# Patient Record
Sex: FEMALE | Race: WHITE | NOT HISPANIC OR LATINO | Employment: OTHER | URBAN - METROPOLITAN AREA
[De-identification: names, ages, dates, MRNs, and addresses within clinical notes are randomized per-mention and may not be internally consistent; named-entity substitution may affect disease eponyms.]

---

## 2017-01-05 ENCOUNTER — OFFICE VISIT (OUTPATIENT)
Dept: DERMATOLOGY | Facility: CLINIC | Age: 77
End: 2017-01-05

## 2017-01-05 DIAGNOSIS — M06.9 RHEUMATOID ARTHRITIS FLARE (H): Primary | ICD-10-CM

## 2017-01-05 DIAGNOSIS — L88 PYODERMA GANGRENOSUM (H): ICD-10-CM

## 2017-01-05 ASSESSMENT — PAIN SCALES - GENERAL: PAINLEVEL: NO PAIN (0)

## 2017-01-05 NOTE — NURSING NOTE
"Dermatology Rooming Note    Abida Hahn's goals for this visit include:   Chief Complaint   Patient presents with     Derm Problem     Ulcer on ankle. Abida states that this is \"wonderful\" since her last visit.     La Nena Figueroa, CMA    "

## 2017-01-05 NOTE — Clinical Note
"1/5/2017       RE: Abida Hahn  [address]  [facility] [address]     Dear Colleague,    Thank you for referring your patient, Abida Hahn, to the [facility] DERMATOLOGY at [facility]. Please see a copy of my visit note below.    [facility] Dermatology Note      Dermatology Problem List:  1 Skin cancer screening 2011 with Dr Garza: resolving herpes on face, Seborrheic keratoses, acrochordons, cherry angiomas  2. Skin eruption to her left ankle, Seen by Dr. Johnson Meraz at [facility] Dermatology   3. Personal hx of eczema in her youth    CC:   Chief Complaint   Patient presents with     Derm Problem     Ulcer on ankle. Abida states that this is \"wonderful\" since her last visit.         Encounter Date: Jan 5, 2017    History of Present Illness:  Ms. Abida Hahn is a 76 year old female with history pyoderma gangrenosum. She was last seen here 12/01/2016 when 2.0 total cc Kenalog 10 mg/cc were injected into pyoderma grangrenosum lesions and her minocycline was reduced to 100 mg. She has continued to apply mupirocin ointment daily.     Since then, Abida is elated to report significant closure and involution of her lesions. No lesions are becoming worse; all seem to be \"closing up.\" she presents today for further treatment with intralesional Kenalog.     She is having worse joint stiffness and is considering seeing a rheumatologist again. She would like a referral to another provider here. She was hospitalized recently at [facility] for chest pain but no cardiac etiology was found. Other than that , she is feeling well. The patient denies GI upset, headaches, blurred vision. No other skin complaints.      She is here today with her daughter.         Past Medical History:   Patient Active Problem List   Diagnosis     Chronic pain syndrome     Essential hypertension     Rheumatoid arthritis (H)     Encounter for long-term current use of medication     " Seborrheic keratosis     Hemangioma     Acrochordon     Pyoderma gangrenosum     Past Medical History   Diagnosis Date     HTN (hypertension)      Glaucoma      Cataract 8/2011     Right s/p surgery 8/2011; left s/p surgery     Wrist fracture      Right, s/p ORIF     Abscess, toe 2009     Right great toe     Headache(784.0)      Relieved with gabapentin. Chronic pain     RA (rheumatoid arthritis) (H)      Past Surgical History   Procedure Laterality Date     Open reduction internal fixation wrist bilateral       Biopsy artery temporal       Extracapsular cataract extration with intraocular lens implant  8/2011     Left 2 years ago as well       Social History:  The patient is single. The patient denies use of tanning beds. Patiently awaiting great grandchildren from her 24-year-old granddaughter, whom she loves very much.    Family History:  There is no family history of asthma, eczema or allergies.    Medications:  Current Outpatient Prescriptions   Medication Sig Dispense Refill     triamcinolone acetonide (KENALOG) 10 MG/ML injection Inject 1 mL (10 mg) into the skin once for 1 dose 5 mL 0     minocycline (MINOCIN/DYNACIN) 100 MG capsule Take 1 capsule (100 mg) by mouth daily 60 capsule 0     mupirocin (BACTROBAN) 2 % ointment Apply twice daily to areas on the left ankle. 30 g 3     clobetasol (TEMOVATE) 0.05 % ointment Apply topically 2 times daily To affected areas, avoiding face. 60 g 1     hydrocortisone 1 % ointment Apply topically 2 times daily To affected area on right upper cheek 25 g 1     folic acid 800 MCG TABS Take by mouth 2 times daily       losartan (COZAAR) 25 MG tablet Take 25 mg by mouth 2 times daily       Cholecalciferol (VITAMIN D-3) 1000 UNITS CAPS Take by mouth 2 times daily       acetaminophen (TYLENOL) 500 MG tablet Take 500-1,000 mg by mouth every 6 hours as needed       levothyroxine (SYNTHROID, LEVOTHROID) 88 MCG tablet Take 88 mcg by mouth daily       ARTIFICIAL TEAR SOLUTION OP Apply   to eye as needed.       Calcium-Vitamin D (CALCIUM + D PO) Take 1 tablet by mouth 2 times daily       Multiple Vitamin (DAILY MULTIVITAMIN PO) Take 1 tablet by mouth daily And minerals per pt       Allergies   Allergen Reactions     Ibuprofen Sodium      Penicillins      Triple Antibiotic [Neomycin-Polymyxin-Dexameth]          Review of Systems:  -Skin/Heme New Pt: The patient denies frequent sun exposure. The patient denies excessive scarring or problems healing except as per HPI. The patient denies excessive bleeding.  -Constitutional: The patient denies fatigue, fevers, chills, unintended weight loss, and night sweats.  -HEENT: Patient denies nonhealing oral sores.  -Skin: As above in HPI. No additional skin concerns.  -The patient denies GI upset, headaches, blurred vision or dyspigmentation.    Physical exam:  Vitals: There were no vitals taken for this visit.  GEN: This is a well developed, well-nourished female in no acute distress, in a pleasant mood.    SKIN: Total skin excluding the genitalia and breast areas was performed. The exam included the head/face, neck, both arms, chest, back, abdomen, both legs, digits and/or nails. Significant for:   Faint pink plaques with gray rolling borders to her left anterior and lateral lower leg, no erythema. The ulcer on the left lower anterior ankle measures 2 x 1 cm. Non tender.   Edema noted to to the bilateral lower legs.   -No other lesions of concern on areas examined.     Impression/Plan:    Pyoderma gangrenosum- improving with minocycline and kenalog injections. Skin eruption primarily to the left lower extremity. Resolved on right upper cheek.    Continue minocycline 100 mg daily . Minocycline side effects discussed.   Kenalog intralesional injection procedure note (performed by faculty): After verbal consent and discussion of risks including but not limited to atrophy, pain, and bruising, cleansing with isopropyl alcohol, time out was performed, 1.7 total cc  of Kenalog 10 mg/cc was injected into lesions on the left lower leg.  The patient tolerated the procedure well and left the Dermatology clinic in good condition.  Photodocumentation performed.   Continue mupirocin ointment twice daily.   She was referred again to rheumatology for reassessment of her rheumatoid arthritis.       CC Dr. Meraz and PMD on close of this encounter.        Staff Involved:    All risks, benefits and alternatives were discussed with patient.  Patient is in agreement and understands the assessment and plan.  All questions were answered.  Sun Screen Education was given.   Return to Clinic 4 weeks or sooner as needed.   Lillian De Luna PA-C       Pictures were placed in Pt's chart today for future reference.    Again, thank you for allowing me to participate in the care of your patient.      Sincerely,    Lillian De Luna PA-C

## 2017-01-05 NOTE — MR AVS SNAPSHOT
After Visit Summary   1/5/2017    Abida Hahn    MRN: [id number]           Patient Information     Date Of Birth          1940        Visit Information        Provider Department      1/5/2017 12:15 PM Lillian De Luna PA-C [facility] Dermatology        Today's Diagnoses     Rheumatoid arthritis flare (H)    -  1        Follow-ups after your visit        Additional Services     RHEUMATOLOGY REFERRAL       Your provider has referred you to: [facility]: Rheumatology Clinic [facility] [phone]   [email or web address]    Please be aware that coverage of these services is subject to the terms and limitations of your health insurance plan.  Call member services at your health plan with any benefit or coverage questions.      Please bring the following with you to your appointment:    (1) Any X-Rays, CTs or MRIs which have been performed.  Contact the facility where they were done to arrange for  prior to your scheduled appointment.    (2) List of current medications   (3) This referral request   (4) Any documents/labs given to you for this referral                  Your next 10 appointments already scheduled     Feb 02, 2017 12:15 PM   (Arrive by 12:00 PM)   Return Visit with VIKTORIA Rocha [facility] Dermatology ([facility] [place])    [address] [place] [id number] [phone]              Who to contact     Please call your clinic at [phone] to:    Ask questions about your health    Make or cancel appointments    Discuss your medicines    Learn about your test results    Speak to your doctor   If you have compliments or concerns about an experience at your clinic, or if you wish to file a complaint, please contact [facility] Patient Relations at [phone] or email us at [email or web address]         Additional Information About Your Visit         Care EveryWhere ID     This is your Care EveryWhere ID. This could be used by other organizations to access your [place] medical records  [id number]         Blood Pressure from Last 3 Encounters:   07/07/16 151/83   12/23/11 143/77   10/17/11 132/84    Weight from Last 3 Encounters:   12/23/11 92.987 kg (205 lb)   10/17/11 90.447 kg (199 lb 6.4 oz)   08/18/11 89.086 kg (196 lb 6.4 oz)              We Performed the Following     RHEUMATOLOGY REFERRAL        Primary Care Provider Office Phone # Fax #    Zeinab Giang [phone]        [facility] [address]  [facility] [address]        Thank you!     Thank you for choosing [facility] DERMATOLOGY  for your care. Our goal is always to provide you with excellent care. Hearing back from our patients is one way we can continue to improve our services. Please take a few minutes to complete the written survey that you may receive in the mail after your visit with us. Thank you!             Your Updated Medication List - Protect others around you: Learn how to safely use, store and throw away your medicines at www.disposemymeds.org.          This list is accurate as of: 1/5/17 12:56 PM.  Always use your most recent med list.                   Brand Name Dispense Instructions for use    acetaminophen 500 MG tablet    TYLENOL     Take 500-1,000 mg by mouth every 6 hours as needed       ARTIFICIAL TEAR SOLUTION OP      Apply  to eye as needed.       CALCIUM + D PO      Take 1 tablet by mouth 2 times daily       clobetasol 0.05 % ointment    TEMOVATE    60 g    Apply topically 2 times daily To affected areas, avoiding face.       DAILY MULTIVITAMIN PO      Take 1 tablet by mouth daily And minerals per pt       folic acid 800 MCG Tabs      Take by mouth 2 times daily       hydrocortisone 1 % ointment     25 g    Apply topically 2 times daily To affected area on right upper cheek       levothyroxine 88 MCG tablet    SYNTHROID/LEVOTHROID      Take 88 mcg by mouth daily       losartan 25 MG tablet    COZAAR     Take 25 mg by mouth 2 times daily       minocycline 100 MG capsule    MINOCIN/DYNACIN    60 capsule    Take 1 capsule (100 mg) by mouth daily       mupirocin 2 % ointment    BACTROBAN    30 g    Apply twice daily to areas on the left ankle.       Vitamin D-3 1000 UNITS Caps      Take by mouth 2 times daily

## 2017-01-05 NOTE — PROGRESS NOTES
"[facility] Dermatology Note      Dermatology Problem List:  1 Skin cancer screening 2011 with Dr Garza: resolving herpes on face, Seborrheic keratoses, acrochordons, cherry angiomas  2. Skin eruption to her left ankle, Seen by Dr. Johnson Meraz at [facility] Dermatology   3. Personal hx of eczema in her youth    CC:   Chief Complaint   Patient presents with     Derm Problem     Ulcer on ankle. Abida states that this is \"wonderful\" since her last visit.         Encounter Date: Jan 5, 2017    History of Present Illness:  Ms. Abida Hahn is a 76 year old female with history pyoderma gangrenosum. She was last seen here 12/01/2016 when 2.0 total cc Kenalog 10 mg/cc were injected into pyoderma grangrenosum lesions and her minocycline was reduced to 100 mg. She has continued to apply mupirocin ointment daily.     Since then, Abida is elated to report significant closure and involution of her lesions. No lesions are becoming worse; all seem to be \"closing up.\" she presents today for further treatment with intralesional Kenalog.     She is having worse joint stiffness and is considering seeing a rheumatologist again. She would like a referral to another provider here. She was hospitalized recently at [facility] for chest pain but no cardiac etiology was found. Other than that , she is feeling well. The patient denies GI upset, headaches, blurred vision. No other skin complaints.      She is here today with her daughter.         Past Medical History:   Patient Active Problem List   Diagnosis     Chronic pain syndrome     Essential hypertension     Rheumatoid arthritis (H)     Encounter for long-term current use of medication     Seborrheic keratosis     Hemangioma     Acrochordon     Pyoderma gangrenosum     Past Medical History   Diagnosis Date     HTN (hypertension)      Glaucoma      Cataract 8/2011     Right s/p surgery 8/2011; left s/p surgery     Wrist fracture      Right, s/p ORIF     Abscess, toe 2009 "     Right great toe     Headache(784.0)      Relieved with gabapentin. Chronic pain     RA (rheumatoid arthritis) (H)      Past Surgical History   Procedure Laterality Date     Open reduction internal fixation wrist bilateral       Biopsy artery temporal       Extracapsular cataract extration with intraocular lens implant  8/2011     Left 2 years ago as well       Social History:  The patient is single. The patient denies use of tanning beds. Patiently awaiting great grandchildren from her 24-year-old granddaughter, whom she loves very much.    Family History:  There is no family history of asthma, eczema or allergies.    Medications:  Current Outpatient Prescriptions   Medication Sig Dispense Refill     triamcinolone acetonide (KENALOG) 10 MG/ML injection Inject 1 mL (10 mg) into the skin once for 1 dose 5 mL 0     minocycline (MINOCIN/DYNACIN) 100 MG capsule Take 1 capsule (100 mg) by mouth daily 60 capsule 0     mupirocin (BACTROBAN) 2 % ointment Apply twice daily to areas on the left ankle. 30 g 3     clobetasol (TEMOVATE) 0.05 % ointment Apply topically 2 times daily To affected areas, avoiding face. 60 g 1     hydrocortisone 1 % ointment Apply topically 2 times daily To affected area on right upper cheek 25 g 1     folic acid 800 MCG TABS Take by mouth 2 times daily       losartan (COZAAR) 25 MG tablet Take 25 mg by mouth 2 times daily       Cholecalciferol (VITAMIN D-3) 1000 UNITS CAPS Take by mouth 2 times daily       acetaminophen (TYLENOL) 500 MG tablet Take 500-1,000 mg by mouth every 6 hours as needed       levothyroxine (SYNTHROID, LEVOTHROID) 88 MCG tablet Take 88 mcg by mouth daily       ARTIFICIAL TEAR SOLUTION OP Apply  to eye as needed.       Calcium-Vitamin D (CALCIUM + D PO) Take 1 tablet by mouth 2 times daily       Multiple Vitamin (DAILY MULTIVITAMIN PO) Take 1 tablet by mouth daily And minerals per pt       Allergies   Allergen Reactions     Ibuprofen Sodium      Penicillins      Triple  Antibiotic [Neomycin-Polymyxin-Dexameth]          Review of Systems:  -Skin/Heme New Pt: The patient denies frequent sun exposure. The patient denies excessive scarring or problems healing except as per HPI. The patient denies excessive bleeding.  -Constitutional: The patient denies fatigue, fevers, chills, unintended weight loss, and night sweats.  -HEENT: Patient denies nonhealing oral sores.  -Skin: As above in HPI. No additional skin concerns.  -The patient denies GI upset, headaches, blurred vision or dyspigmentation.    Physical exam:  Vitals: There were no vitals taken for this visit.  GEN: This is a well developed, well-nourished female in no acute distress, in a pleasant mood.    SKIN: Total skin excluding the genitalia and breast areas was performed. The exam included the head/face, neck, both arms, chest, back, abdomen, both legs, digits and/or nails. Significant for:   Faint pink plaques with gray rolling borders to her left anterior and lateral lower leg, no erythema. The ulcer on the left lower anterior ankle measures 2 x 1 cm. Non tender.   Edema noted to to the bilateral lower legs.   -No other lesions of concern on areas examined.     Impression/Plan:    Pyoderma gangrenosum- improving with minocycline and kenalog injections. Skin eruption primarily to the left lower extremity. Resolved on right upper cheek.    Continue minocycline 100 mg daily . Minocycline side effects discussed.   Kenalog intralesional injection procedure note (performed by faculty): After verbal consent and discussion of risks including but not limited to atrophy, pain, and bruising, cleansing with isopropyl alcohol, time out was performed, 1.7 total cc of Kenalog 10 mg/cc was injected into lesions on the left lower leg.  The patient tolerated the procedure well and left the Dermatology clinic in good condition.  Photodocumentation performed.   Continue mupirocin ointment twice daily.   She was referred again to rheumatology for  reassessment of her rheumatoid arthritis.       CC Dr. Meraz and PMD on close of this encounter.        Staff Involved:    All risks, benefits and alternatives were discussed with patient.  Patient is in agreement and understands the assessment and plan.  All questions were answered.  Sun Screen Education was given.   Return to Clinic 4 weeks or sooner as needed.   Lillian De Luna PA-C

## 2017-01-13 ENCOUNTER — TELEPHONE (OUTPATIENT)
Dept: RHEUMATOLOGY | Facility: CLINIC | Age: 77
End: 2017-01-13

## 2017-01-13 NOTE — TELEPHONE ENCOUNTER
Left message requesting a return call.    Discussed referral with Dr. Mccurdy, as he was the last to see this pt, and was seeing this in conjunction with Alessio Lofton CNP.  Dr. Mccurdy has agreed to see this pt again, is ok with a return slot, and will see this pt when he next has an opening.    Cheryl Peralta RN  Rheumatology Clinic

## 2017-01-16 NOTE — TELEPHONE ENCOUNTER
Pt returned call today.  Pt had been seeing Dr. Mccurdy/Alessio Lofton when her insurance changed and she had to transfer rheumatologist.  Pt's insurance changed back recently and she would prefer to transfer care back to Dr. Mccurdy.  Was able to schedule an appt for 2/8 with Dr. Mccurdy.  Pt is currently off MTX as she had a bacterial infection and could not be on it.  Pt is having flares and her mobility is decreasing and she would like to be able to move better.  Pt is continuing to walk daily as able at her senior care living.  Pt thinks she should be doing more.  Pt is looking to get treatment going again.  Pt is states she is still able to move but does feel like she needs to be back on medication.      Cheryl Peralta RN  Rheumatology Clinic

## 2017-02-02 ENCOUNTER — OFFICE VISIT (OUTPATIENT)
Dept: INTERNAL MEDICINE | Facility: CLINIC | Age: 77
End: 2017-02-02

## 2017-02-02 ENCOUNTER — OFFICE VISIT (OUTPATIENT)
Dept: DERMATOLOGY | Facility: CLINIC | Age: 77
End: 2017-02-02

## 2017-02-02 VITALS
SYSTOLIC BLOOD PRESSURE: 179 MMHG | DIASTOLIC BLOOD PRESSURE: 109 MMHG | BODY MASS INDEX: 32.77 KG/M2 | HEIGHT: 66 IN | RESPIRATION RATE: 18 BRPM | HEART RATE: 76 BPM | WEIGHT: 203.9 LBS | OXYGEN SATURATION: 97 %

## 2017-02-02 DIAGNOSIS — M06.9 RHEUMATOID ARTHRITIS OF HAND, UNSPECIFIED LATERALITY, UNSPECIFIED RHEUMATOID FACTOR PRESENCE: Primary | ICD-10-CM

## 2017-02-02 DIAGNOSIS — G89.4 CHRONIC PAIN SYNDROME: ICD-10-CM

## 2017-02-02 DIAGNOSIS — R06.02 SOB (SHORTNESS OF BREATH): ICD-10-CM

## 2017-02-02 DIAGNOSIS — Z00.00 HEALTHCARE MAINTENANCE: ICD-10-CM

## 2017-02-02 DIAGNOSIS — R32 URINARY INCONTINENCE, UNSPECIFIED TYPE: ICD-10-CM

## 2017-02-02 DIAGNOSIS — L88 PYODERMA GANGRENOSUM (H): ICD-10-CM

## 2017-02-02 DIAGNOSIS — Z12.31 ENCOUNTER FOR SCREENING MAMMOGRAM FOR BREAST CANCER: ICD-10-CM

## 2017-02-02 DIAGNOSIS — L88 PYODERMA GANGRENOSUM (H): Primary | ICD-10-CM

## 2017-02-02 DIAGNOSIS — I10 ESSENTIAL HYPERTENSION: ICD-10-CM

## 2017-02-02 PROBLEM — E03.9 HYPOTHYROIDISM: Status: ACTIVE | Noted: 2017-02-02

## 2017-02-02 LAB
ALBUMIN SERPL-MCNC: 3.8 G/DL (ref 3.4–5)
ALBUMIN UR-MCNC: NEGATIVE MG/DL
ALP SERPL-CCNC: 334 U/L (ref 40–150)
ALT SERPL W P-5'-P-CCNC: 22 U/L (ref 0–50)
ANION GAP SERPL CALCULATED.3IONS-SCNC: 7 MMOL/L (ref 3–14)
APPEARANCE UR: CLEAR
AST SERPL W P-5'-P-CCNC: 14 U/L (ref 0–45)
BASOPHILS # BLD AUTO: 0.1 10E9/L (ref 0–0.2)
BASOPHILS NFR BLD AUTO: 1.4 %
BILIRUB SERPL-MCNC: 0.5 MG/DL (ref 0.2–1.3)
BILIRUB UR QL STRIP: NEGATIVE
BUN SERPL-MCNC: 18 MG/DL (ref 7–30)
CALCIUM SERPL-MCNC: 10.3 MG/DL (ref 8.5–10.1)
CHLORIDE SERPL-SCNC: 102 MMOL/L (ref 94–109)
CO2 SERPL-SCNC: 31 MMOL/L (ref 20–32)
COLOR UR AUTO: YELLOW
CREAT SERPL-MCNC: 0.81 MG/DL (ref 0.52–1.04)
DIFFERENTIAL METHOD BLD: ABNORMAL
EOSINOPHIL # BLD AUTO: 0.1 10E9/L (ref 0–0.7)
EOSINOPHIL NFR BLD AUTO: 1.4 %
ERYTHROCYTE [DISTWIDTH] IN BLOOD BY AUTOMATED COUNT: 13 % (ref 10–15)
GFR SERPL CREATININE-BSD FRML MDRD: 69 ML/MIN/1.7M2
GLUCOSE SERPL-MCNC: 113 MG/DL (ref 70–99)
GLUCOSE UR STRIP-MCNC: NEGATIVE MG/DL
HCT VFR BLD AUTO: 48.7 % (ref 35–47)
HGB BLD-MCNC: 15.7 G/DL (ref 11.7–15.7)
HGB UR QL STRIP: NEGATIVE
HYALINE CASTS #/AREA URNS LPF: 1 /LPF (ref 0–2)
IMM GRANULOCYTES # BLD: 0 10E9/L (ref 0–0.4)
IMM GRANULOCYTES NFR BLD: 0.4 %
KETONES UR STRIP-MCNC: NEGATIVE MG/DL
LEUKOCYTE ESTERASE UR QL STRIP: NEGATIVE
LYMPHOCYTES # BLD AUTO: 0.9 10E9/L (ref 0.8–5.3)
LYMPHOCYTES NFR BLD AUTO: 15.3 %
MCH RBC QN AUTO: 32.6 PG (ref 26.5–33)
MCHC RBC AUTO-ENTMCNC: 32.2 G/DL (ref 31.5–36.5)
MCV RBC AUTO: 101 FL (ref 78–100)
MONOCYTES # BLD AUTO: 0.3 10E9/L (ref 0–1.3)
MONOCYTES NFR BLD AUTO: 4.4 %
MUCOUS THREADS #/AREA URNS LPF: PRESENT /LPF
NEUTROPHILS # BLD AUTO: 4.4 10E9/L (ref 1.6–8.3)
NEUTROPHILS NFR BLD AUTO: 77.1 %
NITRATE UR QL: NEGATIVE
NRBC # BLD AUTO: 0 10*3/UL
NRBC BLD AUTO-RTO: 0 /100
PH UR STRIP: 6 PH (ref 5–7)
PLATELET # BLD AUTO: 209 10E9/L (ref 150–450)
POTASSIUM SERPL-SCNC: 4.1 MMOL/L (ref 3.4–5.3)
PROT SERPL-MCNC: 7.5 G/DL (ref 6.8–8.8)
RBC # BLD AUTO: 4.82 10E12/L (ref 3.8–5.2)
RBC #/AREA URNS AUTO: 1 /HPF (ref 0–2)
SODIUM SERPL-SCNC: 140 MMOL/L (ref 133–144)
SP GR UR STRIP: 1.01 (ref 1–1.03)
TROPONIN I SERPL-MCNC: NORMAL UG/L (ref 0–0.04)
URN SPEC COLLECT METH UR: ABNORMAL
UROBILINOGEN UR STRIP-MCNC: 0 MG/DL (ref 0–2)
WBC # BLD AUTO: 5.7 10E9/L (ref 4–11)
WBC #/AREA URNS AUTO: <1 /HPF (ref 0–2)

## 2017-02-02 RX ORDER — LISINOPRIL 20 MG/1
20 TABLET ORAL DAILY
Qty: 90 TABLET | Refills: 0 | Status: SHIPPED | OUTPATIENT
Start: 2017-02-02 | End: 2017-05-02

## 2017-02-02 RX ORDER — MINOCYCLINE HYDROCHLORIDE 50 MG/1
50 CAPSULE ORAL DAILY
Qty: 90 CAPSULE | Refills: 0 | Status: SHIPPED | OUTPATIENT
Start: 2017-02-02 | End: 2017-02-02

## 2017-02-02 ASSESSMENT — PAIN SCALES - GENERAL
PAINLEVEL: MODERATE PAIN (4)
PAINLEVEL: NO PAIN (0)

## 2017-02-02 NOTE — PATIENT INSTRUCTIONS
Primary Care Center Phone Number [phone]  Primary Care Center Medication Refill Request Information:  * Please contact your pharmacy regarding ANY request for medication refills.  ** [facility] Prescription Fax = [phone]  * Please allow 3 business days for routine medication refills.  * Please allow 5 business days for controlled substance medication refills.     Primary Care Center Test Result notification information:  *You will be notified with in 7-10 days of your appointment day regarding the results of your test.  If you are on MyChart you will be notified as soon as the provider has reviewed the results and signed off on them.        Please schedule the following appointments:  Mammogram:  Breast Center ([facility]) [phone] ([facility])  Breast Center ([facility]) [phone] ([address])   Dexa Scan: Radiology ([facility]) [phone]

## 2017-02-02 NOTE — LETTER
"2/2/2017       RE: Abida Hahn  [address]  [facility] [address]     Dear Colleague,    Thank you for referring your patient, Abida Hahn, to the [facility] DERMATOLOGY at [facility]. Please see a copy of my visit note below.    [facility] Dermatology Note      Dermatology Problem List:  1 Skin cancer screening 2011 with Dr aGrza: resolving herpes on face, Seborrheic keratoses, acrochordons, cherry angiomas  2. Skin eruption to her left ankle, Seen by Dr. Johnson Meraz at [facility] Dermatology   3. Personal hx of eczema in her youth  4. Pyoderma gangrenosum- minocycyline, clobetasol, kenalog injections.    CC:   Chief Complaint   Patient presents with     Derm Problem     Abida is here for leg wound follow up, states \" It's looking wonderful.\"         Encounter Date: Feb 2, 2017    History of Present Illness:  Ms. Abida Hahn is a 76 year old female with history pyoderma gangrenosum. She was last seen here 1/05/2016 when 1.7 total cc Kenalog 10 mg/cc were injected into pyoderma grangrenosum lesions and her minocycline was continued on 100 mg daily. She has continued to apply mupirocin ointment daily.     Since then, Abida continues to report her lesions seem to be closing and she has not noticed any worsening or new symptoms.     No lesions are becoming worse; all seem to be \"closing up.\" she presents today for further treatment with intralesional Kenalog.     She is having worse joint stiffness and is planning on seeing her previous rheumatologist at the [facility].She is taking tylenol and aspirin with improvement. She is having worsening shortness of breath during excerption and is planning of follow up with primary regarding this. Other than that , she is feeling well. The patient denies GI upset, headaches, blurred vision. No other skin complaints.            Past Medical History:   Patient Active Problem List   Diagnosis     " Chronic pain syndrome     Essential hypertension     Rheumatoid arthritis (H)     Encounter for long-term current use of medication     Seborrheic keratosis     Hemangioma     Acrochordon     Pyoderma gangrenosum     Past Medical History   Diagnosis Date     HTN (hypertension)      Glaucoma      Cataract 8/2011     Right s/p surgery 8/2011; left s/p surgery     Wrist fracture      Right, s/p ORIF     Abscess, toe 2009     Right great toe     Headache(784.0)      Relieved with gabapentin. Chronic pain     RA (rheumatoid arthritis) (H)      Past Surgical History   Procedure Laterality Date     Open reduction internal fixation wrist bilateral       Biopsy artery temporal       Extracapsular cataract extration with intraocular lens implant  8/2011     Left 2 years ago as well       Social History:  The patient is single. The patient denies use of tanning beds. Patiently awaiting great grandchildren from her 24-year-old granddaughter, whom she loves very much.    Family History:  There is no family history of asthma, eczema or allergies.    Medications:  Current Outpatient Prescriptions   Medication Sig Dispense Refill     minocycline (MINOCIN/DYNACIN) 100 MG capsule Take 1 capsule (100 mg) by mouth daily 60 capsule 0     mupirocin (BACTROBAN) 2 % ointment Apply twice daily to areas on the left ankle. 30 g 3     clobetasol (TEMOVATE) 0.05 % ointment Apply topically 2 times daily To affected areas, avoiding face. 60 g 1     hydrocortisone 1 % ointment Apply topically 2 times daily To affected area on right upper cheek 25 g 1     folic acid 800 MCG TABS Take by mouth 2 times daily       losartan (COZAAR) 25 MG tablet Take 25 mg by mouth 2 times daily       Cholecalciferol (VITAMIN D-3) 1000 UNITS CAPS Take by mouth 2 times daily       acetaminophen (TYLENOL) 500 MG tablet Take 500-1,000 mg by mouth every 6 hours as needed       levothyroxine (SYNTHROID, LEVOTHROID) 88 MCG tablet Take 88 mcg by mouth daily       ARTIFICIAL  TEAR SOLUTION OP Apply  to eye as needed.       Calcium-Vitamin D (CALCIUM + D PO) Take 1 tablet by mouth 2 times daily       Multiple Vitamin (DAILY MULTIVITAMIN PO) Take 1 tablet by mouth daily And minerals per pt       Allergies   Allergen Reactions     Ibuprofen Sodium      Penicillins      Triple Antibiotic [Neomycin-Polymyxin-Dexameth]          Review of Systems:  -Skin/Heme New Pt: The patient denies frequent sun exposure. The patient denies excessive scarring or problems healing except as per HPI. The patient denies excessive bleeding.  -Constitutional: The patient denies fatigue, fevers, chills, unintended weight loss, and night sweats.  -HEENT: Patient denies nonhealing oral sores.  -Skin: As above in HPI. No additional skin concerns.  -The patient denies GI upset, headaches, blurred vision or dyspigmentation.    Physical exam:  Vitals: There were no vitals taken for this visit.  GEN: This is a well developed, well-nourished female in no acute distress, in a pleasant mood.    SKIN: Total skin excluding the genitalia and breast areas was performed. The exam included the head/face, neck, both arms, chest, back, abdomen, both legs, digits and/or nails. Significant for:   Faint pink plaques with gray rolling borders to her left anterior and lateral lower leg, no erythema. The ulcer on the left lower anterior ankle measures 2 x 1 cm. Non tender.   Less edema noted to to the bilateral lower legs.   -No other lesions of concern on areas examined.     Impression/Plan:    Pyoderma gangrenosum- improving with minocycline and kenalog injections. Skin eruption primarily to the left lower extremity.     Decrease minocycline to 50 mg daily . Minocycline side effects discussed.   Kenalog intralesional injection procedure note (performed by faculty): After verbal consent and discussion of risks including but not limited to atrophy, pain, and bruising, cleansing with isopropyl alcohol, time out was performed, 2.0 total cc  of Kenalog 10 mg/cc was injected into lesions on the left lower leg.  The patient tolerated the procedure well and left the Dermatology clinic in good condition.  Photodocumentation performed.     Continue mupirocin ointment twice daily.       CC Dr. Meraz and PMD on close of this encounter.        Staff Involved:    All risks, benefits and alternatives were discussed with patient.  Patient is in agreement and understands the assessment and plan.  All questions were answered.  Sun Screen Education was given.   Return to Clinic 4 weeks or sooner as needed.   Lillian De Luna PA-C

## 2017-02-02 NOTE — NURSING NOTE
"Dermatology Rooming Note    Abida Hahn's goals for this visit include:   Chief Complaint   Patient presents with     Derm Problem     Abida is here for leg wound follow up, states \" It's looking wonderful.\"     Lou Brown LPN  "

## 2017-02-02 NOTE — PROGRESS NOTES
"[facility] Dermatology Note      Dermatology Problem List:  1 Skin cancer screening 2011 with Dr Garza: resolving herpes on face, Seborrheic keratoses, acrochordons, cherry angiomas  2. Skin eruption to her left ankle, Seen by Dr. Johnson Meraz at [facility] Dermatology   3. Personal hx of eczema in her youth  4. Pyoderma gangrenosum- minocycyline, clobetasol, kenalog injections.    CC:   Chief Complaint   Patient presents with     Derm Problem     Abida is here for leg wound follow up, states \" It's looking wonderful.\"         Encounter Date: Feb 2, 2017    History of Present Illness:  Ms. Abida Hahn is a 76 year old female with history pyoderma gangrenosum. She was last seen here 1/05/2016 when 1.7 total cc Kenalog 10 mg/cc were injected into pyoderma grangrenosum lesions and her minocycline was continued on 100 mg daily. She has continued to apply mupirocin ointment daily.     Since then, Abida continues to report her lesions seem to be closing and she has not noticed any worsening or new symptoms.     No lesions are becoming worse; all seem to be \"closing up.\" she presents today for further treatment with intralesional Kenalog.     She is having worse joint stiffness and is planning on seeing her previous rheumatologist at the [facility].She is taking tylenol and aspirin with improvement. She is having worsening shortness of breath during excerption and is planning of follow up with primary regarding this. Other than that , she is feeling well. The patient denies GI upset, headaches, blurred vision. No other skin complaints.            Past Medical History:   Patient Active Problem List   Diagnosis     Chronic pain syndrome     Essential hypertension     Rheumatoid arthritis (H)     Encounter for long-term current use of medication     Seborrheic keratosis     Hemangioma     Acrochordon     Pyoderma gangrenosum     Past Medical History   Diagnosis Date     HTN (hypertension)      " Glaucoma      Cataract 8/2011     Right s/p surgery 8/2011; left s/p surgery     Wrist fracture      Right, s/p ORIF     Abscess, toe 2009     Right great toe     Headache(784.0)      Relieved with gabapentin. Chronic pain     RA (rheumatoid arthritis) (H)      Past Surgical History   Procedure Laterality Date     Open reduction internal fixation wrist bilateral       Biopsy artery temporal       Extracapsular cataract extration with intraocular lens implant  8/2011     Left 2 years ago as well       Social History:  The patient is single. The patient denies use of tanning beds. Patiently awaiting great grandchildren from her 24-year-old granddaughter, whom she loves very much.    Family History:  There is no family history of asthma, eczema or allergies.    Medications:  Current Outpatient Prescriptions   Medication Sig Dispense Refill     minocycline (MINOCIN/DYNACIN) 100 MG capsule Take 1 capsule (100 mg) by mouth daily 60 capsule 0     mupirocin (BACTROBAN) 2 % ointment Apply twice daily to areas on the left ankle. 30 g 3     clobetasol (TEMOVATE) 0.05 % ointment Apply topically 2 times daily To affected areas, avoiding face. 60 g 1     hydrocortisone 1 % ointment Apply topically 2 times daily To affected area on right upper cheek 25 g 1     folic acid 800 MCG TABS Take by mouth 2 times daily       losartan (COZAAR) 25 MG tablet Take 25 mg by mouth 2 times daily       Cholecalciferol (VITAMIN D-3) 1000 UNITS CAPS Take by mouth 2 times daily       acetaminophen (TYLENOL) 500 MG tablet Take 500-1,000 mg by mouth every 6 hours as needed       levothyroxine (SYNTHROID, LEVOTHROID) 88 MCG tablet Take 88 mcg by mouth daily       ARTIFICIAL TEAR SOLUTION OP Apply  to eye as needed.       Calcium-Vitamin D (CALCIUM + D PO) Take 1 tablet by mouth 2 times daily       Multiple Vitamin (DAILY MULTIVITAMIN PO) Take 1 tablet by mouth daily And minerals per pt       Allergies   Allergen Reactions     Ibuprofen Sodium       Penicillins      Triple Antibiotic [Neomycin-Polymyxin-Dexameth]          Review of Systems:  -Skin/Heme New Pt: The patient denies frequent sun exposure. The patient denies excessive scarring or problems healing except as per HPI. The patient denies excessive bleeding.  -Constitutional: The patient denies fatigue, fevers, chills, unintended weight loss, and night sweats.  -HEENT: Patient denies nonhealing oral sores.  -Skin: As above in HPI. No additional skin concerns.  -The patient denies GI upset, headaches, blurred vision or dyspigmentation.    Physical exam:  Vitals: There were no vitals taken for this visit.  GEN: This is a well developed, well-nourished female in no acute distress, in a pleasant mood.    SKIN: Total skin excluding the genitalia and breast areas was performed. The exam included the head/face, neck, both arms, chest, back, abdomen, both legs, digits and/or nails. Significant for:   Faint pink plaques with gray rolling borders to her left anterior and lateral lower leg, no erythema. The ulcer on the left lower anterior ankle measures 2 x 1 cm. Non tender.   Less edema noted to to the bilateral lower legs.   -No other lesions of concern on areas examined.     Impression/Plan:    Pyoderma gangrenosum- improving with minocycline and kenalog injections. Skin eruption primarily to the left lower extremity.     Decrease minocycline to 50 mg daily . Minocycline side effects discussed.   Kenalog intralesional injection procedure note (performed by faculty): After verbal consent and discussion of risks including but not limited to atrophy, pain, and bruising, cleansing with isopropyl alcohol, time out was performed, 2.0 total cc of Kenalog 10 mg/cc was injected into lesions on the left lower leg.  The patient tolerated the procedure well and left the Dermatology clinic in good condition.  Photodocumentation performed.     Continue mupirocin ointment twice daily.       CC Dr. Meraz and PMD on close of  this encounter.        Staff Involved:    All risks, benefits and alternatives were discussed with patient.  Patient is in agreement and understands the assessment and plan.  All questions were answered.  Sun Screen Education was given.   Return to Clinic 4 weeks or sooner as needed.   Lillian De Luna PA-C

## 2017-02-02 NOTE — NURSING NOTE
Chief Complaint   Patient presents with     Establish Care     pt is here to establish care with new PCP       Doris Kerns CMA at 1:19 PM on 2/2/2017

## 2017-02-02 NOTE — MR AVS SNAPSHOT
After Visit Summary   2/2/2017    Abida Hahn    MRN: [id number]           Patient Information     Date Of Birth          1940        Visit Information        Provider Department      2/2/2017 12:15 PM Lillian De Luna PA-C [facility] Dermatology        Today's Diagnoses     Pyoderma gangrenosum    -  1        Follow-ups after your visit        Your next 10 appointments already scheduled     Feb 02, 2017  1:35 PM   (Arrive by 1:20 PM)   New Patient Visit with Carmel Freitas MD   [facility] ([facility])    [address] [place] [address]  4th [facility] [address]   [phone]            Feb 08, 2017  8:00 AM   (Arrive by 7:45 AM)   Return Visit with Bebeto Mccurdy MD   [facility] ([facility])    [address] [place] [address]   [phone]            Mar 02, 2017 12:15 PM   (Arrive by 12:00 PM)   Return Visit with VIKTORIA Rocha [facility] Dermatology ([facility])    [address] [place] [address]   [phone]            Mar 16, 2017  8:30 AM   NEW GLAUCOMA with Meagan Mendez MD   [facility] Clinic ([facility])    Jonathan Joyce [address]  [facility] [address]   [phone]            Apr 21, 2017 10:30 AM   (Arrive by 10:15 AM)   Return Visit with Bebeto Mccurdy MD   [facility] ([facility])    [address] [place] [id number] [phone]              Who to contact     Please call your clinic at [phone] to:    Ask questions about your health    Make or cancel appointments    Discuss your medicines    Learn about your test results    Speak to your doctor   If you have compliments or concerns about an experience at your clinic, or if you wish to file a complaint, please contact [facility]  [facility] Patient Relations at [phone] or email us at [email or web address]         Additional Information About Your Visit        [facility] Information     [facility] is an electronic gateway that provides easy, online access to your medical records. With [facility], you can request a clinic appointment, read your test results, renew a prescription or communicate with your care team.     To sign up for [facility] visit the website at [email or web address]   You will be asked to enter the access code listed below, as well as some personal information. Please follow the directions to create your username and password.     Your access code is: [email or web address]  Expires: 5/3/2017 12:46 PM     Your access code will  in 90 days. If you need help or a new code, please contact your [facility] or call [phone] for assistance.        Care EveryWhere ID     This is your Care EveryWhere ID. This could be used by other organizations to access your [facility] medical records  [id number]         Blood Pressure from Last 3 Encounters:   16 151/83   11 143/77   10/17/11 132/84    Weight from Last 3 Encounters:   11 92.987 kg (205 lb)   10/17/11 90.447 kg (199 lb 6.4 oz)   11 89.086 kg (196 lb 6.4 oz)              We Performed the Following     INJECTION INTO SKIN LESIONS <=7          Today's Medication Changes          These changes are accurate as of: 17 12:46 PM.  If you have any questions, ask your nurse or doctor.               Start taking these medicines.        Dose/Directions    triamcinolone acetonide 10 MG/ML injection   Commonly known as:  KENALOG   Used for:  Pyoderma gangrenosum   Started by:  Lillian De Luna PA-C        Dose:  10 mg   Inject 1 mL (10 mg) into the skin once for 1 dose   Quantity:  5 mL   Refills:  0         These medicines have changed or have updated prescriptions.        Dose/Directions    * minocycline  100 MG capsule   Commonly known as:  MINOCIN/DYNACIN   This may have changed:  Another medication with the same name was added. Make sure you understand how and when to take each.   Used for:  Ulceration (H)   Changed by:  Lillian De Luna PA-C        Dose:  100 mg   Take 1 capsule (100 mg) by mouth daily   Quantity:  60 capsule   Refills:  0       * minocycline 50 MG capsule   Commonly known as:  MINOCIN/DYNACIN   This may have changed:  You were already taking a medication with the same name, and this prescription was added. Make sure you understand how and when to take each.   Used for:  Pyoderma gangrenosum   Changed by:  Lillian De Luna PA-C        Dose:  50 mg   Take 1 capsule (50 mg) by mouth daily   Quantity:  90 capsule   Refills:  0       * Notice:  This list has 2 medication(s) that are the same as other medications prescribed for you. Read the directions carefully, and ask your doctor or other care provider to review them with you.         Where to get your medicines      These medications were sent to [facility] [address] [place] [address] [address] AT [facility] [address]  [address] [facility] [address]    Hours:  24-hours Phone:  [phone]    - minocycline 50 MG capsule      Some of these will need a paper prescription and others can be bought over the counter.  Ask your nurse if you have questions.     You don't need a prescription for these medications    - triamcinolone acetonide 10 MG/ML injection             Primary Care Provider Office Phone # Fax #    Carmel Freitas [phone]       [facility] [address] [place] [address]        Thank you!     Thank you for choosing [facility] DERMATOLOGY  for your care. Our goal is always to provide you with excellent care. Hearing back from our patients is one way we can continue to improve our services. Please take a few minutes to complete the written  survey that you may receive in the mail after your visit with us. Thank you!             Your Updated Medication List - Protect others around you: Learn how to safely use, store and throw away your medicines at www.disposemymeds.org.          This list is accurate as of: 2/2/17 12:46 PM.  Always use your most recent med list.                   Brand Name Dispense Instructions for use    acetaminophen 500 MG tablet    TYLENOL     Take 500-1,000 mg by mouth every 6 hours as needed       ARTIFICIAL TEAR SOLUTION OP      Apply  to eye as needed.       CALCIUM + D PO      Take 1 tablet by mouth 2 times daily       clobetasol 0.05 % ointment    TEMOVATE    60 g    Apply topically 2 times daily To affected areas, avoiding face.       DAILY MULTIVITAMIN PO      Take 1 tablet by mouth daily And minerals per pt       folic acid 800 MCG Tabs      Take by mouth 2 times daily       hydrocortisone 1 % ointment     25 g    Apply topically 2 times daily To affected area on right upper cheek       levothyroxine 88 MCG tablet    SYNTHROID/LEVOTHROID     Take 88 mcg by mouth daily       losartan 25 MG tablet    COZAAR     Take 25 mg by mouth 2 times daily       * minocycline 100 MG capsule    MINOCIN/DYNACIN    60 capsule    Take 1 capsule (100 mg) by mouth daily       * minocycline 50 MG capsule    MINOCIN/DYNACIN    90 capsule    Take 1 capsule (50 mg) by mouth daily       mupirocin 2 % ointment    BACTROBAN    30 g    Apply twice daily to areas on the left ankle.       triamcinolone acetonide 10 MG/ML injection    KENALOG    5 mL    Inject 1 mL (10 mg) into the skin once for 1 dose       Vitamin D-3 1000 UNITS Caps      Take by mouth 2 times daily       * Notice:  This list has 2 medication(s) that are the same as other medications prescribed for you. Read the directions carefully, and ask your doctor or other care provider to review them with you.

## 2017-02-02 NOTE — NURSING NOTE
Drug Administration Record    Drug Name: triamcinolone acetonide(kenalog)  Dose: 2mL of triamcinolone 10mg/mL, 20mg dose  Route administered: ID  NDC #: Kenalog-10 ([address]-94)  Amount of waste(mL):3ml  Reason for waste: Single use vial

## 2017-02-02 NOTE — PROGRESS NOTES
SUBJECTIVE:                                                      Abida Hahn is a 76 year old female with a history of hypertension, hypothyroidism, and rheumatoid arthritis who presents to clinic today to establish care.     The patient reports that she has had increasing issues with her rheumatoid arthritis and chronic pain throughout the past several months. She describes a severe flare this past December, during which time she was having significant mobility issues. Though her pain has since improved, the patient continues to have difficulty with ambulation and with pain control on a daily basis. The patient is currently seeing Dermatology for management of a pyoderma gangrenosum infection of the left lower extremity. She discontinued her Methotrexate several months ago in order to help with wound healing, though her pain has become progressively more out of control while off of this medication. Her pain is worst in her lower extremity joints and low back. She currently rates her pain as 4/10 in severity and describes it as dull and aching. She has not ha any pain radiating in to the lower extremities or any numbness/tingling/weakness. She has been using Tylenol and Aspirin at home with only moderate relief of her symptoms. She denies any recent falls or traumatic injuries.     The patient also notes some increased shortness of breath throughout the past several weeks, which is intermittent. This occasionally occurs with exercise but more commonly occurs with rest. She does not experience any associated chest pain, palpitations, diaphoresis or nausea. She also denies recent cough, fevers, or chills. However, she does feel that she fatigues more easily.     In addition, the patient states that she has been experiencing progressively worsening urinary urgency. She states that she occasionally experineces incontinence, as she can not ambulate to the restroom in time. She has not experienced any other urinary  symptoms such as dysuria, hematuria, or frequency. She is managing her symptoms with underwear liners.     The patient would also like to discuss her hypertension today. She is currently taking Losartan 25 mg BID, though her blood pressures have not been at goal. She states that she had previously been on Lisinopril and seemed to be doing better on this medication. She has not been routinely monitoring her blood pressures in the outpatient setting.     Problem list and histories reviewed & adjusted, as indicated.    Patient Active Problem List   Diagnosis     Chronic pain syndrome     Essential hypertension     Rheumatoid arthritis (H)     Encounter for long-term current use of medication     Seborrheic keratosis     Hemangioma     Acrochordon     Pyoderma gangrenosum     Hypothyroidism     Past Surgical History   Procedure Laterality Date     Open reduction internal fixation wrist bilateral       Biopsy artery temporal       Extracapsular cataract extration with intraocular lens implant  8/2011     Left 2 years ago as well       Social History   Substance Use Topics     Smoking status: Former Smoker     Smokeless tobacco: Never Used      Comment: Quit in 20yrs      Alcohol Use: No     Family History   Problem Relation Age of Onset     CANCER Father      colon     Arthritis Mother      RA at 31 y/o, glaucome     Arthritis Brother      RA on Mtx and humira     Melanoma No family hx of      Skin Cancer No family hx of          Current Outpatient Prescriptions   Medication Sig Dispense Refill     GLUCOSAMINE SULFATE PO Take 1,000 mg by mouth daily       lisinopril (PRINIVIL/ZESTRIL) 20 MG tablet Take 1 tablet (20 mg) by mouth daily 90 tablet 0     minocycline (MINOCIN/DYNACIN) 100 MG capsule Take 1 capsule (100 mg) by mouth daily 60 capsule 0     mupirocin (BACTROBAN) 2 % ointment Apply twice daily to areas on the left ankle. 30 g 3     folic acid 800 MCG TABS Take by mouth 2 times daily       Cholecalciferol (VITAMIN  "D-3) 1000 UNITS CAPS Take by mouth 2 times daily       acetaminophen (TYLENOL) 500 MG tablet Take 500-1,000 mg by mouth every 6 hours as needed       levothyroxine (SYNTHROID, LEVOTHROID) 88 MCG tablet Take 88 mcg by mouth daily       ARTIFICIAL TEAR SOLUTION OP Apply  to eye as needed.       Calcium-Vitamin D (CALCIUM + D PO) Take 1 tablet by mouth 2 times daily       Multiple Vitamin (DAILY MULTIVITAMIN PO) Take 1 tablet by mouth daily And minerals per pt       hydrocortisone 1 % ointment Apply topically 2 times daily To affected area on right upper cheek 25 g 1     Allergies   Allergen Reactions     Ibuprofen Sodium      Penicillins      Triple Antibiotic [Neomycin-Polymyxin-Dexameth]      BP Readings from Last 3 Encounters:   02/02/17 179/109   07/07/16 151/83   12/23/11 143/77    Wt Readings from Last 3 Encounters:   02/02/17 92.488 kg (203 lb 14.4 oz)   12/23/11 92.987 kg (205 lb)   10/17/11 90.447 kg (199 lb 6.4 oz)          Labs reviewed in EPIC.     Problem list, Medication list, Allergies, and Medical/Social/Surgical histories reviewed in [facility] and updated as appropriate.    ROS:  10 point ROS neg other than the symptoms noted above in the HPI.     OBJECTIVE:                                                    /109 mmHg  Pulse 76  Resp 18  Ht 1.683 m (5' 6.25\")  Wt 92.488 kg (203 lb 14.4 oz)  BMI 32.65 kg/m2  SpO2 97%  Breastfeeding? No  Body mass index is 32.65 kg/(m^2).    Physical Examination:   General: Well appearing, sitting upright in chair. No acute distress. Pleasant and cooperative with examination, making jokes.   HEENT: PERRL and EOMs intact bilaterally. No scleral icterus or conjunctival injection. Mucous membranes moist without erythema or exudates. Tympanic membranes clear bilaterally.   Neck: No thyromegaly. No lymphadenopathy.   Pulmonary: Poor air movement throughout the bilateral lung fields. CTAB. No wheezes or rales.   Cardiovascular: Normal rate and rhythm. Normal S1 and " S2. No murmurs, gallop, or rub. 2+ radial pulses bilaterally. Trace edema to the bilateral lower extremities.  Abdomen: Non distended. Soft and non-tender to palpation throughout.   Neurological: Cranial nerves 2-12 grossly intact. 5/5 strength throughout the upper and lower extremities bilaterally. 3+ biceps, patellar, and achilles reflexes, symmetric. Sensation intact throughout all dermatomal distributions.    Skin: Several open sores to the LLE covered with Vaseline. Small amount of surrounding erythema. Area is cleaned with appropriate dressings in place.   Psych: Pleasant affect. Normal mentation.      ASSESSMENT/PLAN:                                                      # Shortness of Breath  The patient has had several weeks of intermittent shortness of breath without any associated symptoms. She has no findings on physical examination to suggest infectious causes. The differential diagnosis includes, but is not limited to, viral URI, sequelae of rheumatoid arthritis, COPD, PE, and ACS. Given her lack of associated symptoms, lung disease related to RA seems most likely. We will obtain EKG and CXR today to rule out ACS. The patient has never had PFTs in the past.   - CXR and EKG today   - PFTs in future     # Rheumatoid Arthritis   # Back Pain  The patient has chronic pain at baseline secondary to RA. She is currently not taking any medication apart from Tylenol and Aspirin. She does have an appointment scheduled with Dr. Mccurdy of Rheumatology scheduled for next week, which she has been encouraged to keep.   - Follow up in Rheumatology clinic as scheduled   - Continue Tylenol and Aspirin PRN for pain control     # Hypertension  - Discontinue Losartan  - Start Lisinopril     # Healthcare Maintenance   - Schedule Dexa scan   - Schedule Mammogram   - Lipid panel, CMP, A1c, TSH  - Referral to dental clinic   - Follow up with ophthalmology as scheduled     Follow Up: Return to primary care clinic in 1-2 weeks  for discussion of laboratory and imaging results.     Claudia Story, MS4 2/2/17    Scribe Disclosure:   I, Claudia Story, am serving as a scribe; to document services personally performed by Dr. Freitas based on data collection and the provider's statements to me.     Provider Disclosure:  I agree with above History, Review of Systems, Physical exam and Plan.  I have reviewed the content of the documentation and have edited it as needed. I have personally performed the services documented here and the documentation accurately represents those services and the decisions I have made.      Electronically signed by:      Carmel Freitas MD  [facility] PRIMARY CARE [facility]

## 2017-02-03 LAB — INTERPRETATION ECG - MUSE: NORMAL

## 2017-02-06 ENCOUNTER — TELEPHONE (OUTPATIENT)
Dept: FAMILY MEDICINE | Facility: CLINIC | Age: 77
End: 2017-02-06

## 2017-02-07 NOTE — PROGRESS NOTES
"Rheumatology Clinic Visit 1     Abida Hahn MRN# [id number]   YOB: 1940 Age: 76 year old     Date of Visit: 02/08/2017  Primary care provider: Carmel Freitas          Assessment and Plan:   RA: seropositive, erosive:   Morning stiffness (2 hours) and pain have returned after discontinuing methotrexate in August 2016. The stiffness is worst in her hands, hips, ankles, and feet. Physical exam reveals joint deformity of R and L digits and decreased ROM, but no active synovitis. Tylenol and aspirin have been helpful for stiffness and pain. CRP today is WNL. However, given the extent of her morning stiffness, I believe that rheumatoid arthritis is active at a low level. Strong seropositivity and the presence of established erosions/deformities portends high risk for chronic destructive disease. We had a good discussion about how DMARD Rx can halt and prevent joint-damaging disease, and my recommendation that patient resume chronic Rx.    The plan is as follows:    1) LFT, Renal, and CRP  2) Re-start methotrexate (17.5 mg/week). If this is insufficient for suppressing symptoms/signs within 6-8 weeks, consider adding humira.  3) Continue alternating aspirin (650mg q 6-8 hr) and tylenol (500mg) at least until methotrexate takes effect; pt declines to consider \"bridging\" therapy with prednisone  4) Start omeprazole 20 mg/d due to chronic aspirin use    Follow up 3 months.    I saw this patient with the medical student, who acted as scribe for me. I agree with the findings and recommendations.    Bebeto Mccurdy M.D.  Staff Rheumatologist, [facility]  Pager [phone]          Active Problem List:     Patient Active Problem List    Diagnosis Date Noted     Hypothyroidism 02/02/2017     Priority: Medium     Pyoderma gangrenosum 10/06/2016     Priority: Medium     Seborrheic keratosis 09/25/2011     Hemangioma 09/25/2011     (Problem list name updated by automated process. Provider to review and " confirm.)       Finn 09/25/2011     Encounter for long-term current use of medication 08/18/2011     Problem list name updated by automated process. Provider to review       Chronic pain syndrome 08/01/2011     Essential hypertension 08/01/2011     Problem list name updated by automated process. Provider to review       Rheumatoid arthritis (H) 08/01/2011     Sero-positive, deforming RA with erosions on hand x-ray films 2009  RF 1990 IU; anti-CCP 38 (6/2009). Mantoux neg 2009.   Monotherapy mtx 6/2009 (alalimumab contemplated 2010 but never started)  Prior patient Dr. Shukla.   Problem list name updated by automated process. Provider to review              History of Present Illness:   Abida Hahn follows up for inflammatory arthritis. Last seen in our offices in 2011.    Interval History:    She reports discontinuing methotrexate in July due to concerns of wound healing of a large lesion on the medial surface of her left ankle. She had been taking 7 tabs weekly (under supervision of Dr. DALLAS Haynes) and then slowly tapered her methotrexate before completely stopping in August. She reports feeling fine for about two months after discontinuing her methotrexate, however her arthritis stiffness and pain began to flare in October. She had difficulty getting out of bed and required a lot of stability support. Her morning stiffness lasts for approximately two hours and can continue throughout the day if she is not active. She reports that she only has pain in her hips, but that the stiffness is what is interfering with her quality of life. Her symptoms are worst in her hands, hips, ankles, and feet.She started alternating 625mg aspirin and 500mg tylenol every 6 hours, which greatly improved her ability to be mobile and get out of bed. She relayed concern about falling on ice and falling at home, which limits her ability to get outside and exercise. She has started using a walker, which has greatly improved her  ability to get around. Her primary concern is that she does not want her symptoms to get worse and is hoping that she can restart treatment so that she can regain some independence.    She was diagnosed with pyoderma gangrenosum infection of the lesion on her lower left extremity and has continued to follow with dermatology. She reports that the lesion has been slow to heal and that she continues to take Minocyclin, which she will continue for 3 more months (total of 11 months' treatment).         Review of Systems:   Review Of Systems  Constitutional: No headaches, fevers, chills  Skin: No new rashes, lesions  Eyes: No changes in vision, discharge  Ears/Nose/Throat: No discharge  Respiratory: Some shortness of breath, dyspnea on exertion, cough  Cardiovascular: No palpitations or chest pain  Gastrointestinal: No abdominal pain or changes in bowel movements  Genitourinary: Some urinary incontinence  Musculoskeletal: No cramping or soreness, see HPI  Neurologic: No numbness or tingling  Psychiatric: negative  Hematologic/Lymphatic/Immunologic: negative  Endocrine: negative          Past Medical History:     Past Medical History   Diagnosis Date     HTN (hypertension)      Glaucoma      Cataract 8/2011     Right s/p surgery 8/2011; left s/p surgery     Wrist fracture      Right, s/p ORIF     Abscess, toe 2009     Right great toe     Headache(784.0)      Relieved with gabapentin. Chronic pain     RA (rheumatoid arthritis) (H)      Past Surgical History   Procedure Laterality Date     Open reduction internal fixation wrist bilateral       Biopsy artery temporal       Extracapsular cataract extration with intraocular lens implant  8/2011     Left 2 years ago as well            Social History:     Social History     Occupational History     Not on file.     Social History Main Topics     Smoking status: Former Smoker     Smokeless tobacco: Never Used      Comment: Quit in 20yrs      Alcohol Use: No     Drug Use: No      "Sexual Activity: Not on file            Family History:     Family History   Problem Relation Age of Onset     CANCER Father      colon     Arthritis Mother      RA at 31 y/o, glaucome     Arthritis Brother      RA on Mtx and humira     Melanoma No family hx of      Skin Cancer No family hx of             Allergies:     Allergies   Allergen Reactions     Ibuprofen Sodium      Penicillins      Triple Antibiotic [Neomycin-Polymyxin-Dexameth]             Medications:     Current Outpatient Prescriptions   Medication Sig Dispense Refill     aspirin 325 MG tablet Take 650 mg by mouth every 6 hours as needed for moderate pain       methotrexate 2.5 MG tablet CHEMO Take 7 tablets (17.5 mg) by mouth once a week Take 8 tablets per week 32 tablet 3     omeprazole (PRILOSEC) 20 MG CR capsule Take 1 capsule (20 mg) by mouth daily 30 capsule 3     GLUCOSAMINE SULFATE PO Take 1,000 mg by mouth daily       lisinopril (PRINIVIL/ZESTRIL) 20 MG tablet Take 1 tablet (20 mg) by mouth daily 90 tablet 0     minocycline (MINOCIN/DYNACIN) 100 MG capsule Take 1 capsule (100 mg) by mouth daily 60 capsule 0     mupirocin (BACTROBAN) 2 % ointment Apply twice daily to areas on the left ankle. 30 g 3     folic acid 800 MCG TABS Take by mouth 2 times daily       Cholecalciferol (VITAMIN D-3) 1000 UNITS CAPS Take by mouth 2 times daily       acetaminophen (TYLENOL) 500 MG tablet Take 500-1,000 mg by mouth every 6 hours as needed       levothyroxine (SYNTHROID, LEVOTHROID) 88 MCG tablet Take 88 mcg by mouth daily       ARTIFICIAL TEAR SOLUTION OP Apply  to eye as needed.       Calcium-Vitamin D (CALCIUM + D PO) Take 1 tablet by mouth 2 times daily       Multiple Vitamin (DAILY MULTIVITAMIN PO) Take 1 tablet by mouth daily And minerals per pt       hydrocortisone 1 % ointment Apply topically 2 times daily To affected area on right upper cheek 25 g 1            Physical Exam:   Blood pressure 170/101, pulse 76, height 1.683 m (5' 6.25\"), weight " 91.173 kg (201 lb), SpO2 96 %, not currently breastfeeding.  Wt Readings from Last 4 Encounters:   02/08/17 91.173 kg (201 lb)   02/02/17 92.488 kg (203 lb 14.4 oz)   12/23/11 92.987 kg (205 lb)   10/17/11 90.447 kg (199 lb 6.4 oz)       Constitutional: Appears stated age; cooperative; very talkative  Eyes: nl EOM, PERRLA, vision, conjunctiva, sclera  ENT: nl external ears, nose, hearing, lips, teeth, gums, throat  No mucous membrane lesions, normal saliva pool  Neck: Thyroid enlarged  Resp: lungs clear to auscultation, nl to palpation  CV: RRR, no murmurs, rubs, no edema  GI: no ABD mass or tenderness  : not tested  Lymph: no cervical, supraclavicular, inguinal or epitrochlear nodes  MS: R 4th and 5th PIPs show angulation deformity. L 2nd MCP has angulation deformity. L 4th PIP hyperflexion, DIP hyperextension. L 5th DIP hyperflexion. The TMJ, neck, shoulder, elbow, wrist, spine, hip, knee, and ankle joints were examined and found normal. No active synovitis. Full joint ROM of wrists, elbows, shoulders, knees, and ankle joints. Decreased fist closure bilaterally. Normal  strength.  Skin: 2-3cm nodule L elbow. Dark, well circumscribed, healing lesions L medial leg. Gray coloration likely due to minocycline treatment. No weeping or pus. No nail pitting, rash  Neuro: nl cranial nerves, strength, sensation, DTRs.   Psych: nl judgement, orientation, memory, affect.         Data:     Results for orders placed or performed in visit on 02/02/17   X-ray Chest 2 vws*    Narrative    Exam:  XR CHEST 2 VW, 2/2/2017 3:27 PM    History: Shortness of breath    Comparison:  Chest radiograph from 3/11/2009.    Findings:  PA and lateral views of the chest were obtained.  Cardiomediastinal silhouette is within normal limits. Pulmonary  vasculature is unremarkable. No focal airspace opacity. No pleural  effusion or pneumothorax. Visualized upper abdomen is unremarkable. No  acute osseous abnormality.      Impression     Impression:  No acute cardiopulmonary abnormality.    I have personally reviewed the examination and initial interpretation  and I agree with the findings.    CLAUDIO PRITCHETT MD       Recent Labs   Lab Test  02/02/17   1455  01/26/12   1137  11/01/11   1706  10/17/11   1045  08/01/11   1512  07/15/11   1007   12/22/10   1635  09/24/10   1403   WBC  5.7  5.9   --   5.5   --   5.2   < >  5.5  5.7   RBC  4.82  4.60   --   4.48   --   4.44   < >  4.56  4.62   HGB  15.7  14.9   --   14.7   --   14.7   < >  14.5  14.9   HCT  48.7*  46.2   --   45.8   --   44.7   < >  44.1  45.4   MCV  101*  100   --   102*   --   101*   < >  97  98   RDW  13.0  13.6   --   13.4   --   13.4   < >  13.1  13.1   PLT  209  189   --   210   --   167   < >  203  205   ALBUMIN  3.8   --   3.7  3.6   --   3.8   < >  3.9   --    CRP   --    --    --   8.9*   --   <5.0   --   5.6   --    BUN  18   --    --    --   17   --    --    --   20    < > = values in this interval not displayed.      No results for input(s): TSH, T4 in the last 91072 hours.  CYCLIC CIT PEPT IGG/IGA   Date Value Ref Range Status   01/26/2012 >250  Strongly Positive* <20 UNITS Final     HEMOGLOBIN   Date Value Ref Range Status   02/02/2017 15.7 11.7 - 15.7 g/dL Final   01/26/2012 14.9 11.7 - 15.7 g/dL Final   10/17/2011 14.7 11.7 - 15.7 g/dL Final     UREA NITROGEN   Date Value Ref Range Status   02/02/2017 18 7 - 30 mg/dL Final   08/01/2011 17 7 - 30 mg/dL Final   09/24/2010 20 7 - 30 mg/dL Final     SED RATE   Date Value Ref Range Status   06/13/2008 30 0 - 30 mm/h Final   05/30/2008 21 0 - 30 mm/h Final     CRP INFLAMMATION   Date Value Ref Range Status   10/17/2011 8.9* 0.0 - 8.0 mg/L Final   07/15/2011 <5.0 0.0 - 8.0 mg/L Final   12/22/2010 5.6 0.0 - 8.0 mg/L Final     AST   Date Value Ref Range Status   02/02/2017 14 0 - 45 U/L Final   01/26/2012 26 0 - 45 U/L Final   11/01/2011 37 0 - 45 U/L Final     ALBUMIN   Date Value Ref Range Status   02/02/2017 3.8 3.4 - 5.0 g/dL  Final   11/01/2011 3.7 3.3 - 4.9 g/dL Final   10/17/2011 3.6 3.3 - 4.9 g/dL Final     ALKALINE PHOSPHATASE   Date Value Ref Range Status   02/02/2017 334* 40 - 150 U/L Final   11/01/2011 103 40 - 150 U/L Final   01/07/2009 96 40 - 150 U/L Final     ALT   Date Value Ref Range Status   02/02/2017 22 0 - 50 U/L Final   01/26/2012 19 0 - 50 U/L Final   11/01/2011 24 0 - 50 U/L Final     RHEUMATOID FACTOR   Date Value Ref Range Status   03/11/2009 1990* 0 - 14 IU/mL Final     Recent Labs   Lab Test  02/02/17   1455  01/26/12   1137  11/01/11   1706  10/17/11   1045  08/01/11   1512   09/24/10   1403   01/07/09   1456   WBC  5.7  5.9   --   5.5   --    < >  5.7   < >   --    HGB  15.7  14.9   --   14.7   --    < >  14.9   < >   --    HCT  48.7*  46.2   --   45.8   --    < >  45.4   < >   --    MCV  101*  100   --   102*   --    < >  98   < >   --    PLT  209  189   --   210   --    < >  205   < >   --    BUN  18   --    --    --   17   --   20   --    --    AST  14  26  37  38   --    < >   --    < >  29   ALT  22  19  24  80*   --    < >   --    < >  38   ALKPHOS  334*   --   103   --    --    --    --    --   96    < > = values in this interval not displayed.       Reviewed Rheumatology lab flowsheet

## 2017-02-08 ENCOUNTER — OFFICE VISIT (OUTPATIENT)
Dept: RHEUMATOLOGY | Facility: CLINIC | Age: 77
End: 2017-02-08
Attending: INTERNAL MEDICINE
Payer: MEDICARE

## 2017-02-08 VITALS
HEIGHT: 66 IN | BODY MASS INDEX: 32.3 KG/M2 | HEART RATE: 76 BPM | WEIGHT: 201 LBS | SYSTOLIC BLOOD PRESSURE: 170 MMHG | OXYGEN SATURATION: 96 % | DIASTOLIC BLOOD PRESSURE: 101 MMHG

## 2017-02-08 DIAGNOSIS — R06.02 SOB (SHORTNESS OF BREATH): ICD-10-CM

## 2017-02-08 DIAGNOSIS — M06.9 RHEUMATOID ARTHRITIS FLARE (H): ICD-10-CM

## 2017-02-08 DIAGNOSIS — M06.9 RHEUMATOID ARTHRITIS OF HAND, UNSPECIFIED LATERALITY, UNSPECIFIED RHEUMATOID FACTOR PRESENCE: ICD-10-CM

## 2017-02-08 DIAGNOSIS — M06.9 RHEUMATOID ARTHRITIS FLARE (H): Primary | ICD-10-CM

## 2017-02-08 LAB
6 MIN WALK (FT): 675 FT
6 MIN WALK (M): 206 M
ALT SERPL W P-5'-P-CCNC: 20 U/L (ref 0–50)
AST SERPL W P-5'-P-CCNC: 13 U/L (ref 0–45)
CREAT SERPL-MCNC: 0.78 MG/DL (ref 0.52–1.04)
CRP SERPL-MCNC: <2.9 MG/L (ref 0–8)
ERYTHROCYTE [DISTWIDTH] IN BLOOD BY AUTOMATED COUNT: 13.1 % (ref 10–15)
GFR SERPL CREATININE-BSD FRML MDRD: 71 ML/MIN/1.7M2
HCT VFR BLD AUTO: 46.2 % (ref 35–47)
HGB BLD-MCNC: 15 G/DL (ref 11.7–15.7)
MCH RBC QN AUTO: 32.3 PG (ref 26.5–33)
MCHC RBC AUTO-ENTMCNC: 32.5 G/DL (ref 31.5–36.5)
MCV RBC AUTO: 99 FL (ref 78–100)
PLATELET # BLD AUTO: 204 10E9/L (ref 150–450)
RBC # BLD AUTO: 4.65 10E12/L (ref 3.8–5.2)
WBC # BLD AUTO: 6.8 10E9/L (ref 4–11)

## 2017-02-08 PROCEDURE — 84460 ALANINE AMINO (ALT) (SGPT): CPT | Performed by: INTERNAL MEDICINE

## 2017-02-08 PROCEDURE — 85027 COMPLETE CBC AUTOMATED: CPT | Performed by: INTERNAL MEDICINE

## 2017-02-08 PROCEDURE — 86140 C-REACTIVE PROTEIN: CPT | Performed by: INTERNAL MEDICINE

## 2017-02-08 PROCEDURE — 84450 TRANSFERASE (AST) (SGOT): CPT | Performed by: INTERNAL MEDICINE

## 2017-02-08 PROCEDURE — 99212 OFFICE O/P EST SF 10 MIN: CPT | Mod: ZF

## 2017-02-08 PROCEDURE — 36415 COLL VENOUS BLD VENIPUNCTURE: CPT | Performed by: INTERNAL MEDICINE

## 2017-02-08 PROCEDURE — 82565 ASSAY OF CREATININE: CPT | Performed by: INTERNAL MEDICINE

## 2017-02-08 RX ORDER — ASPIRIN 325 MG
650 TABLET ORAL EVERY 6 HOURS PRN
COMMUNITY
End: 2017-09-08 | Stop reason: ALTCHOICE

## 2017-02-08 ASSESSMENT — PAIN SCALES - GENERAL: PAINLEVEL: NO PAIN (0)

## 2017-02-08 NOTE — NURSING NOTE
"Chief Complaint   Patient presents with     RECHECK     Follow up for RA       Initial /101 mmHg  Pulse 76  Ht 1.683 m (5' 6.25\")  Wt 91.173 kg (201 lb)  BMI 32.19 kg/m2  SpO2 96% Estimated body mass index is 32.19 kg/(m^2) as calculated from the following:    Height as of this encounter: 1.683 m (5' 6.25\").    Weight as of this encounter: 91.173 kg (201 lb).  Medication Reconciliation: complete   Fatimah Smith CMA    "

## 2017-02-08 NOTE — MR AVS SNAPSHOT
After Visit Summary   2/8/2017    Abida Hahn    MRN: [id number]           Patient Information     Date Of Birth          1940        Visit Information        Provider Department      2/8/2017 8:00 AM Bebeto Mccurdy MD [facility] Rheumatology        Today's Diagnoses     Rheumatoid arthritis flare (H)    -  1        Follow-ups after your visit        Follow-up notes from your care team     Return in about 3 months (around 5/8/2017).      Your next 10 appointments already scheduled     Feb 08, 2017 10:00 AM   Lab with  LAB   [facility] Lab ([facility])    [address] [place] [address]  1st [facility] [address]   [phone]            Feb 08, 2017 10:30 AM   DX HIP/PELVIS/SPINE with UCDX1   [facility] ([facility])    [address] [place] [id number] [phone]           Please do not take any of the following 48 hours prior to your exam: vitamins, calcium tablets, antacids.            Feb 16, 2017  3:05 PM   (Arrive by 2:50 PM)   Return Visit with Carmel Freitas MD   [facility] Primary [facility] ([facility])    [address] [place] [address]  4th Floor  [facility] [address]   [phone]            Mar 02, 2017 11:45 AM   (Arrive by 11:30 AM)   MA SCREENING DIGITAL BILATERAL with [email or web address]   [facility] ([facility])    [address] [place] [address]  2nd [facility] [id number] [phone]           Do not use any powder, lotion or deodorant under your arms or on your breast. If you do, we will ask you to remove it before your exam.  Wear comfortable, two-piece clothing.  If you have any allergies, tell your care team.  Bring any previous mammograms from other facilities or have them mailed to the breast center. This mammogram location, [facility], now offers 3D mammography. It doesn't replace a  screening mammogram and can be done with a regular screening mammogram. It is optional and not all insurances will pay for it. 3D mammography is a special kind of mammogram that produces a three-dimensional image of the breast by using low dose-xrays. 3D allows the radiologist to see the breast tissue differently from 2D, which reduces the chance of repeat testing due to overlapping breast tissue. If you are interested in have a 3D mammogram, please check with your insurance before you arrive for your exam. 3D mammography is offered to all patients. On the day of your exam you will be asked to sign a form stating yes, you wish to have 3D imaging or, no, you decline.            Mar 02, 2017 12:15 PM   (Arrive by 12:00 PM)   Return Visit with Lillian De Luna PA-C   [facility] Dermatology ([facility])    [address] [place] [address]   [phone]            Mar 16, 2017  8:30 AM   NEW GLAUCOMA with Meagan Mendez MD   [facility] Clinic ([facility])    [place] [address]  [facility] [address]   [phone]            Apr 21, 2017 10:30 AM   (Arrive by 10:15 AM)   Return Visit with Bebeto Mccurdy MD   [facility] Rheumatology ([facility])    [address] [place] [address]   [phone]            Jun 07, 2017 10:00 AM   (Arrive by 9:45 AM)   Return Visit with Bebeto Mccurdy MD   [facility] Rheumatology ([facility])    [address] [place] [address]   [phone]              Future tests that were ordered for you today     Open Future Orders        Priority Expected Expires Ordered    AST Routine  2/8/2018 2/8/2017    ALT Routine  2/8/2018 2/8/2017    Creatinine Routine  2/8/2018 2/8/2017    CRP inflammation Routine  2/8/2018 2/8/2017    CBC with platelets Routine  2/8/2018 2/8/2017            Who to contact   "   If you have questions or need follow up information about today's clinic visit or your schedule please contact [facility] RHEUMATOLOGY directly at [phone].  Normal or non-critical lab and imaging results will be communicated to you by Megadynehart, letter or phone within 4 business days after the clinic has received the results. If you do not hear from us within 7 days, please contact the clinic through Megadynehart or phone. If you have a critical or abnormal lab result, we will notify you by phone as soon as possible.  Submit refill requests through comScore or call your pharmacy and they will forward the refill request to us. Please allow 3 business days for your refill to be completed.          Additional Information About Your Visit        MegadyneharPancetera Information     comScore lets you send messages to your doctor, view your test results, renew your prescriptions, schedule appointments and more. To sign up, go to [email or web address] . Click on \"Log in\" on the left side of the screen, which will take you to the Welcome page. Then click on \"Sign up Now\" on the right side of the page.     You will be asked to enter the access code listed below, as well as some personal information. Please follow the directions to create your username and password.     Your access code is: [email or web address]  Expires: 5/3/2017 12:46 PM     Your access code will  in 90 days. If you need help or a new code, please call your [place] clinic or [phone].        Care EveryWhere ID     This is your Care EveryWhere ID. This could be used by other organizations to access your [facility] [place] medical records  [id number]        Your Vitals Were     Pulse Height BMI (Body Mass Index) Pulse Oximetry          76 1.683 m (5' 6.25\") 32.19 kg/m2 96%         Blood Pressure from Last 3 Encounters:   17 170/101   17 179/109   16 151/83    Weight from Last 3 Encounters:   17 91.173 kg (201 lb)   17 92.488 kg (203 lb 14.4 oz) "   12/23/11 92.987 kg (205 lb)                 Today's Medication Changes          These changes are accurate as of: 2/8/17  9:04 AM.  If you have any questions, ask your nurse or doctor.               Start taking these medicines.        Dose/Directions    methotrexate 2.5 MG tablet CHEMO   Used for:  Rheumatoid arthritis flare (H)   Started by:  Bebeto Mccurdy MD        Dose:  17.5 mg   Take 7 tablets (17.5 mg) by mouth once a week Take 8 tablets per week   Quantity:  32 tablet   Refills:  3       omeprazole 20 MG CR capsule   Commonly known as:  priLOSEC   Used for:  Rheumatoid arthritis flare (H)   Started by:  Bebeto Mccurdy MD        Dose:  20 mg   Take 1 capsule (20 mg) by mouth daily   Quantity:  30 capsule   Refills:  3            Where to get your medicines      These medications were sent to [facility] [address] [facility] [address] AT [facility] [address]  [address], [facility] [address]    Hours:  24-hours Phone:  [phone]    - methotrexate 2.5 MG tablet CHEMO  - omeprazole 20 MG CR capsule             Primary Care Provider Office Phone # Fax #    Carmel Freitas [phone]       [facility] [address] [place] [address]        Thank you!     Thank you for choosing [facility] RHEUMATOLOGY  for your care. Our goal is always to provide you with excellent care. Hearing back from our patients is one way we can continue to improve our services. Please take a few minutes to complete the written survey that you may receive in the mail after your visit with us. Thank you!             Your Updated Medication List - Protect others around you: Learn how to safely use, store and throw away your medicines at www.disposemymeds.org.          This list is accurate as of: 2/8/17  9:04 AM.  Always use your most recent med list.                   Brand Name Dispense Instructions for use    acetaminophen 500 MG tablet     TYLENOL     Take 500-1,000 mg by mouth every 6 hours as needed       ARTIFICIAL TEAR SOLUTION OP      Apply  to eye as needed.       aspirin 325 MG tablet      Take 650 mg by mouth every 6 hours as needed for moderate pain       CALCIUM + D PO      Take 1 tablet by mouth 2 times daily       DAILY MULTIVITAMIN PO      Take 1 tablet by mouth daily And minerals per pt       folic acid 800 MCG Tabs      Take by mouth 2 times daily       GLUCOSAMINE SULFATE PO      Take 1,000 mg by mouth daily       hydrocortisone 1 % ointment     25 g    Apply topically 2 times daily To affected area on right upper cheek       levothyroxine 88 MCG tablet    SYNTHROID/LEVOTHROID     Take 88 mcg by mouth daily       lisinopril 20 MG tablet    PRINIVIL/ZESTRIL    90 tablet    Take 1 tablet (20 mg) by mouth daily       methotrexate 2.5 MG tablet CHEMO     32 tablet    Take 7 tablets (17.5 mg) by mouth once a week Take 8 tablets per week       minocycline 100 MG capsule    MINOCIN/DYNACIN    60 capsule    Take 1 capsule (100 mg) by mouth daily       mupirocin 2 % ointment    BACTROBAN    30 g    Apply twice daily to areas on the left ankle.       omeprazole 20 MG CR capsule    priLOSEC    30 capsule    Take 1 capsule (20 mg) by mouth daily       Vitamin D-3 1000 UNITS Caps      Take by mouth 2 times daily

## 2017-02-08 NOTE — Clinical Note
"2/8/2017      RE: Abida Hahn  [address]  [facility] [address]       Rheumatology Clinic Visit 1     Abida Hahn MRN# [id number]   YOB: 1940 Age: 76 year old     Date of Visit: 02/08/2017  Primary care provider: Carmel Freitas          Assessment and Plan:   RA: seropositive, erosive:   Morning stiffness (2 hours) and pain have returned after discontinuing methotrexate in August 2016. The stiffness is worst in her hands, hips, ankles, and feet. Physical exam reveals joint deformity of R and L digits and decreased ROM, but no active synovitis. Tylenol and aspirin have been helpful for stiffness and pain. CRP today is WNL. However, given the extent of her morning stiffness, I believe that rheumatoid arthritis is active at a low level. Strong seropositivity and the presence of established erosions/deformities portends high risk for chronic destructive disease. We had a good discussion about how DMARD Rx can halt and prevent joint-damaging disease, and my recommendation that patient resume chronic Rx.    The plan is as follows:    1) LFT, Renal, and CRP  2) Re-start methotrexate (17.5 mg/week). If this is insufficient for suppressing symptoms/signs within 6-8 weeks, consider adding humira.  3) Continue alternating aspirin (650mg q 6-8 hr) and tylenol (500mg) at least until methotrexate takes effect; pt declines to consider \"bridging\" therapy with prednisone  4) Start omeprazole 20 mg/d due to chronic aspirin use    Follow up 3 months.    I saw this patient with the medical student, who acted as scribe for me. I agree with the findings and recommendations.    Bebeto Mccurdy M.D.  Staff Rheumatologist,  Health  Pager [phone]          Active Problem List:     Patient Active Problem List    Diagnosis Date Noted     Hypothyroidism 02/02/2017     Priority: Medium     Pyoderma gangrenosum 10/06/2016     Priority: Medium     Seborrheic keratosis 09/25/2011     Hemangioma 09/25/2011 "     (Problem list name updated by automated process. Provider to review and confirm.)       Acrochordon 09/25/2011     Encounter for long-term current use of medication 08/18/2011     Problem list name updated by automated process. Provider to review       Chronic pain syndrome 08/01/2011     Essential hypertension 08/01/2011     Problem list name updated by automated process. Provider to review       Rheumatoid arthritis (H) 08/01/2011     Sero-positive, deforming RA with erosions on hand x-ray films 2009  RF 1990 IU; anti-CCP 38 (6/2009). Mantoux neg 2009.   Monotherapy mtx 6/2009 (alalimumab contemplated 2010 but never started)  Prior patient Dr. Shukla.   Problem list name updated by automated process. Provider to review              History of Present Illness:   Abida Hahn follows up for inflammatory arthritis. Last seen in our offices in 2011.    Interval History:    She reports discontinuing methotrexate in July due to concerns of wound healing of a large lesion on the medial surface of her left ankle. She had been taking 7 tabs weekly (under supervision of Dr. DALLAS Haynes) and then slowly tapered her methotrexate before completely stopping in August. She reports feeling fine for about two months after discontinuing her methotrexate, however her arthritis stiffness and pain began to flare in October. She had difficulty getting out of bed and required a lot of stability support. Her morning stiffness lasts for approximately two hours and can continue throughout the day if she is not active. She reports that she only has pain in her hips, but that the stiffness is what is interfering with her quality of life. Her symptoms are worst in her hands, hips, ankles, and feet.She started alternating 625mg aspirin and 500mg tylenol every 6 hours, which greatly improved her ability to be mobile and get out of bed. She relayed concern about falling on ice and falling at home, which limits her ability to get outside and  exercise. She has started using a walker, which has greatly improved her ability to get around. Her primary concern is that she does not want her symptoms to get worse and is hoping that she can restart treatment so that she can regain some independence.    She was diagnosed with pyoderma gangrenosum infection of the lesion on her lower left extremity and has continued to follow with dermatology. She reports that the lesion has been slow to heal and that she continues to take Minocyclin, which she will continue for 3 more months (total of 11 months' treatment).         Review of Systems:   Review Of Systems  Constitutional: No headaches, fevers, chills  Skin: No new rashes, lesions  Eyes: No changes in vision, discharge  Ears/Nose/Throat: No discharge  Respiratory: Some shortness of breath, dyspnea on exertion, cough  Cardiovascular: No palpitations or chest pain  Gastrointestinal: No abdominal pain or changes in bowel movements  Genitourinary: Some urinary incontinence  Musculoskeletal: No cramping or soreness, see HPI  Neurologic: No numbness or tingling  Psychiatric: negative  Hematologic/Lymphatic/Immunologic: negative  Endocrine: negative          Past Medical History:     Past Medical History   Diagnosis Date     HTN (hypertension)      Glaucoma      Cataract 8/2011     Right s/p surgery 8/2011; left s/p surgery     Wrist fracture      Right, s/p ORIF     Abscess, toe 2009     Right great toe     Headache(784.0)      Relieved with gabapentin. Chronic pain     RA (rheumatoid arthritis) (H)      Past Surgical History   Procedure Laterality Date     Open reduction internal fixation wrist bilateral       Biopsy artery temporal       Extracapsular cataract extration with intraocular lens implant  8/2011     Left 2 years ago as well            Social History:     Social History     Occupational History     Not on file.     Social History Main Topics     Smoking status: Former Smoker     Smokeless tobacco: Never Used       Comment: Quit in 20yrs      Alcohol Use: No     Drug Use: No     Sexual Activity: Not on file            Family History:     Family History   Problem Relation Age of Onset     CANCER Father      colon     Arthritis Mother      RA at 31 y/o, glaucome     Arthritis Brother      RA on Mtx and humira     Melanoma No family hx of      Skin Cancer No family hx of             Allergies:     Allergies   Allergen Reactions     Ibuprofen Sodium      Penicillins      Triple Antibiotic [Neomycin-Polymyxin-Dexameth]             Medications:     Current Outpatient Prescriptions   Medication Sig Dispense Refill     aspirin 325 MG tablet Take 650 mg by mouth every 6 hours as needed for moderate pain       methotrexate 2.5 MG tablet CHEMO Take 7 tablets (17.5 mg) by mouth once a week Take 8 tablets per week 32 tablet 3     omeprazole (PRILOSEC) 20 MG CR capsule Take 1 capsule (20 mg) by mouth daily 30 capsule 3     GLUCOSAMINE SULFATE PO Take 1,000 mg by mouth daily       lisinopril (PRINIVIL/ZESTRIL) 20 MG tablet Take 1 tablet (20 mg) by mouth daily 90 tablet 0     minocycline (MINOCIN/DYNACIN) 100 MG capsule Take 1 capsule (100 mg) by mouth daily 60 capsule 0     mupirocin (BACTROBAN) 2 % ointment Apply twice daily to areas on the left ankle. 30 g 3     folic acid 800 MCG TABS Take by mouth 2 times daily       Cholecalciferol (VITAMIN D-3) 1000 UNITS CAPS Take by mouth 2 times daily       acetaminophen (TYLENOL) 500 MG tablet Take 500-1,000 mg by mouth every 6 hours as needed       levothyroxine (SYNTHROID, LEVOTHROID) 88 MCG tablet Take 88 mcg by mouth daily       ARTIFICIAL TEAR SOLUTION OP Apply  to eye as needed.       Calcium-Vitamin D (CALCIUM + D PO) Take 1 tablet by mouth 2 times daily       Multiple Vitamin (DAILY MULTIVITAMIN PO) Take 1 tablet by mouth daily And minerals per pt       hydrocortisone 1 % ointment Apply topically 2 times daily To affected area on right upper cheek 25 g 1            Physical Exam:  "  Blood pressure 170/101, pulse 76, height 1.683 m (5' 6.25\"), weight 91.173 kg (201 lb), SpO2 96 %, not currently breastfeeding.  Wt Readings from Last 4 Encounters:   02/08/17 91.173 kg (201 lb)   02/02/17 92.488 kg (203 lb 14.4 oz)   12/23/11 92.987 kg (205 lb)   10/17/11 90.447 kg (199 lb 6.4 oz)       Constitutional: Appears stated age; cooperative; very talkative  Eyes: nl EOM, PERRLA, vision, conjunctiva, sclera  ENT: nl external ears, nose, hearing, lips, teeth, gums, throat  No mucous membrane lesions, normal saliva pool  Neck: Thyroid enlarged  Resp: lungs clear to auscultation, nl to palpation  CV: RRR, no murmurs, rubs, no edema  GI: no ABD mass or tenderness  : not tested  Lymph: no cervical, supraclavicular, inguinal or epitrochlear nodes  MS: R 4th and 5th PIPs show angulation deformity. L 2nd MCP has angulation deformity. L 4th PIP hyperflexion, DIP hyperextension. L 5th DIP hyperflexion. The TMJ, neck, shoulder, elbow, wrist, spine, hip, knee, and ankle joints were examined and found normal. No active synovitis. Full joint ROM of wrists, elbows, shoulders, knees, and ankle joints. Decreased fist closure bilaterally. Normal  strength.  Skin: 2-3cm nodule L elbow. Dark, well circumscribed, healing lesions L medial leg. Gray coloration likely due to minocycline treatment. No weeping or pus. No nail pitting, rash  Neuro: nl cranial nerves, strength, sensation, DTRs.   Psych: nl judgement, orientation, memory, affect.         Data:     Results for orders placed or performed in visit on 02/02/17   X-ray Chest 2 vws*    Narrative    Exam:  XR CHEST 2 VW, 2/2/2017 3:27 PM    History: Shortness of breath    Comparison:  Chest radiograph from 3/11/2009.    Findings:  PA and lateral views of the chest were obtained.  Cardiomediastinal silhouette is within normal limits. Pulmonary  vasculature is unremarkable. No focal airspace opacity. No pleural  effusion or pneumothorax. Visualized upper abdomen is " unremarkable. No  acute osseous abnormality.      Impression    Impression:  No acute cardiopulmonary abnormality.    I have personally reviewed the examination and initial interpretation  and I agree with the findings.    CLAUDIO PRITCHETT MD       Recent Labs   Lab Test  02/02/17   1455  01/26/12   1137  11/01/11   1706  10/17/11   1045  08/01/11   1512  07/15/11   1007   12/22/10   1635  09/24/10   1403   WBC  5.7  5.9   --   5.5   --   5.2   < >  5.5  5.7   RBC  4.82  4.60   --   4.48   --   4.44   < >  4.56  4.62   HGB  15.7  14.9   --   14.7   --   14.7   < >  14.5  14.9   HCT  48.7*  46.2   --   45.8   --   44.7   < >  44.1  45.4   MCV  101*  100   --   102*   --   101*   < >  97  98   RDW  13.0  13.6   --   13.4   --   13.4   < >  13.1  13.1   PLT  209  189   --   210   --   167   < >  203  205   ALBUMIN  3.8   --   3.7  3.6   --   3.8   < >  3.9   --    CRP   --    --    --   8.9*   --   <5.0   --   5.6   --    BUN  18   --    --    --   17   --    --    --   20    < > = values in this interval not displayed.      No results for input(s): TSH, T4 in the last 83512 hours.  CYCLIC CIT PEPT IGG/IGA   Date Value Ref Range Status   01/26/2012 >250  Strongly Positive* <20 UNITS Final     HEMOGLOBIN   Date Value Ref Range Status   02/02/2017 15.7 11.7 - 15.7 g/dL Final   01/26/2012 14.9 11.7 - 15.7 g/dL Final   10/17/2011 14.7 11.7 - 15.7 g/dL Final     UREA NITROGEN   Date Value Ref Range Status   02/02/2017 18 7 - 30 mg/dL Final   08/01/2011 17 7 - 30 mg/dL Final   09/24/2010 20 7 - 30 mg/dL Final     SED RATE   Date Value Ref Range Status   06/13/2008 30 0 - 30 mm/h Final   05/30/2008 21 0 - 30 mm/h Final     CRP INFLAMMATION   Date Value Ref Range Status   10/17/2011 8.9* 0.0 - 8.0 mg/L Final   07/15/2011 <5.0 0.0 - 8.0 mg/L Final   12/22/2010 5.6 0.0 - 8.0 mg/L Final     AST   Date Value Ref Range Status   02/02/2017 14 0 - 45 U/L Final   01/26/2012 26 0 - 45 U/L Final   11/01/2011 37 0 - 45 U/L Final      ALBUMIN   Date Value Ref Range Status   02/02/2017 3.8 3.4 - 5.0 g/dL Final   11/01/2011 3.7 3.3 - 4.9 g/dL Final   10/17/2011 3.6 3.3 - 4.9 g/dL Final     ALKALINE PHOSPHATASE   Date Value Ref Range Status   02/02/2017 334* 40 - 150 U/L Final   11/01/2011 103 40 - 150 U/L Final   01/07/2009 96 40 - 150 U/L Final     ALT   Date Value Ref Range Status   02/02/2017 22 0 - 50 U/L Final   01/26/2012 19 0 - 50 U/L Final   11/01/2011 24 0 - 50 U/L Final     RHEUMATOID FACTOR   Date Value Ref Range Status   03/11/2009 1990* 0 - 14 IU/mL Final     Recent Labs   Lab Test  02/02/17   1455  01/26/12   1137  11/01/11   1706  10/17/11   1045  08/01/11   1512   09/24/10   1403   01/07/09   1456   WBC  5.7  5.9   --   5.5   --    < >  5.7   < >   --    HGB  15.7  14.9   --   14.7   --    < >  14.9   < >   --    HCT  48.7*  46.2   --   45.8   --    < >  45.4   < >   --    MCV  101*  100   --   102*   --    < >  98   < >   --    PLT  209  189   --   210   --    < >  205   < >   --    BUN  18   --    --    --   17   --   20   --    --    AST  14  26  37  38   --    < >   --    < >  29   ALT  22  19  24  80*   --    < >   --    < >  38   ALKPHOS  334*   --   103   --    --    --    --    --   96    < > = values in this interval not displayed.       Reviewed Rheumatology lab flowsheet    Bebeto Mccurdy MD

## 2017-02-09 DIAGNOSIS — J98.4 RESTRICTIVE LUNG DISEASE: Primary | ICD-10-CM

## 2017-02-16 ENCOUNTER — OFFICE VISIT (OUTPATIENT)
Dept: INTERNAL MEDICINE | Facility: CLINIC | Age: 77
End: 2017-02-16

## 2017-02-16 VITALS
HEART RATE: 82 BPM | DIASTOLIC BLOOD PRESSURE: 87 MMHG | WEIGHT: 200.4 LBS | SYSTOLIC BLOOD PRESSURE: 138 MMHG | BODY MASS INDEX: 32.1 KG/M2

## 2017-02-16 DIAGNOSIS — I10 ESSENTIAL HYPERTENSION: ICD-10-CM

## 2017-02-16 DIAGNOSIS — M06.9 RHEUMATOID ARTHRITIS OF HAND, UNSPECIFIED LATERALITY, UNSPECIFIED RHEUMATOID FACTOR PRESENCE: ICD-10-CM

## 2017-02-16 DIAGNOSIS — M81.0 OSTEOPOROSIS: Primary | ICD-10-CM

## 2017-02-16 DIAGNOSIS — E03.9 HYPOTHYROIDISM, UNSPECIFIED TYPE: ICD-10-CM

## 2017-02-16 DIAGNOSIS — R06.02 SOB (SHORTNESS OF BREATH): ICD-10-CM

## 2017-02-16 RX ORDER — ALENDRONATE SODIUM 70 MG/1
70 TABLET ORAL
Qty: 12 TABLET | Refills: 3 | Status: SHIPPED | OUTPATIENT
Start: 2017-02-16 | End: 2017-07-06 | Stop reason: SINTOL

## 2017-02-16 ASSESSMENT — PAIN SCALES - GENERAL: PAINLEVEL: NO PAIN (0)

## 2017-02-16 NOTE — PATIENT INSTRUCTIONS
[facility] Medication Refill Request Information:  * Please contact your pharmacy regarding ANY request for medication refills.  ** [facility] Prescription Fax = [phone]  * Please allow 3 business days for routine medication refills.  * Please allow 5 business days for controlled substance medication refills.     [facility] Test Result notification information:  *You will be notified with in 7-10 days of your appointment day regarding the results of your test.  If you are on MyChart you will be notified as soon as the provider has reviewed the results and signed off on them.    Schedule with the lung clinic to look into the breathing problems.   We'll get thyroid test next time your blood is drawn. Last check in October with [facility] was OK  Blood pressure looks better.  We'll check in 6-8 weeks    Begin taking the alendronate 70mg once weekly  With large glass of water and stay upright for at least 60 minutes afterward.

## 2017-02-16 NOTE — NURSING NOTE
Chief Complaint   Patient presents with     Results     Patient here to discuss CT, Chest X-ray, PFT and labs.       Elio Arellano CMA at 2:41 PM on 2/16/2017.

## 2017-02-16 NOTE — PROGRESS NOTES
"  SUBJECTIVE:                                                    Abida Hahn is a 76 year old female who presents to clinic today for the following health issues:    Here for follow-up of breathing problems. Had PFTs done showing restrictive lung disease pattern.    Back on MTX for Rheumatoid arthritis 2 days ago following appointment with Dr Mccurdy  Mammogram upcoming. Was off of MTX from mid august to mid february    Recent DEXA with osteporosis.  She previously was on alendronate, but stopped 2/2 concern about insert from med showing \"unusual thigh bone fracture\" and she was having some hip pain so she stopped.  She is now interested in resuming the medication.  She walks about 20 minutes a day around her building.    -------------------------------------    Problem list and histories reviewed & adjusted, as indicated.  Additional history: as documented    BP Readings from Last 3 Encounters:   02/16/17 138/87   02/08/17 (!) 170/101   02/02/17 (!) 179/109    Wt Readings from Last 3 Encounters:   02/16/17 90.9 kg (200 lb 6.4 oz)   02/08/17 91.2 kg (201 lb)   02/02/17 92.5 kg (203 lb 14.4 oz)            ROS:  Constitutional, HEENT, cardiovascular, pulmonary, gi and gu systems are negative, except as otherwise noted.    OBJECTIVE:                                                    /87  Pulse 82  Wt 90.9 kg (200 lb 6.4 oz)  Breastfeeding? No  BMI 32.1 kg/m2  Body mass index is 32.1 kg/(m^2).  GENERAL: healthy, alert and no distress  NECK: no adenopathy, no asymmetry, masses, or scars and thyroid normal to palpation  RESP:  Moderate kyphosis, scattered fine crackles present end inspiration  CV: regular rate and rhythm, normal S1 S2, no S3 or S4, no murmur, click or rub, no peripheral edema and peripheral pulses strong  MS: no gross musculoskeletal defects noted, no edema  SKIN: no suspicious lesions or rashes  PSYCH: mentation appears normal, affect normal/bright         ASSESSMENT/PLAN:                       "                                1. Osteoporosis  resume  - alendronate (FOSAMAX) 70 MG tablet; Take 1 tablet (70 mg) by mouth every 7 days  Dispense: 12 tablet; Refill: 3    2. Hypothyroidism, unspecified type  Due for labs  - TSH with free T4 reflex; Future    3. Rheumatoid arthritis of hand, unspecified laterality, unspecified rheumatoid factor presence (H)  Back on MTX, hopeful for improvement, has rheum follow-up in April    4. Essential hypertension  Improved and controlled today    Shortness of breath- PFTs c/w restrictive process, follow-up pulmonary ?rheumatoid assoc lung disease    See Patient Instructions    Cramel Freitas MD  [facility]

## 2017-02-16 NOTE — MR AVS SNAPSHOT
After Visit Summary   2/16/2017    Abida Hahn    MRN: [id number]           Patient Information     Date Of Birth          1940        Visit Information        Provider Department      2/16/2017 3:05 PM Carmel Freitas MD [facility]        Today's Diagnoses     Osteoporosis    -  1    Hypothyroidism, unspecified type        Rheumatoid arthritis of hand, unspecified laterality, unspecified rheumatoid factor presence (H)        Essential hypertension          Care Instructions    Primary Care Center Medication Refill Request Information:  * Please contact your pharmacy regarding ANY request for medication refills.  ** [facility] Prescription Fax = [phone]  * Please allow 3 business days for routine medication refills.  * Please allow 5 business days for controlled substance medication refills.     Primary Care Center Test Result notification information:  *You will be notified with in 7-10 days of your appointment day regarding the results of your test.  If you are on MyChart you will be notified as soon as the provider has reviewed the results and signed off on them.    Schedule with the lung clinic to look into the breathing problems.   We'll get thyroid test next time your blood is drawn. Last check in October with [facility] was OK  Blood pressure looks better.  We'll check in 6-8 weeks    Begin taking the alendronate 70mg once weekly  With large glass of water and stay upright for at least 60 minutes afterward.        Follow-ups after your visit        Follow-up notes from your care team     Return in about 7 weeks (around 4/6/2017).      Your next 10 appointments already scheduled     Mar 02, 2017 11:45 AM CST   (Arrive by 11:30 AM)   MA SCREENING DIGITAL BILATERAL with [id number]   [facility] ([facility])    [address] [address] [phone]           Do not use any powder, lotion  or deodorant under your arms or on your breast. If you do, we will ask you to remove it before your exam.  Wear comfortable, two-piece clothing.  If you have any allergies, tell your care team.  Bring any previous mammograms from other facilities or have them mailed to the breast center. This mammogram location, [facility], now offers 3D mammography. It doesn't replace a screening mammogram and can be done with a regular screening mammogram. It is optional and not all insurances will pay for it. 3D mammography is a special kind of mammogram that produces a three-dimensional image of the breast by using low dose-xrays. 3D allows the radiologist to see the breast tissue differently from 2D, which reduces the chance of repeat testing due to overlapping breast tissue. If you are interested in have a 3D mammogram, please check with your insurance before you arrive for your exam. 3D mammography is offered to all patients. On the day of your exam you will be asked to sign a form stating yes, you wish to have 3D imaging or, no, you decline.            Mar 02, 2017 12:15 PM CST   (Arrive by 12:00 PM)   Return Visit with Lillian De Luna PA-C   [facility] ([facility])    [address]  [address] [facility] [address]   [phone]            Mar 16, 2017  8:30 AM CDT   NEW GLAUCOMA with Meagan Mendez MD   [facility] ([facility])    [address]  [address]  [address] [address]  [facility] [address]   [phone]            Apr 06, 2017  1:05 PM CDT   (Arrive by 12:50 PM)   Return Visit with Carmel Freitas MD   [facility] ([facility])    [address]  [address] [facility] [address]   [phone]            Apr 21, 2017 10:30 AM CDT   (Arrive by 10:15 AM)   Return Visit with Bebeto Mccurdy MD   [facility] ([facility])    [address] [place] [address]   [phone]            May 04, 2017 10:30 AM CDT   (Arrive by 10:15 AM)   New Patient Visit with Mario Marks MD   [facility] ([facility])    [address] [place] [address]   [phone]            2017 10:00 AM CDT   (Arrive by 9:45 AM)   Return Visit with Bebeto Mccurdy MD   [facility] ([facility])    [address] [place] [address]   [phone]              Future tests that were ordered for you today     Open Future Orders        Priority Expected Expires Ordered    TSH with free T4 reflex Routine 2017            Who to contact     Please call your clinic at [phone] to:    Ask questions about your health    Make or cancel appointments    Discuss your medicines    Learn about your test results    Speak to your doctor   If you have compliments or concerns about an experience at your clinic, or if you wish to file a complaint, please contact [facility] Patient Relations at [phone] or email us at [email or web address]         Additional Information About Your Visit        "[employer]"hart Information     Yaphie is an electronic gateway that provides easy, online access to your medical records. With Yaphie, you can request a clinic appointment, read your test results, renew a prescription or communicate with your care team.     To sign up for Yaphie visit the website at [email or web address]   You will be asked to enter the access code listed below, as well as some personal information. Please follow the directions to create your username and password.     Your access code is: [email or web address]  Expires: 5/3/2017 12:46 PM     Your access code will  in 90 days. If you need help or a new code, please contact your  [facility] or call [phone] for assistance.        Care EveryWhere ID     This is your Care EveryWhere ID. This could be used by other organizations to access your [place] medical records  [id number]        Your Vitals Were     Pulse Breastfeeding? BMI (Body Mass Index)             82 No 32.1 kg/m2          Blood Pressure from Last 3 Encounters:   02/16/17 138/87   02/08/17 (!) 170/101   02/02/17 (!) 179/109    Weight from Last 3 Encounters:   02/16/17 90.9 kg (200 lb 6.4 oz)   02/08/17 91.2 kg (201 lb)   02/02/17 92.5 kg (203 lb 14.4 oz)                 Today's Medication Changes          These changes are accurate as of: 2/16/17  3:40 PM.  If you have any questions, ask your nurse or doctor.               Start taking these medicines.        Dose/Directions    alendronate 70 MG tablet   Commonly known as:  FOSAMAX   Used for:  Osteoporosis   Started by:  Carmel Freitas MD        Dose:  70 mg   Take 1 tablet (70 mg) by mouth every 7 days   Quantity:  12 tablet   Refills:  3            Where to get your medicines      These medications were sent to [facility] [address] [place] [address] [address] AT [facility] [address]  [address] [facility] [address]    Hours:  24-hours Phone:  [phone]     alendronate 70 MG tablet                Primary Care Provider Office Phone # Fax #    Carmel Freitas [phone]       PRIMARY CARE CENTER [address] [place] [address]        Thank you!     Thank you for choosing [facility]  for your care. Our goal is always to provide you with excellent care. Hearing back from our patients is one way we can continue to improve our services. Please take a few minutes to complete the written survey that you may receive in the mail after your visit with us. Thank you!             Your Updated Medication List - Protect others around you: Learn how to  safely use, store and throw away your medicines at www.disposemymeds.org.          This list is accurate as of: 2/16/17  3:40 PM.  Always use your most recent med list.                   Brand Name Dispense Instructions for use    acetaminophen 500 MG tablet    TYLENOL     Take 500-1,000 mg by mouth every 6 hours as needed       alendronate 70 MG tablet    FOSAMAX    12 tablet    Take 1 tablet (70 mg) by mouth every 7 days       ARTIFICIAL TEAR SOLUTION OP      Apply  to eye as needed.       aspirin 325 MG tablet      Take 650 mg by mouth every 6 hours as needed for moderate pain       CALCIUM + D PO      Take 1 tablet by mouth 2 times daily       DAILY MULTIVITAMIN PO      Take 1 tablet by mouth daily And minerals per pt       folic acid 800 MCG Tabs      Take by mouth 2 times daily       GLUCOSAMINE SULFATE PO      Take 1,000 mg by mouth daily       hydrocortisone 1 % ointment     25 g    Apply topically 2 times daily To affected area on right upper cheek       levothyroxine 88 MCG tablet    SYNTHROID/LEVOTHROID     Take 88 mcg by mouth daily       lisinopril 20 MG tablet    PRINIVIL/ZESTRIL    90 tablet    Take 1 tablet (20 mg) by mouth daily       methotrexate 2.5 MG tablet CHEMO     32 tablet    Take 7 tablets (17.5 mg) by mouth once a week Take 8 tablets per week       minocycline 100 MG capsule    MINOCIN/DYNACIN    60 capsule    Take 1 capsule (100 mg) by mouth daily       mupirocin 2 % ointment    BACTROBAN    30 g    Apply twice daily to areas on the left ankle.       omeprazole 20 MG CR capsule    priLOSEC    30 capsule    Take 1 capsule (20 mg) by mouth daily       Vitamin D-3 1000 UNITS Caps      Take by mouth 2 times daily

## 2017-03-02 ENCOUNTER — OFFICE VISIT (OUTPATIENT)
Dept: DERMATOLOGY | Facility: CLINIC | Age: 77
End: 2017-03-02

## 2017-03-02 ENCOUNTER — RADIANT APPOINTMENT (OUTPATIENT)
Dept: MAMMOGRAPHY | Facility: CLINIC | Age: 77
End: 2017-03-02

## 2017-03-02 DIAGNOSIS — Z12.31 ENCOUNTER FOR SCREENING MAMMOGRAM FOR BREAST CANCER: ICD-10-CM

## 2017-03-02 DIAGNOSIS — L88: Primary | ICD-10-CM

## 2017-03-02 ASSESSMENT — PAIN SCALES - GENERAL: PAINLEVEL: NO PAIN (0)

## 2017-03-02 NOTE — LETTER
"3/2/2017       RE: Abida Hahn  [address]  [facility] [place] [address]     Dear Colleague,    Thank you for referring your patient, Abida Hahn, to the [facility] DER[facility] at [facility]. Please see a copy of my visit note below.              Pictures were placed in Pt's chart today for future reference.      [facility] Dermatology Note      Dermatology Problem List:  1 Skin cancer screening 2011 with Dr Garza: resolving herpes on face, Seborrheic keratoses, acrochordons, cherry angiomas  2. Skin eruption to her left ankle, Seen by Dr. Johnson Meraz at [facility]   3. Personal hx of eczema in her youth  4. Pyoderma gangrenosum- minocycyline, mupirocin, kenalog injections.    CC:   Chief Complaint   Patient presents with     Derm Problem     Follow up for wound.  Abida states she has \"no concerns\" at this time.             Encounter Date: Mar 2, 2017    History of Present Illness:  Ms. Abida Hahn is a 76 year old female with history pyoderma gangrenosum. She was last seen here 2/2/2017 when 2.0 total cc Kenalog 10 mg/cc were injected into pyoderma grangrenosum lesions and her minocycline was reduced to 50 mg daily. She has continued to apply mupirocin ointment daily.     Since then, Abida continues to report improvement in her skin lesions. They are asymptomatic. For many months she had to wrap her leg but now they are closed and do not weep. No associated pain. She presents today for further treatment with intralesional Kenalog.     Since her last visit she has had pulmonary function testing showing restrictive lung disease pattern and had a DEXA scan showing osteoporosis. She saw her rheumatologist and was restarted on methotrexate. She is feeling well. The patient denies GI upset, headaches, blurred vision. No other skin complaints.            Past Medical History:   Patient Active Problem List   Diagnosis     Chronic pain syndrome     " Essential hypertension     Rheumatoid arthritis (H)     Encounter for long-term current use of medication     Seborrheic keratosis     Hemangioma     Acrochordon     Pyoderma gangrenosum     Hypothyroidism     Osteoporosis     Past Medical History   Diagnosis Date     Abscess, toe 2009     Right great toe     Cataract 8/2011     Right s/p surgery 8/2011; left s/p surgery     Glaucoma      Headache(784.0)      Relieved with gabapentin. Chronic pain     HTN (hypertension)      RA (rheumatoid arthritis) (H)      Wrist fracture      Right, s/p ORIF     Past Surgical History   Procedure Laterality Date     Open reduction internal fixation wrist bilateral       Biopsy artery temporal       Extracapsular cataract extration with intraocular lens implant  8/2011     Left 2 years ago as well       Social History:  The patient is single. The patient denies use of tanning beds. Patiently awaiting great grandchildren from her 24-year-old granddaughter, whom she loves very much.    Family History:  There is no family history of asthma, eczema or allergies.    Medications:  Current Outpatient Prescriptions   Medication Sig Dispense Refill     alendronate (FOSAMAX) 70 MG tablet Take 1 tablet (70 mg) by mouth every 7 days 12 tablet 3     aspirin 325 MG tablet Take 650 mg by mouth every 6 hours as needed for moderate pain       methotrexate 2.5 MG tablet CHEMO Take 7 tablets (17.5 mg) by mouth once a week Take 8 tablets per week 32 tablet 3     omeprazole (PRILOSEC) 20 MG CR capsule Take 1 capsule (20 mg) by mouth daily 30 capsule 3     GLUCOSAMINE SULFATE PO Take 1,000 mg by mouth daily       lisinopril (PRINIVIL/ZESTRIL) 20 MG tablet Take 1 tablet (20 mg) by mouth daily 90 tablet 0     minocycline (MINOCIN/DYNACIN) 100 MG capsule Take 1 capsule (100 mg) by mouth daily 60 capsule 0     mupirocin (BACTROBAN) 2 % ointment Apply twice daily to areas on the left ankle. 30 g 3     hydrocortisone 1 % ointment Apply topically 2 times daily  To affected area on right upper cheek 25 g 1     folic acid 800 MCG TABS Take by mouth 2 times daily       Cholecalciferol (VITAMIN D-3) 1000 UNITS CAPS Take by mouth 2 times daily       acetaminophen (TYLENOL) 500 MG tablet Take 500-1,000 mg by mouth every 6 hours as needed       levothyroxine (SYNTHROID, LEVOTHROID) 88 MCG tablet Take 88 mcg by mouth daily       ARTIFICIAL TEAR SOLUTION OP Apply  to eye as needed.       Calcium-Vitamin D (CALCIUM + D PO) Take 1 tablet by mouth 2 times daily       Multiple Vitamin (DAILY MULTIVITAMIN PO) Take 1 tablet by mouth daily And minerals per pt       Allergies   Allergen Reactions     Ibuprofen Sodium      Penicillins      Triple Antibiotic [Neomycin-Polymyxin-Dexameth]      Aspirin Rash     Codeine Rash         Review of Systems:  -Skin/Heme New Pt: The patient denies frequent sun exposure. The patient denies excessive scarring or problems healing except as per HPI. The patient denies excessive bleeding.  -Constitutional: The patient denies fatigue, fevers, chills, unintended weight loss, and night sweats.  -HEENT: Patient denies nonhealing oral sores.  -Skin: As above in HPI. No additional skin concerns.  -The patient denies GI upset, headaches, blurred vision or dyspigmentation.    Physical exam:  Vitals: There were no vitals taken for this visit.  GEN: This is a well developed, well-nourished female in no acute distress, in a pleasant mood.    SKIN: Localized skin exam includes below the knees bilaterally, face, hands  Significant for:   Skin colored plaques with gray rolling borders to her left anterior and lateral lower leg, no erythema. The ulcer on the left lower anterior ankle measures 2 x 1 cm. Non tender. Granulation tissue noted to all sites, no weeping.   Less edema noted to to the bilateral lower legs.   -No other lesions of concern on areas examined.     Impression/Plan:    Pyoderma gangrenosum- improving with minocycline and kenalog injections. Skin eruption  primarily to the left lower extremity.     Continue minocycline to 50 mg daily . Minocycline side effects discussed.   Kenalog intralesional injection procedure note (performed by faculty): After verbal consent and discussion of risks including but not limited to atrophy, pain, and bruising, cleansing with isopropyl alcohol, time out was performed, 1.5 total cc of Kenalog 10 mg/cc was injected into lesions on the left lower leg.  The patient tolerated the procedure well and left the Dermatology clinic in good condition.  Photodocumentation performed.     Continue mupirocin ointment twice daily.       CC Dr. Meraz and PMD on close of this encounter.      Staff Involved:    All risks, benefits and alternatives were discussed with patient.  Patient is in agreement and understands the assessment and plan.  All questions were answered.  Sun Screen Education was given.   Return to Clinic 4 weeks or sooner as needed.       Lillian De Luna PA-C

## 2017-03-02 NOTE — NURSING NOTE
"Dermatology Rooming Note    Abida Hahn's goals for this visit include:   Chief Complaint   Patient presents with     Derm Problem     Follow up for wound.  Abida states she has \"no concerns\" at this time.           Ashley Montiel LPN    "

## 2017-03-02 NOTE — PROGRESS NOTES
"[facility] Dermatology Note      Dermatology Problem List:  1 Skin cancer screening 2011 with Dr Garza: resolving herpes on face, Seborrheic keratoses, acrochordons, cherry angiomas  2. Skin eruption to her left ankle, Seen by Dr. Johnson Meraz at [facility]   3. Personal hx of eczema in her youth  4. Pyoderma gangrenosum- minocycyline, mupirocin, kenalog injections.    CC:   Chief Complaint   Patient presents with     Derm Problem     Follow up for wound.  Abida states she has \"no concerns\" at this time.             Encounter Date: Mar 2, 2017    History of Present Illness:  Ms. Abida Hahn is a 76 year old female with history pyoderma gangrenosum. She was last seen here 2/2/2017 when 2.0 total cc Kenalog 10 mg/cc were injected into pyoderma grangrenosum lesions and her minocycline was reduced to 50 mg daily. She has continued to apply mupirocin ointment daily.     Since then, Abida continues to report improvement in her skin lesions. They are asymptomatic. For many months she had to wrap her leg but now they are closed and do not weep. No associated pain. She presents today for further treatment with intralesional Kenalog.     Since her last visit she has had pulmonary function testing showing restrictive lung disease pattern and had a DEXA scan showing osteoporosis. She saw her rheumatologist and was restarted on methotrexate. She is feeling well. The patient denies GI upset, headaches, blurred vision. No other skin complaints.            Past Medical History:   Patient Active Problem List   Diagnosis     Chronic pain syndrome     Essential hypertension     Rheumatoid arthritis (H)     Encounter for long-term current use of medication     Seborrheic keratosis     Hemangioma     Acrochordon     Pyoderma gangrenosum     Hypothyroidism     Osteoporosis     Past Medical History   Diagnosis Date     Abscess, toe 2009     Right great toe     Cataract 8/2011     Right s/p surgery 8/2011; left " s/p surgery     Glaucoma      Headache(784.0)      Relieved with gabapentin. Chronic pain     HTN (hypertension)      RA (rheumatoid arthritis) (H)      Wrist fracture      Right, s/p ORIF     Past Surgical History   Procedure Laterality Date     Open reduction internal fixation wrist bilateral       Biopsy artery temporal       Extracapsular cataract extration with intraocular lens implant  8/2011     Left 2 years ago as well       Social History:  The patient is single. The patient denies use of tanning beds. Patiently awaiting great grandchildren from her 24-year-old granddaughter, whom she loves very much.    Family History:  There is no family history of asthma, eczema or allergies.    Medications:  Current Outpatient Prescriptions   Medication Sig Dispense Refill     alendronate (FOSAMAX) 70 MG tablet Take 1 tablet (70 mg) by mouth every 7 days 12 tablet 3     aspirin 325 MG tablet Take 650 mg by mouth every 6 hours as needed for moderate pain       methotrexate 2.5 MG tablet CHEMO Take 7 tablets (17.5 mg) by mouth once a week Take 8 tablets per week 32 tablet 3     omeprazole (PRILOSEC) 20 MG CR capsule Take 1 capsule (20 mg) by mouth daily 30 capsule 3     GLUCOSAMINE SULFATE PO Take 1,000 mg by mouth daily       lisinopril (PRINIVIL/ZESTRIL) 20 MG tablet Take 1 tablet (20 mg) by mouth daily 90 tablet 0     minocycline (MINOCIN/DYNACIN) 100 MG capsule Take 1 capsule (100 mg) by mouth daily 60 capsule 0     mupirocin (BACTROBAN) 2 % ointment Apply twice daily to areas on the left ankle. 30 g 3     hydrocortisone 1 % ointment Apply topically 2 times daily To affected area on right upper cheek 25 g 1     folic acid 800 MCG TABS Take by mouth 2 times daily       Cholecalciferol (VITAMIN D-3) 1000 UNITS CAPS Take by mouth 2 times daily       acetaminophen (TYLENOL) 500 MG tablet Take 500-1,000 mg by mouth every 6 hours as needed       levothyroxine (SYNTHROID, LEVOTHROID) 88 MCG tablet Take 88 mcg by mouth daily        ARTIFICIAL TEAR SOLUTION OP Apply  to eye as needed.       Calcium-Vitamin D (CALCIUM + D PO) Take 1 tablet by mouth 2 times daily       Multiple Vitamin (DAILY MULTIVITAMIN PO) Take 1 tablet by mouth daily And minerals per pt       Allergies   Allergen Reactions     Ibuprofen Sodium      Penicillins      Triple Antibiotic [Neomycin-Polymyxin-Dexameth]      Aspirin Rash     Codeine Rash         Review of Systems:  -Skin/Heme New Pt: The patient denies frequent sun exposure. The patient denies excessive scarring or problems healing except as per HPI. The patient denies excessive bleeding.  -Constitutional: The patient denies fatigue, fevers, chills, unintended weight loss, and night sweats.  -HEENT: Patient denies nonhealing oral sores.  -Skin: As above in HPI. No additional skin concerns.  -The patient denies GI upset, headaches, blurred vision or dyspigmentation.    Physical exam:  Vitals: There were no vitals taken for this visit.  GEN: This is a well developed, well-nourished female in no acute distress, in a pleasant mood.    SKIN: Localized skin exam includes below the knees bilaterally, face, hands  Significant for:   Skin colored plaques with gray rolling borders to her left anterior and lateral lower leg, no erythema. The ulcer on the left lower anterior ankle measures 2 x 1 cm. Non tender. Granulation tissue noted to all sites, no weeping.   Less edema noted to to the bilateral lower legs.   -No other lesions of concern on areas examined.     Impression/Plan:    Pyoderma gangrenosum- improving with minocycline and kenalog injections. Skin eruption primarily to the left lower extremity.     Continue minocycline to 50 mg daily . Minocycline side effects discussed.   Kenalog intralesional injection procedure note (performed by faculty): After verbal consent and discussion of risks including but not limited to atrophy, pain, and bruising, cleansing with isopropyl alcohol, time out was performed, 1.5 total  cc of Kenalog 10 mg/cc was injected into lesions on the left lower leg.  The patient tolerated the procedure well and left the Dermatology clinic in good condition.  Photodocumentation performed.     Continue mupirocin ointment twice daily.       CC Dr. Meraz and PMD on close of this encounter.        Staff Involved:    All risks, benefits and alternatives were discussed with patient.  Patient is in agreement and understands the assessment and plan.  All questions were answered.  Sun Screen Education was given.   Return to Clinic 4 weeks or sooner as needed.   Lillian De Luna PA-C

## 2017-03-02 NOTE — MR AVS SNAPSHOT
After Visit Summary   3/2/2017    Abida Hahn    MRN: [id number]           Patient Information     Date Of Birth          1940        Visit Information        Provider Department      3/2/2017 12:15 PM Lillian De Luna PA-C [facility] Dermatology        Today's Diagnoses     Pyodermic gangrenosum    -  1       Follow-ups after your visit        Your next 10 appointments already scheduled     Mar 23, 2017  1:15 PM CDT   NEW GLAUCOMA with Corazon Addison MD   Eye Clinic ([facility])    [address]  [address] [facility]  [address] [address]  [facility] [address]   [phone]            Apr 06, 2017 12:00 PM CDT   (Arrive by 11:45 AM)   Return Visit with VIKTORIA Rocha [facility] Dermatology ([facility])    [address] [place] [id number] [phone]            Apr 06, 2017  1:05 PM CDT   (Arrive by 12:50 PM)   Return Visit with Carmel Freitas MD   [facility] ([facility])    [address] [place] [address]  4th [facility] [id number] [phone]            Apr 21, 2017 10:30 AM CDT   (Arrive by 10:15 AM)   Return Visit with Bebeto Mcucrdy MD   [facility] Rheumatology ([facility])    [address] [place] [id number] [phone]            May 04, 2017 10:30 AM CDT   (Arrive by 10:15 AM)   New Patient Visit with Mario Marks MD   [facility] ([facility])    [address] [facility]  3rd [facility] [id number] [phone]            Jun 07, 2017 10:00 AM CDT   (Arrive by 9:45 AM)   Return Visit with Bebeto Mccurdy MD   [facility] Rheumatology ([facility])    [address] [place] [id number] [phone]              Who to contact     Please call your clinic at [phone] to:    Ask  questions about your health    Make or cancel appointments    Discuss your medicines    Learn about your test results    Speak to your doctor   If you have compliments or concerns about an experience at your clinic, or if you wish to file a complaint, please contact [facility] Patient Relations at [phone] or email us at [email or web address]         Additional Information About Your Visit        Care EveryWhere ID     This is your Care EveryWhere ID. This could be used by other organizations to access your [place] medical records  [id number]         Blood Pressure from Last 3 Encounters:   02/16/17 138/87   02/08/17 (!) 170/101   02/02/17 (!) 179/109    Weight from Last 3 Encounters:   02/16/17 90.9 kg (200 lb 6.4 oz)   02/08/17 91.2 kg (201 lb)   02/02/17 92.5 kg (203 lb 14.4 oz)              We Performed the Following     INJECTION INTO SKIN LESIONS <=7          Today's Medication Changes          These changes are accurate as of: 3/2/17 12:59 PM.  If you have any questions, ask your nurse or doctor.               Start taking these medicines.        Dose/Directions    triamcinolone acetonide 10 MG/ML injection   Commonly known as:  KENALOG   Used for:  Pyodermic gangrenosum   Started by:  Lillian De Luna PA-C        Dose:  10 mg   Inject 1 mL (10 mg) into the skin once for 1 dose   Quantity:  5 mL   Refills:  0            Where to get your medicines      Some of these will need a paper prescription and others can be bought over the counter.  Ask your nurse if you have questions.     You don't need a prescription for these medications     triamcinolone acetonide 10 MG/ML injection                Primary Care Provider Office Phone # Fax #    Carmel Freitas [phone]       [facility] [address] [place] [address]        Thank you!     Thank you for choosing [facility] DERMATOLOGY  for your care. Our goal is always  to provide you with excellent care. Hearing back from our patients is one way we can continue to improve our services. Please take a few minutes to complete the written survey that you may receive in the mail after your visit with us. Thank you!             Your Updated Medication List - Protect others around you: Learn how to safely use, store and throw away your medicines at www.disposemymeds.org.          This list is accurate as of: 3/2/17 12:59 PM.  Always use your most recent med list.                   Brand Name Dispense Instructions for use    acetaminophen 500 MG tablet    TYLENOL     Take 500-1,000 mg by mouth every 6 hours as needed       alendronate 70 MG tablet    FOSAMAX    12 tablet    Take 1 tablet (70 mg) by mouth every 7 days       ARTIFICIAL TEAR SOLUTION OP      Apply  to eye as needed.       aspirin 325 MG tablet      Take 650 mg by mouth every 6 hours as needed for moderate pain       CALCIUM + D PO      Take 1 tablet by mouth 2 times daily       DAILY MULTIVITAMIN PO      Take 1 tablet by mouth daily And minerals per pt       folic acid 800 MCG Tabs      Take by mouth 2 times daily       GLUCOSAMINE SULFATE PO      Take 1,000 mg by mouth daily       hydrocortisone 1 % ointment     25 g    Apply topically 2 times daily To affected area on right upper cheek       levothyroxine 88 MCG tablet    SYNTHROID/LEVOTHROID     Take 88 mcg by mouth daily       lisinopril 20 MG tablet    PRINIVIL/ZESTRIL    90 tablet    Take 1 tablet (20 mg) by mouth daily       methotrexate 2.5 MG tablet CHEMO     32 tablet    Take 7 tablets (17.5 mg) by mouth once a week Take 8 tablets per week       minocycline 100 MG capsule    MINOCIN/DYNACIN    60 capsule    Take 1 capsule (100 mg) by mouth daily       mupirocin 2 % ointment    BACTROBAN    30 g    Apply twice daily to areas on the left ankle.       omeprazole 20 MG CR capsule    priLOSEC    30 capsule    Take 1 capsule (20 mg) by mouth daily       triamcinolone  acetonide 10 MG/ML injection    KENALOG    5 mL    Inject 1 mL (10 mg) into the skin once for 1 dose       Vitamin D-3 1000 UNITS Caps      Take by mouth 2 times daily

## 2017-03-09 LAB
DLCOCOR-%PRED-PRE: 76 %
DLCOCOR-PRE: 15.79 ML/MIN/MMHG
DLCOUNC-%PRED-PRE: 81 %
DLCOUNC-PRE: 16.8 ML/MIN/MMHG
DLCOUNC-PRED: 20.69 ML/MIN/MMHG
ERV-%PRED-PRE: 7 %
ERV-PRE: 0.03 L
ERV-PRED: 0.42 L
EXPTIME-PRE: 7.6 SEC
FEF2575-%PRED-PRE: 25 %
FEF2575-PRE: 0.45 L/SEC
FEF2575-PRED: 1.73 L/SEC
FEFMAX-%PRED-PRE: 54 %
FEFMAX-PRE: 2.95 L/SEC
FEFMAX-PRED: 5.45 L/SEC
FEV1-%PRED-PRE: 38 %
FEV1-PRE: 0.84 L
FEV1FEV6-PRE: 68 %
FEV1FEV6-PRED: 78 %
FEV1FVC-PRE: 67 %
FEV1FVC-PRED: 77 %
FEV1SVC-PRE: 61 %
FEV1SVC-PRED: 68 %
FIFMAX-PRE: 2.09 L/SEC
FRCPLETH-%PRED-PRE: 98 %
FRCPLETH-PRE: 2.8 L
FRCPLETH-PRED: 2.85 L
FVC-%PRED-PRE: 43 %
FVC-PRE: 1.25 L
FVC-PRED: 2.86 L
IC-%PRED-PRE: 48 %
IC-PRE: 1.35 L
IC-PRED: 2.79 L
RVPLETH-%PRED-PRE: 121 %
RVPLETH-PRE: 2.77 L
RVPLETH-PRED: 2.27 L
TLCPLETH-%PRED-PRE: 77 %
TLCPLETH-PRE: 4.15 L
TLCPLETH-PRED: 5.32 L
VA-%PRED-PRE: 49 %
VA-PRE: 2.68 L
VC-%PRED-PRE: 42 %
VC-PRE: 1.38 L
VC-PRED: 3.21 L

## 2017-03-23 ENCOUNTER — OFFICE VISIT (OUTPATIENT)
Dept: OPHTHALMOLOGY | Facility: CLINIC | Age: 77
End: 2017-03-23
Attending: OPHTHALMOLOGY
Payer: MEDICARE

## 2017-03-23 DIAGNOSIS — H40.1133 PRIMARY OPEN ANGLE GLAUCOMA OF BOTH EYES, SEVERE STAGE: Primary | ICD-10-CM

## 2017-03-23 DIAGNOSIS — Z96.1 PSEUDOPHAKIA OF BOTH EYES: ICD-10-CM

## 2017-03-23 PROCEDURE — 99215 OFFICE O/P EST HI 40 MIN: CPT | Mod: ZF

## 2017-03-23 PROCEDURE — 92250 FUNDUS PHOTOGRAPHY W/I&R: CPT | Mod: ZF | Performed by: OPHTHALMOLOGY

## 2017-03-23 PROCEDURE — 92020 GONIOSCOPY: CPT | Mod: ZF | Performed by: OPHTHALMOLOGY

## 2017-03-23 ASSESSMENT — EXTERNAL EXAM - LEFT EYE: OS_EXAM: NORMAL

## 2017-03-23 ASSESSMENT — GONIOSCOPY
OD_TEMPORAL: 2-3
OS_SUPERIOR: 2-3
OD_INFERIOR: 2-3
OS_NASAL: 2-3
OS_TEMPORAL: 2-3
OS_INFERIOR: 2-3
OD_SUPERIOR: 2-3
OD_NASAL: 2-3

## 2017-03-23 ASSESSMENT — VISUAL ACUITY
OD_CC+: -2
METHOD: SNELLEN - LINEAR
CORRECTION_TYPE: GLASSES
OS_CC+: -2
OS_CC: 20/25
OD_CC: 20/25

## 2017-03-23 ASSESSMENT — ENCOUNTER SYMPTOMS: JOINT SWELLING: 1

## 2017-03-23 ASSESSMENT — CONF VISUAL FIELD
OS_NORMAL: 1
OD_NORMAL: 1

## 2017-03-23 ASSESSMENT — REFRACTION_WEARINGRX
OS_AXIS: 052
OD_SPHERE: -0.25
OD_AXIS: 014
OS_SPHERE: PLANO
OS_ADD: +3.00
OD_ADD: +3.00
OS_CYLINDER: +1.50
OD_CYLINDER: +1.00

## 2017-03-23 ASSESSMENT — TONOMETRY
OD_IOP_MMHG: 18
IOP_METHOD: TONOPEN
OS_IOP_MMHG: 15

## 2017-03-23 ASSESSMENT — SLIT LAMP EXAM - LIDS
COMMENTS: NORMAL
COMMENTS: NORMAL

## 2017-03-23 ASSESSMENT — PACHYMETRY
OS_CT(UM): 540
OD_CT(UM): 571

## 2017-03-23 ASSESSMENT — CUP TO DISC RATIO
OS_RATIO: 0.9
OD_RATIO: 0.9

## 2017-03-23 ASSESSMENT — EXTERNAL EXAM - RIGHT EYE: OD_EXAM: NORMAL

## 2017-03-23 NOTE — MR AVS SNAPSHOT
After Visit Summary   3/23/2017    Abida Hahn    MRN: [id number]           Patient Information     Date Of Birth          1940        Visit Information        Provider Department      3/23/2017 1:15 PM Corazon Addison MD Eye Clinic        Today's Diagnoses     Primary open angle glaucoma of both eyes, severe stage    -  1    Pseudophakia of both eyes          Care Instructions    Patient will return to clinic in 1-4 weeks with refraction for new glasses, visual field test, OCT with RNFL analysis and IOP check.  Pending results further treatment and management decisions will be made.          Follow-ups after your visit        Follow-up notes from your care team     Return 1-4 weeks with refraction for new glasses, visual field test, OCT with RNFL analysis and IOP check.      Your next 10 appointments already scheduled     Mar 30, 2017  9:45 AM CDT   VISUAL FIELD with [facility] EYE VISUAL FIELD   Eye Clinic ([facility])    [address]  [address] [facility]  9[facility] [address] [place] [address]   [phone]            Mar 30, 2017 10:15 AM CDT   RETURN GLAUCOMA with Corazon Addison MD   Eye Clinic ([facility])    [address]  [address] [facility]  [facility] [address] [place] [address]   [phone]            Apr 06, 2017 12:00 PM CDT   (Arrive by 11:45 AM)   Return Visit with Lillian De Luna PA-C   [facility] Dermatology ([facility])    [address] [facility]  3rd [facility] [address]   [phone]            Apr 06, 2017  1:05 PM CDT   (Arrive by 12:50 PM)   Return Visit with Carmel Freitas MD   [facility] ([facility])    [address] [facility]  4th Floor  [facility] [address]   [phone]            Apr 21, 2017 10:30 AM CDT   (Arrive by 10:15 AM)   Return Visit with Bebeto Mccurdy MD   [facility] Rheumatology ([facility])    [address] [address]   [phone]            May 04, 2017 10:30 AM CDT   (Arrive by 10:15 AM)   New Patient Visit with Mario Marks MD   [facility] ([facility])    [address] [address]   [phone]            Jun 07, 2017 10:00 AM CDT   (Arrive by 9:45 AM)   Return Visit with Bebeto Mccurdy MD   [facility] ([facility])    [address] [id number] [phone]              Who to contact     Please call your clinic at [phone] to:    Ask questions about your health    Make or cancel appointments    Discuss your medicines    Learn about your test results    Speak to your doctor   If you have compliments or concerns about an experience at your clinic, or if you wish to file a complaint, please contact [facility] Patient Relations at [phone] or email us at [email or web address]         Additional Information About Your Visit        Care EveryWhere ID     This is your Care EveryWhere ID. This could be used by other organizations to access your [place] medical records  [id number]         Blood Pressure from Last 3 Encounters:   02/16/17 138/87   02/08/17 (!) 170/101   02/02/17 (!) 179/109    Weight from Last 3 Encounters:   02/16/17 90.9 kg (200 lb 6.4 oz)   02/08/17 91.2 kg (201 lb)   02/02/17 92.5 kg (203 lb 14.4 oz)              We Performed the Following     Fundus Photos OU (both eyes)     GONIOSCOPY     Pachymetry OU (both eyes)        Primary Care Provider Office Phone # Fax #    Carmel Freitas [phone]       [facility] [address] [place] [address]        Thank you!     Thank you for choosing EYE CLINIC  for your care. Our goal is always to provide you with excellent care. Hearing back from our patients is  one way we can continue to improve our services. Please take a few minutes to complete the written survey that you may receive in the mail after your visit with us. Thank you!             Your Updated Medication List - Protect others around you: Learn how to safely use, store and throw away your medicines at www.disposemymeds.org.          This list is accurate as of: 3/23/17  2:43 PM.  Always use your most recent med list.                   Brand Name Dispense Instructions for use    acetaminophen 500 MG tablet    TYLENOL     Take 500-1,000 mg by mouth every 6 hours as needed       alendronate 70 MG tablet    FOSAMAX    12 tablet    Take 1 tablet (70 mg) by mouth every 7 days       ARTIFICIAL TEAR SOLUTION OP      Apply  to eye as needed.       aspirin 325 MG tablet      Take 650 mg by mouth every 6 hours as needed for moderate pain       CALCIUM + D PO      Take 1 tablet by mouth 2 times daily       DAILY MULTIVITAMIN PO      Take 1 tablet by mouth daily And minerals per pt       folic acid 800 MCG Tabs      Take by mouth 2 times daily       GLUCOSAMINE SULFATE PO      Take 1,000 mg by mouth daily       hydrocortisone 1 % ointment     25 g    Apply topically 2 times daily To affected area on right upper cheek       levothyroxine 88 MCG tablet    SYNTHROID/LEVOTHROID     Take 88 mcg by mouth daily       lisinopril 20 MG tablet    PRINIVIL/ZESTRIL    90 tablet    Take 1 tablet (20 mg) by mouth daily       methotrexate 2.5 MG tablet CHEMO     32 tablet    Take 7 tablets (17.5 mg) by mouth once a week Take 8 tablets per week       minocycline 100 MG capsule    MINOCIN/DYNACIN    60 capsule    Take 1 capsule (100 mg) by mouth daily       mupirocin 2 % ointment    BACTROBAN    30 g    Apply twice daily to areas on the left ankle.       omeprazole 20 MG CR capsule    priLOSEC    30 capsule    Take 1 capsule (20 mg) by mouth daily       Vitamin D-3 1000 UNITS Caps      Take by mouth 2 times daily

## 2017-03-23 NOTE — PROGRESS NOTES
1)POAG -- Diagnosed with Glc [id number], s/p TRAB OU (OD:96 and OS:95 with revision OS in 1997), H/O Noncompliance with visits (lost to f/u from 5510-4813) -- K pachy: 571/540   Tmax:     HVF:  OD:[place] and OS:[place] in 2009    CDR: 0.9/0.9    HRT/OCT:       FHX of Glc: Mother -- gtts, Sister -- possibly     Gonio:  Open with few PAS     Intolerant to:      Asthma/COPD: No  Steroid Use: No    Kidney Stones:  No   Sulfa Allergy:  No    IOP targets:  2)PCIOL OU  3)H/O HA -- s/p Negative TAB with Dr. Bellamy   4)H/O RA on MTX -- following with Rheum  5)XIN    Patient will return to clinic in 1-4 weeks with refraction for new glasses, visual field test, OCT with RNFL analysis and IOP check.  Pending results further treatment and management decisions will be made.      Attending Physician Attestation:  Complete documentation of historical and exam elements from today's encounter can be found in the full encounter summary report (not reduplicated in this progress note). I personally obtained the chief complaint(s) and history of present illness.  I confirmed and edited as necessary the review of systems, past medical/surgical history, family history, social history, and examination findings as documented by others; and I examined the patient myself. I personally reviewed the relevant tests, images, and reports as documented above. I formulated and edited as necessary the assessment and plan and discussed the findings and management plan with the patient and family.  - Corazon Addison MD 2:06 PM 3/23/2017

## 2017-03-23 NOTE — NURSING NOTE
Chief Complaints and History of Present Illnesses   Patient presents with     Eye Problem     glaucoma suspect     HPI    Symptoms:              Comments:  Abida is a 76 year old female presenting     Self referred.   History of glaucoma and trabeculectomy in [place] 30 years ago. Has not followed up with a physician in a long time.   - Left eye is worse than right per patient.     Cisco LYONS 1:10 PM March 23, 2017

## 2017-03-30 ENCOUNTER — APPOINTMENT (OUTPATIENT)
Dept: OPHTHALMOLOGY | Facility: CLINIC | Age: 77
End: 2017-03-30
Attending: OPHTHALMOLOGY
Payer: MEDICARE

## 2017-03-30 DIAGNOSIS — H40.1130 PRIMARY OPEN ANGLE GLAUCOMA OF BOTH EYES: ICD-10-CM

## 2017-03-30 DIAGNOSIS — Z96.1 PSEUDOPHAKIA OF BOTH EYES: ICD-10-CM

## 2017-03-30 DIAGNOSIS — H40.1133 PRIMARY OPEN ANGLE GLAUCOMA OF BOTH EYES, SEVERE STAGE: Primary | ICD-10-CM

## 2017-03-30 PROCEDURE — 92015 DETERMINE REFRACTIVE STATE: CPT | Mod: ZF

## 2017-03-30 PROCEDURE — 99212 OFFICE O/P EST SF 10 MIN: CPT | Mod: 25,ZF

## 2017-03-30 PROCEDURE — 92133 CPTRZD OPH DX IMG PST SGM ON: CPT | Mod: ZF | Performed by: OPHTHALMOLOGY

## 2017-03-30 PROCEDURE — 92083 EXTENDED VISUAL FIELD XM: CPT | Mod: ZF | Performed by: OPHTHALMOLOGY

## 2017-03-30 ASSESSMENT — REFRACTION_WEARINGRX
OD_ADD: +3.00
OD_AXIS: 170
OS_ADD: +3.00
OD_CYLINDER: +1.00
OD_SPHERE: -1.25
OS_CYLINDER: +1.50
SPECS_TYPE: PAL
OS_AXIS: 082
OS_SPHERE: -1.25

## 2017-03-30 ASSESSMENT — REFRACTION_MANIFEST
OD_SPHERE: -1.75
OD_ADD: +3.00
OS_AXIS: 104
OD_AXIS: 145
OS_CYLINDER: +1.50
OS_ADD: +3.00
OS_SPHERE: -0.75
OD_CYLINDER: +0.75

## 2017-03-30 ASSESSMENT — CONF VISUAL FIELD
OS_NORMAL: 1
OD_NORMAL: 1

## 2017-03-30 ASSESSMENT — TONOMETRY
IOP_METHOD: APPLANATION
OD_IOP_MMHG: 19
OS_IOP_MMHG: 19

## 2017-03-30 ASSESSMENT — VISUAL ACUITY
METHOD: SNELLEN - LINEAR
CORRECTION_TYPE: GLASSES
OS_CC+: -2
OD_CC+: -2
OS_CC: 20/25
OD_CC: 20/30

## 2017-03-30 ASSESSMENT — SLIT LAMP EXAM - LIDS
COMMENTS: NORMAL
COMMENTS: NORMAL

## 2017-03-30 ASSESSMENT — EXTERNAL EXAM - LEFT EYE: OS_EXAM: NORMAL

## 2017-03-30 ASSESSMENT — EXTERNAL EXAM - RIGHT EYE: OD_EXAM: NORMAL

## 2017-03-30 NOTE — PATIENT INSTRUCTIONS
Patient will start Latanoprost which is a teal top drop at bedtime in both eyes.  Patient will return to clinic in 1-2 months with repeat IOP check.      This drop may cause lash growth and some darkening of the skin around the eye.  It is also possible in a patient with a freckled iris or michell eyes, that the iris could darken to brown with time, something that is not common but not reversible.

## 2017-03-30 NOTE — NURSING NOTE
Chief Complaints and History of Present Illnesses   Patient presents with     Follow Up For     POAG      HPI    Affected eye(s):  Both   Symptoms:        Duration:  3 days      Do you have eye pain now?:  No      Comments:  Pt. States no change in VA BE.  No c/o comfort BE.  Freda Olivares COT 10:16 AM March 30, 2017

## 2017-03-30 NOTE — PROGRESS NOTES
1)Endstage POAG -- Diagnosed with Glc [id number], s/p TRAB OU (OD:96 and OS:95 with revision OS in 1997), H/O Noncompliance with visits (lost to f/u from 2658-6615) with marked progression OS -- K pachy: 571/540   Tmax:     HVF:OD:[place] and OS:[place] (marked progresion from 9785-6032 and HVF  OD:[place] and OS:[place] in 2009    CDR: 0.9/0.9    HRT/OCT: OD:Severe RNFl thinning and OS:Mod RNFL thinning      FHX of Glc: Mother -- gtts, Sister -- possibly     Gonio:  Open with few PAS     Intolerant to:      Asthma/COPD: No  Steroid Use: No    Kidney Stones:  No   Sulfa Allergy:  No    IOP targets:L-M teens (?Lteens) -- IOP borderline  2)PCIOL OU  3)H/O HA -- s/p Negative TAB with Dr. Bellamy   4)H/O RA on MTX -- following with Rheum  5)XIN    Patient will start Latanoprost which is a teal top drop at bedtime in both eyes.  Patient will return to clinic in 1-2 months with repeat IOP check.      This drop may cause lash growth and some darkening of the skin around the eye.  It is also possible in a patient with a freckled iris or michell eyes, that the iris could darken to brown with time, something that is not common but not reversible.    Attending Physician Attestation:  Complete documentation of historical and exam elements from today's encounter can be found in the full encounter summary report (not reduplicated in this progress note). I personally obtained the chief complaint(s) and history of present illness.  I confirmed and edited as necessary the review of systems, past medical/surgical history, family history, social history, and examination findings as documented by others; and I examined the patient myself. I personally reviewed the relevant tests, images, and reports as documented above. I formulated and edited as necessary the assessment and plan and discussed the findings and management plan with the patient and family.  - Corazon Addison MD 11:50 AM 3/30/2017

## 2017-03-30 NOTE — MR AVS SNAPSHOT
After Visit Summary   3/30/2017    Abida Hahn    MRN: [id number]           Patient Information     Date Of Birth          1940        Visit Information        Provider Department      3/30/2017 10:15 AM Corazon Addison MD [facility]        Today's Diagnoses     Primary open angle glaucoma of both eyes, severe stage    -  1    Primary open angle glaucoma of both eyes        Pseudophakia of both eyes          Care Instructions    Patient will start Latanoprost which is a teal top drop at bedtime in both eyes.  Patient will return to clinic in 1-2 months with repeat IOP check.      This drop may cause lash growth and some darkening of the skin around the eye.  It is also possible in a patient with a freckled iris or michell eyes, that the iris could darken to brown with time, something that is not common but not reversible.          Follow-ups after your visit        Follow-up notes from your care team     Return in about 2 months (around 5/30/2017).      Your next 10 appointments already scheduled     Apr 06, 2017 12:00 PM CDT   (Arrive by 11:45 AM)   Return Visit with Lillian De Luna PA-C   [facility] ([facility])    [address] [place] [address]  3rd [facility] [address]   [phone]            Apr 06, 2017  1:05 PM CDT   (Arrive by 12:50 PM)   Return Visit with Carmel Freitas MD   [facility] ([facility])    [address] [place] [address]  4th [facility] [address]   [phone]            Apr 21, 2017 10:30 AM CDT   (Arrive by 10:15 AM)   Return Visit with Bebeto Mccurdy MD   [facility] Rheumat[facility] ([facility])    [address] [place] [address]  3rd [facility] [address]   [phone]            May 04, 2017 10:30 AM CDT   (Arrive by 10:15 AM)   New Patient Visit with Mario Marks MD   [facility] ([facility])    [address] [address] [phone]            Jun 07, 2017 10:00 AM CDT   (Arrive by 9:45 AM)   Return Visit with Bebeto Mccurdy MD   [facility] Rheumat[facility] ([facility])    [address] [address] [phone]              Who to contact     Please call your clinic at [phone] to:    Ask questions about your health    Make or cancel appointments    Discuss your medicines    Learn about your test results    Speak to your doctor   If you have compliments or concerns about an experience at your clinic, or if you wish to file a complaint, please contact [facility] Patient Relations at [phone] or email us at [email or web address]         Additional Information About Your Visit        Care EveryWhere ID     This is your Care EveryWhere ID. This could be used by other organizations to access your [place] medical records  [id number]         Blood Pressure from Last 3 Encounters:   02/16/17 138/87   02/08/17 (!) 170/101   02/02/17 (!) 179/109    Weight from Last 3 Encounters:   02/16/17 90.9 kg (200 lb 6.4 oz)   02/08/17 91.2 kg (201 lb)   02/02/17 92.5 kg (203 lb 14.4 oz)              We Performed the Following     OCT Optic Nerve RNFL Spectralis OU (both eyes)     OVF 24-2 Dynamic OU        Primary Care Provider Office Phone # Fax #    Carmel Freitas [phone]       [facility] [address] [place] [id number]        Thank you!     Thank you for choosing E[facility] CLINIC  for your care. Our goal is always to provide you with excellent care. Hearing back from our patients is one way we can continue to improve our services. Please take a few minutes to complete the written survey that you may receive in the mail after your visit with us. Thank you!             Your Updated Medication List - Protect others around you: Learn  how to safely use, store and throw away your medicines at www.disposemymeds.org.          This list is accurate as of: 3/30/17 11:52 AM.  Always use your most recent med list.                   Brand Name Dispense Instructions for use    acetaminophen 500 MG tablet    TYLENOL     Take 500-1,000 mg by mouth every 6 hours as needed       alendronate 70 MG tablet    FOSAMAX    12 tablet    Take 1 tablet (70 mg) by mouth every 7 days       ARTIFICIAL TEAR SOLUTION OP      Apply  to eye as needed.       aspirin 325 MG tablet      Take 650 mg by mouth every 6 hours as needed for moderate pain       CALCIUM + D PO      Take 1 tablet by mouth 2 times daily       DAILY MULTIVITAMIN PO      Take 1 tablet by mouth daily And minerals per pt       folic acid 800 MCG Tabs      Take by mouth 2 times daily       GLUCOSAMINE SULFATE PO      Take 1,000 mg by mouth daily       hydrocortisone 1 % ointment     25 g    Apply topically 2 times daily To affected area on right upper cheek       levothyroxine 88 MCG tablet    SYNTHROID/LEVOTHROID     Take 88 mcg by mouth daily       lisinopril 20 MG tablet    PRINIVIL/ZESTRIL    90 tablet    Take 1 tablet (20 mg) by mouth daily       methotrexate 2.5 MG tablet CHEMO     32 tablet    Take 7 tablets (17.5 mg) by mouth once a week Take 8 tablets per week       minocycline 100 MG capsule    MINOCIN/DYNACIN    60 capsule    Take 1 capsule (100 mg) by mouth daily       mupirocin 2 % ointment    BACTROBAN    30 g    Apply twice daily to areas on the left ankle.       omeprazole 20 MG CR capsule    priLOSEC    30 capsule    Take 1 capsule (20 mg) by mouth daily       Vitamin D-3 1000 UNITS Caps      Take by mouth 2 times daily

## 2017-04-03 DIAGNOSIS — H40.10X3 OPEN-ANGLE GLAUCOMA OF BOTH EYES, SEVERE STAGE, UNSPECIFIED OPEN-ANGLE GLAUCOMA TYPE: Primary | ICD-10-CM

## 2017-04-03 RX ORDER — LATANOPROST 50 UG/ML
1 SOLUTION/ DROPS OPHTHALMIC AT BEDTIME
Qty: 1 BOTTLE | Refills: 11 | Status: SHIPPED | OUTPATIENT
Start: 2017-04-03 | End: 2018-04-25

## 2017-04-06 ENCOUNTER — OFFICE VISIT (OUTPATIENT)
Dept: INTERNAL MEDICINE | Facility: CLINIC | Age: 77
End: 2017-04-06

## 2017-04-06 ENCOUNTER — OFFICE VISIT (OUTPATIENT)
Dept: DERMATOLOGY | Facility: CLINIC | Age: 77
End: 2017-04-06

## 2017-04-06 VITALS — HEART RATE: 69 BPM | SYSTOLIC BLOOD PRESSURE: 148 MMHG | DIASTOLIC BLOOD PRESSURE: 91 MMHG

## 2017-04-06 DIAGNOSIS — E03.9 HYPOTHYROIDISM, UNSPECIFIED TYPE: ICD-10-CM

## 2017-04-06 DIAGNOSIS — L88 PYODERMA GANGRENOSUM (H): Primary | ICD-10-CM

## 2017-04-06 DIAGNOSIS — Z79.899 HIGH RISK MEDICATION USE: ICD-10-CM

## 2017-04-06 DIAGNOSIS — Z79.899 HIGH RISK MEDICATION USE: Primary | ICD-10-CM

## 2017-04-06 LAB
ALBUMIN SERPL-MCNC: 3.9 G/DL (ref 3.4–5)
ALP SERPL-CCNC: 133 U/L (ref 40–150)
ALT SERPL W P-5'-P-CCNC: 25 U/L (ref 0–50)
ANION GAP SERPL CALCULATED.3IONS-SCNC: 5 MMOL/L (ref 3–14)
AST SERPL W P-5'-P-CCNC: 17 U/L (ref 0–45)
BILIRUB SERPL-MCNC: 0.4 MG/DL (ref 0.2–1.3)
BUN SERPL-MCNC: 18 MG/DL (ref 7–30)
CALCIUM SERPL-MCNC: 10 MG/DL (ref 8.5–10.1)
CHLORIDE SERPL-SCNC: 104 MMOL/L (ref 94–109)
CO2 SERPL-SCNC: 30 MMOL/L (ref 20–32)
CREAT SERPL-MCNC: 0.89 MG/DL (ref 0.52–1.04)
GFR SERPL CREATININE-BSD FRML MDRD: 62 ML/MIN/1.7M2
GLUCOSE SERPL-MCNC: 112 MG/DL (ref 70–99)
POTASSIUM SERPL-SCNC: 4.4 MMOL/L (ref 3.4–5.3)
PROT SERPL-MCNC: 7.5 G/DL (ref 6.8–8.8)
SODIUM SERPL-SCNC: 140 MMOL/L (ref 133–144)
TSH SERPL DL<=0.005 MIU/L-ACNC: 1.29 MU/L (ref 0.4–4)
TSH SERPL DL<=0.05 MIU/L-ACNC: 1.29 MU/L (ref 0.4–4)

## 2017-04-06 ASSESSMENT — PAIN SCALES - GENERAL
PAINLEVEL: NO PAIN (1)
PAINLEVEL: NO PAIN (0)

## 2017-04-06 NOTE — LETTER
"4/6/2017       RE: Abida Hahn  [address]  [facility] [address]     Dear Colleague,    Thank you for referring your patient, Abida Hahn, to the [facility] DERMATOLOGY at [facility]. Please see a copy of my visit note below.    [facility] Dermatology Note      Dermatology Problem List:  1 Skin cancer screening 2011 with Dr Garza: resolving herpes on face, Seborrheic keratoses, acrochordons, cherry angiomas  2. Skin eruption to her left ankle, Seen by Dr. Johnson Meraz at [facility] D[facility]   3. Personal hx of eczema in her youth  4. Pyoderma gangrenosum- minocycyline, mupirocin, kenalog injections.    CC:   Chief Complaint   Patient presents with     Derm Problem     Abida is here today for a follow up for wound.  States it \"looks okay.\"            Encounter Date: Apr 6, 2017    History of Present Illness:  Ms. Abida Hahn is a 76 year old female with history pyoderma gangrenosum. She was last seen here 3/2/2017 when 1.5 total cc Kenalog 10 mg/cc were injected into pyoderma grangrenosum lesions and her minocycline was reduced to 50 mg daily. She has continued to apply mupirocin ointment daily.     Since then, Abida continues to report improvement in her skin lesions. They are asymptomatic. For many months she had to wrap her leg but now they are closed and do not weep. No associated pain. She presents today for further treatment with intralesional Kenalog.     She is feeling well but fatigued from the methotrexate that was recently restarted. The patient denies GI upset, headaches, blurred vision. No other skin complaints.            Past Medical History:   Patient Active Problem List   Diagnosis     Chronic pain syndrome     Essential hypertension     Rheumatoid arthritis (H)     Encounter for long-term current use of medication     Seborrheic keratosis     Hemangioma     Acrochordon     Pyoderma gangrenosum     Hypothyroidism     Osteoporosis "     Primary open angle glaucoma of both eyes, severe stage     Pseudophakia of both eyes     Past Medical History:   Diagnosis Date     Abscess, toe 2009    Right great toe     Cataract 8/2011    Right s/p surgery 8/2011; left s/p surgery     Glaucoma      Headache(784.0)     Relieved with gabapentin. Chronic pain     HTN (hypertension)      RA (rheumatoid arthritis) (H)      Wrist fracture     Right, s/p ORIF     Past Surgical History:   Procedure Laterality Date     BIOPSY ARTERY TEMPORAL       EXTRACAPSULAR CATARACT EXTRATION WITH INTRAOCULAR LENS IMPLANT  8/2011    Left 2 years ago as well     OPEN REDUCTION INTERNAL FIXATION WRIST BILATERAL         Social History:  The patient is single. The patient denies use of tanning beds. Patiently awaiting great grandchildren from her 24-year-old granddaughter, whom she loves very much.    Family History:  There is no family history of asthma, eczema or allergies.    Medications:  Current Outpatient Prescriptions   Medication Sig Dispense Refill     latanoprost (XALATAN) 0.005 % ophthalmic solution Place 1 drop into both eyes At Bedtime 1 Bottle 11     alendronate (FOSAMAX) 70 MG tablet Take 1 tablet (70 mg) by mouth every 7 days 12 tablet 3     aspirin 325 MG tablet Take 650 mg by mouth every 6 hours as needed for moderate pain       methotrexate 2.5 MG tablet CHEMO Take 7 tablets (17.5 mg) by mouth once a week Take 8 tablets per week 32 tablet 3     omeprazole (PRILOSEC) 20 MG CR capsule Take 1 capsule (20 mg) by mouth daily 30 capsule 3     GLUCOSAMINE SULFATE PO Take 1,000 mg by mouth daily       lisinopril (PRINIVIL/ZESTRIL) 20 MG tablet Take 1 tablet (20 mg) by mouth daily 90 tablet 0     minocycline (MINOCIN/DYNACIN) 100 MG capsule Take 1 capsule (100 mg) by mouth daily (Patient taking differently: Take 50 mg by mouth daily ) 60 capsule 0     mupirocin (BACTROBAN) 2 % ointment Apply twice daily to areas on the left ankle. 30 g 3     folic acid 800 MCG TABS Take by  mouth 2 times daily       Cholecalciferol (VITAMIN D-3) 1000 UNITS CAPS Take by mouth 2 times daily       acetaminophen (TYLENOL) 500 MG tablet Take 500-1,000 mg by mouth every 6 hours as needed       levothyroxine (SYNTHROID, LEVOTHROID) 88 MCG tablet Take 88 mcg by mouth daily       ARTIFICIAL TEAR SOLUTION OP Apply  to eye as needed.       Calcium-Vitamin D (CALCIUM + D PO) Take 1 tablet by mouth 2 times daily       Multiple Vitamin (DAILY MULTIVITAMIN PO) Take 1 tablet by mouth daily And minerals per pt       hydrocortisone 1 % ointment Apply topically 2 times daily To affected area on right upper cheek (Patient not taking: Reported on 4/6/2017) 25 g 1     Allergies   Allergen Reactions     Ibuprofen Sodium      Penicillins      Triple Antibiotic [Neomycin-Polymyxin-Dexameth]      Aspirin Rash     Codeine Rash         Review of Systems:  -Skin/Heme New Pt: The patient denies frequent sun exposure. The patient denies excessive scarring or problems healing except as per HPI. The patient denies excessive bleeding.  -Constitutional: The patient denies fatigue, fevers, chills, unintended weight loss, and night sweats.  -HEENT: Patient denies nonhealing oral sores.  -Skin: As above in HPI. No additional skin concerns.  -The patient denies GI upset, headaches, blurred vision or dyspigmentation.    Physical exam:  Vitals: There were no vitals taken for this visit.  GEN: This is a well developed, well-nourished female in no acute distress, in a pleasant mood.    SKIN: Localized skin exam includes below the knees bilaterally, face, hands  Significant for:   Skin colored plaques with gray rolling borders to her left anterior and lateral lower leg, no erythema. The ulcer on the left lower anterior ankle measures 2 x 1 cm. Non tender. Granulation tissue noted to all sites, no weeping.   -No other lesions of concern on areas examined.     Impression/Plan:    Pyoderma gangrenosum- improving with minocycline and kenalog  injections. Skin eruption primarily to the left lower extremity.     Continue minocycline to 50 mg daily . Minocycline side effects discussed.   Kenalog intralesional injection procedure note (performed by faculty): After verbal consent and discussion of risks including but not limited to atrophy, pain, and bruising, cleansing with isopropyl alcohol, time out was performed, 1.8 total cc of Kenalog 10 mg/cc was injected into lesions on the left lower leg.  The patient tolerated the procedure well and left the Dermatology clinic in good condition.  Photodocumentation performed.     Continue mupirocin ointment twice daily.             Staff Involved:    All risks, benefits and alternatives were discussed with patient.  Patient is in agreement and understands the assessment and plan.  All questions were answered.  Sun Screen Education was given.   Return to Clinic 4 weeks or sooner as needed.   Lillian De Luna PA-C

## 2017-04-06 NOTE — NURSING NOTE
Chief Complaint   Patient presents with     Hypertension     pt here for high blood pressure     Sara Dillon CMA at 1:01 PM on 4/6/2017.

## 2017-04-06 NOTE — PROGRESS NOTES
CC: Follow up    HPI:   Aibda Hahn is a 77 yo female with a history of RA, hypothyroidism, HTN, osteoporosis, and glaucoma presenting to clinic for follow up. Overall she is been doing well. She reports preferring the lisinopril over losartan as she has noticed she has to get up less frequently at night to urinate which has improved her sleep. She is unsure if this is related to stopping losartan or not, but is pleased with it. She is also very happy to be back on methotrexate, she feels it helps her RA pain significantly and overall improvement in her quality of life.     Regarding her breathing, she feels it has stayed steadyy, not worsened or improved. She has pulmonology follow up in May.     Additionally, she has a question about taking both fosamax and synthroid on empty stomachs, she has been skipping synthroid on the days she takes fosamax.     Lastly, she has a question about whether she can resume driving. She stopped early December as she was feeling very ill, however, she has ok vision per ophtho and no other health concerns that would prevent her from driving.     Patient Active Problem List   Diagnosis     Chronic pain syndrome     Essential hypertension     Rheumatoid arthritis (H)     Encounter for long-term current use of medication     Seborrheic keratosis     Hemangioma     Acrochordon     Pyoderma gangrenosum     Hypothyroidism     Osteoporosis     Primary open angle glaucoma of both eyes, severe stage     Pseudophakia of both eyes       Past Medical History:   Diagnosis Date     Abscess, toe 2009    Right great toe     Cataract 8/2011    Right s/p surgery 8/2011; left s/p surgery     Glaucoma      Headache(784.0)     Relieved with gabapentin. Chronic pain     HTN (hypertension)      RA (rheumatoid arthritis) (H)      Wrist fracture     Right, s/p ORIF     Past Surgical History:   Procedure Laterality Date     BIOPSY ARTERY TEMPORAL       EXTRACAPSULAR CATARACT EXTRATION WITH INTRAOCULAR LENS  IMPLANT  8/2011    Left 2 years ago as well     OPEN REDUCTION INTERNAL FIXATION WRIST BILATERAL       Family History   Problem Relation Age of Onset     CANCER Father      colon     Arthritis Mother      RA at 31 y/o, glaucome     Glaucoma Mother      Arthritis Brother      RA on Mtx and humira     Melanoma No family hx of      Skin Cancer No family hx of      Social History     Social History     Marital status: Single     Spouse name: N/A     Number of children: N/A     Years of education: N/A     Occupational History     Not on file.     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 Enjoys reading, friends, children and shopping.         Allergies   Allergen Reactions     Ibuprofen Sodium      Penicillins      Triple Antibiotic [Neomycin-Polymyxin-Dexameth]      Aspirin Rash     Codeine Rash     Current Outpatient Prescriptions   Medication     latanoprost (XALATAN) 0.005 % ophthalmic solution     alendronate (FOSAMAX) 70 MG tablet     aspirin 325 MG tablet     methotrexate 2.5 MG tablet CHEMO     omeprazole (PRILOSEC) 20 MG CR capsule     GLUCOSAMINE SULFATE PO     lisinopril (PRINIVIL/ZESTRIL) 20 MG tablet     minocycline (MINOCIN/DYNACIN) 100 MG capsule     mupirocin (BACTROBAN) 2 % ointment     hydrocortisone 1 % ointment     folic acid 800 MCG TABS     Cholecalciferol (VITAMIN D-3) 1000 UNITS CAPS     acetaminophen (TYLENOL) 500 MG tablet     levothyroxine (SYNTHROID, LEVOTHROID) 88 MCG tablet     ARTIFICIAL TEAR SOLUTION OP     Calcium-Vitamin D (CALCIUM + D PO)     Multiple Vitamin (DAILY MULTIVITAMIN PO)     No current facility-administered medications for this visit.      ROS:   Review Of Systems  Skin:  positive for negative, rash  Eyes: positive for glaucoma, negative for, visual blurring, double vision  Ears/Nose/Throat: negative for, hearing loss, sinus trouble, persistent sore throat  Respiratory: Shortness of breath- unchanged from prior visit  Cardiovascular: negative for, palpitations, irregular heart beat and chest pain  Gastrointestinal: negative for, nausea, vomiting, abdominal pain, constipation and diarrhea  Genitourinary: negative for, dysuria, frequency and urgency  Musculoskeletal: positive for negative, arthritis and joint pain  Neurologic: negative  Psychiatric: negative  Hematologic/Lymphatic/Immunologic: negative  Endocrine: positive for thyroid disorder and negative for fevers, chills    Physical Exam-   BP (!) 148/91 (BP Location: Right arm, Patient Position: Chair, Cuff Size: Adult Large)  Pulse 69  General- alert, oriented, no acute distress, pleasant mood  HEENT- TMs clear without bulging, erythema, or perforations. Oropharynx clear. Moist mucous membranes  CV- regular rate and rhythm; no murmurs, rubs, or gallops   Lungs- clear to auscultation bilaterally; decreased air movement bilaterally, possibly related to poor effort   Psych: affect pleasant, mood normal.  MSK- ulnar deviation sig at MCPs    Assessment/Plan:   Abida Hahn is a 75 yo female with a history of RA, hypothyroidism, HTN, osteoporosis, and glaucoma presenting to clinic for follow up.     #Hypothyroidism, unspecified type  TSH ordered at last visit, will be done today. Currently taking 88 mcg synthroid.   - TSH     #High risk medication use  On methotrexate for rheumatoid arthritis, will check CMP particularly to monitor for liver function. Patient will be seeing rheumatology June 2017.   - Comprehensive metabolic panel     #Dyspnea  PFTs with obstructive pattern. CT done with multiple thin walled cysts. Shortness of breath continues but stable, hasn't worsened or improved.  Pulmonology follow up May 2017. Today signed DOLLY  [facility] to obtain CD of CTA done in December 2016 for comparison for pulmonology visit.     Also discussed driving with patient. Gave information with [facility] and [facility] about driving and evaluations that she can present to her daughter.     RTC in 3 months.    I, Coltmadeleine Rojascarlos eduardo MS4, acted as scribe for Dr. Freitas.     I have seen and examined the patient, discussed with Ms Esteban and agree with the history, physical and plan as documented above.  Carmel Freitas MD, MPH

## 2017-04-06 NOTE — MR AVS SNAPSHOT
After Visit Summary   4/6/2017    Abida Hahn    MRN: [id number]           Patient Information     Date Of Birth          1940        Visit Information        Provider Department      4/6/2017 12:00 PM Lillian De Luna PA-C [facility]         Follow-ups after your visit        Your next 10 appointments already scheduled     Apr 06, 2017  1:05 PM CDT   (Arrive by 12:50 PM)   Return Visit with Carmel Freitas MD   [facility] ([facility])    [address] [place] [address]   [phone]            Apr 21, 2017 10:30 AM CDT   (Arrive by 10:15 AM)   Return Visit with Bebeto Mccurdy MD   [facility] ([facility])    [address] [place] [address] [phone]            May 04, 2017 10:30 AM CDT   (Arrive by 10:15 AM)   New Patient Visit with Mario Marks MD   [facility] ([facility])    [address] [place] [id number] [phone]            May 11, 2017 12:30 PM CDT   (Arrive by 12:15 PM)   Return Visit with VIKTORIA Rocha [facility] ([facility])    [address] [place] [id number] [phone]            May 23, 2017 12:45 PM CDT   RETURN GLAUCOMA with Corazon Addison MD   Eye Clinic ([facility])    Jonathan Joyce [address]  9[facility] Clin 9a  [facility] [address]   [phone]            Jun 07, 2017 10:00 AM CDT   (Arrive by 9:45 AM)   Return Visit with Bebeto Mccurdy MD   [facility] ([facility])    [address] [place] [id number] [phone]              Who to contact     Please call your clinic at [phone] to:    Ask questions about your health    Make or cancel  appointments    Discuss your medicines    Learn about your test results    Speak to your doctor   If you have compliments or concerns about an experience at your clinic, or if you wish to file a complaint, please contact [facility] Patient Relations at [phone] or email us at [email or web address]         Additional Information About Your Visit        Care EveryWhere ID     This is your Care EveryWhere ID. This could be used by other organizations to access your [facility] medical records  [id number]         Blood Pressure from Last 3 Encounters:   02/16/17 138/87   02/08/17 (!) 170/101   02/02/17 (!) 179/109    Weight from Last 3 Encounters:   02/16/17 90.9 kg (200 lb 6.4 oz)   02/08/17 91.2 kg (201 lb)   02/02/17 92.5 kg (203 lb 14.4 oz)              Today, you had the following     No orders found for display         Today's Medication Changes          These changes are accurate as of: 4/6/17 12:27 PM.  If you have any questions, ask your nurse or doctor.               These medicines have changed or have updated prescriptions.        Dose/Directions    minocycline 100 MG capsule   Commonly known as:  MINOCIN/DYNACIN   This may have changed:  how much to take   Used for:  Ulceration (H)        Dose:  100 mg   Take 1 capsule (100 mg) by mouth daily   Quantity:  60 capsule   Refills:  0                Primary Care Provider Office Phone # Fax #    Carmel Freitas [phone]       [facility] [address] [place] [address]        Thank you!     Thank you for choosing [facility] DERMAT[facility]  for your care. Our goal is always to provide you with excellent care. Hearing back from our patients is one way we can continue to improve our services. Please take a few minutes to complete the written survey that you may receive in the mail after your visit with us. Thank you!             Your Updated Medication List - Protect others around  you: Learn how to safely use, store and throw away your medicines at www.disposemymeds.org.          This list is accurate as of: 4/6/17 12:27 PM.  Always use your most recent med list.                   Brand Name Dispense Instructions for use    acetaminophen 500 MG tablet    TYLENOL     Take 500-1,000 mg by mouth every 6 hours as needed       alendronate 70 MG tablet    FOSAMAX    12 tablet    Take 1 tablet (70 mg) by mouth every 7 days       ARTIFICIAL TEAR SOLUTION OP      Apply  to eye as needed.       aspirin 325 MG tablet      Take 650 mg by mouth every 6 hours as needed for moderate pain       CALCIUM + D PO      Take 1 tablet by mouth 2 times daily       DAILY MULTIVITAMIN PO      Take 1 tablet by mouth daily And minerals per pt       folic acid 800 MCG Tabs      Take by mouth 2 times daily       GLUCOSAMINE SULFATE PO      Take 1,000 mg by mouth daily       hydrocortisone 1 % ointment     25 g    Apply topically 2 times daily To affected area on right upper cheek       latanoprost 0.005 % ophthalmic solution    XALATAN    1 Bottle    Place 1 drop into both eyes At Bedtime       levothyroxine 88 MCG tablet    SYNTHROID/LEVOTHROID     Take 88 mcg by mouth daily       lisinopril 20 MG tablet    PRINIVIL/ZESTRIL    90 tablet    Take 1 tablet (20 mg) by mouth daily       methotrexate 2.5 MG tablet CHEMO     32 tablet    Take 7 tablets (17.5 mg) by mouth once a week Take 8 tablets per week       minocycline 100 MG capsule    MINOCIN/DYNACIN    60 capsule    Take 1 capsule (100 mg) by mouth daily       mupirocin 2 % ointment    BACTROBAN    30 g    Apply twice daily to areas on the left ankle.       omeprazole 20 MG CR capsule    priLOSEC    30 capsule    Take 1 capsule (20 mg) by mouth daily       Vitamin D-3 1000 UNITS Caps      Take by mouth 2 times daily

## 2017-04-06 NOTE — PROGRESS NOTES
"[facility] Dermatology Note      Dermatology Problem List:  1 Skin cancer screening 2011 with Dr Garza: resolving herpes on face, Seborrheic keratoses, acrochordons, cherry angiomas  2. Skin eruption to her left ankle, Seen by Dr. Johnson Meraz at [facility]   3. Personal hx of eczema in her youth  4. Pyoderma gangrenosum- minocycyline, mupirocin, kenalog injections.    CC:   Chief Complaint   Patient presents with     Derm Problem     Abida is here today for a follow up for wound.  States it \"looks okay.\"            Encounter Date: Apr 6, 2017    History of Present Illness:  Ms. Abida Hahn is a 76 year old female with history pyoderma gangrenosum. She was last seen here 3/2/2017 when 1.5 total cc Kenalog 10 mg/cc were injected into pyoderma grangrenosum lesions and her minocycline was reduced to 50 mg daily. She has continued to apply mupirocin ointment daily.     Since then, Abida continues to report improvement in her skin lesions. They are asymptomatic. For many months she had to wrap her leg but now they are closed and do not weep. No associated pain. She presents today for further treatment with intralesional Kenalog.     She is feeling well but fatigued from the methotrexate that was recently restarted. The patient denies GI upset, headaches, blurred vision. No other skin complaints.            Past Medical History:   Patient Active Problem List   Diagnosis     Chronic pain syndrome     Essential hypertension     Rheumatoid arthritis (H)     Encounter for long-term current use of medication     Seborrheic keratosis     Hemangioma     Acrochordon     Pyoderma gangrenosum     Hypothyroidism     Osteoporosis     Primary open angle glaucoma of both eyes, severe stage     Pseudophakia of both eyes     Past Medical History:   Diagnosis Date     Abscess, toe 2009    Right great toe     Cataract 8/2011    Right s/p surgery 8/2011; left s/p surgery     Glaucoma      Headache(784.0)     " Relieved with gabapentin. Chronic pain     HTN (hypertension)      RA (rheumatoid arthritis) (H)      Wrist fracture     Right, s/p ORIF     Past Surgical History:   Procedure Laterality Date     BIOPSY ARTERY TEMPORAL       EXTRACAPSULAR CATARACT EXTRATION WITH INTRAOCULAR LENS IMPLANT  8/2011    Left 2 years ago as well     OPEN REDUCTION INTERNAL FIXATION WRIST BILATERAL         Social History:  The patient is single. The patient denies use of tanning beds. Patiently awaiting great grandchildren from her 24-year-old granddaughter, whom she loves very much.    Family History:  There is no family history of asthma, eczema or allergies.    Medications:  Current Outpatient Prescriptions   Medication Sig Dispense Refill     latanoprost (XALATAN) 0.005 % ophthalmic solution Place 1 drop into both eyes At Bedtime 1 Bottle 11     alendronate (FOSAMAX) 70 MG tablet Take 1 tablet (70 mg) by mouth every 7 days 12 tablet 3     aspirin 325 MG tablet Take 650 mg by mouth every 6 hours as needed for moderate pain       methotrexate 2.5 MG tablet CHEMO Take 7 tablets (17.5 mg) by mouth once a week Take 8 tablets per week 32 tablet 3     omeprazole (PRILOSEC) 20 MG CR capsule Take 1 capsule (20 mg) by mouth daily 30 capsule 3     GLUCOSAMINE SULFATE PO Take 1,000 mg by mouth daily       lisinopril (PRINIVIL/ZESTRIL) 20 MG tablet Take 1 tablet (20 mg) by mouth daily 90 tablet 0     minocycline (MINOCIN/DYNACIN) 100 MG capsule Take 1 capsule (100 mg) by mouth daily (Patient taking differently: Take 50 mg by mouth daily ) 60 capsule 0     mupirocin (BACTROBAN) 2 % ointment Apply twice daily to areas on the left ankle. 30 g 3     folic acid 800 MCG TABS Take by mouth 2 times daily       Cholecalciferol (VITAMIN D-3) 1000 UNITS CAPS Take by mouth 2 times daily       acetaminophen (TYLENOL) 500 MG tablet Take 500-1,000 mg by mouth every 6 hours as needed       levothyroxine (SYNTHROID, LEVOTHROID) 88 MCG tablet Take 88 mcg by mouth  daily       ARTIFICIAL TEAR SOLUTION OP Apply  to eye as needed.       Calcium-Vitamin D (CALCIUM + D PO) Take 1 tablet by mouth 2 times daily       Multiple Vitamin (DAILY MULTIVITAMIN PO) Take 1 tablet by mouth daily And minerals per pt       hydrocortisone 1 % ointment Apply topically 2 times daily To affected area on right upper cheek (Patient not taking: Reported on 4/6/2017) 25 g 1     Allergies   Allergen Reactions     Ibuprofen Sodium      Penicillins      Triple Antibiotic [Neomycin-Polymyxin-Dexameth]      Aspirin Rash     Codeine Rash         Review of Systems:  -Skin/Heme New Pt: The patient denies frequent sun exposure. The patient denies excessive scarring or problems healing except as per HPI. The patient denies excessive bleeding.  -Constitutional: The patient denies fatigue, fevers, chills, unintended weight loss, and night sweats.  -HEENT: Patient denies nonhealing oral sores.  -Skin: As above in HPI. No additional skin concerns.  -The patient denies GI upset, headaches, blurred vision or dyspigmentation.    Physical exam:  Vitals: There were no vitals taken for this visit.  GEN: This is a well developed, well-nourished female in no acute distress, in a pleasant mood.    SKIN: Localized skin exam includes below the knees bilaterally, face, hands  Significant for:   Skin colored plaques with gray rolling borders to her left anterior and lateral lower leg, no erythema. The ulcer on the left lower anterior ankle measures 2 x 1 cm. Non tender. Granulation tissue noted to all sites, no weeping.   -No other lesions of concern on areas examined.     Impression/Plan:    Pyoderma gangrenosum- improving with minocycline and kenalog injections. Skin eruption primarily to the left lower extremity.     Continue minocycline to 50 mg daily . Minocycline side effects discussed.   Kenalog intralesional injection procedure note (performed by faculty): After verbal consent and discussion of risks including but not  limited to atrophy, pain, and bruising, cleansing with isopropyl alcohol, time out was performed, 1.8 total cc of Kenalog 10 mg/cc was injected into lesions on the left lower leg.  The patient tolerated the procedure well and left the Dermatology clinic in good condition.  Photodocumentation performed.     Continue mupirocin ointment twice daily.             Staff Involved:    All risks, benefits and alternatives were discussed with patient.  Patient is in agreement and understands the assessment and plan.  All questions were answered.  Sun Screen Education was given.   Return to Clinic 4 weeks or sooner as needed.   Lillian De Luna PA-C

## 2017-04-06 NOTE — MR AVS SNAPSHOT
After Visit Summary   4/6/2017    Abida Hahn    MRN: [id number]           Patient Information     Date Of Birth          1940        Visit Information        Provider Department      4/6/2017 1:05 PM Carmel Freitas MD [facility]        Today's Diagnoses     High risk medication use    -  1    Hypothyroidism, unspecified type          Care Instructions    Primary Care Center Medication Refill Request Information:  * Please contact your pharmacy regarding ANY request for medication refills.  ** [facility] Prescription Fax = [phone]  * Please allow 3 business days for routine medication refills.  * Please allow 5 business days for controlled substance medication refills.     Primary Care Center Test Result notification information:  *You will be notified with in 7-10 days of your appointment day regarding the results of your test.  If you are on MyChart you will be notified as soon as the provider has reviewed the results and signed off on them.      Primary Care Center   [facility]  [address] [place] [address] (4th Floor )   [place] [address] [phone]  [phone]    Labs today  Pulmonary appointment  Follow-up me in 3 months        Follow-ups after your visit        Your next 10 appointments already scheduled     Apr 06, 2017  2:30 PM CDT   LAB with  LAB   [facility] ([facility])    [address] [place] [address]  [address]st [facility] [address] [phone]           Patient must bring picture ID.  Patient should be prepared to give a urine specimen  Please do not eat 10-12 hours before your appointment if you are coming in fasting for labs on lipids, cholesterol, or glucose (sugar).  Pregnant women should follow their Care Team instructions. Water with medications is okay. Do not drink coffee or other fluids.   If you have concerns about taking  your medications, please ask at office or if scheduling via [employer], send a message by clicking  on Secure Messaging, Message Your Care Team.            Apr 21, 2017 10:30 AM CDT   (Arrive by 10:15 AM)   Return Visit with Bebeto Mccurdy MD   [facility] ([facility])    [address] [place] [address]  [address] [facility] [address]   [phone]            May 04, 2017 10:30 AM CDT   (Arrive by 10:15 AM)   New Patient Visit with Mario Marks MD   [facility] ([facility])    [address] [place] [address]  [address] [facility] [address]   [phone]            May 11, 2017 12:30 PM CDT   (Arrive by 12:15 PM)   Return Visit with Lillian De Luna PA-C   [facility] ([facility])    [address] [place] [address]  [address] [facility] [address]   [phone]            May 23, 2017 12:45 PM CDT   RETURN GLAUCOMA with Corazon Addison MD   Eye Clinic ([facility])    Jonathan Joyce [address] Clin 9a  [facility] [address]   [phone]            Jun 07, 2017 10:00 AM CDT   (Arrive by 9:45 AM)   Return Visit with Bebeto Mccurdy MD   [facility] ([facility])    [address] [place] [address]   [phone]            Jul 06, 2017  1:05 PM CDT   (Arrive by 12:50 PM)   Return Visit with Carmel Freitas MD   [facility] ([facility])    [address] [place] [address] [phone]              Future tests that were ordered for you today     Open Future Orders        Priority Expected Expires Ordered    TSH Routine 4/6/2017 4/6/2018 4/6/2017    Comprehensive metabolic panel Routine 4/6/2017 6/6/2017 4/6/2017            Who to contact     Please call your clinic at [phone] to:    Ask questions about your health    Make or cancel appointments    Discuss your medicines    Learn about your test results    Speak to  your doctor   If you have compliments or concerns about an experience at your clinic, or if you wish to file a complaint, please contact [facility] Patient Relations at [phone] or email us at [email or web address]         Additional Information About Your Visit        Care EveryWhere ID     This is your Care EveryWhere ID. This could be used by other organizations to access your [facility] medical records  [id number]        Your Vitals Were     Pulse                   69            Blood Pressure from Last 3 Encounters:   04/06/17 (!) 148/91   02/16/17 138/87   02/08/17 (!) 170/101    Weight from Last 3 Encounters:   02/16/17 90.9 kg (200 lb 6.4 oz)   02/08/17 91.2 kg (201 lb)   02/02/17 92.5 kg (203 lb 14.4 oz)                 Today's Medication Changes          These changes are accurate as of: 4/6/17  2:14 PM.  If you have any questions, ask your nurse or doctor.               These medicines have changed or have updated prescriptions.        Dose/Directions    minocycline 100 MG capsule   Commonly known as:  MINOCIN/DYNACIN   This may have changed:  how much to take   Used for:  Ulceration (H)        Dose:  100 mg   Take 1 capsule (100 mg) by mouth daily   Quantity:  60 capsule   Refills:  0                Primary Care Provider Office Phone # Fax #    Carmel Freitas [phone]       PRIMARY [facility]ARE CENTER [address] [place] [address]        Thank you!     Thank you for choosing [facility]  for your care. Our goal is always to provide you with excellent care. Hearing back from our patients is one way we can continue to improve our services. Please take a few minutes to complete the written survey that you may receive in the mail after your visit with us. Thank you!             Your Updated Medication List - Protect others around you: Learn how to safely use, store and throw away your medicines at  www.disposemymeds.org.          This list is accurate as of: 4/6/17  2:14 PM.  Always use your most recent med list.                   Brand Name Dispense Instructions for use    acetaminophen 500 MG tablet    TYLENOL     Take 500-1,000 mg by mouth every 6 hours as needed       alendronate 70 MG tablet    FOSAMAX    12 tablet    Take 1 tablet (70 mg) by mouth every 7 days       ARTIFICIAL TEAR SOLUTION OP      Apply  to eye as needed.       aspirin 325 MG tablet      Take 650 mg by mouth every 6 hours as needed for moderate pain       CALCIUM + D PO      Take 1 tablet by mouth 2 times daily       DAILY MULTIVITAMIN PO      Take 1 tablet by mouth daily And minerals per pt       folic acid 800 MCG Tabs      Take by mouth 2 times daily       GLUCOSAMINE SULFATE PO      Take 1,000 mg by mouth daily       hydrocortisone 1 % ointment     25 g    Apply topically 2 times daily To affected area on right upper cheek       latanoprost 0.005 % ophthalmic solution    XALATAN    1 Bottle    Place 1 drop into both eyes At Bedtime       levothyroxine 88 MCG tablet    SYNTHROID/LEVOTHROID     Take 88 mcg by mouth daily       lisinopril 20 MG tablet    PRINIVIL/ZESTRIL    90 tablet    Take 1 tablet (20 mg) by mouth daily       methotrexate 2.5 MG tablet CHEMO     32 tablet    Take 7 tablets (17.5 mg) by mouth once a week Take 8 tablets per week       minocycline 100 MG capsule    MINOCIN/DYNACIN    60 capsule    Take 1 capsule (100 mg) by mouth daily       mupirocin 2 % ointment    BACTROBAN    30 g    Apply twice daily to areas on the left ankle.       omeprazole 20 MG CR capsule    priLOSEC    30 capsule    Take 1 capsule (20 mg) by mouth daily       Vitamin D-3 1000 UNITS Caps      Take by mouth 2 times daily

## 2017-04-06 NOTE — NURSING NOTE
Drug Administration Record    Drug Name: triamcinolone acetonide(kenalog)  Dose: 1.8mL of triamcinolone 10mg/mL, 18mg dose  Route administered: ID  NDC #: Kenalog-10 ([address]-86)  Amount of waste(mL):3.2  Reason for waste: Single use vial

## 2017-04-06 NOTE — NURSING NOTE
"Dermatology Rooming Note    Abida Hahn's goals for this visit include:   Chief Complaint   Patient presents with     Derm Problem     Abida is here today for a follow up for wound.  States it \"looks okay.\"          Ashley Montiel, TRISTIN    "

## 2017-04-06 NOTE — PATIENT INSTRUCTIONS
Primary Care Center Medication Refill Request Information:  * Please contact your pharmacy regarding ANY request for medication refills.  ** [facility] Prescription Fax = [phone]  * Please allow 3 business days for routine medication refills.  * Please allow 5 business days for controlled substance medication refills.     Primary Care Center Test Result notification information:  *You will be notified with in 7-10 days of your appointment day regarding the results of your test.  If you are on MyChart you will be notified as soon as the provider has reviewed the results and signed off on them.      Primary Care Center   [facility]  [address] [place] [address] (4th Floor )   [place] [address] [phone]  [phone]    Labs today  Pulmonary appointment  Follow-up me in 3 months

## 2017-04-28 ENCOUNTER — PRE VISIT (OUTPATIENT)
Dept: PULMONOLOGY | Facility: CLINIC | Age: 77
End: 2017-04-28

## 2017-04-28 NOTE — TELEPHONE ENCOUNTER
1.  Date/reason for appt:5/4/17, SOB  2.  Referring provider: BECCA EVANGELISTA  3.  Call to patient (Yes / No - short description): No, referred   4.  Previous care at / records requested from:   [facility]- office notes and imaging are in epic

## 2017-05-02 DIAGNOSIS — I10 ESSENTIAL HYPERTENSION: ICD-10-CM

## 2017-05-02 RX ORDER — LISINOPRIL 20 MG/1
20 TABLET ORAL DAILY
Qty: 90 TABLET | Refills: 0 | Status: SHIPPED | OUTPATIENT
Start: 2017-05-02 | End: 2017-06-07

## 2017-05-02 NOTE — TELEPHONE ENCOUNTER
lisinopril (PRINIVIL/ZESTRIL) 20 MG tablet  Last Written Prescription Date:  2/2/17  Last Fill Quantity: 90,   # refills: 0  Last Office Visit with G, P or [facility] prescribing provider: 4/6/17  Future Office visit:   7/6/17    Potassium   Date Value Ref Range Status   04/06/2017 4.4 3.4 - 5.3 mmol/L Final   ]  Creatinine   Date Value Ref Range Status   04/06/2017 0.89 0.52 - 1.04 mg/dL Final   ]  BP Readings from Last 3 Encounters:   04/06/17 (!) 148/91   02/16/17 138/87   02/08/17 (!) 170/101     90 tabs to pharmacy    Routing refill request to provider for review/approval because:    lisinopril (PRINIVIL/ZESTRIL) 20 MG tablet  FYI    bp > 140/90 . 90 tabs to pharmacy per SO protocol.

## 2017-05-03 NOTE — TELEPHONE ENCOUNTER
Appointment tomorrow in pulmonary, will see what BP is at that visit. Switch recently from losartan to lisinopril.

## 2017-05-04 ENCOUNTER — OFFICE VISIT (OUTPATIENT)
Dept: PULMONOLOGY | Facility: CLINIC | Age: 77
End: 2017-05-04
Attending: INTERNAL MEDICINE
Payer: MEDICARE

## 2017-05-04 VITALS
DIASTOLIC BLOOD PRESSURE: 83 MMHG | BODY MASS INDEX: 31.93 KG/M2 | OXYGEN SATURATION: 95 % | RESPIRATION RATE: 18 BRPM | WEIGHT: 198.7 LBS | HEIGHT: 66 IN | SYSTOLIC BLOOD PRESSURE: 163 MMHG | TEMPERATURE: 98.1 F | HEART RATE: 68 BPM

## 2017-05-04 DIAGNOSIS — M06.09 RHEUMATOID ARTHRITIS OF MULTIPLE SITES WITHOUT RHEUMATOID FACTOR (H): ICD-10-CM

## 2017-05-04 DIAGNOSIS — J98.4 RESTRICTIVE LUNG DISEASE: ICD-10-CM

## 2017-05-04 DIAGNOSIS — R93.89 ABNORMAL CHEST CT: Primary | ICD-10-CM

## 2017-05-04 PROCEDURE — 99212 OFFICE O/P EST SF 10 MIN: CPT | Mod: ZF

## 2017-05-04 ASSESSMENT — PAIN SCALES - GENERAL: PAINLEVEL: NO PAIN (0)

## 2017-05-04 NOTE — NURSING NOTE
Chief Complaint   Patient presents with     Consult For     New Patient: MARKELL Iraheta CMA at 10:53 AM on 5/4/2017

## 2017-05-04 NOTE — MR AVS SNAPSHOT
After Visit Summary   5/4/2017    Abida Hahn    MRN: [id number]           Patient Information     Date Of Birth          1940        Visit Information        Provider Department      5/4/2017 10:30 AM Mario Marks MD [facility]        Today's Diagnoses     Abnormal chest CT    -  1    Restrictive lung disease        Rheumatoid arthritis of multiple sites without rheumatoid factor (H)           Follow-ups after your visit        Follow-up notes from your care team     Return in about 5 weeks (around 6/8/2017).      Your next 10 appointments already scheduled     May 11, 2017 12:30 PM CDT   (Arrive by 12:15 PM)   Return Visit with Lillian De Luna PA-C   [facility] ([facility])    [address]  [address] [facility] [address] [phone]            May 11, 2017  1:30 PM CDT   PFT VISIT with  [facility] ([facility])    [address] [place] [address]  [address] [facility] [address] [phone]            May 23, 2017 12:45 PM CDT   RETURN GLAUCOMA with Corazon Addison MD   [facility] ([facility])    [address]  [address]  [address] [address]  [facility] [address]   [phone]            Jun 07, 2017 10:00 AM CDT   (Arrive by 9:45 AM)   Return Visit with Bebeto Mccurdy MD   [facility] ([facility] [place])    [address]  [address] [facility] [address]   [phone]            Jul 06, 2017  1:05 PM CDT   (Arrive by 12:50 PM)   Return Visit with Carmel Freitas MD   [facility] ([facility])    [address]  [address] [facility] [address] [phone]              Future tests that were ordered for you today     Open Future Orders        Priority Expected Expires Ordered    General PFT Lab (Please always keep  "checked) Routine  5/4/2018 5/4/2017    General PFT Lab (Please always keep checked) Routine  5/4/2018 5/4/2017    Pulmonary Function Test Routine  5/4/2018 5/4/2017    AFB Culture Non Blood [JDS690] Routine  10/31/2017 5/4/2017    AFB Stain Non Blood [[id number]] Routine  10/31/2017 5/4/2017    AFB Culture Non Blood [[id number]] Routine  10/31/2017 5/4/2017    AFB Stain Non Blood [[id number]] Routine  10/31/2017 5/4/2017    Gram stain (DQV910) Routine  10/31/2017 5/4/2017    AFB Culture Non Blood [[id number]] Routine  10/31/2017 5/4/2017    AFB Stain Non Blood [[id number]] Routine  10/31/2017 5/4/2017            Who to contact     If you have questions or need follow up information about today's clinic visit or your schedule please contact [facility] directly at [phone].  Normal or non-critical lab and imaging results will be communicated to you by MyChart, letter or phone within 4 business days after the clinic has received the results. If you do not hear from us within 7 days, please contact the clinic through [employer] or phone. If you have a critical or abnormal lab result, we will notify you by phone as soon as possible.  Submit refill requests through [facility] or call your pharmacy and they will forward the refill request to us. Please allow 3 business days for your refill to be completed.          Additional Information About Your Visit        Care EveryWhere ID     This is your Care EveryWhere ID. This could be used by other organizations to access your [place] medical records  [id number]        Your Vitals Were     Pulse Temperature Respirations Height Pulse Oximetry BMI (Body Mass Index)    68 98.1  F (36.7  C) (Oral) 18 1.681 m (5' 6.2\") 95% 31.88 kg/m2       Blood Pressure from Last 3 Encounters:   05/04/17 163/83   04/06/17 (!) 148/91   02/16/17 138/87    Weight from Last 3 Encounters:   05/04/17 90.1 kg (198 lb 11.2 oz)   02/16/17 90.9 kg (200 lb 6.4 oz)   02/08/17 91.2 kg (201 lb)    "           We Performed the Following     AFB Culture Non Blood     AFB Stain Non Blood          Today's Medication Changes          These changes are accurate as of: 5/4/17  1:51 PM.  If you have any questions, ask your nurse or doctor.               These medicines have changed or have updated prescriptions.        Dose/Directions    minocycline 100 MG capsule   Commonly known as:  MINOCIN/DYNACIN   This may have changed:  how much to take   Used for:  Ulceration (H)        Dose:  100 mg   Take 1 capsule (100 mg) by mouth daily   Quantity:  60 capsule   Refills:  0                Primary Care Provider Office Phone # Fax #    Carmel Freitas [phone]       [facility] [address] [place] [id number]        Thank you!     Thank you for choosing [facility]  for your care. Our goal is always to provide you with excellent care. Hearing back from our patients is one way we can continue to improve our services. Please take a few minutes to complete the written survey that you may receive in the mail after your visit with us. Thank you!             Your Updated Medication List - Protect others around you: Learn how to safely use, store and throw away your medicines at www.disposemymeds.org.          This list is accurate as of: 5/4/17  1:51 PM.  Always use your most recent med list.                   Brand Name Dispense Instructions for use    acetaminophen 500 MG tablet    TYLENOL     Take 500-1,000 mg by mouth every 6 hours as needed       alendronate 70 MG tablet    FOSAMAX    12 tablet    Take 1 tablet (70 mg) by mouth every 7 days       ARTIFICIAL TEAR SOLUTION OP      Apply  to eye as needed.       aspirin 325 MG tablet      Take 650 mg by mouth every 6 hours as needed for moderate pain       CALCIUM + D PO      Take 1 tablet by mouth 2 times daily       DAILY MULTIVITAMIN PO      Take 1 tablet by mouth daily And minerals per pt        folic acid 800 MCG Tabs      Take by mouth 2 times daily       GLUCOSAMINE SULFATE PO      Take 1,000 mg by mouth daily       hydrocortisone 1 % ointment     25 g    Apply topically 2 times daily To affected area on right upper cheek       latanoprost 0.005 % ophthalmic solution    XALATAN    1 Bottle    Place 1 drop into both eyes At Bedtime       levothyroxine 88 MCG tablet    SYNTHROID/LEVOTHROID     Take 88 mcg by mouth daily       lisinopril 20 MG tablet    PRINIVIL/ZESTRIL    90 tablet    Take 1 tablet (20 mg) by mouth daily       methotrexate 2.5 MG tablet CHEMO     32 tablet    Take 7 tablets (17.5 mg) by mouth once a week Take 8 tablets per week       minocycline 100 MG capsule    MINOCIN/DYNACIN    60 capsule    Take 1 capsule (100 mg) by mouth daily       mupirocin 2 % ointment    BACTROBAN    30 g    Apply twice daily to areas on the left ankle.       omeprazole 20 MG CR capsule    priLOSEC    30 capsule    Take 1 capsule (20 mg) by mouth daily       Vitamin D-3 1000 UNITS Caps      Take by mouth 2 times daily

## 2017-05-04 NOTE — PROGRESS NOTES
"Reason for Visit  Abida Hahn is a 76 year old year old female who is being seen for evaluation of cough and SOB, particularly upon lying down, by Dr. Nataliia Freitas    Pulmonary HPI    The patient was seen and examined by Mario Marks   I had the pleasure of seeing Ms. Abida Hahn for initial visit at the [facility] in referral by Dr. Carmel Freitas as above.  She is a very pleasant 76-year-old woman with a longstanding history of rheumatoid arthritis plus hypothyroidism, hypertension, osteoporosis, and glaucoma, sent to evaluate significant shortness of breath and cough.  The patient has been having difficulty taking a deep breath and getting out of breath when lying down, particularly since approximately Thanksgiving 2016.  She does also experience some degrees of intermittent shortness of breath just while sitting.  She says she is able to walk on flat ground slowly for approximately 15 minutes.  She does not have to deal with steps or flights of stairs very much.  Assessing her activity level is made somewhat difficult by problems with her ankles and lower extremities with rheumatoid arthritis and also a left lower extremity ankle area infection that has been chronic.  She also complains of a cough that is productive of white mucus and sinus trouble, but without definite drainage.  She has been on methotrexate for her rheumatoid arthritis for more than 10 years up until approximately 9 months ago when it was stopped for approximately 7 months.  She has been back on methotrexate again for her rheumatoid arthritis since 02/2017.  She last saw Dr. Freitas approximately 1 month ago on 04/06/2017 which time she told Dr. Freitas her breathing is stable.      In terms of pulmonary history, she was hospitalized for 2-3 weeks with pleurisy and bronchitis is a 6-year-old and she says that she remembers being told they had to \"pull fluid off\" multiple times. "  She was a low-grade smoker, smoking less than a pack per day for 25-30 years from age 20 to approximately age 50.  She has not smoked for 25-30 years.  She has never had pneumonia, wheezing, blood clots or another episode of pleurisy.  She feels her cough has been worse over the last 2-3 weeks.  She did have a significant ankle swelling in 2016 with decreased mobility that led to having a chest PA gram done at [facility] that was negative for pulmonary embolism (by report) and I do not have the formal interpretation from that study, but upon my review it looks like there is some ground-glass changes.      PAST MEDICAL HISTORY:  Notable for shingles, chronic rheumatoid arthritis, glaucoma and eye difficulties, hypothyroidism, hypertension, osteoporosis.      SOCIAL HISTORY:  She is  for more than 30 years and has one daughter and one granddaughter (daughter is local, granddaughter in [place]).      FAMILY HISTORY:  Negative for most lung disease, apart from her father  at age 65.  He was a  at a [employer] production plant and was a tinsmith.  Her mother  at age 55 of myocardial infarction and also had rheumatoid arthritis.  She has 2 sisters and 1 brother, and they have some RA.        PULMONARY REVIEW OF SYSTEMS:  Notable for an aunt who had TB, but she does not believe she was exposed to her.  She denies any trauma to the chest, sinus trouble.  Her weight has increased approximately 10 pounds over the last year.  She does snore somewhat and has mild daytime hypersomnolence, falling asleep watching TV shows but not during meals or conversations.         Current Outpatient Prescriptions   Medication     lisinopril (PRINIVIL/ZESTRIL) 20 MG tablet     latanoprost (XALATAN) 0.005 % ophthalmic solution     alendronate (FOSAMAX) 70 MG tablet     aspirin 325 MG tablet     methotrexate 2.5 MG tablet CHEMO     omeprazole (PRILOSEC) 20 MG CR capsule     GLUCOSAMINE SULFATE PO      minocycline (MINOCIN/DYNACIN) 100 MG capsule     mupirocin (BACTROBAN) 2 % ointment     hydrocortisone 1 % ointment     folic acid 800 MCG TABS     Cholecalciferol (VITAMIN D-3) 1000 UNITS CAPS     acetaminophen (TYLENOL) 500 MG tablet     levothyroxine (SYNTHROID, LEVOTHROID) 88 MCG tablet     ARTIFICIAL TEAR SOLUTION OP     Calcium-Vitamin D (CALCIUM + D PO)     Multiple Vitamin (DAILY MULTIVITAMIN PO)     No current facility-administered medications for this visit.      Allergies   Allergen Reactions     Ibuprofen Sodium      Penicillins      Triple Antibiotic [Neomycin-Polymyxin-Dexameth]      Aspirin Rash     Codeine Rash     Past Medical History:   Diagnosis Date     Abscess, toe 2009    Right great toe     Cataract 8/2011    Right s/p surgery 8/2011; left s/p surgery     Glaucoma      Headache(784.0)     Relieved with gabapentin. Chronic pain     HTN (hypertension)      RA (rheumatoid arthritis) (H)      Wrist fracture     Right, s/p ORIF       Past Surgical History:   Procedure Laterality Date     BIOPSY ARTERY TEMPORAL       EXTRACAPSULAR CATARACT EXTRATION WITH INTRAOCULAR LENS IMPLANT  8/2011    Left 2 years ago as well     OPEN REDUCTION INTERNAL FIXATION WRIST BILATERAL         Social History     Social History     Marital status: Single     Spouse name: N/A     Number of children: N/A     Years of education: N/A     Occupational History     Not on file.     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 Enjoys reading, friends, children and shopping.        ROS Pulmonary  A complete ROS was otherwise negative except as noted in the HPI.  /83  Pulse 68  Temp 98.1  F (36.7  C) (Oral)  Resp 18  Ht  "1.681 m (5' 6.2\")  Wt 90.1 kg (198 lb 11.2 oz)  SpO2 95%  BMI 31.88 kg/m2  Exam:   GENERAL APPEARANCE: Well developed, well nourished, alert, and in no apparent distress. No cough, tachypnea, wheeze  EYES: PERRL, EOMI  HENT: Nasal mucosa with no edema and no hyperemia. No nasal polyps.  EARS: deferred  MOUTH: Oral mucosa is moist, without any lesions, no tonsillar enlargement, no oropharyngeal exudate.  NECK: supple, no masses, no thyromegaly.  LYMPHATICS: No significant axillary, cervical, or supraclavicular nodes.  RESP: normal inspection, palpation, percussion, good air flow throughout.  No crackles. No rhonchi. No wheezes.  CV: RRR Normal S1, S2, regular rhythm, normal rate. No murmur.  No rub. No gallop. No LE edema.   ABDOMEN:  Soft, Bowel sounds normal, soft, nontender, no HSM or masses.   MS: extremities normal. No clubbing. No cyanosis.  SKIN: no rash on limited exam  NEURO: Mentation intact, speech normal, normal strength and tone, normal gait and stance  PSYCH: mentation appears normal. and affect normal/bright  Supine - no paradoxical abdominal motion and O2 Sat 95% on RA  Results:  No results found for this or any previous visit (from the past 168 hour(s)).    Assessment and plan:   Prior testing was reviewed by me personally, including the images.  She had a chest CT scan without contrast on 02/16/2017 that was reviewed by Dr. Jeannie Alexis.  It showed numerous scattered small thin-walled cysts associated with pulmonary vessels, consistent with LIP or emphysema related cyst (less likely); centrilobular nodules and tree-in-bud opacities in the right upper lobe consistent with follicular bronchiolitis or infection.  She did not have inspiratory, expiratory views done but on review, there is a suggestion of possible ground-glass, suggesting some small airways air trapping.  The PFTs done on 02/08/2017 show FEV1/FVC of 0.8/1.25 (38/43% predicted) for a ratio of 67%.  The RV TLC shows 2.8/4.2 (121/77% of " predicted) for a ratio of 67%.  The DLCO corrected for hemoglobin is 15.8 (76% of predicted).  There are no prior PFTs for comparison.  A 6-minute walk test done on the same day show a mild increase in heart rate from the high 60 low 70 range to the mid 80s, but no desaturation with O2 sat on room air maintained 93% or above virtually all the time.  Her [place] dyspnea and fatigue did not change and post-walk (each 3 throughout).        ASSESSMENT/PLAN:     1.  Abnormal chest CT scan and abnormal PFTs with mixed restrictive obstructive defects.  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  I cannot completely exclude any methotrexate side effects on the lung, but these seem unlikely given the long duration of methotrexate exposure and non-specificity to the findings.  Most likely components of her rheumatoid-associated lung disease would be small airway bronchiolitis and/or potential follicular bronchiolitis with LIP (lymphoid interstitial pneumonitis) is also possible.  The other concern is whether she could have chronic subacute atypical mycobacterial infection.  I spent a considerable time period discussing  the possible issues with the patient and her daughter.  We also talked given the worsening with supine positioning about whether she might have respiratory muscle defect.  I did not any abdominal paradoxical motion when lying her flat I observed her breathing.      PLAN:   1.  Obtain maintain MIP and MAP in supine and sitting PFTs to assess why her symptoms seem so much worse when supine.   2.  Consider high resolution CT with inspiratory and expiratory views to confirm the diagnosis of bronchiolitis with air trapping and as a basis for potential therapy for bronchiolitis.   3.  Exclude atypical mycobacterial infection with multiple sputum cultures for AFB.     4.  I will plan to see her back in approximately 4-6 weeks and may choose to obtain a  high risk CT scan with inspiratory and expiratory views as above.  Unless another cause of her symptoms becomes apparent, I will likely start her empirically on chronic azithromycin as an anti-inflammatory agent and his combined beta agonist/steroid inhaler for bronchiolitis.  She currently is on methotrexate and I do not think this is likely the cause.  If there is a major diagnostic concern, we will consider bronchoscopy with lavage.  I spent considerably more than 60 minutes in total face-to-face contact time with the patient and her daughter getting her history and examination and also discussing the potential causes and plan for evaluation and management.        I also reviewed her CT scan and discussed her case with Dr. Nirmala Muñoz, specialist in collagen vascular disease - associated lung problems.

## 2017-05-04 NOTE — LETTER
5/4/2017       RE: Abida Hahn  [address]  [facility] [address]     Dear Colleague,    Thank you for referring your patient, Abida Hahn, to the [facility] at [facility]. Please see a copy of my visit note below.    Reason for Visit  Abida Hahn is a 76 year old year old female who is being seen for evaluation of cough and SOB, particularly upon lying down, by Dr. Nataliia Freitas    Pulmonary HPI    The patient was seen and examined by Mario Marks   I had the pleasure of seeing Ms. Abida Hahn for initial visit at the [facility] in referral by Dr. Carmel Freitas as above.  She is a very pleasant 76-year-old woman with a longstanding history of rheumatoid arthritis plus hypothyroidism, hypertension, osteoporosis, and glaucoma, sent to evaluate significant shortness of breath and cough.  The patient has been having difficulty taking a deep breath and getting out of breath when lying down, particularly since approximately Thanksgiving 2016.  She does also experience some degrees of intermittent shortness of breath just while sitting.  She says she is able to walk on flat ground slowly for approximately 15 minutes.  She does not have to deal with steps or flights of stairs very much.  Assessing her activity level is made somewhat difficult by problems with her ankles and lower extremities with rheumatoid arthritis and also a left lower extremity ankle area infection that has been chronic.  She also complains of a cough that is productive of white mucus and sinus trouble, but without definite drainage.  She has been on methotrexate for her rheumatoid arthritis for more than 10 years up until approximately 9 months ago when it was stopped for approximately 7 months.  She has been back on methotrexate again for her rheumatoid arthritis since 02/2017.  She last saw   "[place] approximately 1 month ago on 2017 which time she told Dr. Freitas her breathing is stable.      In terms of pulmonary history, she was hospitalized for 2-3 weeks with pleurisy and bronchitis is a 6-year-old and she says that she remembers being told they had to \"pull fluid off\" multiple times.  She was a low-grade smoker, smoking less than a pack per day for 25-30 years from age 20 to approximately age 50.  She has not smoked for 25-30 years.  She has never had pneumonia, wheezing, blood clots or another episode of pleurisy.  She feels her cough has been worse over the last 2-3 weeks.  She did have a significant ankle swelling in 2016 with decreased mobility that led to having a chest PA gram done at [facility] that was negative for pulmonary embolism (by report) and I do not have the formal interpretation from that study, but upon my review it looks like there is some ground-glass changes.      PAST MEDICAL HISTORY:  Notable for shingles, chronic rheumatoid arthritis, glaucoma and eye difficulties, hypothyroidism, hypertension, osteoporosis.      SOCIAL HISTORY:  She is  for more than 30 years and has one daughter and one granddaughter (daughter is local, granddaughter in [place]).      FAMILY HISTORY:  Negative for most lung disease, apart from her father  at age 65.  He was a  at a [employer] production plant and was a tinsmith.  Her mother  at age 55 of myocardial infarction and also had rheumatoid arthritis.  She has 2 sisters and 1 brother, and they have some RA.        PULMONARY REVIEW OF SYSTEMS:  Notable for an aunt who had TB, but she does not believe she was exposed to her.  She denies any trauma to the chest, sinus trouble.  Her weight has increased approximately 10 pounds over the last year.  She does snore somewhat and has mild daytime hypersomnolence, falling asleep watching TV shows but not during meals or conversations.         Current Outpatient " Prescriptions   Medication     lisinopril (PRINIVIL/ZESTRIL) 20 MG tablet     latanoprost (XALATAN) 0.005 % ophthalmic solution     alendronate (FOSAMAX) 70 MG tablet     aspirin 325 MG tablet     methotrexate 2.5 MG tablet CHEMO     omeprazole (PRILOSEC) 20 MG CR capsule     GLUCOSAMINE SULFATE PO     minocycline (MINOCIN/DYNACIN) 100 MG capsule     mupirocin (BACTROBAN) 2 % ointment     hydrocortisone 1 % ointment     folic acid 800 MCG TABS     Cholecalciferol (VITAMIN D-3) 1000 UNITS CAPS     acetaminophen (TYLENOL) 500 MG tablet     levothyroxine (SYNTHROID, LEVOTHROID) 88 MCG tablet     ARTIFICIAL TEAR SOLUTION OP     Calcium-Vitamin D (CALCIUM + D PO)     Multiple Vitamin (DAILY MULTIVITAMIN PO)     No current facility-administered medications for this visit.      Allergies   Allergen Reactions     Ibuprofen Sodium      Penicillins      Triple Antibiotic [Neomycin-Polymyxin-Dexameth]      Aspirin Rash     Codeine Rash     Past Medical History:   Diagnosis Date     Abscess, toe 2009    Right great toe     Cataract 8/2011    Right s/p surgery 8/2011; left s/p surgery     Glaucoma      Headache(784.0)     Relieved with gabapentin. Chronic pain     HTN (hypertension)      RA (rheumatoid arthritis) (H)      Wrist fracture     Right, s/p ORIF       Past Surgical History:   Procedure Laterality Date     BIOPSY ARTERY TEMPORAL       EXTRACAPSULAR CATARACT EXTRATION WITH INTRAOCULAR LENS IMPLANT  8/2011    Left 2 years ago as well     OPEN REDUCTION INTERNAL FIXATION WRIST BILATERAL         Social History     Social History     Marital status: Single     Spouse name: N/A     Number of children: N/A     Years of education: N/A     Occupational History     Not on file.     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 Enjoys reading, friends, children and shopping.        ROS Pulmonary  A complete ROS was otherwise negative except as noted in the HPI.  /83  Pulse 68  Temp 98.1  F (36.7  C) (Oral)  Resp 18  Ht 1.681 m (5' 6.2\")  Wt 90.1 kg (198 lb 11.2 oz)  SpO2 95%  BMI 31.88 kg/m2  Exam:   GENERAL APPEARANCE: Well developed, well nourished, alert, and in no apparent distress. No cough, tachypnea, wheeze  EYES: PERRL, EOMI  HENT: Nasal mucosa with no edema and no hyperemia. No nasal polyps.  EARS: deferred  MOUTH: Oral mucosa is moist, without any lesions, no tonsillar enlargement, no oropharyngeal exudate.  NECK: supple, no masses, no thyromegaly.  LYMPHATICS: No significant axillary, cervical, or supraclavicular nodes.  RESP: normal inspection, palpation, percussion, good air flow throughout.  No crackles. No rhonchi. No wheezes.  CV: RRR Normal S1, S2, regular rhythm, normal rate. No murmur.  No rub. No gallop. No LE edema.   ABDOMEN:  Soft, Bowel sounds normal, soft, nontender, no HSM or masses.   MS: extremities normal. No clubbing. No cyanosis.  SKIN: no rash on limited exam  NEURO: Mentation intact, speech normal, normal strength and tone, normal gait and stance  PSYCH: mentation appears normal. and affect normal/bright  Supine - no paradoxical abdominal motion and O2 Sat 95% on RA  Results:  No results found for this or any previous visit (from the past 168 hour(s)).    Assessment and plan:   Prior testing was reviewed by me personally, including the images.  She had a chest CT scan without contrast on 02/16/2017 that was reviewed by Dr. Jeannie Alexis.  It showed numerous scattered small thin-walled cysts associated with pulmonary vessels, consistent with LIP or emphysema related cyst (less likely); centrilobular nodules and tree-in-bud opacities in the right upper lobe consistent with follicular " bronchiolitis or infection.  She did not have inspiratory, expiratory views done but on review, there is a suggestion of possible ground-glass, suggesting some small airways air trapping.  The PFTs done on 02/08/2017 show FEV1/FVC of 0.8/1.25 (38/43% predicted) for a ratio of 67%.  The RV TLC shows 2.8/4.2 (121/77% of predicted) for a ratio of 67%.  The DLCO corrected for hemoglobin is 15.8 (76% of predicted).  There are no prior PFTs for comparison.  A 6-minute walk test done on the same day show a mild increase in heart rate from the high 60 low 70 range to the mid 80s, but no desaturation with O2 sat on room air maintained 93% or above virtually all the time.  Her [place] dyspnea and fatigue did not change and post-walk (each 3 throughout).        ASSESSMENT/PLAN:     1.  Abnormal chest CT scan and abnormal PFTs with mixed restrictive obstructive defects.  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  I cannot completely exclude any methotrexate side effects on the lung, but these seem unlikely given the long duration of methotrexate exposure and non-specificity to the findings.  Most likely components of her rheumatoid-associated lung disease would be small airway bronchiolitis and/or potential follicular bronchiolitis with LIP (lymphoid interstitial pneumonitis) is also possible.  The other concern is whether she could have chronic subacute atypical mycobacterial infection.  I spent a considerable time period discussing  the possible issues with the patient and her daughter.  We also talked given the worsening with supine positioning about whether she might have respiratory muscle defect.  I did not any abdominal paradoxical motion when lying her flat I observed her breathing.      PLAN:   1.  Obtain maintain MIP and MAP in supine and sitting PFTs to assess why her symptoms seem so much worse when supine.   2.  Consider high resolution CT with  inspiratory and expiratory views to confirm the diagnosis of bronchiolitis with air trapping and as a basis for potential therapy for bronchiolitis.   3.  Exclude atypical mycobacterial infection with multiple sputum cultures for AFB.     4.  I will plan to see her back in approximately 4-6 weeks and may choose to obtain a high risk CT scan with inspiratory and expiratory views as above.  Unless another cause of her symptoms becomes apparent, I will likely start her empirically on chronic azithromycin as an anti-inflammatory agent and his combined beta agonist/steroid inhaler for bronchiolitis.  She currently is on methotrexate and I do not think this is likely the cause.  If there is a major diagnostic concern, we will consider bronchoscopy with lavage.  I spent considerably more than 60 minutes in total face-to-face contact time with the patient and her daughter getting her history and examination and also discussing the potential causes and plan for evaluation and management.        I also reviewed her CT scan and discussed her case with Dr. Nirmala Muñoz, specialist in collagen vascular disease - associated lung problems.       Again, thank you for allowing me to participate in the care of your patient.      Sincerely,    Mario Marks MD

## 2017-05-08 LAB
ACID FAST STN SPEC QL: NORMAL
MICRO REPORT STATUS: NORMAL
SPECIMEN SOURCE: NORMAL

## 2017-05-11 ENCOUNTER — TELEPHONE (OUTPATIENT)
Dept: RHEUMATOLOGY | Facility: CLINIC | Age: 77
End: 2017-05-11

## 2017-05-11 ENCOUNTER — OFFICE VISIT (OUTPATIENT)
Dept: DERMATOLOGY | Facility: CLINIC | Age: 77
End: 2017-05-11

## 2017-05-11 DIAGNOSIS — M81.0 OSTEOPOROSIS: ICD-10-CM

## 2017-05-11 DIAGNOSIS — Z79.899 HIGH RISK MEDICATION USE: ICD-10-CM

## 2017-05-11 DIAGNOSIS — M05.79 RHEUMATOID ARTHRITIS INVOLVING MULTIPLE SITES WITH POSITIVE RHEUMATOID FACTOR (H): ICD-10-CM

## 2017-05-11 DIAGNOSIS — L88 PYODERMA GANGRENOSUM (H): Primary | ICD-10-CM

## 2017-05-11 DIAGNOSIS — M05.79 RHEUMATOID ARTHRITIS INVOLVING MULTIPLE SITES WITH POSITIVE RHEUMATOID FACTOR (H): Primary | ICD-10-CM

## 2017-05-11 LAB
ALT SERPL W P-5'-P-CCNC: 21 U/L (ref 0–50)
AST SERPL W P-5'-P-CCNC: 15 U/L (ref 0–45)
CREAT SERPL-MCNC: 0.72 MG/DL (ref 0.52–1.04)
ERYTHROCYTE [DISTWIDTH] IN BLOOD BY AUTOMATED COUNT: 14.9 % (ref 10–15)
GFR SERPL CREATININE-BSD FRML MDRD: 79 ML/MIN/1.7M2
HCT VFR BLD AUTO: 42.4 % (ref 35–47)
HGB BLD-MCNC: 14.1 G/DL (ref 11.7–15.7)
MCH RBC QN AUTO: 34.2 PG (ref 26.5–33)
MCHC RBC AUTO-ENTMCNC: 33.3 G/DL (ref 31.5–36.5)
MCV RBC AUTO: 103 FL (ref 78–100)
PLATELET # BLD AUTO: 236 10E9/L (ref 150–450)
RBC # BLD AUTO: 4.12 10E12/L (ref 3.8–5.2)
WBC # BLD AUTO: 4.5 10E9/L (ref 4–11)

## 2017-05-11 ASSESSMENT — PAIN SCALES - GENERAL
PAINLEVEL: MILD PAIN (2)
PAINLEVEL: NO PAIN (0)

## 2017-05-11 NOTE — TELEPHONE ENCOUNTER
Pt stopped into clinic to see if Dr Mccurdy would like her to have any lab work as she is here for another appointment. Pt is on methotrexate, ordered by Dr Mccurdy, was added on 2/8/17. Last CMP was done 4/6/17. ls needing a CBC/Plts.    Orders for the standing labs completed per refill protocol. Pt will have the CBC done today.    RAQUEL LevineN RN  Rheumatology RN Coordinator  ARNIE Tian

## 2017-05-11 NOTE — NURSING NOTE
"Dermatology Rooming Note    Abida Hahn's goals for this visit include:   Chief Complaint   Patient presents with     Derm Problem     Abida states \" I am here to have my wound checked and to have steriods injected\" \"I feel that my wound is doing fine\"     Annabel Moreno CMA  "

## 2017-05-11 NOTE — NURSING NOTE
Drug Administration Record    Drug Name: triamcinolone acetonide(kenalog)  Dose: 1.5 mL of triamcinolone 10mg/mL, 15mg dose  Route administered: ID  NDC #: Kenalog-10 ([address]-59)  Amount of waste(mL): 3.5  Reason for waste: Single use vial

## 2017-05-11 NOTE — PROGRESS NOTES
"[facility] Dermatology Note      Dermatology Problem List:  1 Skin cancer screening 2011 with Dr Garza: resolving herpes on face, Seborrheic keratoses, acrochordons, cherry angiomas  2. Skin eruption to her left ankle, Seen by Dr. Johnson Meraz at [facility]   3. Personal hx of eczema in her youth  4. Pyoderma gangrenosum- minocycyline, mupirocin, kenalog injections.    CC:   Chief Complaint   Patient presents with     Derm Problem     Abida states \" I am here to have my wound checked and to have steriods injected\" \"I feel that my wound is doing fine\"         Encounter Date: May 11, 2017    History of Present Illness:  Ms. Abida Hahn is a 76 year old female with history pyoderma gangrenosum. She was last seen here 4/6/2017 when she had 1.8 total cc Kenalog 10 mg/cc injected into pyoderma grangrenosum lesions and her minocycline continued on 50 mg daily. She has continued to apply mupirocin ointment daily.     Since then, Abida continues to report improvement in her skin lesions. They are asymptomatic. For many months she had to wrap her leg but now they are closed and do not weep. No associated pain. She presents today for further treatment with intralesional Kenalog.     She is feeling well. The patient denies GI upset, headaches, blurred vision. No other skin complaints.            Past Medical History:   Patient Active Problem List   Diagnosis     Chronic pain syndrome     Essential hypertension     Rheumatoid arthritis (H)     Encounter for long-term current use of medication     Seborrheic keratosis     Hemangioma     Acrochordon     Pyoderma gangrenosum     Hypothyroidism     Osteoporosis     Primary open angle glaucoma of both eyes, severe stage     Pseudophakia of both eyes     Past Medical History:   Diagnosis Date     Abscess, toe 2009    Right great toe     Cataract 8/2011    Right s/p surgery 8/2011; left s/p surgery     Glaucoma      Headache(784.0)     Relieved with " gabapentin. Chronic pain     HTN (hypertension)      RA (rheumatoid arthritis) (H)      Wrist fracture     Right, s/p ORIF     Past Surgical History:   Procedure Laterality Date     BIOPSY ARTERY TEMPORAL       EXTRACAPSULAR CATARACT EXTRATION WITH INTRAOCULAR LENS IMPLANT  8/2011    Left 2 years ago as well     OPEN REDUCTION INTERNAL FIXATION WRIST BILATERAL         Social History:  The patient is single. The patient denies use of tanning beds. Patiently awaiting great grandchildren from her 24-year-old granddaughter, whom she loves very much.    Family History:  There is no family history of asthma, eczema or allergies.    Medications:  Current Outpatient Prescriptions   Medication Sig Dispense Refill     lisinopril (PRINIVIL/ZESTRIL) 20 MG tablet Take 1 tablet (20 mg) by mouth daily 90 tablet 0     latanoprost (XALATAN) 0.005 % ophthalmic solution Place 1 drop into both eyes At Bedtime 1 Bottle 11     alendronate (FOSAMAX) 70 MG tablet Take 1 tablet (70 mg) by mouth every 7 days 12 tablet 3     aspirin 325 MG tablet Take 650 mg by mouth every 6 hours as needed for moderate pain       methotrexate 2.5 MG tablet CHEMO Take 7 tablets (17.5 mg) by mouth once a week Take 8 tablets per week 32 tablet 3     GLUCOSAMINE SULFATE PO Take 1,000 mg by mouth daily       minocycline (MINOCIN/DYNACIN) 100 MG capsule Take 1 capsule (100 mg) by mouth daily (Patient taking differently: Take 50 mg by mouth daily ) 60 capsule 0     mupirocin (BACTROBAN) 2 % ointment Apply twice daily to areas on the left ankle. 30 g 3     folic acid 800 MCG TABS Take by mouth 2 times daily       Cholecalciferol (VITAMIN D-3) 1000 UNITS CAPS Take by mouth 2 times daily       acetaminophen (TYLENOL) 500 MG tablet Take 500-1,000 mg by mouth every 6 hours as needed       levothyroxine (SYNTHROID, LEVOTHROID) 88 MCG tablet Take 88 mcg by mouth daily       ARTIFICIAL TEAR SOLUTION OP Apply  to eye as needed.       Calcium-Vitamin D (CALCIUM + D PO) Take  1 tablet by mouth 2 times daily       Multiple Vitamin (DAILY MULTIVITAMIN PO) Take 1 tablet by mouth daily And minerals per pt       omeprazole (PRILOSEC) 20 MG CR capsule Take 1 capsule (20 mg) by mouth daily (Patient not taking: Reported on 5/11/2017) 30 capsule 3     hydrocortisone 1 % ointment Apply topically 2 times daily To affected area on right upper cheek (Patient not taking: Reported on 5/11/2017) 25 g 1     Allergies   Allergen Reactions     Ibuprofen Sodium      Penicillins      Triple Antibiotic [Neomycin-Polymyxin-Dexameth]      Aspirin Rash     Codeine Rash         Review of Systems:  -Skin/Heme New Pt: The patient denies frequent sun exposure. The patient denies excessive scarring or problems healing except as per HPI. The patient denies excessive bleeding.  -Constitutional: The patient denies fatigue, fevers, chills, unintended weight loss, and night sweats.  -HEENT: Patient denies nonhealing oral sores.  -Skin: As above in HPI. No additional skin concerns.  -The patient denies GI upset, headaches, blurred vision or dyspigmentation.    Physical exam:  Vitals: There were no vitals taken for this visit.  GEN: This is a well developed, well-nourished female in no acute distress, in a pleasant mood.    SKIN: Localized skin exam includes below the knees bilaterally, face, hands  Significant for:   Skin colored plaques with gray rolling borders to her left anterior and lateral lower leg, no erythema. The ulcer on the left lower anterior ankle measures 2 x 1 cm. Non tender. Granulation tissue noted to all sites, no weeping.   -No other lesions of concern on areas examined.     Impression/Plan:    Pyoderma gangrenosum with minocycline hyperpigmentation- improving with minocycline and kenalog injections. All areas are closed. Skin eruption primarily to the left lower extremity.     Finish minocycline to 50 mg daily . Minocycline side effects discussed.   Kenalog intralesional injection procedure note  (performed by faculty): After verbal consent and discussion of risks including but not limited to atrophy, pain, and bruising, cleansing with isopropyl alcohol, time out was performed, 1.5 total cc of Kenalog 10 mg/cc was injected into lesions on the left lower leg (less than 7).  The patient tolerated the procedure well and left the Dermatology clinic in good condition.  Photodocumentation performed.             Staff Involved:    All risks, benefits and alternatives were discussed with patient.  Patient is in agreement and understands the assessment and plan.  All questions were answered.  Sun Screen Education was given.   Return to Clinic 4 weeks or sooner as needed.   Lillian De Luna PA-C

## 2017-05-11 NOTE — MR AVS SNAPSHOT
After Visit Summary   5/11/2017    Abida Hahn    MRN: [id number]           Patient Information     Date Of Birth          1940        Visit Information        Provider Department      5/11/2017 12:30 PM Lillian De Luna PA-C M [facility] Dermatology        Today's Diagnoses     Pyoderma gangrenosum    -  1       Follow-ups after your visit        Your next 10 appointments already scheduled     May 11, 2017  1:30 PM CDT   PFT VISIT with  PFL C   [facility] Pulmonary Function Testing ([facility])    [address] [facility]  3rd [facility] [address]   [phone]            May 23, 2017 12:45 PM CDT   RETURN GLAUCOMA with Corazon Addison MD   Eye Clinic ([facility])    Jonathan Joyce [address]  [facility] [address]   [phone]            Jun 07, 2017 10:00 AM CDT   (Arrive by 9:45 AM)   Return Visit with Bebeto Mccurdy MD   [facility] Rheumatology ([facility])    [address] [place] [address]   [phone]            Jun 29, 2017 12:15 PM CDT   (Arrive by 12:00 PM)   Return Visit with VIKTORIA Rocha [facility] Dermatology ([facility])    [address] [place] [address]   [phone]            Jul 06, 2017 11:25 AM CDT   (Arrive by 11:10 AM)   RE-ESTABLISH NEW with Amarilys Iyer MD   [facility] Primary Care Clinic ([facility])    [address] [place] [address]   [phone]              Future tests that were ordered for you today     Open Standing Orders        Priority Remaining Interval Expires Ordered    ALT Routine 5/6 every 2-3 months 5/11/2018 5/11/2017    AST Routine 5/6 every 2-3 months 5/11/2018 5/11/2017    CBC with platelets Routine 5/6 every 2-3 months 5/11/2018 5/11/2017    Creatinine Routine 5/6 every 2-3 months  5/11/2018 5/11/2017            Who to contact     Please call your clinic at [phone] to:    Ask questions about your health    Make or cancel appointments    Discuss your medicines    Learn about your test results    Speak to your doctor   If you have compliments or concerns about an experience at your clinic, or if you wish to file a complaint, please contact [facility] Patient Relations at [phone] or email us at [email or web address]         Additional Information About Your Visit        Care EveryWhere ID     This is your Care EveryWhere ID. This could be used by other organizations to access your [place] medical records  [id number]         Blood Pressure from Last 3 Encounters:   05/04/17 163/83   04/06/17 (!) 148/91   02/16/17 138/87    Weight from Last 3 Encounters:   05/04/17 90.1 kg (198 lb 11.2 oz)   02/16/17 90.9 kg (200 lb 6.4 oz)   02/08/17 91.2 kg (201 lb)              We Performed the Following     INJECTION INTO SKIN LESIONS <=7          Today's Medication Changes          These changes are accurate as of: 5/11/17  1:28 PM.  If you have any questions, ask your nurse or doctor.               Start taking these medicines.        Dose/Directions    triamcinolone acetonide 10 MG/ML injection   Commonly known as:  KENALOG   Used for:  Pyoderma gangrenosum   Started by:  Lillian De Luna PA-C        Dose:  10 mg   Inject 1 mL (10 mg) into the skin once for 1 dose   Quantity:  5 mL   Refills:  0         These medicines have changed or have updated prescriptions.        Dose/Directions    minocycline 100 MG capsule   Commonly known as:  MINOCIN/DYNACIN   This may have changed:  how much to take   Used for:  Ulceration (H)        Dose:  100 mg   Take 1 capsule (100 mg) by mouth daily   Quantity:  60 capsule   Refills:  0            Where to get your medicines      Some of these will need a paper prescription and others can be bought over the counter.   Ask your nurse if you have questions.     You don't need a prescription for these medications     triamcinolone acetonide 10 MG/ML injection                Primary Care Provider Office Phone # Fax #    Carmel Freitas [phone]       [facility] [address] [place] [address]        Thank you!     Thank you for choosing [facility]  for your care. Our goal is always to provide you with excellent care. Hearing back from our patients is one way we can continue to improve our services. Please take a few minutes to complete the written survey that you may receive in the mail after your visit with us. Thank you!             Your Updated Medication List - Protect others around you: Learn how to safely use, store and throw away your medicines at www.disposemymeds.org.          This list is accurate as of: 5/11/17  1:28 PM.  Always use your most recent med list.                   Brand Name Dispense Instructions for use    acetaminophen 500 MG tablet    TYLENOL     Take 500-1,000 mg by mouth every 6 hours as needed       alendronate 70 MG tablet    FOSAMAX    12 tablet    Take 1 tablet (70 mg) by mouth every 7 days       ARTIFICIAL TEAR SOLUTION OP      Apply  to eye as needed.       aspirin 325 MG tablet      Take 650 mg by mouth every 6 hours as needed for moderate pain       CALCIUM + D PO      Take 1 tablet by mouth 2 times daily       DAILY MULTIVITAMIN PO      Take 1 tablet by mouth daily And minerals per pt       folic acid 800 MCG Tabs      Take by mouth 2 times daily       GLUCOSAMINE SULFATE PO      Take 1,000 mg by mouth daily       hydrocortisone 1 % ointment     25 g    Apply topically 2 times daily To affected area on right upper cheek       latanoprost 0.005 % ophthalmic solution    XALATAN    1 Bottle    Place 1 drop into both eyes At Bedtime       levothyroxine 88 MCG tablet    SYNTHROID/LEVOTHROID     Take 88 mcg by mouth daily       lisinopril 20  MG tablet    PRINIVIL/ZESTRIL    90 tablet    Take 1 tablet (20 mg) by mouth daily       methotrexate 2.5 MG tablet CHEMO     32 tablet    Take 7 tablets (17.5 mg) by mouth once a week Take 8 tablets per week       minocycline 100 MG capsule    MINOCIN/DYNACIN    60 capsule    Take 1 capsule (100 mg) by mouth daily       mupirocin 2 % ointment    BACTROBAN    30 g    Apply twice daily to areas on the left ankle.       omeprazole 20 MG CR capsule    priLOSEC    30 capsule    Take 1 capsule (20 mg) by mouth daily       triamcinolone acetonide 10 MG/ML injection    KENALOG    5 mL    Inject 1 mL (10 mg) into the skin once for 1 dose       Vitamin D-3 1000 UNITS Caps      Take by mouth 2 times daily

## 2017-05-23 ENCOUNTER — OFFICE VISIT (OUTPATIENT)
Dept: OPHTHALMOLOGY | Facility: CLINIC | Age: 77
End: 2017-05-23
Attending: OPHTHALMOLOGY
Payer: MEDICARE

## 2017-05-23 DIAGNOSIS — Z96.1 PSEUDOPHAKIA OF BOTH EYES: ICD-10-CM

## 2017-05-23 DIAGNOSIS — H40.1133 PRIMARY OPEN ANGLE GLAUCOMA OF BOTH EYES, SEVERE STAGE: Primary | ICD-10-CM

## 2017-05-23 PROCEDURE — 99213 OFFICE O/P EST LOW 20 MIN: CPT | Mod: ZF

## 2017-05-23 ASSESSMENT — EXTERNAL EXAM - RIGHT EYE: OD_EXAM: NORMAL

## 2017-05-23 ASSESSMENT — REFRACTION_WEARINGRX
OS_AXIS: 082
SPECS_TYPE: PAL
OD_AXIS: 170
OS_SPHERE: -1.25
OD_ADD: +3.00
OS_ADD: +3.00
OD_SPHERE: -1.25
OS_CYLINDER: +1.50
OD_CYLINDER: +1.00

## 2017-05-23 ASSESSMENT — CONF VISUAL FIELD
OS_INFERIOR_NASAL_RESTRICTION: 3
OS_SUPERIOR_TEMPORAL_RESTRICTION: 3
OD_SUPERIOR_TEMPORAL_RESTRICTION: 3
OD_INFERIOR_TEMPORAL_RESTRICTION: 3
OS_INFERIOR_TEMPORAL_RESTRICTION: 3
OS_SUPERIOR_NASAL_RESTRICTION: 3
OD_SUPERIOR_NASAL_RESTRICTION: 3
OD_INFERIOR_NASAL_RESTRICTION: 3

## 2017-05-23 ASSESSMENT — VISUAL ACUITY
CORRECTION_TYPE: GLASSES
OS_PH_CC: 20/25-2
OD_CC: 20/30-1+2
METHOD: SNELLEN - LINEAR
OS_CC: 20/30-2

## 2017-05-23 ASSESSMENT — SLIT LAMP EXAM - LIDS
COMMENTS: NORMAL
COMMENTS: NORMAL

## 2017-05-23 ASSESSMENT — TONOMETRY
OS_IOP_MMHG: 16
OD_IOP_MMHG: 16
IOP_METHOD: APPLANATION

## 2017-05-23 ASSESSMENT — EXTERNAL EXAM - LEFT EYE: OS_EXAM: NORMAL

## 2017-05-23 NOTE — PATIENT INSTRUCTIONS
Patient will continue Latanoprost which is a teal top drop at bedtime in both eyes.  Patient will return to clinic in 3 months with repeat IOP check and visual field test.  A long discussion of the risks, benefits, and alternatives including potential treatment and management options were had with patient and a decision was made to observe at this time and not add additional eye drops unless evidence of progression or elevated IOPs.         This drop may cause lash growth and some darkening of the skin around the eye.  It is also possible in a patient with a freckled iris or michell eyes, that the iris could darken to brown with time, something that is not common but not reversible.

## 2017-05-23 NOTE — PROGRESS NOTES
1)Endstage POAG -- Diagnosed with Glc [id number], s/p TRAB OU (OD:96 and OS:95 with revision OS in 1997), H/O Noncompliance with visits (lost to f/u from 4483-8547) with marked progression OS -- K pachy: 571/540   Tmax:     HVF:OD:[place] and OS:[place] (marked progresion from 9857-6939 and HVF  OD:[place] and OS:[place]uct in 2009    CDR: 0.9/0.9    HRT/OCT: OD:Severe RNFl thinning and OS:Mod RNFL thinning      FHX of Glc: Mother -- gtts, Sister -- possibly     Gonio:  Open with few PAS     Intolerant to:      Asthma/COPD: No  Steroid Use: No    Kidney Stones:  No   Sulfa Allergy:  No    IOP targets:L-M teens (?Lteens) -- IOP borderline  2)PCIOL OU  3)H/O HA -- s/p Negative TAB with Dr. Bellamy   4)H/O RA on MTX -- following with Rheum  5)XIN    Patient will continue Latanoprost which is a teal top drop at bedtime in both eyes.  Patient will return to clinic in 3 months with repeat IOP check and visual field test.  A long discussion of the risks, benefits, and alternatives including potential treatment and management options were had with patient and a decision was made to observe at this time and not add additional eye drops unless evidence of progression or elevated IOPs.         This drop may cause lash growth and some darkening of the skin around the eye.  It is also possible in a patient with a freckled iris or michell eyes, that the iris could darken to brown with time, something that is not common but not reversible.    Attending Physician Attestation:  Complete documentation of historical and exam elements from today's encounter can be found in the full encounter summary report (not reduplicated in this progress note). I personally obtained the chief complaint(s) and history of present illness.  I confirmed and edited as necessary the review of systems, past medical/surgical history, family history, social history, and examination findings as documented by others; and I examined the patient  myself. I personally reviewed the relevant tests, images, and reports as documented above. I formulated and edited as necessary the assessment and plan and discussed the findings and management plan with the patient and family.  - Corazon Addison MD

## 2017-05-23 NOTE — NURSING NOTE
Chief Complaints and History of Present Illnesses   Patient presents with     Glaucoma Follow Up     IOP check      HPI    Affected eye(s):  Both   Symptoms:     Blurred vision   No decreased vision   Dryness         Do you have eye pain now?:  No      Comments:  Latanoprost qhs BE. No VA changes.   Shelly ROMERO May 23, 2017 12:35 PM

## 2017-05-23 NOTE — MR AVS SNAPSHOT
After Visit Summary   5/23/2017    Abida Hahn    MRN: [id number]           Patient Information     Date Of Birth          1940        Visit Information        Provider Department      5/23/2017 12:45 PM Corazon Addison MD [facility]        Today's Diagnoses     Primary open angle glaucoma of both eyes, severe stage    -  1    Pseudophakia of both eyes          Care Instructions    Patient will continue Latanoprost which is a teal top drop at bedtime in both eyes.  Patient will return to clinic in 3 months with repeat IOP check and visual field test.  A long discussion of the risks, benefits, and alternatives including potential treatment and management options were had with patient and a decision was made to observe at this time and not add additional eye drops unless evidence of progression or elevated IOPs.         This drop may cause lash growth and some darkening of the skin around the eye.  It is also possible in a patient with a freckled iris or michell eyes, that the iris could darken to brown with time, something that is not common but not reversible.        Follow-ups after your visit        Follow-up notes from your care team     Return 3 months with repeat IOP check and visual field test.      Your next 10 appointments already scheduled     Jun 07, 2017 10:00 AM CDT   (Arrive by 9:45 AM)   Return Visit with Bebeto Mccurdy MD   [facility] Rheumatology ([facility])    [address] [place] [address]   [phone]            Jun 29, 2017 12:15 PM CDT   (Arrive by 12:00 PM)   Return Visit with Lillian De Luna PA-C   [facility] Dermatology ([facility])    [address] [place] [address]   [phone]            Jul 06, 2017 11:25 AM CDT   (Arrive by 11:10 AM)   RE-ESTABLISH NEW with Amarilys Iyer MD   [facility] Primary Care Clinic ([facility])     [address] [facility]  4th [facility] [id number] [phone]              Who to contact     Please call your clinic at [phone] to:    Ask questions about your health    Make or cancel appointments    Discuss your medicines    Learn about your test results    Speak to your doctor   If you have compliments or concerns about an experience at your clinic, or if you wish to file a complaint, please contact [facility] Patient Relations at [phone] or email us at [email or web address]         Additional Information About Your Visit        Care EveryWhere ID     This is your Care EveryWhere ID. This could be used by other organizations to access your [place] medical records  [id number]         Blood Pressure from Last 3 Encounters:   05/04/17 163/83   04/06/17 (!) 148/91   02/16/17 138/87    Weight from Last 3 Encounters:   05/04/17 90.1 kg (198 lb 11.2 oz)   02/16/17 90.9 kg (200 lb 6.4 oz)   02/08/17 91.2 kg (201 lb)              Today, you had the following     No orders found for display         Today's Medication Changes          These changes are accurate as of: 5/23/17  1:16 PM.  If you have any questions, ask your nurse or doctor.               These medicines have changed or have updated prescriptions.        Dose/Directions    minocycline 100 MG capsule   Commonly known as:  MINOCIN/DYNACIN   This may have changed:  how much to take   Used for:  Ulceration (H)        Dose:  100 mg   Take 1 capsule (100 mg) by mouth daily   Quantity:  60 capsule   Refills:  0                Primary Care Provider Office Phone # Fax #    Carmel Freitas [phone]       [facility] [address] [place] [id number]        Thank you!     Thank you for choosing EYE CLINIC  for your care. Our goal is always to provide you with excellent care. Hearing back from our patients is one way we can continue to improve our services. Please  take a few minutes to complete the written survey that you may receive in the mail after your visit with us. Thank you!             Your Updated Medication List - Protect others around you: Learn how to safely use, store and throw away your medicines at www.disposemymeds.org.          This list is accurate as of: 5/23/17  1:16 PM.  Always use your most recent med list.                   Brand Name Dispense Instructions for use    acetaminophen 500 MG tablet    TYLENOL     Take 500-1,000 mg by mouth every 6 hours as needed       alendronate 70 MG tablet    FOSAMAX    12 tablet    Take 1 tablet (70 mg) by mouth every 7 days       ARTIFICIAL TEAR SOLUTION OP      Apply  to eye as needed.       aspirin 325 MG tablet      Take 650 mg by mouth every 6 hours as needed for moderate pain       CALCIUM + D PO      Take 1 tablet by mouth 2 times daily       DAILY MULTIVITAMIN PO      Take 1 tablet by mouth daily And minerals per pt       folic acid 800 MCG Tabs      Take by mouth 2 times daily       GLUCOSAMINE SULFATE PO      Take 1,000 mg by mouth daily       hydrocortisone 1 % ointment     25 g    Apply topically 2 times daily To affected area on right upper cheek       latanoprost 0.005 % ophthalmic solution    XALATAN    1 Bottle    Place 1 drop into both eyes At Bedtime       levothyroxine 88 MCG tablet    SYNTHROID/LEVOTHROID     Take 88 mcg by mouth daily       lisinopril 20 MG tablet    PRINIVIL/ZESTRIL    90 tablet    Take 1 tablet (20 mg) by mouth daily       methotrexate 2.5 MG tablet CHEMO     32 tablet    Take 7 tablets (17.5 mg) by mouth once a week Take 8 tablets per week       minocycline 100 MG capsule    MINOCIN/DYNACIN    60 capsule    Take 1 capsule (100 mg) by mouth daily       mupirocin 2 % ointment    BACTROBAN    30 g    Apply twice daily to areas on the left ankle.       omeprazole 20 MG CR capsule    priLOSEC    30 capsule    Take 1 capsule (20 mg) by mouth daily       Vitamin D-3 1000 UNITS Caps       Take by mouth 2 times daily

## 2017-05-25 NOTE — TELEPHONE ENCOUNTER
Sounds like we should INCREASE lisinopril to 40mg daily and repeat labs in 2 weeks. Can you please call her to advise of this OR she can schedule an appointment to follow-up with me.  I have pended prescription if you can sign if she is OK to go ahead and increase.    BP Readings from Last 3 Encounters:   05/04/17 163/83   04/06/17 (!) 148/91   02/16/17 138/87       Thanks  Suzette    Telephone Encounter: Outgoing    Reason for Outgoing Call: See above.     Nursing Action/Patient Instruction: Spoke with patient about BPs and the need to increased lisinopril dose to 40 mg daily. Patient is amenable to the increase. Rx sent for increase dose to pharmacy. She is scheduled for F/U in July.       Patient Questions/Concerns: N/A.     Hollie DURAN

## 2017-05-30 DIAGNOSIS — M06.9 RHEUMATOID ARTHRITIS FLARE (H): ICD-10-CM

## 2017-05-31 RX ORDER — LISINOPRIL 40 MG/1
40 TABLET ORAL DAILY
Qty: 90 TABLET | Refills: 1 | Status: SHIPPED | OUTPATIENT
Start: 2017-05-31 | End: 2017-06-07

## 2017-05-31 NOTE — TELEPHONE ENCOUNTER
methotrexate 2.5 MG      Last Written Prescription Date:  2/8/17  Last Fill Quantity: 32,   # refills: 3  Last Office Visit: 2/8/17  Future Office visit:  6/7/17    CBC RESULTS:   Recent Labs   Lab Test  05/11/17   1226   WBC  4.5   RBC  4.12   HGB  14.1   HCT  42.4   MCV  103*   MCH  34.2*   MCHC  33.3   RDW  14.9   PLT  236       Creatinine   Date Value Ref Range Status   05/11/2017 0.72 0.52 - 1.04 mg/dL Final   ]    Liver Function Studies -   Recent Labs   Lab Test  05/11/17   1226  04/06/17   1442   PROTTOTAL   --   7.5   ALBUMIN   --   3.9   BILITOTAL   --   0.4   ALKPHOS   --   133   AST  15  17   ALT  21  25

## 2017-06-07 ENCOUNTER — OFFICE VISIT (OUTPATIENT)
Dept: RHEUMATOLOGY | Facility: CLINIC | Age: 77
End: 2017-06-07
Attending: INTERNAL MEDICINE
Payer: MEDICARE

## 2017-06-07 VITALS
DIASTOLIC BLOOD PRESSURE: 84 MMHG | BODY MASS INDEX: 31.65 KG/M2 | WEIGHT: 197.3 LBS | SYSTOLIC BLOOD PRESSURE: 149 MMHG | TEMPERATURE: 98 F | HEART RATE: 65 BPM | OXYGEN SATURATION: 95 %

## 2017-06-07 DIAGNOSIS — M06.9 RHEUMATOID ARTHRITIS FLARE (H): ICD-10-CM

## 2017-06-07 DIAGNOSIS — I10 ESSENTIAL HYPERTENSION: ICD-10-CM

## 2017-06-07 PROCEDURE — 99212 OFFICE O/P EST SF 10 MIN: CPT | Mod: ZF

## 2017-06-07 RX ORDER — LISINOPRIL 40 MG/1
40 TABLET ORAL DAILY
Qty: 90 TABLET | Refills: 1 | Status: SHIPPED | OUTPATIENT
Start: 2017-06-07 | End: 2018-07-05

## 2017-06-07 ASSESSMENT — PAIN SCALES - GENERAL: PAINLEVEL: MILD PAIN (2)

## 2017-06-07 NOTE — MR AVS SNAPSHOT
After Visit Summary   6/7/2017    Abida Hahn    MRN: [id number]           Patient Information     Date Of Birth          1940        Visit Information        Provider Department      6/7/2017 10:00 AM Bebeto Mccurdy MD [facility]        Today's Diagnoses     Essential hypertension        Rheumatoid arthritis flare (H)          Care Instructions    For methotrexate associated nausea, try Robitussin-DM 2 tsp every 8 hours as needed.  Continue methotrexate 8 tabs weekly.  Laboratory testing in about 3 months.          Follow-ups after your visit        Follow-up notes from your care team     Return in about 5 months (around 11/7/2017).      Your next 10 appointments already scheduled     Jun 29, 2017 12:15 PM CDT   (Arrive by 12:00 PM)   Return Visit with Lillian De Luna PA-C   [facility] ([facility])    [address] [place] [id number] [phone]            Jul 06, 2017 11:25 AM CDT   (Arrive by 11:10 AM)   RE-ESTABLISH NEW with Amarilys Iyer MD   [facility] ([facility])    [address] [place] [address]  4th [facility] [id number] [phone]            Aug 24, 2017  1:30 PM CDT   VISUAL FIELD with [facility] EYE VISUAL FIELD   Eye Clinic ([facility])    [facility]  [address] [facility] [address] [place] [phone]   [phone]            Aug 24, 2017  2:00 PM CDT   RETURN GLAUCOMA with Corazon Addison MD   Eye Clinic ([facility])    [facility]  [address]   [facility] [address] [place] [id number]   [phone]            Oct 18, 2017  1:00 PM CDT   (Arrive by 12:45 PM)   New Patient Visit with NAKUL Lew [facility] ([facility])    [address] [place] [id number]   [phone]              Who to contact     If you have  "questions or need follow up information about today's clinic visit or your schedule please contact [facility] directly at [phone].  Normal or non-critical lab and imaging results will be communicated to you by ZIIBRAhart, letter or phone within 4 business days after the clinic has received the results. If you do not hear from us within 7 days, please contact the clinic through ZIIBRAhart or phone. If you have a critical or abnormal lab result, we will notify you by phone as soon as possible.  Submit refill requests through [employer] Watch or call your pharmacy and they will forward the refill request to us. Please allow 3 business days for your refill to be completed.          Additional Information About Your Visit        [employer] Information     [employer] Watch lets you send messages to your doctor, view your test results, renew your prescriptions, schedule appointments and more. To sign up, go to [email or web address] . Click on \"Log in\" on the left side of the screen, which will take you to the Welcome page. Then click on \"Sign up Now\" on the right side of the page.     You will be asked to enter the access code listed below, as well as some personal information. Please follow the directions to create your username and password.     Your access code is: [email or web address]  Expires: 2017 11:25 AM     Your access code will  in 90 days. If you need help or a new code, please call your [facility] clinic or [phone].        Care EveryWhere ID     This is your Care EveryWhere ID. This could be used by other organizations to access your [facility] medical records  [id number]        Your Vitals Were     Pulse Temperature Pulse Oximetry BMI (Body Mass Index)          65 98  F (36.7  C) (Oral) 95% 31.65 kg/m2         Blood Pressure from Last 3 Encounters:   17 149/84   17 163/83   17 (!) 148/91    Weight from Last 3 Encounters:   17 89.5 kg (197 lb 4.8 oz)   17 90.1 kg (198 lb 11.2 oz) "   02/16/17 90.9 kg (200 lb 6.4 oz)              Today, you had the following     No orders found for display         Today's Medication Changes          These changes are accurate as of: 6/7/17 11:25 AM.  If you have any questions, ask your nurse or doctor.               These medicines have changed or have updated prescriptions.        Dose/Directions    lisinopril 40 MG tablet   Commonly known as:  PRINIVIL/ZESTRIL   This may have changed:  Another medication with the same name was removed. Continue taking this medication, and follow the directions you see here.   Used for:  Essential hypertension   Changed by:  Bebeto Mccurdy MD        Dose:  40 mg   Take 1 tablet (40 mg) by mouth daily   Quantity:  90 tablet   Refills:  1       methotrexate 2.5 MG tablet CHEMO   This may have changed:  additional instructions   Used for:  Rheumatoid arthritis flare (H)   Changed by:  Bebeto Mccurdy MD        Take 8 tablets (20mg) by mouth once a week Take 8 tablets per week   Quantity:  32 tablet   Refills:  3         Stop taking these medicines if you haven't already. Please contact your care team if you have questions.     minocycline 100 MG capsule   Commonly known as:  MINOCIN/DYNACIN   Stopped by:  Bebeto Mccurdy MD                Where to get your medicines      [facility] [address] [place] [address] [address] AT [facility] [address]  [address] [facility] [address]    Hours:  24-hours Phone:  [phone]     lisinopril 40 MG tablet    methotrexate 2.5 MG tablet CHEMO                Primary Care Provider Office Phone # Fax #    Carmel Freitas [phone]       [facility] [address] [place] [id number]        Thank you!     Thank you for choosing [facility] R[facility]  for your care. Our goal is always to provide you with excellent care. Hearing back from our patients is one way we can continue to  improve our services. Please take a few minutes to complete the written survey that you may receive in the mail after your visit with us. Thank you!             Your Updated Medication List - Protect others around you: Learn how to safely use, store and throw away your medicines at www.disposemymeds.org.          This list is accurate as of: 6/7/17 11:25 AM.  Always use your most recent med list.                   Brand Name Dispense Instructions for use    acetaminophen 500 MG tablet    TYLENOL     Take 500-1,000 mg by mouth every 6 hours as needed       alendronate 70 MG tablet    FOSAMAX    12 tablet    Take 1 tablet (70 mg) by mouth every 7 days       ARTIFICIAL TEAR SOLUTION OP      Apply  to eye as needed.       aspirin 325 MG tablet      Take 650 mg by mouth every 6 hours as needed for moderate pain       CALCIUM + D PO      Take 1 tablet by mouth 2 times daily       DAILY MULTIVITAMIN PO      Take 1 tablet by mouth daily And minerals per pt       folic acid 800 MCG Tabs      Take by mouth 2 times daily       GLUCOSAMINE SULFATE PO      Take 1,000 mg by mouth daily       hydrocortisone 1 % ointment     25 g    Apply topically 2 times daily To affected area on right upper cheek       latanoprost 0.005 % ophthalmic solution    XALATAN    1 Bottle    Place 1 drop into both eyes At Bedtime       levothyroxine 88 MCG tablet    SYNTHROID/LEVOTHROID     Take 88 mcg by mouth daily       lisinopril 40 MG tablet    PRINIVIL/ZESTRIL    90 tablet    Take 1 tablet (40 mg) by mouth daily       methotrexate 2.5 MG tablet CHEMO     32 tablet    Take 8 tablets (20mg) by mouth once a week Take 8 tablets per week       mupirocin 2 % ointment    BACTROBAN    30 g    Apply twice daily to areas on the left ankle.       omeprazole 20 MG CR capsule    priLOSEC    30 capsule    Take 1 capsule (20 mg) by mouth daily       Vitamin D-3 1000 UNITS Caps      Take by mouth 2 times daily

## 2017-06-07 NOTE — PROGRESS NOTES
Rheumatology Clinic Visit     Abida Hahn MRN# [id number]   YOB: 1940 Age: 76 year old     Date of Visit: 06/07/2017  Primary care provider: Carmel Freitas          Assessment and Plan:   RA: seropositive, erosive:   Small joint predominant arthralgia has improved since restarting methotrexate in early 2017. Chronic low back, hip, and ankle discomfort continues, and morning stiffness persists.  Physical exam reveals chronic hand and digit changes, and decreased ROM, but no active synovitis. Liver function tests and CBC in May 2017 were normal. I believe that rheumatoid arthritis activity is low, and improved on methotrexate. Strong seropositivity and the presence of established erosions/deformities portends high risk for chronic destructive disease. We had a good discussion about how DMARD Rx can halt and prevent joint-damaging disease, and my recommendation that patient continue chronic Rx.    The plan is as follows:  1) LFT, CRP, and CRP  2) Continue methotrexate (20 mg/week). If this is insufficient, consider adding humira.  4) Continue omeprazole 20 mg/d due to chronic aspirin use    Post-MTx dosing nausea: Methotrexate is associated with several days of low-grade nausea. I recommend a trial of acetaminophen 1000 mg twice a day, augmented by dextromethorphan (Robitussin DM) 1 to 2 teaspoons every 8 hours after methotrexate dose.  Follow up 4 months with ANDREI Lofton.    Bebeto Mccurdy M.D.  Staff Rheumatologist,  Health  Pager [phone]          Active Problem List:     Patient Active Problem List    Diagnosis Date Noted     Primary open angle glaucoma of both eyes, severe stage 03/23/2017     Priority: Medium     Pseudophakia of both eyes 03/23/2017     Priority: Medium     Osteoporosis 02/16/2017     Priority: Medium     Hypothyroidism 02/02/2017     Priority: Medium     Pyoderma gangrenosum 10/06/2016     Priority: Medium     Encounter for long-term current use of medication  "08/18/2011     Priority: Medium     Problem list name updated by automated process. Provider to review       Chronic pain syndrome 08/01/2011     Priority: Medium     Essential hypertension 08/01/2011     Priority: Medium     Problem list name updated by automated process. Provider to review       Rheumatoid arthritis (H) 08/01/2011     Priority: Medium     Sero-positive, deforming RA with erosions on hand x-ray films 2009  RF 1990 IU; anti-CCP 38 (6/2009). Mantoux neg 2009.   Monotherapy mtx 6/2009 (alalimumab contemplated 2010 but never started)  Prior patient Dr. Shukla.   Problem list name updated by automated process. Provider to review              History of Present Illness:   Abida Hahn follows up for inflammatory arthritis. Last seen in 2-17, when methotrexate was restarted for recurrent arthralgia.    Interval History:    She notes aching pain in both shoulders, ankles, and feet. She reports reduced mobility due to hip and low back discomfort.  There is minimal visible swelling in previously affected finger and hand joints. She has EAS at least 2 hours. She restarted methotrexate in 1-17. She \"doesn't feel right\" for a couple of days after the dose. Not nauseated, just blah.  She is not taking prescribed fosomax due to concerns about GI AE.    She has been working with Derm to treat pyoderma gangrenosum of the L ankle for the last 11 months. She has continued using a walker, which has greatly improved her ability to get around. Her primary concern is that she does not want her symptoms to get worse and is hoping that she can restart treatment so that she can regain some independence.She reports that the lesion has been slow to heal and that she continues to take Minocyclin, which she just discontinued (total of 11 months' treatment).           Review of Systems:   Review Of Systems  Constitutional: No headaches, fevers, chills  Skin: No new rashes, lesions  Eyes: No changes in vision, " discharge  Ears/Nose/Throat: No discharge  Respiratory: No dyspnea on exertion, cough  Cardiovascular: No palpitations or chest pain  Gastrointestinal: No abdominal pain or changes in bowel movements  Genitourinary: Some urinary incontinence  Musculoskeletal: No cramping or soreness, see HPI  Neurologic: No numbness or tingling  Psychiatric: negative  Hematologic/Lymphatic/Immunologic: negative  Endocrine: negative          Past Medical History:     Past Medical History:   Diagnosis Date     Abscess, toe 2009    Right great toe     Cataract 8/2011    Right s/p surgery 8/2011; left s/p surgery     Glaucoma      Headache(784.0)     Relieved with gabapentin. Chronic pain     HTN (hypertension)      RA (rheumatoid arthritis) (H)      Wrist fracture     Right, s/p ORIF     Past Surgical History:   Procedure Laterality Date     BIOPSY ARTERY TEMPORAL       EXTRACAPSULAR CATARACT EXTRATION WITH INTRAOCULAR LENS IMPLANT  8/2011    Left 2 years ago as well     OPEN REDUCTION INTERNAL FIXATION WRIST BILATERAL              Social History:     Social History     Occupational History     Not on file.     Social History Main Topics     Smoking status: Former Smoker     Smokeless tobacco: Never Used      Comment: Quit in 20yrs      Alcohol use No     Drug use: No     Sexual activity: Not on file            Family History:     Family History   Problem Relation Age of Onset     CANCER Father      colon     Arthritis Mother      RA at 33 y/o, glaucome     Glaucoma Mother      Arthritis Brother      RA on Mtx and humira     Melanoma No family hx of      Skin Cancer No family hx of             Allergies:     Allergies   Allergen Reactions     Ibuprofen Sodium      Penicillins      Triple Antibiotic [Neomycin-Polymyxin-Dexameth]      Aspirin Rash     Codeine Rash            Medications:     Current Outpatient Prescriptions   Medication Sig Dispense Refill     lisinopril (PRINIVIL/ZESTRIL) 40 MG tablet Take 1 tablet (40 mg) by mouth  daily 90 tablet 1     methotrexate 2.5 MG tablet CHEMO Take 7 tablets (17.5 mg) by mouth once a week Take 8 tablets per week 32 tablet 2     latanoprost (XALATAN) 0.005 % ophthalmic solution Place 1 drop into both eyes At Bedtime 1 Bottle 11     alendronate (FOSAMAX) 70 MG tablet Take 1 tablet (70 mg) by mouth every 7 days 12 tablet 3     aspirin 325 MG tablet Take 650 mg by mouth every 6 hours as needed for moderate pain       omeprazole (PRILOSEC) 20 MG CR capsule Take 1 capsule (20 mg) by mouth daily 30 capsule 3     GLUCOSAMINE SULFATE PO Take 1,000 mg by mouth daily       mupirocin (BACTROBAN) 2 % ointment Apply twice daily to areas on the left ankle. 30 g 3     hydrocortisone 1 % ointment Apply topically 2 times daily To affected area on right upper cheek 25 g 1     folic acid 800 MCG TABS Take by mouth 2 times daily       Cholecalciferol (VITAMIN D-3) 1000 UNITS CAPS Take by mouth 2 times daily       acetaminophen (TYLENOL) 500 MG tablet Take 500-1,000 mg by mouth every 6 hours as needed       levothyroxine (SYNTHROID, LEVOTHROID) 88 MCG tablet Take 88 mcg by mouth daily       ARTIFICIAL TEAR SOLUTION OP Apply  to eye as needed.       Calcium-Vitamin D (CALCIUM + D PO) Take 1 tablet by mouth 2 times daily       Multiple Vitamin (DAILY MULTIVITAMIN PO) Take 1 tablet by mouth daily And minerals per pt       [DISCONTINUED] lisinopril (PRINIVIL/ZESTRIL) 20 MG tablet Take 1 tablet (20 mg) by mouth daily 90 tablet 0            Physical Exam:   Blood pressure 149/84, pulse 65, temperature 98  F (36.7  C), temperature source Oral, weight 89.5 kg (197 lb 4.8 oz), SpO2 95 %, not currently breastfeeding.  Wt Readings from Last 4 Encounters:   06/07/17 89.5 kg (197 lb 4.8 oz)   05/04/17 90.1 kg (198 lb 11.2 oz)   02/16/17 90.9 kg (200 lb 6.4 oz)   02/08/17 91.2 kg (201 lb)       Constitutional: Appears stated age; cooperative; very talkative  Eyes: nl EOM, PERRLA, vision, conjunctiva, sclera  ENT: nl external ears, nose,  hearing, lips, teeth, gums, throat  No mucous membrane lesions, normal saliva pool  Neck: Thyroid enlarged  Resp: lungs clear to auscultation, nl to palpation  CV: RRR, no murmurs, rubs, no edema  GI: no ABD mass or tenderness  : not tested  Lymph: no cervical, supraclavicular, inguinal or epitrochlear nodes  MS: R 4th and 5th PIPs, L 2nd MCP shows angulation. L 4th PIP hyperflexion, DIP hyperextension. L 5th DIP hyperflexion. The TMJ, neck, shoulder, elbow, wrist, spine, hip, knee, and ankle joints were examined and found normal. No active synovitis. Full joint ROM of wrists, elbows, shoulders, knees, and ankle joints. Decreased fist closure bilaterally. Normal  strength.  Skin: 1 1-cm nodule L elbow. Dark, well circumscribed, healing lesions L medial leg near ankle. Gray coloration likely due to minocycline treatment. No nail pitting, rash  Neuro: nl cranial nerves, strength, sensation, DTRs.   Psych: nl judgement, orientation, memory, affect.         Data:     Results for orders placed or performed in visit on 05/11/17   General PFT Lab (Please always keep checked)   Result Value Ref Range    FVC-Pred 2.85 L    FVC-Pre 1.29 L    FVC-%Pred-Pre 45 %    FEV1-Pre 0.89 L    FEV1-%Pred-Pre 41 %    FEV1FVC-Pred 77 %    FEV1FVC-Pre 70 %    FEFMax-Pred 5.43 L/sec    FEFMax-Pre 3.24 L/sec    FEFMax-%Pred-Pre 59 %    FEF2575-Pred 1.72 L/sec    FEF2575-Pre 0.54 L/sec    RES5882-%Pred-Pre 31 %    FEF2575-Post 0.36 L/sec    IZW3482-%Pred-Post 21 %    ExpTime-Pre 8.13 sec    FIFMax-Pre 1.28 L/sec    MEP-Pre 55 cmH2O    MIP-Pre -30 cmH2O    FEV1FEV6-Pred 78 %    FEV1FEV6-Pre 70 %       Recent Labs   Lab Test  02/02/17   1455  01/26/12   1137  11/01/11   1706  10/17/11   1045  08/01/11   1512  07/15/11   1007   12/22/10   1635  09/24/10   1403   WBC  5.7  5.9   --   5.5   --   5.2   < >  5.5  5.7   RBC  4.82  4.60   --   4.48   --   4.44   < >  4.56  4.62   HGB  15.7  14.9   --   14.7   --   14.7   < >  14.5  14.9   HCT   48.7*  46.2   --   45.8   --   44.7   < >  44.1  45.4   MCV  101*  100   --   102*   --   101*   < >  97  98   RDW  13.0  13.6   --   13.4   --   13.4   < >  13.1  13.1   PLT  209  189   --   210   --   167   < >  203  205   ALBUMIN  3.8   --   3.7  3.6   --   3.8   < >  3.9   --    CRP   --    --    --   8.9*   --   <5.0   --   5.6   --    BUN  18   --    --    --   17   --    --    --   20    < > = values in this interval not displayed.      No results for input(s): TSH, T4 in the last 62041 hours.  Cyclic Cit Pept IgG/IgA   Date Value Ref Range Status   01/26/2012 >250  Strongly Positive (H) <20 UNITS Final     Hemoglobin   Date Value Ref Range Status   05/11/2017 14.1 11.7 - 15.7 g/dL Final   02/08/2017 15.0 11.7 - 15.7 g/dL Final   02/02/2017 15.7 11.7 - 15.7 g/dL Final     Urea Nitrogen   Date Value Ref Range Status   04/06/2017 18 7 - 30 mg/dL Final   02/02/2017 18 7 - 30 mg/dL Final   08/01/2011 17 7 - 30 mg/dL Final     Sed Rate   Date Value Ref Range Status   06/13/2008 30 0 - 30 mm/h Final   05/30/2008 21 0 - 30 mm/h Final     CRP Inflammation   Date Value Ref Range Status   02/08/2017 <2.9 0.0 - 8.0 mg/L Final   10/17/2011 8.9 (H) 0.0 - 8.0 mg/L Final   07/15/2011 <5.0 0.0 - 8.0 mg/L Final     AST   Date Value Ref Range Status   05/11/2017 15 0 - 45 U/L Final   04/06/2017 17 0 - 45 U/L Final   02/08/2017 13 0 - 45 U/L Final     Albumin   Date Value Ref Range Status   04/06/2017 3.9 3.4 - 5.0 g/dL Final   02/02/2017 3.8 3.4 - 5.0 g/dL Final   11/01/2011 3.7 3.3 - 4.9 g/dL Final     Alkaline Phosphatase   Date Value Ref Range Status   04/06/2017 133 40 - 150 U/L Final   02/02/2017 334 (H) 40 - 150 U/L Final   11/01/2011 103 40 - 150 U/L Final     ALT   Date Value Ref Range Status   05/11/2017 21 0 - 50 U/L Final   04/06/2017 25 0 - 50 U/L Final   02/08/2017 20 0 - 50 U/L Final     Rheumatoid Factor   Date Value Ref Range Status   03/11/2009 1990 (H) 0 - 14 IU/mL Final     Recent Labs   Lab Test  05/11/17    1226  04/06/17   1442  02/08/17   1035  02/02/17   1455   11/01/11   1706   08/01/11   1512   WBC  4.5   --   6.8  5.7   < >   --    < >   --    HGB  14.1   --   15.0  15.7   < >   --    < >   --    HCT  42.4   --   46.2  48.7*   < >   --    < >   --    MCV  103*   --   99  101*   < >   --    < >   --    PLT  236   --   204  209   < >   --    < >   --    BUN   --   18   --   18   --    --    --   17   TSH   --   1.29  1.29   --    --    --    --    --    --    AST  15  17  13  14   < >  37   < >   --    ALT  21  25  20  22   < >  24   < >   --    ALKPHOS   --   133   --   334*   --   103   --    --     < > = values in this interval not displayed.       Reviewed Rheumatology lab flowsheet

## 2017-06-07 NOTE — LETTER
6/7/2017      RE: Abida Hahn  [address]  [facility] [address]       Rheumatology Clinic Visit     Abida Hahn MRN# [id number]   YOB: 1940 Age: 76 year old     Date of Visit: 06/07/2017  Primary care provider: Carmel Freitas          Assessment and Plan:   RA: seropositive, erosive:   Small joint predominant arthralgia has improved since restarting methotrexate in early 2017. Chronic low back, hip, and ankle discomfort continues, and morning stiffness persists.  Physical exam reveals chronic hand and digit changes, and decreased ROM, but no active synovitis. Liver function tests and CBC in May 2017 were normal. I believe that rheumatoid arthritis activity is low, and improved on methotrexate. Strong seropositivity and the presence of established erosions/deformities portends high risk for chronic destructive disease. We had a good discussion about how DMARD Rx can halt and prevent joint-damaging disease, and my recommendation that patient continue chronic Rx.    The plan is as follows:  1) LFT, CRP, and CRP  2) Continue methotrexate (20 mg/week). If this is insufficient, consider adding humira.  4) Continue omeprazole 20 mg/d due to chronic aspirin use    Post-MTx dosing nausea: Methotrexate is associated with several days of low-grade nausea. I recommend a trial of acetaminophen 1000 mg twice a day, augmented by dextromethorphan (Robitussin DM) 1 to 2 teaspoons every 8 hours after methotrexate dose.  Follow up 4 months with ANDREI Lofton.    Bebeto Mccurdy M.D.  Staff Rheumatologist,  Health  Pager [phone]          Active Problem List:     Patient Active Problem List    Diagnosis Date Noted     Primary open angle glaucoma of both eyes, severe stage 03/23/2017     Priority: Medium     Pseudophakia of both eyes 03/23/2017     Priority: Medium     Osteoporosis 02/16/2017     Priority: Medium     Hypothyroidism 02/02/2017     Priority: Medium     Pyoderma gangrenosum  "10/06/2016     Priority: Medium     Encounter for long-term current use of medication 08/18/2011     Priority: Medium     Problem list name updated by automated process. Provider to review       Chronic pain syndrome 08/01/2011     Priority: Medium     Essential hypertension 08/01/2011     Priority: Medium     Problem list name updated by automated process. Provider to review       Rheumatoid arthritis (H) 08/01/2011     Priority: Medium     Sero-positive, deforming RA with erosions on hand x-ray films 2009  RF 1990 IU; anti-CCP 38 (6/2009). Mantoux neg 2009.   Monotherapy mtx 6/2009 (alalimumab contemplated 2010 but never started)  Prior patient Dr. Shukla.   Problem list name updated by automated process. Provider to review              History of Present Illness:   Abida Hahn follows up for inflammatory arthritis. Last seen in 2-17, when methotrexate was restarted for recurrent arthralgia.    Interval History:    She notes aching pain in both shoulders, ankles, and feet. She reports reduced mobility due to hip and low back discomfort.  There is minimal visible swelling in previously affected finger and hand joints. She has EAS at least 2 hours. She restarted methotrexate in 1-17. She \"doesn't feel right\" for a couple of days after the dose. Not nauseated, just blah.  She is not taking prescribed fosomax due to concerns about GI AE.    She has been working with Derm to treat pyoderma gangrenosum of the L ankle for the last 11 months. She has continued using a walker, which has greatly improved her ability to get around. Her primary concern is that she does not want her symptoms to get worse and is hoping that she can restart treatment so that she can regain some independence.She reports that the lesion has been slow to heal and that she continues to take Minocyclin, which she just discontinued (total of 11 months' treatment).           Review of Systems:   Review Of Systems  Constitutional: No headaches, " fevers, chills  Skin: No new rashes, lesions  Eyes: No changes in vision, discharge  Ears/Nose/Throat: No discharge  Respiratory: No dyspnea on exertion, cough  Cardiovascular: No palpitations or chest pain  Gastrointestinal: No abdominal pain or changes in bowel movements  Genitourinary: Some urinary incontinence  Musculoskeletal: No cramping or soreness, see HPI  Neurologic: No numbness or tingling  Psychiatric: negative  Hematologic/Lymphatic/Immunologic: negative  Endocrine: negative          Past Medical History:     Past Medical History:   Diagnosis Date     Abscess, toe 2009    Right great toe     Cataract 8/2011    Right s/p surgery 8/2011; left s/p surgery     Glaucoma      Headache(784.0)     Relieved with gabapentin. Chronic pain     HTN (hypertension)      RA (rheumatoid arthritis) (H)      Wrist fracture     Right, s/p ORIF     Past Surgical History:   Procedure Laterality Date     BIOPSY ARTERY TEMPORAL       EXTRACAPSULAR CATARACT EXTRATION WITH INTRAOCULAR LENS IMPLANT  8/2011    Left 2 years ago as well     OPEN REDUCTION INTERNAL FIXATION WRIST BILATERAL              Social History:     Social History     Occupational History     Not on file.     Social History Main Topics     Smoking status: Former Smoker     Smokeless tobacco: Never Used      Comment: Quit in 20yrs      Alcohol use No     Drug use: No     Sexual activity: Not on file            Family History:     Family History   Problem Relation Age of Onset     CANCER Father      colon     Arthritis Mother      RA at 31 y/o, glaucome     Glaucoma Mother      Arthritis Brother      RA on Mtx and humira     Melanoma No family hx of      Skin Cancer No family hx of             Allergies:     Allergies   Allergen Reactions     Ibuprofen Sodium      Penicillins      Triple Antibiotic [Neomycin-Polymyxin-Dexameth]      Aspirin Rash     Codeine Rash            Medications:     Current Outpatient Prescriptions   Medication Sig Dispense Refill      lisinopril (PRINIVIL/ZESTRIL) 40 MG tablet Take 1 tablet (40 mg) by mouth daily 90 tablet 1     methotrexate 2.5 MG tablet CHEMO Take 7 tablets (17.5 mg) by mouth once a week Take 8 tablets per week 32 tablet 2     latanoprost (XALATAN) 0.005 % ophthalmic solution Place 1 drop into both eyes At Bedtime 1 Bottle 11     alendronate (FOSAMAX) 70 MG tablet Take 1 tablet (70 mg) by mouth every 7 days 12 tablet 3     aspirin 325 MG tablet Take 650 mg by mouth every 6 hours as needed for moderate pain       omeprazole (PRILOSEC) 20 MG CR capsule Take 1 capsule (20 mg) by mouth daily 30 capsule 3     GLUCOSAMINE SULFATE PO Take 1,000 mg by mouth daily       mupirocin (BACTROBAN) 2 % ointment Apply twice daily to areas on the left ankle. 30 g 3     hydrocortisone 1 % ointment Apply topically 2 times daily To affected area on right upper cheek 25 g 1     folic acid 800 MCG TABS Take by mouth 2 times daily       Cholecalciferol (VITAMIN D-3) 1000 UNITS CAPS Take by mouth 2 times daily       acetaminophen (TYLENOL) 500 MG tablet Take 500-1,000 mg by mouth every 6 hours as needed       levothyroxine (SYNTHROID, LEVOTHROID) 88 MCG tablet Take 88 mcg by mouth daily       ARTIFICIAL TEAR SOLUTION OP Apply  to eye as needed.       Calcium-Vitamin D (CALCIUM + D PO) Take 1 tablet by mouth 2 times daily       Multiple Vitamin (DAILY MULTIVITAMIN PO) Take 1 tablet by mouth daily And minerals per pt       [DISCONTINUED] lisinopril (PRINIVIL/ZESTRIL) 20 MG tablet Take 1 tablet (20 mg) by mouth daily 90 tablet 0            Physical Exam:   Blood pressure 149/84, pulse 65, temperature 98  F (36.7  C), temperature source Oral, weight 89.5 kg (197 lb 4.8 oz), SpO2 95 %, not currently breastfeeding.  Wt Readings from Last 4 Encounters:   06/07/17 89.5 kg (197 lb 4.8 oz)   05/04/17 90.1 kg (198 lb 11.2 oz)   02/16/17 90.9 kg (200 lb 6.4 oz)   02/08/17 91.2 kg (201 lb)       Constitutional: Appears stated age; cooperative; very talkative  Eyes:  nl EOM, PERRLA, vision, conjunctiva, sclera  ENT: nl external ears, nose, hearing, lips, teeth, gums, throat  No mucous membrane lesions, normal saliva pool  Neck: Thyroid enlarged  Resp: lungs clear to auscultation, nl to palpation  CV: RRR, no murmurs, rubs, no edema  GI: no ABD mass or tenderness  : not tested  Lymph: no cervical, supraclavicular, inguinal or epitrochlear nodes  MS: R 4th and 5th PIPs, L 2nd MCP shows angulation. L 4th PIP hyperflexion, DIP hyperextension. L 5th DIP hyperflexion. The TMJ, neck, shoulder, elbow, wrist, spine, hip, knee, and ankle joints were examined and found normal. No active synovitis. Full joint ROM of wrists, elbows, shoulders, knees, and ankle joints. Decreased fist closure bilaterally. Normal  strength.  Skin: 1 1-cm nodule L elbow. Dark, well circumscribed, healing lesions L medial leg near ankle. Gray coloration likely due to minocycline treatment. No nail pitting, rash  Neuro: nl cranial nerves, strength, sensation, DTRs.   Psych: nl judgement, orientation, memory, affect.         Data:     Results for orders placed or performed in visit on 05/11/17   General PFT Lab (Please always keep checked)   Result Value Ref Range    FVC-Pred 2.85 L    FVC-Pre 1.29 L    FVC-%Pred-Pre 45 %    FEV1-Pre 0.89 L    FEV1-%Pred-Pre 41 %    FEV1FVC-Pred 77 %    FEV1FVC-Pre 70 %    FEFMax-Pred 5.43 L/sec    FEFMax-Pre 3.24 L/sec    FEFMax-%Pred-Pre 59 %    FEF2575-Pred 1.72 L/sec    FEF2575-Pre 0.54 L/sec    HWN0068-%Pred-Pre 31 %    FEF2575-Post 0.36 L/sec    THX9463-%Pred-Post 21 %    ExpTime-Pre 8.13 sec    FIFMax-Pre 1.28 L/sec    MEP-Pre 55 cmH2O    MIP-Pre -30 cmH2O    FEV1FEV6-Pred 78 %    FEV1FEV6-Pre 70 %       Recent Labs   Lab Test  02/02/17   1455  01/26/12   1137  11/01/11   1706  10/17/11   1045  08/01/11   1512  07/15/11   1007   12/22/10   1635  09/24/10   1403   WBC  5.7  5.9   --   5.5   --   5.2   < >  5.5  5.7   RBC  4.82  4.60   --   4.48   --   4.44   < >  4.56   4.62   HGB  15.7  14.9   --   14.7   --   14.7   < >  14.5  14.9   HCT  48.7*  46.2   --   45.8   --   44.7   < >  44.1  45.4   MCV  101*  100   --   102*   --   101*   < >  97  98   RDW  13.0  13.6   --   13.4   --   13.4   < >  13.1  13.1   PLT  209  189   --   210   --   167   < >  203  205   ALBUMIN  3.8   --   3.7  3.6   --   3.8   < >  3.9   --    CRP   --    --    --   8.9*   --   <5.0   --   5.6   --    BUN  18   --    --    --   17   --    --    --   20    < > = values in this interval not displayed.      No results for input(s): TSH, T4 in the last 67237 hours.  Cyclic Cit Pept IgG/IgA   Date Value Ref Range Status   01/26/2012 >250  Strongly Positive (H) <20 UNITS Final     Hemoglobin   Date Value Ref Range Status   05/11/2017 14.1 11.7 - 15.7 g/dL Final   02/08/2017 15.0 11.7 - 15.7 g/dL Final   02/02/2017 15.7 11.7 - 15.7 g/dL Final     Urea Nitrogen   Date Value Ref Range Status   04/06/2017 18 7 - 30 mg/dL Final   02/02/2017 18 7 - 30 mg/dL Final   08/01/2011 17 7 - 30 mg/dL Final     Sed Rate   Date Value Ref Range Status   06/13/2008 30 0 - 30 mm/h Final   05/30/2008 21 0 - 30 mm/h Final     CRP Inflammation   Date Value Ref Range Status   02/08/2017 <2.9 0.0 - 8.0 mg/L Final   10/17/2011 8.9 (H) 0.0 - 8.0 mg/L Final   07/15/2011 <5.0 0.0 - 8.0 mg/L Final     AST   Date Value Ref Range Status   05/11/2017 15 0 - 45 U/L Final   04/06/2017 17 0 - 45 U/L Final   02/08/2017 13 0 - 45 U/L Final     Albumin   Date Value Ref Range Status   04/06/2017 3.9 3.4 - 5.0 g/dL Final   02/02/2017 3.8 3.4 - 5.0 g/dL Final   11/01/2011 3.7 3.3 - 4.9 g/dL Final     Alkaline Phosphatase   Date Value Ref Range Status   04/06/2017 133 40 - 150 U/L Final   02/02/2017 334 (H) 40 - 150 U/L Final   11/01/2011 103 40 - 150 U/L Final     ALT   Date Value Ref Range Status   05/11/2017 21 0 - 50 U/L Final   04/06/2017 25 0 - 50 U/L Final   02/08/2017 20 0 - 50 U/L Final     Rheumatoid Factor   Date Value Ref Range Status    03/11/2009 1990 (H) 0 - 14 IU/mL Final     Recent Labs   Lab Test  05/11/17   1226  04/06/17   1442  02/08/17   1035  02/02/17   1455   11/01/11   1706   08/01/11   1512   WBC  4.5   --   6.8  5.7   < >   --    < >   --    HGB  14.1   --   15.0  15.7   < >   --    < >   --    HCT  42.4   --   46.2  48.7*   < >   --    < >   --    MCV  103*   --   99  101*   < >   --    < >   --    PLT  236   --   204  209   < >   --    < >   --    BUN   --   18   --   18   --    --    --   17   TSH   --   1.29  1.29   --    --    --    --    --    --    AST  15  17  13  14   < >  37   < >   --    ALT  21  25  20  22   < >  24   < >   --    ALKPHOS   --   133   --   334*   --   103   --    --     < > = values in this interval not displayed.       Reviewed Rheumatology lab flowsheet    Bebeto Mccurdy MD

## 2017-06-07 NOTE — PATIENT INSTRUCTIONS
For methotrexate associated nausea, try Robitussin-DM 2 tsp every 8 hours as needed.  Continue methotrexate 8 tabs weekly.  Laboratory testing in about 3 months.

## 2017-06-07 NOTE — NURSING NOTE
"Chief Complaint   Patient presents with     RECHECK     Rheumatiod arthritis follow up       Initial /84 (BP Location: Left arm, Patient Position: Chair, Cuff Size: Adult Regular)  Pulse 65  Temp 98  F (36.7  C) (Oral)  Wt 89.5 kg (197 lb 4.8 oz)  SpO2 95%  BMI 31.65 kg/m2 Estimated body mass index is 31.65 kg/(m^2) as calculated from the following:    Height as of 5/4/17: 1.681 m (5' 6.2\").    Weight as of this encounter: 89.5 kg (197 lb 4.8 oz).  Medication Reconciliation: complete   LATONYA GUTIERREZ CMA      "

## 2017-06-08 LAB
EXPTIME-PRE: 8.13 SEC
FEF2575-%PRED-POST: 21 %
FEF2575-%PRED-PRE: 31 %
FEF2575-POST: 0.36 L/SEC
FEF2575-PRE: 0.54 L/SEC
FEF2575-PRED: 1.72 L/SEC
FEFMAX-%PRED-PRE: 59 %
FEFMAX-PRE: 3.24 L/SEC
FEFMAX-PRED: 5.43 L/SEC
FEV1-%PRED-PRE: 41 %
FEV1-PRE: 0.89 L
FEV1FEV6-PRE: 70 %
FEV1FEV6-PRED: 78 %
FEV1FVC-PRE: 70 %
FEV1FVC-PRED: 77 %
FIFMAX-PRE: 1.28 L/SEC
FVC-%PRED-PRE: 45 %
FVC-PRE: 1.29 L
FVC-PRED: 2.85 L
MEP-PRE: 55 CMH2O
MIP-PRE: -30 CMH2O

## 2017-06-29 ENCOUNTER — OFFICE VISIT (OUTPATIENT)
Dept: DERMATOLOGY | Facility: CLINIC | Age: 77
End: 2017-06-29

## 2017-06-29 DIAGNOSIS — L88 PYODERMA GANGRENOSUM (H): Primary | ICD-10-CM

## 2017-06-29 ASSESSMENT — PAIN SCALES - GENERAL
PAINLEVEL: NO PAIN (1)
PAINLEVEL: NO PAIN (0)

## 2017-06-29 NOTE — NURSING NOTE
"Dermatology Rooming Note    bAida Hahn's goals for this visit include:   Chief Complaint   Patient presents with     Derm Problem     Wound check, Abida states \" It is all healed up.\"     Lou Brown LPN  "

## 2017-06-29 NOTE — NURSING NOTE
Drug Administration Record    Drug Name: triamcinolone acetonide(kenalog)  Dose:0.8 mL  Route administered: ID  NDC #: Kenalog-10 ([address]-08)  Amount of waste(mL):4.2  Reason for waste: Single use vial

## 2017-06-29 NOTE — MR AVS SNAPSHOT
After Visit Summary   6/29/2017    Abida Hahn    MRN: [id number]           Patient Information     Date Of Birth          1940        Visit Information        Provider Department      6/29/2017 12:15 PM Lillian De Luna PA-C [facility] Dermatology        Today's Diagnoses     Pyoderma gangrenosum    -  1       Follow-ups after your visit        Your next 10 appointments already scheduled     Jul 06, 2017 11:25 AM CDT   (Arrive by 11:10 AM)   RE-ESTABLISH NEW with Amarilys Iyer MD   [facility] ([facility])    [address]  [address] [facility] [id number] [phone]            Aug 24, 2017  1:30 PM CDT   VISUAL FIELD with [facility] EYE VISUAL FIELD   Eye Clinic ([facility])    [address]  [address]  [address]  [facility] [id number] [phone]            Aug 24, 2017  2:00 PM CDT   RETURN GLAUCOMA with Corazon Addison MD   Eye Clinic ([facility])    [address]  [address]  [address]  [facility] [id number] [phone]            Aug 24, 2017  2:15 PM CDT   (Arrive by 2:00 PM)   Return Visit with Lillian De Luna PA-C   [facility] Dermatology ([facility])    [address]  [address] [facility] [id number] [phone]            Sep 07, 2017 11:30 AM CDT   (Arrive by 11:15 AM)   Return Visit with Mario Marks MD   [facility] ([facility])    [address]  [address] [facility] [id number] [phone]            Oct 18, 2017  1:00 PM CDT   (Arrive by 12:45 PM)   New Patient Visit with NAKUL [facility] Rheumatology ([facility])    [address] [id number] [phone]              Who to contact     Please call your clinic at [phone] to:    Ask questions  about your health    Make or cancel appointments    Discuss your medicines    Learn about your test results    Speak to your doctor   If you have compliments or concerns about an experience at your clinic, or if you wish to file a complaint, please contact [facility] Patient Relations at [phone] or email us at [email or web address]         Additional Information About Your Visit        CounterTackhart Information     [employer] is an electronic gateway that provides easy, online access to your medical records. With AmberPoint, you can request a clinic appointment, read your test results, renew a prescription or communicate with your care team.     To sign up for Amber[employer] visit the website at [email or web address]   You will be asked to enter the access code listed below, as well as some personal information. Please follow the directions to create your username and password.     Your access code is: [email or web address]  Expires: 2017 11:25 AM     Your access code will  in 90 days. If you need help or a new code, please contact your [facility] or call [phone] for assistance.        Care EveryWhere ID     This is your Care EveryWhere ID. This could be used by other organizations to access your [place] medical records  [id number]         Blood Pressure from Last 3 Encounters:   17 149/84   17 163/83   17 (!) 148/91    Weight from Last 3 Encounters:   17 89.5 kg (197 lb 4.8 oz)   17 90.1 kg (198 lb 11.2 oz)   17 90.9 kg (200 lb 6.4 oz)              We Performed the Following     INJECTION INTO SKIN LESIONS <=7          Today's Medication Changes          These changes are accurate as of: 17 12:52 PM.  If you have any questions, ask your nurse or doctor.               Start taking these medicines.        Dose/Directions    triamcinolone acetonide 10 MG/ML injection   Commonly known as:  KENALOG   Used for:   Pyoderma gangrenosum        Dose:  10 mg   Inject 1 mL (10 mg) into the skin once for 1 dose   Quantity:  1 mL   Refills:  0            Where to get your medicines      Some of these will need a paper prescription and others can be bought over the counter.  Ask your nurse if you have questions.     You don't need a prescription for these medications     triamcinolone acetonide 10 MG/ML injection                Primary Care Provider Office Phone # Fax #    Carmel Becca Freitas [phone]       [facility] [address] [place] [id number]        Equal Access to Services     [facility]: Hadii anu jones hadasho Sochandler, waaxda luqadaha, qaybta kaalmada yo, neo quinn . So [facility] [phone].    ATENCIÓN: Si habla español, tiene a be disposición servicios gratuitos de asistencia lingüística. [facility] [phone].    We comply with applicable federal civil rights laws and [place] laws. We do not discriminate on the basis of race, color, national origin, age, disability sex, sexual orientation or gender identity.            Thank you!     Thank you for choosing [facility] DERMATOLOGY  for your care. Our goal is always to provide you with excellent care. Hearing back from our patients is one way we can continue to improve our services. Please take a few minutes to complete the written survey that you may receive in the mail after your visit with us. Thank you!             Your Updated Medication List - Protect others around you: Learn how to safely use, store and throw away your medicines at www.disposemymeds.org.          This list is accurate as of: 6/29/17 12:52 PM.  Always use your most recent med list.                   Brand Name Dispense Instructions for use Diagnosis    acetaminophen 500 MG tablet    TYLENOL     Take 500-1,000 mg by mouth every 6 hours as needed        alendronate 70 MG tablet    FOSAMAX    12 tablet    Take 1 tablet (70  mg) by mouth every 7 days    Osteoporosis       ARTIFICIAL TEAR SOLUTION OP      Apply  to eye as needed.        aspirin 325 MG tablet      Take 650 mg by mouth every 6 hours as needed for moderate pain        CALCIUM + D PO      Take 1 tablet by mouth 2 times daily        DAILY MULTIVITAMIN PO      Take 1 tablet by mouth daily And minerals per pt        folic acid 800 MCG Tabs      Take by mouth 2 times daily        GLUCOSAMINE SULFATE PO      Take 1,000 mg by mouth daily        hydrocortisone 1 % ointment     25 g    Apply topically 2 times daily To affected area on right upper cheek    Ulceration (H)       latanoprost 0.005 % ophthalmic solution    XALATAN    1 Bottle    Place 1 drop into both eyes At Bedtime    Open-angle glaucoma of both eyes, severe stage, unspecified open-angle glaucoma type       levothyroxine 88 MCG tablet    SYNTHROID/LEVOTHROID     Take 88 mcg by mouth daily        lisinopril 40 MG tablet    PRINIVIL/ZESTRIL    90 tablet    Take 1 tablet (40 mg) by mouth daily    Essential hypertension       methotrexate 2.5 MG tablet CHEMO     32 tablet    Take 8 tablets (20mg) by mouth once a week Take 8 tablets per week    Rheumatoid arthritis flare (H)       mupirocin 2 % ointment    BACTROBAN    30 g    Apply twice daily to areas on the left ankle.    Pyoderma gangrenosum       omeprazole 20 MG CR capsule    priLOSEC    30 capsule    Take 1 capsule (20 mg) by mouth daily    Rheumatoid arthritis flare (H)       triamcinolone acetonide 10 MG/ML injection    KENALOG    1 mL    Inject 1 mL (10 mg) into the skin once for 1 dose    Pyoderma gangrenosum       Vitamin D-3 1000 UNITS Caps      Take by mouth 2 times daily

## 2017-06-29 NOTE — LETTER
"6/29/2017       RE: Abida Hahn  [address]  [facility] [address]     Dear Colleague,    Thank you for referring your patient, Abida Hahn, to the [facility] DERMATOLOGY at [facility]. Please see a copy of my visit note below.    [facility] Dermatology Note      Dermatology Problem List:  1 Skin cancer screening 2011 with Dr Garza: resolving herpes on face, Seborrheic keratoses, acrochordons, cherry angiomas  2. Skin eruption to her left ankle, Seen by Dr. Johnson Meraz at [facility] Dermatology   3. Personal hx of eczema in her youth  4. Pyoderma gangrenosum- minocycyline, mupirocin, kenalog injections.    CC:   Chief Complaint   Patient presents with     Derm Problem     Wound check, Abida states \" It is all healed up.\"         Encounter Date: Jun 29, 2017    History of Present Illness:  Ms. Abida Hahn is a 77 year old female with history pyoderma gangrenosum. She was last seen here 5/11/2017 when she had 1.5 total cc Kenalog 10 mg/cc injected into pyoderma grangrenosum lesions and her minocycline was discontinued. She has continued to apply mupirocin ointment daily.     Since then, Abida continues to report improvement in her skin lesions. They are asymptomatic. For many months she had to wrap her leg but now they are closed and do not weep. No associated pain. She presents today for further evaluation and potential treatment.     She is feeling well. The patient denies GI upset, headaches, blurred vision. No other skin complaints.            Past Medical History:   Patient Active Problem List   Diagnosis     Chronic pain syndrome     Essential hypertension     Rheumatoid arthritis (H)     Encounter for long-term current use of medication     Pyoderma gangrenosum     Hypothyroidism     Osteoporosis     Primary open angle glaucoma of both eyes, severe stage     Pseudophakia of both eyes     Past Medical History:   Diagnosis Date     Abscess, toe " 2009    Right great toe     Cataract 8/2011    Right s/p surgery 8/2011; left s/p surgery     Glaucoma      Headache(784.0)     Relieved with gabapentin. Chronic pain     HTN (hypertension)      RA (rheumatoid arthritis) (H)      Wrist fracture     Right, s/p ORIF     Past Surgical History:   Procedure Laterality Date     BIOPSY ARTERY TEMPORAL       EXTRACAPSULAR CATARACT EXTRATION WITH INTRAOCULAR LENS IMPLANT  8/2011    Left 2 years ago as well     OPEN REDUCTION INTERNAL FIXATION WRIST BILATERAL         Social History:  The patient is single. The patient denies use of tanning beds. Patiently awaiting great grandchildren from her 24-year-old granddaughter, whom she loves very much.    Family History:  There is no family history of asthma, eczema or allergies.    Medications:  Current Outpatient Prescriptions   Medication Sig Dispense Refill     lisinopril (PRINIVIL/ZESTRIL) 40 MG tablet Take 1 tablet (40 mg) by mouth daily 90 tablet 1     methotrexate 2.5 MG tablet CHEMO Take 8 tablets (20mg) by mouth once a week Take 8 tablets per week 32 tablet 3     latanoprost (XALATAN) 0.005 % ophthalmic solution Place 1 drop into both eyes At Bedtime 1 Bottle 11     alendronate (FOSAMAX) 70 MG tablet Take 1 tablet (70 mg) by mouth every 7 days 12 tablet 3     aspirin 325 MG tablet Take 650 mg by mouth every 6 hours as needed for moderate pain       omeprazole (PRILOSEC) 20 MG CR capsule Take 1 capsule (20 mg) by mouth daily 30 capsule 3     GLUCOSAMINE SULFATE PO Take 1,000 mg by mouth daily       mupirocin (BACTROBAN) 2 % ointment Apply twice daily to areas on the left ankle. 30 g 3     hydrocortisone 1 % ointment Apply topically 2 times daily To affected area on right upper cheek 25 g 1     folic acid 800 MCG TABS Take by mouth 2 times daily       Cholecalciferol (VITAMIN D-3) 1000 UNITS CAPS Take by mouth 2 times daily       acetaminophen (TYLENOL) 500 MG tablet Take 500-1,000 mg by mouth every 6 hours as needed        levothyroxine (SYNTHROID, LEVOTHROID) 88 MCG tablet Take 88 mcg by mouth daily       ARTIFICIAL TEAR SOLUTION OP Apply  to eye as needed.       Calcium-Vitamin D (CALCIUM + D PO) Take 1 tablet by mouth 2 times daily       Multiple Vitamin (DAILY MULTIVITAMIN PO) Take 1 tablet by mouth daily And minerals per pt       Allergies   Allergen Reactions     Ibuprofen Sodium      Penicillins      Triple Antibiotic [Neomycin-Polymyxin-Dexameth]      Aspirin Rash     Codeine Rash         Review of Systems:  -Skin/Heme New Pt: The patient denies frequent sun exposure. The patient denies excessive scarring or problems healing except as per HPI. The patient denies excessive bleeding.  -Constitutional: The patient denies fatigue, fevers, chills, unintended weight loss, and night sweats.  -HEENT: Patient denies nonhealing oral sores.  -Skin: As above in HPI. No additional skin concerns.  -The patient denies GI upset, headaches, blurred vision or dyspigmentation.    Physical exam:  Vitals: There were no vitals taken for this visit.  GEN: This is a well developed, well-nourished female in no acute distress, in a pleasant mood.    SKIN: Localized skin exam includes below the knees bilaterally, face, hands  Significant for:   Skin colored plaques with gray rolling borders to her left anterior and lateral lower leg, no erythema. The ulcer on the left lower anterior ankle measures 2 x 1 cm. Non tender. Granulation tissue noted to all sites, no weeping.   -No other lesions of concern on areas examined.     Impression/Plan:    Pyoderma gangrenosum with minocycline hyperpigmentation- improving s/pminocycline and monthly kenalog injections. All areas are closed. Skin eruption primarily to the left lower extremity.     Finish minocycline to 50 mg daily . Minocycline side effects discussed.   Kenalog intralesional injection procedure note (performed by faculty): After verbal consent and discussion of risks including but not limited to  atrophy, pain, and bruising, cleansing with isopropyl alcohol, time out was performed, 0.8 total cc of Kenalog 10 mg/cc was injected into lesions on the left lower leg (less than 7).  The patient tolerated the procedure well and left the Dermatology clinic in good condition.  Photodocumentation performed.   I also curetted some areas to see if the areas can completely close.             Staff Involved:    All risks, benefits and alternatives were discussed with patient.  Patient is in agreement and understands the assessment and plan.  All questions were answered.  Sun Screen Education was given.   Return to Clinic 4 weeks or sooner as needed.   Lillian De Luna PA-C

## 2017-06-29 NOTE — PROGRESS NOTES
"[facility] Dermatology Note      Dermatology Problem List:  1 Skin cancer screening 2011 with Dr Garza: resolving herpes on face, Seborrheic keratoses, acrochordons, cherry angiomas  2. Skin eruption to her left ankle, Seen by Dr. Johnson Meraz at [facility] Dermatology   3. Personal hx of eczema in her youth  4. Pyoderma gangrenosum- minocycyline, mupirocin, kenalog injections.    CC:   Chief Complaint   Patient presents with     Derm Problem     Wound check, Abida states \" It is all healed up.\"         Encounter Date: Jun 29, 2017    History of Present Illness:  Ms. Abida Hahn is a 77 year old female with history pyoderma gangrenosum. She was last seen here 5/11/2017 when she had 1.5 total cc Kenalog 10 mg/cc injected into pyoderma grangrenosum lesions and her minocycline was discontinued. She has continued to apply mupirocin ointment daily.     Since then, Abida continues to report improvement in her skin lesions. They are asymptomatic. For many months she had to wrap her leg but now they are closed and do not weep. No associated pain. She presents today for further evaluation and potential treatment.     She is feeling well. The patient denies GI upset, headaches, blurred vision. No other skin complaints.            Past Medical History:   Patient Active Problem List   Diagnosis     Chronic pain syndrome     Essential hypertension     Rheumatoid arthritis (H)     Encounter for long-term current use of medication     Pyoderma gangrenosum     Hypothyroidism     Osteoporosis     Primary open angle glaucoma of both eyes, severe stage     Pseudophakia of both eyes     Past Medical History:   Diagnosis Date     Abscess, toe 2009    Right great toe     Cataract 8/2011    Right s/p surgery 8/2011; left s/p surgery     Glaucoma      Headache(784.0)     Relieved with gabapentin. Chronic pain     HTN (hypertension)      RA (rheumatoid arthritis) (H)      Wrist fracture     Right, s/p ORIF     Past Surgical " History:   Procedure Laterality Date     BIOPSY ARTERY TEMPORAL       EXTRACAPSULAR CATARACT EXTRATION WITH INTRAOCULAR LENS IMPLANT  8/2011    Left 2 years ago as well     OPEN REDUCTION INTERNAL FIXATION WRIST BILATERAL         Social History:  The patient is single. The patient denies use of tanning beds. Patiently awaiting great grandchildren from her 24-year-old granddaughter, whom she loves very much.    Family History:  There is no family history of asthma, eczema or allergies.    Medications:  Current Outpatient Prescriptions   Medication Sig Dispense Refill     lisinopril (PRINIVIL/ZESTRIL) 40 MG tablet Take 1 tablet (40 mg) by mouth daily 90 tablet 1     methotrexate 2.5 MG tablet CHEMO Take 8 tablets (20mg) by mouth once a week Take 8 tablets per week 32 tablet 3     latanoprost (XALATAN) 0.005 % ophthalmic solution Place 1 drop into both eyes At Bedtime 1 Bottle 11     alendronate (FOSAMAX) 70 MG tablet Take 1 tablet (70 mg) by mouth every 7 days 12 tablet 3     aspirin 325 MG tablet Take 650 mg by mouth every 6 hours as needed for moderate pain       omeprazole (PRILOSEC) 20 MG CR capsule Take 1 capsule (20 mg) by mouth daily 30 capsule 3     GLUCOSAMINE SULFATE PO Take 1,000 mg by mouth daily       mupirocin (BACTROBAN) 2 % ointment Apply twice daily to areas on the left ankle. 30 g 3     hydrocortisone 1 % ointment Apply topically 2 times daily To affected area on right upper cheek 25 g 1     folic acid 800 MCG TABS Take by mouth 2 times daily       Cholecalciferol (VITAMIN D-3) 1000 UNITS CAPS Take by mouth 2 times daily       acetaminophen (TYLENOL) 500 MG tablet Take 500-1,000 mg by mouth every 6 hours as needed       levothyroxine (SYNTHROID, LEVOTHROID) 88 MCG tablet Take 88 mcg by mouth daily       ARTIFICIAL TEAR SOLUTION OP Apply  to eye as needed.       Calcium-Vitamin D (CALCIUM + D PO) Take 1 tablet by mouth 2 times daily       Multiple Vitamin (DAILY MULTIVITAMIN PO) Take 1 tablet by mouth  daily And minerals per pt       Allergies   Allergen Reactions     Ibuprofen Sodium      Penicillins      Triple Antibiotic [Neomycin-Polymyxin-Dexameth]      Aspirin Rash     Codeine Rash         Review of Systems:  -Skin/Heme New Pt: The patient denies frequent sun exposure. The patient denies excessive scarring or problems healing except as per HPI. The patient denies excessive bleeding.  -Constitutional: The patient denies fatigue, fevers, chills, unintended weight loss, and night sweats.  -HEENT: Patient denies nonhealing oral sores.  -Skin: As above in HPI. No additional skin concerns.  -The patient denies GI upset, headaches, blurred vision or dyspigmentation.    Physical exam:  Vitals: There were no vitals taken for this visit.  GEN: This is a well developed, well-nourished female in no acute distress, in a pleasant mood.    SKIN: Localized skin exam includes below the knees bilaterally, face, hands  Significant for:   Skin colored plaques with gray rolling borders to her left anterior and lateral lower leg, no erythema. The ulcer on the left lower anterior ankle measures 2 x 1 cm. Non tender. Granulation tissue noted to all sites, no weeping.   -No other lesions of concern on areas examined.     Impression/Plan:    Pyoderma gangrenosum with minocycline hyperpigmentation- improving s/pminocycline and monthly kenalog injections. All areas are closed. Skin eruption primarily to the left lower extremity.     Finish minocycline to 50 mg daily . Minocycline side effects discussed.   Kenalog intralesional injection procedure note (performed by faculty): After verbal consent and discussion of risks including but not limited to atrophy, pain, and bruising, cleansing with isopropyl alcohol, time out was performed, 0.8 total cc of Kenalog 10 mg/cc was injected into lesions on the left lower leg (less than 7).  The patient tolerated the procedure well and left the Dermatology clinic in good  condition.  Photodocumentation performed.   I also curetted some areas to see if the areas can completely close.             Staff Involved:    All risks, benefits and alternatives were discussed with patient.  Patient is in agreement and understands the assessment and plan.  All questions were answered.  Sun Screen Education was given.   Return to Clinic 4 weeks or sooner as needed.   Lillian De Luna PA-C

## 2017-07-06 ENCOUNTER — OFFICE VISIT (OUTPATIENT)
Dept: INTERNAL MEDICINE | Facility: CLINIC | Age: 77
End: 2017-07-06

## 2017-07-06 VITALS
SYSTOLIC BLOOD PRESSURE: 149 MMHG | BODY MASS INDEX: 31.28 KG/M2 | DIASTOLIC BLOOD PRESSURE: 93 MMHG | HEART RATE: 67 BPM | RESPIRATION RATE: 16 BRPM | WEIGHT: 195 LBS

## 2017-07-06 DIAGNOSIS — M81.0 AGE-RELATED OSTEOPOROSIS WITHOUT CURRENT PATHOLOGICAL FRACTURE: ICD-10-CM

## 2017-07-06 DIAGNOSIS — E78.5 HYPERLIPIDEMIA LDL GOAL <100: Primary | ICD-10-CM

## 2017-07-06 DIAGNOSIS — I10 ESSENTIAL HYPERTENSION: ICD-10-CM

## 2017-07-06 ASSESSMENT — PAIN SCALES - GENERAL: PAINLEVEL: NO PAIN (0)

## 2017-07-06 NOTE — PATIENT INSTRUCTIONS
[facility] Medication Refill Request Information:  * Please contact your pharmacy regarding ANY request for medication refills.  ** [facility] Prescription Fax = [phone]  * Please allow 3 business days for routine medication refills.  * Please allow 5 business days for controlled substance medication refills.     [facility] Test Result notification information:  *You will be notified with in 7-10 days of your appointment day regarding the results of your test.  If you are on MyChart you will be notified as soon as the provider has reviewed the results and signed off on them.    [facility] [phone]     Come back for a fasting cholesterol check with labs anytime.

## 2017-07-06 NOTE — NURSING NOTE
Chief Complaint   Patient presents with     Establish Care     Patient is michael to establish a new PCP     Ro Donald CMA 11:16 AM on 7/6/2017.

## 2017-07-06 NOTE — MR AVS SNAPSHOT
After Visit Summary   7/6/2017    Abida Hahn    MRN: [id number]           Patient Information     Date Of Birth          1940        Visit Information        Provider Department      7/6/2017 11:25 AM Amarilys Iyer MD [facility]        Today's Diagnoses     Hyperlipidemia LDL goal <100    -  1      Care Instructions    [facility] Center Medication Refill Request Information:  * Please contact your pharmacy regarding ANY request for medication refills.  ** [facility] Prescription Fax = [phone]  * Please allow 3 business days for routine medication refills.  * Please allow 5 business days for controlled substance medication refills.     [facility] Test Result notification information:  *You will be notified with in 7-10 days of your appointment day regarding the results of your test.  If you are on MyChart you will be notified as soon as the provider has reviewed the results and signed off on them.    Primary Care Center [phone]     Come back for a fasting cholesterol check with labs anytime.          Follow-ups after your visit        Follow-up notes from your care team     Return in about 4 months (around 11/6/2017) for with future fasting labs.      Your next 10 appointments already scheduled     Aug 24, 2017  1:30 PM CDT   VISUAL FIELD with [facility] EYE VISUAL FIELD   Eye Clinic ([facility])    Jonathan Gaitang  [address] [place] [address]   [phone]            Aug 24, 2017  2:00 PM CDT   RETURN GLAUCOMA with Corazon Addison MD   Eye Clinic ([facility])    [address]  [address] [address]   [phone]            Aug 30, 2017  2:15 PM CDT   (Arrive by 2:00 PM)   Return Visit with Lillian De Luna PA-C   [facility] D[facility]at[facility] ([facility])    [address]  [address] [facility] [id number] [phone]             Sep 07, 2017 11:30 AM CDT   (Arrive by 11:15 AM)   Return Visit with Mario Marks MD   [facility] ([facility])    [address] [place] [address]  3rd [facility] [id number] [phone]            Oct 12, 2017 11:25 AM CDT   (Arrive by 11:10 AM)   Return Visit with Amarilys Iyer MD   [facility] ([facility])    [address] [place] [address]  [address] [facility] [id number] [phone]            Oct 18, 2017  1:00 PM CDT   (Arrive by 12:45 PM)   New Patient Visit with NAKUL Lew CNP   [facility] Rheumatology ([facility])    [address] [place] [id number] [phone]              Future tests that were ordered for you today     Open Future Orders        Priority Expected Expires Ordered    Lipid panel reflex to direct LDL - -(Today) Routine 7/27/2017 7/6/2018 7/6/2017            Who to contact     Please call your clinic at [phone] to:    Ask questions about your health    Make or cancel appointments    Discuss your medicines    Learn about your test results    Speak to your doctor   If you have compliments or concerns about an experience at your clinic, or if you wish to file a complaint, please contact [facility] Patient Relations at [phone] or email us at [email or web address]         Additional Information About Your Visit        [employer] Information     Boedo is an electronic gateway that provides easy, online access to your medical records. With Boedo, you can request a clinic appointment, read your test results, renew a prescription or communicate with your care team.     To sign up for Boedo visit the website at [email or web address]   You will be asked to enter the access code listed below, as well as some personal information. Please follow the directions to create  your username and password.     Your access code is: [email or web address]  Expires: 2017 11:25 AM     Your access code will  in 90 days. If you need help or a new code, please contact your [facility] or call [phone] for assistance.        Care EveryWhere ID     This is your Care EveryWhere ID. This could be used by other organizations to access your [place] medical records  [id number]        Your Vitals Were     Pulse Respirations Breastfeeding? BMI (Body Mass Index)          67 16 No 31.28 kg/m2         Blood Pressure from Last 3 Encounters:   17 (!) 149/93   17 149/84   17 163/83    Weight from Last 3 Encounters:   17 88.5 kg (195 lb)   17 89.5 kg (197 lb 4.8 oz)   17 90.1 kg (198 lb 11.2 oz)                 Today's Medication Changes          These changes are accurate as of: 17 12:16 PM.  If you have any questions, ask your nurse or doctor.               Stop taking these medicines if you haven't already. Please contact your care team if you have questions.     alendronate 70 MG tablet   Commonly known as:  FOSAMAX   Stopped by:  Amarilys Iyer MD                    Primary Care Provider Office Phone # Fax #    Amarilys Iyer [phone]       [address] [address]        Equal Access to Services     ANIBAL ALMARAZ AH: Hadii anu lymano Wayne, waaxda luqadaha, qaybta kaalmaemely wylieay stephy quinn . So [facility] [phone].    ATENCIÓN: Si habla español, tiene a be disposición servicios gratuitos de asistencia lingüística. Llame al [phone].    We comply with applicable federal civil rights laws and [place] laws. We do not discriminate on the basis of race, color, national origin, age, disability sex, sexual orientation or gender identity.            Thank you!     Thank you for choosing [facility]  for your care.  Our goal is always to provide you with excellent care. Hearing back from our patients is one way we can continue to improve our services. Please take a few minutes to complete the written survey that you may receive in the mail after your visit with us. Thank you!             Your Updated Medication List - Protect others around you: Learn how to safely use, store and throw away your medicines at www.disposemymeds.org.          This list is accurate as of: 7/6/17 12:16 PM.  Always use your most recent med list.                   Brand Name Dispense Instructions for use Diagnosis    acetaminophen 500 MG tablet    TYLENOL     Take 500-1,000 mg by mouth every 6 hours as needed        ARTIFICIAL TEAR SOLUTION OP      Apply  to eye as needed.        aspirin 325 MG tablet      Take 650 mg by mouth every 6 hours as needed for moderate pain        CALCIUM + D PO      Take 1 tablet by mouth 2 times daily        DAILY MULTIVITAMIN PO      Take 1 tablet by mouth daily And minerals per pt        folic acid 800 MCG Tabs      Take by mouth 2 times daily        GLUCOSAMINE SULFATE PO      Take 1,000 mg by mouth daily        hydrocortisone 1 % ointment     25 g    Apply topically 2 times daily To affected area on right upper cheek    Ulceration (H)       latanoprost 0.005 % ophthalmic solution    XALATAN    1 Bottle    Place 1 drop into both eyes At Bedtime    Open-angle glaucoma of both eyes, severe stage, unspecified open-angle glaucoma type       levothyroxine 88 MCG tablet    SYNTHROID/LEVOTHROID     Take 88 mcg by mouth daily        lisinopril 40 MG tablet    PRINIVIL/ZESTRIL    90 tablet    Take 1 tablet (40 mg) by mouth daily    Essential hypertension       methotrexate 2.5 MG tablet CHEMO     32 tablet    Take 8 tablets (20mg) by mouth once a week Take 8 tablets per week    Rheumatoid arthritis flare (H)       mupirocin 2 % ointment    BACTROBAN    30 g    Apply twice daily to areas on the left ankle.    Pyoderma gangrenosum        omeprazole 20 MG CR capsule    priLOSEC    30 capsule    Take 1 capsule (20 mg) by mouth daily    Rheumatoid arthritis flare (H)       Vitamin D-3 1000 UNITS Caps      Take by mouth 2 times daily

## 2017-07-06 NOTE — PROGRESS NOTES
Ms. Hahn is a 77 year old female here to establish care.    History of Present Illness:  Abida was previously seeing Dr. Freitas.  She is a pleasant 76 yo F with PMH of RA on MTX, Osteoporosis, Pyoderma gangrenosum, hypothyroidism and possible ILD.  She follows with numerous specialists.  She does take MTX for RA, diagnosed in her 60s, mostly in hands/feets, not much pain but she is stiff.  She has not had surgeries.  She was recently endorsing dyspnea.  She rsaw Dr. Marks for possible restrictive lung disease/ILD given this history of dyspnea when bending, talking.  She has mild cough, nonproductive. She had w/u with PFTs and CT.  They are considering doing sputums for AFB.    She was seeing Derm clinic for TAC injections for PG. She is off of minocycline for 10 months.  Hypothyroidism, last TSH 4/17.  For her OP she was prescribed fosamax but not taking d/t upset stomach, did break wrist previously but no other fractures.  She has Glaucoma and wonders if she can take timolol.    She is retired. Lives in senior housing.  Previously worked in [employer] and [employer] office. Has daughter peg and granddaughter Nilo in [place].        CT Chest 2/17  IMPRESSION:   1. Numerous scattered small thin-walled cysts which are closely  associated with pulmonary vessels are noted, the differential for  which in a patient with rheumatoid arthritis primarily includes  lymphocytic interstitial pneumonia (LIP) and emphysematous related  cysts, the latter which is considered less likely due to the size,  random distribution, and perivascular location of the cysts.  2. Centrilobular nodularity and tree-in-bud opacities in the right  upper lobe. Differential includes follicular bronchiolitis or  Infection.    PFT 5/17:  IMPRESSION:  Possible restriction present, although lung volumes would be required to confirm if clinically indicated.   There is no significant change in spirometry between sitting and supine position.    MIP and MEP are  reduced.    Mammo 3/17 normal  Dexa 2/17 -3.7 radius, -3.1 hips, started fosamax    A full 10-pt Review of Systems was performed, verified and is negative except as documented in the HPI.  All health questionnaires were reviewed, verified and relevant information documented above.    Past Medical History:  Past Medical History:   Diagnosis Date     Abscess, toe 2009    Right great toe     Cataract 8/2011    Right s/p surgery 8/2011; left s/p surgery     Glaucoma      Headache(784.0)     Relieved with gabapentin. Chronic pain     HTN (hypertension)      RA (rheumatoid arthritis) (H)      Wrist fracture     Right, s/p ORIF       Past Surgical History:  Past Surgical History:   Procedure Laterality Date     BIOPSY ARTERY TEMPORAL       EXTRACAPSULAR CATARACT EXTRATION WITH INTRAOCULAR LENS IMPLANT  8/2011    Left 2 years ago as well     OPEN REDUCTION INTERNAL FIXATION WRIST BILATERAL         Active Meds:  Current Outpatient Prescriptions   Medication     lisinopril (PRINIVIL/ZESTRIL) 40 MG tablet     methotrexate 2.5 MG tablet CHEMO     latanoprost (XALATAN) 0.005 % ophthalmic solution     aspirin 325 MG tablet     omeprazole (PRILOSEC) 20 MG CR capsule     GLUCOSAMINE SULFATE PO     mupirocin (BACTROBAN) 2 % ointment     hydrocortisone 1 % ointment     folic acid 800 MCG TABS     Cholecalciferol (VITAMIN D-3) 1000 UNITS CAPS     acetaminophen (TYLENOL) 500 MG tablet     levothyroxine (SYNTHROID, LEVOTHROID) 88 MCG tablet     ARTIFICIAL TEAR SOLUTION OP     Calcium-Vitamin D (CALCIUM + D PO)     Multiple Vitamin (DAILY MULTIVITAMIN PO)     No current facility-administered medications for this visit.         Allergies:  Ibuprofen sodium; Penicillins; Triple antibiotic [neomycin-polymyxin-dexameth]; Aspirin; and Codeine    Family History:  family history includes Arthritis in her brother and mother; CANCER in her father; Glaucoma in her mother. There is no history of Melanoma or Skin Cancer.    Social History:  Social  History   Substance Use Topics     Smoking status: Former Smoker     Packs/day: 0.50     Quit date: 7/6/1992     Smokeless tobacco: Never Used      Comment: Quit in 20yrs      Alcohol use No       Physical Exam:  Vitals: BP (!) 149/93  Pulse 67  Resp 16  Wt 88.5 kg (195 lb)  Breastfeeding? No  BMI 31.28 kg/m2  Constitutional: Alert, oriented, pleasant, no acute distress  Head: Normocephalic, atraumatic  Eyes: Extra-ocular movements intact, pupils equally round and reactive bilaterally, no scleral icterus  ENT: Oropharynx clear, moist mucus membranes, good dentition  Neck: Supple, no lymphadenopathy, no thyromegaly  Cardiovascular: Regular rate and rhythm, no murmurs, rubs or gallops, peripheral pulses full/symmetric  Respiratory: Some decreased air movement bilaterally, lungs clear, no wheezes/rales/rhonchi  Musculoskeletal: No edema, normal muscle tone, ambulates independently but has walker  Neurologic: Alert and oriented, cranial nerves 2-12 intact,   Skin: Healed areas of granulation tissue L medial ankle without warmth/erythema  Psychiatric: normal mentation, affect and mood    Recent Labs:  Lab Results   Component Value Date     04/06/2017      Lab Results   Component Value Date    POTASSIUM 4.4 04/06/2017     Lab Results   Component Value Date    CHLORIDE 104 04/06/2017     Lab Results   Component Value Date    SUNIL 10.0 04/06/2017     Lab Results   Component Value Date    CO2 30 04/06/2017     Lab Results   Component Value Date    BUN 18 04/06/2017     Lab Results   Component Value Date    CR 0.72 05/11/2017     Lab Results   Component Value Date     04/06/2017     Lab Results   Component Value Date    AST 15 05/11/2017     Lab Results   Component Value Date    ALT 21 05/11/2017     Lab Results   Component Value Date    BILICONJ 0.0 01/07/2009      Lab Results   Component Value Date    BILITOTAL 0.4 04/06/2017     Lab Results   Component Value Date    ALBUMIN 3.9 04/06/2017     Lab Results    Component Value Date    PROTTOTAL 7.5 04/06/2017      Lab Results   Component Value Date    ALKPHOS 133 04/06/2017       Lab Results   Component Value Date    WBC 4.5 05/11/2017     Lab Results   Component Value Date    HGB 14.1 05/11/2017     Lab Results   Component Value Date    HCT 42.4 05/11/2017     Lab Results   Component Value Date     05/11/2017     Lab Results   Component Value Date     05/11/2017         Assessment and Plan:    Abida was seen today for establish care.    Diagnoses and all orders for this visit:    Hyperlipidemia LDL goal <100  -     Lipid panel reflex to direct LDL - -(Today); Future    Age-related osteoporosis without current pathological fracture:        - She did not tolerate bisphosphonate.  She is at higher risk d/t history of RA.  She is not eager to see Endocrinology.  We could consider prolia or reclast in the future.  Would need baseline Ca, Mg, Phos, Creatinine.  Will revisit next visit.    Essential hypertension       - Elevated today but improved after lisinopril increased.  I asked her to come back in 3 months to repeat BP check.        #Routine Health Maintenence:  Immunizations (zoster, pneumovax, flu, Tdap, Hep A/B):   Most Recent Immunizations   Administered Date(s) Administered     Influenza (High Dose) 3 valent vaccine 09/27/2015     Influenza (IIV3) 01/01/2016     Pneumococcal (PCV 13) 10/05/2015     Pneumococcal 23 valent 11/21/2006     TD (ADULT, 7+) 06/01/2006     TDAP Vaccine (Boostrix) 11/01/2007     Zoster vaccine, live 10/17/2011     Lipids:   Recent Labs   Lab Test  10/08/10   0947   CHOL  199   HDL  48*   LDL  121   TRIG  155*   CHOLHDLRATIO  4.2     Lung Ca Screening (>30 pk age 55-79 or >20 py age 50-79 + RF): does not qualify, had CT already  Colonoscopy (50-75 yrs): unknown  Dexa (>65W or 70M yrs): 2/17 Conclusions:  The most negative and valid T-score of -3.7  at the level of the wrist, corresponds with osteoporosis   .  Mammogram (40-75  yrs): 3/17  Pap (21-65 yrs):   Lab Results   Component Value Date    PAP NIL 09/24/2010     Depression: screen neg  Advanced Directive: deferred    Return to clinic: 2-3 months to f/u BP    Amarilys Iyer MD  Internal Medicine

## 2017-08-23 DIAGNOSIS — H40.1193 PRIMARY OPEN-ANGLE GLAUCOMA, SEVERE STAGE: Primary | ICD-10-CM

## 2017-08-24 ENCOUNTER — OFFICE VISIT (OUTPATIENT)
Dept: OPHTHALMOLOGY | Facility: CLINIC | Age: 77
End: 2017-08-24
Attending: OPHTHALMOLOGY
Payer: MEDICARE

## 2017-08-24 DIAGNOSIS — H40.1193 PRIMARY OPEN-ANGLE GLAUCOMA, SEVERE STAGE: ICD-10-CM

## 2017-08-24 DIAGNOSIS — Z96.1 PSEUDOPHAKIA OF BOTH EYES: ICD-10-CM

## 2017-08-24 DIAGNOSIS — H40.1133 PRIMARY OPEN ANGLE GLAUCOMA OF BOTH EYES, SEVERE STAGE: Primary | ICD-10-CM

## 2017-08-24 PROCEDURE — 99214 OFFICE O/P EST MOD 30 MIN: CPT | Mod: ZF

## 2017-08-24 PROCEDURE — 92083 EXTENDED VISUAL FIELD XM: CPT | Mod: ZF | Performed by: OPHTHALMOLOGY

## 2017-08-24 ASSESSMENT — VISUAL ACUITY
OS_CC: 20/25
OD_CC+: -2
METHOD: SNELLEN - LINEAR
CORRECTION_TYPE: GLASSES
OS_CC+: -2
OD_CC: 20/30

## 2017-08-24 ASSESSMENT — SLIT LAMP EXAM - LIDS
COMMENTS: NORMAL
COMMENTS: NORMAL

## 2017-08-24 ASSESSMENT — CONF VISUAL FIELD
OD_INFERIOR_NASAL_RESTRICTION: 3
OS_INFERIOR_TEMPORAL_RESTRICTION: 3
OS_SUPERIOR_TEMPORAL_RESTRICTION: 3
OD_SUPERIOR_NASAL_RESTRICTION: 3
OS_SUPERIOR_NASAL_RESTRICTION: 3
OS_INFERIOR_NASAL_RESTRICTION: 3
OD_INFERIOR_TEMPORAL_RESTRICTION: 3
OD_SUPERIOR_TEMPORAL_RESTRICTION: 3

## 2017-08-24 ASSESSMENT — REFRACTION_WEARINGRX
OS_ADD: +3.00
OD_AXIS: 148
OD_ADD: +3.00
OS_AXIS: 109
OS_CYLINDER: +1.50
OD_CYLINDER: +0.50
SPECS_TYPE: PAL
OS_SPHERE: -0.75
OD_SPHERE: -1.50

## 2017-08-24 ASSESSMENT — TONOMETRY
OD_IOP_MMHG: 15
OS_IOP_MMHG: 16
OS_IOP_MMHG: 15
IOP_METHOD: APPLANATION
OD_IOP_MMHG: 15
IOP_METHOD: APPLANATION

## 2017-08-24 ASSESSMENT — EXTERNAL EXAM - LEFT EYE: OS_EXAM: NORMAL

## 2017-08-24 ASSESSMENT — EXTERNAL EXAM - RIGHT EYE: OD_EXAM: NORMAL

## 2017-08-24 NOTE — NURSING NOTE
"Chief Complaints and History of Present Illnesses   Patient presents with     Glaucoma Follow Up     3 month f/u     HPI    Affected eye(s):  Both   Symptoms:     No decreased vision   No floaters   No flashes   Foreign body sensation (Comment: \"like a handful of gravel in the eye\" occasionally noted - related to drops?)      Duration:  3 months      Do you have eye pain now?:  No      Comments:  3 month f/u IOP check and VF  Pt states vision stable since last visit - not better but doesn't feel it's worse    Ocular meds:  Latanoprost at bedtime in both eyes    Peyton Lee COA 1:47 PM August 24, 2017              "

## 2017-08-24 NOTE — MR AVS SNAPSHOT
After Visit Summary   8/24/2017    Abida Hahn    MRN: [id number]           Patient Information     Date Of Birth          1940        Visit Information        Provider Department      8/24/2017 2:00 PM Corazon Addison MD [facility]        Today's Diagnoses     Primary open angle glaucoma of both eyes, severe stage    -  1    Primary open-angle glaucoma, severe stage        Pseudophakia of both eyes          Care Instructions    Patient will continue Latanoprost which is a teal top drop at bedtime in both eyes.  Patient will return to clinic in 3-4 months with repeat IOP check, dilated eye exam and visual field test.  A long discussion of the risks, benefits, and alternatives including potential treatment and management options were had with patient and a decision was made to observe at this time and not add additional eye drops unless evidence of progression or elevated IOPs.         This drop may cause lash growth and some darkening of the skin around the eye.  It is also possible in a patient with a freckled iris or michell eyes, that the iris could darken to brown with time, something that is not common but not reversible.          Follow-ups after your visit        Follow-up notes from your care team     Return 4 months with repeat IOP check, dilated eye exam and visual field test..      Your next 10 appointments already scheduled     Aug 30, 2017  2:15 PM CDT   (Arrive by 2:00 PM)   Return Visit with Lillian De Luna PA-C   [facility] ([facility])    [address] [place] [id number]   [phone]            Sep 07, 2017 11:30 AM CDT   (Arrive by 11:15 AM)   Return Visit with Mario Marks MD   [facility] ([facility])    [address] [place] [id number]   [phone]            Oct 12, 2017 11:25 AM CDT   (Arrive by 11:10 AM)   Return  Visit with Amarilys Iyer MD   [facility] ([facility])    [address]  4th Floor  [facility] [id number] [phone]            Oct 18, 2017  1:00 PM CDT   (Arrive by 12:45 PM)   New Patient Visit with NAKUL Lew CNP   [facility] ([facility])    [address]  3rd Floor  [facility] [id number] [phone]            Dec 19, 2017 12:45 PM CST   VISUAL FIELD with [facility] EYE VISUAL FIELD   [facility]    [address]  [address]  [address]  [facility] [id number] [phone]            Dec 19, 2017  1:15 PM CST   RETURN GLAUCOMA with Corazon Addison MD   [facility]    [address]  [address]  [address]  [facility] [id number] [phone]              Who to contact     Please call your clinic at [phone] to:    Ask questions about your health    Make or cancel appointments    Discuss your medicines    Learn about your test results    Speak to your doctor   If you have compliments or concerns about an experience at your clinic, or if you wish to file a complaint, please contact [facility] Patient Relations at [phone] or email us at [email or web address]         Additional Information About Your Visit        MyChart Information     [employer] is an electronic gateway that provides easy, online access to your medical records. With [employer], you can request a clinic appointment, read your test results, renew a prescription or communicate with your care team.     To sign up for [employer] visit the website at [email or web address]   You will be asked to enter the access code listed below, as well as some personal information. Please follow the directions to create your username and password.     Your access code is: [email or web address]  Expires: 9/5/2017 11:25 AM      Your access code will  in 90 days. If you need help or a new code, please contact your [facility] or call [phone] for assistance.        Care EveryWhere ID     This is your Care EveryWhere ID. This could be used by other organizations to access your [facility] medical records  [id number]         Blood Pressure from Last 3 Encounters:   17 (!) 149/93   17 149/84   17 163/83    Weight from Last 3 Encounters:   17 88.5 kg (195 lb)   17 89.5 kg (197 lb 4.8 oz)   17 90.1 kg (198 lb 11.2 oz)              We Performed the Following     OVF 24-2 Dynamic OU        Primary Care Provider Office Phone # Fax #    Amarilys Iyer [phone]       [address] [place] [address] [address] [place] [id number]        Equal Access to Services     DADA ALMARAZ : Hadii aad ku hadasho Soomaali, waaxda luqadaha, qaybta kaalmada adeegyada, waxay judithin hayisidoro quinn . So [facility] [phone].    ATENCIÓN: Si habla español, tiene a be disposición servicios gratuitos de asistencia lingüística. Llame al [phone].    We comply with applicable federal civil rights laws and [place] laws. We do not discriminate on the basis of race, color, national origin, age, disability sex, sexual orientation or gender identity.            Thank you!     Thank you for choosing EYE CLINIC  for your care. Our goal is always to provide you with excellent care. Hearing back from our patients is one way we can continue to improve our services. Please take a few minutes to complete the written survey that you may receive in the mail after your visit with us. Thank you!             Your Updated Medication List - Protect others around you: Learn how to safely use, store and throw away your medicines at www.disposemymeds.org.          This list is accurate as of: 17  3:11 PM.  Always use your most recent med list.                   Brand Name Dispense  Instructions for use Diagnosis    acetaminophen 500 MG tablet    TYLENOL     Take 500-1,000 mg by mouth every 6 hours as needed        ARTIFICIAL TEAR SOLUTION OP      Apply  to eye as needed.        aspirin 325 MG tablet      Take 650 mg by mouth every 6 hours as needed for moderate pain        CALCIUM + D PO      Take 1 tablet by mouth 2 times daily        DAILY MULTIVITAMIN PO      Take 1 tablet by mouth daily And minerals per pt        folic acid 800 MCG Tabs      Take by mouth 2 times daily        GLUCOSAMINE SULFATE PO      Take 1,000 mg by mouth daily        hydrocortisone 1 % ointment     25 g    Apply topically 2 times daily To affected area on right upper cheek    Ulceration (H)       latanoprost 0.005 % ophthalmic solution    XALATAN    1 Bottle    Place 1 drop into both eyes At Bedtime    Open-angle glaucoma of both eyes, severe stage, unspecified open-angle glaucoma type       levothyroxine 88 MCG tablet    SYNTHROID/LEVOTHROID     Take 88 mcg by mouth daily        lisinopril 40 MG tablet    PRINIVIL/ZESTRIL    90 tablet    Take 1 tablet (40 mg) by mouth daily    Essential hypertension       methotrexate 2.5 MG tablet CHEMO     32 tablet    Take 8 tablets (20mg) by mouth once a week Take 8 tablets per week    Rheumatoid arthritis flare (H)       mupirocin 2 % ointment    BACTROBAN    30 g    Apply twice daily to areas on the left ankle.    Pyoderma gangrenosum       omeprazole 20 MG CR capsule    priLOSEC    30 capsule    Take 1 capsule (20 mg) by mouth daily    Rheumatoid arthritis flare (H)       Vitamin D-3 1000 UNITS Caps      Take by mouth 2 times daily

## 2017-08-24 NOTE — PATIENT INSTRUCTIONS
Patient will continue Latanoprost which is a teal top drop at bedtime in both eyes.  Patient will return to clinic in 3-4 months with repeat IOP check, dilated eye exam and visual field test.  A long discussion of the risks, benefits, and alternatives including potential treatment and management options were had with patient and a decision was made to observe at this time and not add additional eye drops unless evidence of progression or elevated IOPs.         This drop may cause lash growth and some darkening of the skin around the eye.  It is also possible in a patient with a freckled iris or michell eyes, that the iris could darken to brown with time, something that is not common but not reversible.

## 2017-08-24 NOTE — PROGRESS NOTES
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, s/p TRAB OU (OD:96 and OS:95 with revision OS in 1997), H/O Noncompliance with visits (lost to f/u from 7289-5189) with marked progression OS -- K pachy: 571/540   Tmax:     HVF:OD:[place] and OS:[place] (marked progresion from 8220-2951 and HVF  OD:[place] and OS:[place]uct in 2009    CDR: 0.9/0.9    HRT/OCT: OD:Severe RNFl thinning and OS:Mod RNFL thinning      FHX of Glc: Mother -- gtts, Sister -- possibly     Gonio:  Open with few PAS     Intolerant to:      Asthma/COPD: No  Steroid Use: No    Kidney Stones:  No   Sulfa Allergy:  No    IOP targets:L-M teens (?Lteens) -- IOP borderline  2)PCIOL OU  3)H/O HA -- s/p Negative TAB with Dr. Bellamy   4)H/O RA on MTX -- following with Rheum  5)XIN    Patient will continue Latanoprost which is a teal top drop at bedtime in both eyes.  Patient will return to clinic in 3-4 months with repeat IOP check, dilated eye exam and visual field test.  A long discussion of the risks, benefits, and alternatives including potential treatment and management options were had with patient and a decision was made to observe at this time and not add additional eye drops unless evidence of progression or elevated IOPs.         This drop may cause lash growth and some darkening of the skin around the eye.  It is also possible in a patient with a freckled iris or michell eyes, that the iris could darken to brown with time, something that is not common but not reversible.      Resident Note (Please Follow Final Note Above)  S: Vision stable, eyes comfortable, good drop compliance with latanoprost qhs ou. Occ dryness sensation. Not using ATs.     O: IOP 15/16.    A/P:     Endstage POAG   IOP good   VF not uploaded yet   Continue latanoprost    Dry eye   Restart ATs    Attending Physician Attestation:  Complete documentation of historical and exam elements from today's encounter can be found in the full encounter summary report (not  reduplicated in this progress note). I personally obtained the chief complaint(s) and history of present illness.  I confirmed and edited as necessary the review of systems, past medical/surgical history, family history, social history, and examination findings as documented by others; and I examined the patient myself. I personally reviewed the relevant tests, images, and reports as documented above. I formulated and edited as necessary the assessment and plan and discussed the findings and management plan with the patient and family.  - Corazon Addison MD

## 2017-08-30 ENCOUNTER — OFFICE VISIT (OUTPATIENT)
Dept: DERMATOLOGY | Facility: CLINIC | Age: 77
End: 2017-08-30

## 2017-08-30 VITALS — DIASTOLIC BLOOD PRESSURE: 87 MMHG | HEART RATE: 73 BPM | SYSTOLIC BLOOD PRESSURE: 155 MMHG

## 2017-08-30 DIAGNOSIS — M05.79 RHEUMATOID ARTHRITIS INVOLVING MULTIPLE SITES WITH POSITIVE RHEUMATOID FACTOR (H): ICD-10-CM

## 2017-08-30 DIAGNOSIS — Z79.899 HIGH RISK MEDICATION USE: ICD-10-CM

## 2017-08-30 DIAGNOSIS — L91.8 INFLAMED ACROCHORDON: ICD-10-CM

## 2017-08-30 DIAGNOSIS — L88 PYODERMA GANGRENOSUM (H): Primary | ICD-10-CM

## 2017-08-30 LAB
ALT SERPL W P-5'-P-CCNC: 23 U/L (ref 0–50)
AST SERPL W P-5'-P-CCNC: 16 U/L (ref 0–45)
CREAT SERPL-MCNC: 0.94 MG/DL (ref 0.52–1.04)
ERYTHROCYTE [DISTWIDTH] IN BLOOD BY AUTOMATED COUNT: 12.5 % (ref 10–15)
GFR SERPL CREATININE-BSD FRML MDRD: 58 ML/MIN/1.7M2
HCT VFR BLD AUTO: 42.9 % (ref 35–47)
HGB BLD-MCNC: 13.9 G/DL (ref 11.7–15.7)
MCH RBC QN AUTO: 34 PG (ref 26.5–33)
MCHC RBC AUTO-ENTMCNC: 32.4 G/DL (ref 31.5–36.5)
MCV RBC AUTO: 105 FL (ref 78–100)
PLATELET # BLD AUTO: 205 10E9/L (ref 150–450)
RBC # BLD AUTO: 4.09 10E12/L (ref 3.8–5.2)
WBC # BLD AUTO: 5.6 10E9/L (ref 4–11)

## 2017-08-30 ASSESSMENT — PAIN SCALES - GENERAL: PAINLEVEL: NO PAIN (0)

## 2017-08-30 NOTE — PATIENT INSTRUCTIONS
Cryotherapy    What is it?    Use of a very cold liquid, such as liquid nitrogen, to freeze and destroy abnormal skin cells that need to be removed    What should I expect?    Tenderness and redness    A small blister that might grow and fill with dark purple blood. There may be crusting.    More than one treatment may be needed if the lesions do not go away.    How do I care for the treated area?    Gently wash the area with your hands when bathing.    Use a thin layer of Vaseline to help with healing. You may use a Band-Aid.     The area should heal within 7-10 days and may leave behind a pink or lighter color.     Do not use an antibiotic or Neosporin ointment.     You may take acetaminophen (Tylenol) for pain.     Call your Doctor if you have:    Severe pain    Signs of infection (warmth, redness, cloudy yellow drainage, and or a bad smell)    Questions or concerns    Who should I call with questions?       [facility]: [phone]       [facility]: [phone]       For urgent needs outside of business hours call the [facility] at [phone]        and ask for the dermatology resident on call

## 2017-08-30 NOTE — LETTER
8/30/2017       RE: Abida Hahn  [address]  [facility] [address]     Dear Colleague,    Thank you for referring your patient, Abida Hahn, to the [facility] DERMATOLOGY at [facility]. Please see a copy of my visit note below.    [facility] Dermatology Note    Dermatology Problem List:  1.  Skin eruption to her left ankle, Seen by Dr. Johnson Meraz at [facility] Dermatology   2. Hx of eczema in her youth  3. Pyoderma gangrenosum  - s/p minocycyline, mupirocin, kenalog injections.  4. Inflamed acrochordon(s) s/p cryo    CC:   Chief Complaint   Patient presents with     Derm Problem     Valerie is here for a wound follow up.  States the area has gotten better.       Encounter Date: Aug 30, 2017    History of Present Illness:  Ms. Abida Hahn is a 77 year old female for a follow up for pyoderma gangrenosum. Today the patient reports that areas have been a little better. She thinks it looks a lot better. She states that she sees another doctor later this week to help with her shortness of breath. She is doing well. She also reports spots on her neck that are irritating and catch on clothing and necklaces. She would like these areas treated. The patient reports no other lesions of concern at this time.     Otherwise, the patient reports no painful, bleeding, nonhealing, or pruritic lesions, and denies new or changing moles.    Past Medical History:   Patient Active Problem List   Diagnosis     Chronic pain syndrome     Essential hypertension     Rheumatoid arthritis (H)     Encounter for long-term current use of medication     Pyoderma gangrenosum     Hypothyroidism     Osteoporosis     Primary open angle glaucoma of both eyes, severe stage     Pseudophakia of both eyes     Past Medical History:   Diagnosis Date     Abscess, toe 2009    Right great toe     Cataract 8/2011    Right s/p surgery 8/2011; left s/p surgery     Glaucoma      Headache(784.0)      Relieved with gabapentin. Chronic pain     HTN (hypertension)      RA (rheumatoid arthritis) (H)      Wrist fracture     Right, s/p ORIF     Past Surgical History:   Procedure Laterality Date     BIOPSY ARTERY TEMPORAL       EXTRACAPSULAR CATARACT EXTRATION WITH INTRAOCULAR LENS IMPLANT  8/2011    Left 2 years ago as well     OPEN REDUCTION INTERNAL FIXATION WRIST BILATERAL       Social History:  The patient is single. The patient denies use of tanning beds. Patiently awaiting great grandchildren from her 24-year-old granddaughter, whom she loves very much.    Family History:  There is no family history of asthma, eczema or allergies.    Medications:  Current Outpatient Prescriptions   Medication Sig Dispense Refill     lisinopril (PRINIVIL/ZESTRIL) 40 MG tablet Take 1 tablet (40 mg) by mouth daily 90 tablet 1     methotrexate 2.5 MG tablet CHEMO Take 8 tablets (20mg) by mouth once a week Take 8 tablets per week 32 tablet 3     latanoprost (XALATAN) 0.005 % ophthalmic solution Place 1 drop into both eyes At Bedtime 1 Bottle 11     aspirin 325 MG tablet Take 650 mg by mouth every 6 hours as needed for moderate pain       omeprazole (PRILOSEC) 20 MG CR capsule Take 1 capsule (20 mg) by mouth daily 30 capsule 3     GLUCOSAMINE SULFATE PO Take 1,000 mg by mouth daily       mupirocin (BACTROBAN) 2 % ointment Apply twice daily to areas on the left ankle. 30 g 3     hydrocortisone 1 % ointment Apply topically 2 times daily To affected area on right upper cheek 25 g 1     folic acid 800 MCG TABS Take by mouth 2 times daily       Cholecalciferol (VITAMIN D-3) 1000 UNITS CAPS Take by mouth 2 times daily       acetaminophen (TYLENOL) 500 MG tablet Take 500-1,000 mg by mouth every 6 hours as needed       levothyroxine (SYNTHROID, LEVOTHROID) 88 MCG tablet Take 88 mcg by mouth daily       ARTIFICIAL TEAR SOLUTION OP Apply  to eye as needed.       Calcium-Vitamin D (CALCIUM + D PO) Take 1 tablet by mouth 2 times daily        Multiple Vitamin (DAILY MULTIVITAMIN PO) Take 1 tablet by mouth daily And minerals per pt       Allergies   Allergen Reactions     Ibuprofen Sodium      Penicillins      Triple Antibiotic [Neomycin-Polymyxin-Dexameth]      Aspirin Rash     Codeine Rash     Review of Systems:  - Skin: per HPI    Physical exam:  Vitals: /87 (BP Location: Left arm, Patient Position: Chair, Cuff Size: Adult Large)  Pulse 73  GEN: This is a well developed, well-nourished female in no acute distress, in a pleasant mood.      SKIN: Focused examination of the lower left leg and neck was performed.  - Skin colored plaques with gray rolling borders to her left anterior and lateral lower leg, no erythema. The ulcer on the left lower anterior ankle measures 2 x 1 cm. Non tender. Granulation tissue noted to all sites, no weeping.   - There is(are) skin colored pedunculated papules with erythema on the right neck x 4.   - No other lesions of concern on areas examined.     Impression/Plan:  1. Pyoderma gangrenosum with minocycline hyperpigmentation- improving s/pminocycline and monthly kenalog injections. All areas are closed. Skin eruption primarily to the left lower extremity.   - Areas were curetted at today's visit.  - No indication for further kenalog injections or minocycline as there is no longer inflammation.    2. Inflamed acrochordon(s) - right neck x 4  - Cryotherapy procedure note: After verbal consent and discussion of risks and benefits including but no limited to dyspigmentation/scar, blister, and pain, 4 was(were) treated with 1-2mm freeze border for 2 cycles with liquid nitrogen. Post cryotherapy instructions were provided.     Follow up in 3 months, earlier for new or changing lesions.     Staff Involved:  Scribe Disclosure:   Eboni AVILES, am serving as a scribe to document services personally performed by Lillian De Luna PA-C, based on data collection and the provider's statements to me.    Provider Disclosure:    The documentation recorded by the scribe accurately reflects the services I personally performed and the decisions made by me.    All risks, benefits and alternatives were discussed with patient.  Patient is in agreement and understands the assessment and plan.  All questions were answered.  Sun Screen Education was given.   Return to Clinic in 3 months or sooner as needed.   Lillian De Luna PA-C   [facility]

## 2017-08-30 NOTE — PROGRESS NOTES
[facility] Dermatology Note    Dermatology Problem List:  1.  Skin eruption to her left ankle, Seen by Dr. Johnson Meraz at [facility] Dermatology   2. Hx of eczema in her youth  3. Pyoderma gangrenosum  - s/p minocycyline, mupirocin, kenalog injections.  4. Inflamed acrochordon(s) s/p cryo    CC:   Chief Complaint   Patient presents with     Derm Problem     Valerie is here for a wound follow up.  States the area has gotten better.       Encounter Date: Aug 30, 2017    History of Present Illness:  Ms. Abida Hahn is a 77 year old female for a follow up for pyoderma gangrenosum. Today the patient reports that areas have been a little better. She thinks it looks a lot better. She states that she sees another doctor later this week to help with her shortness of breath. She is doing well. She also reports spots on her neck that are irritating and catch on clothing and necklaces. She would like these areas treated. The patient reports no other lesions of concern at this time.     Otherwise, the patient reports no painful, bleeding, nonhealing, or pruritic lesions, and denies new or changing moles.    Past Medical History:   Patient Active Problem List   Diagnosis     Chronic pain syndrome     Essential hypertension     Rheumatoid arthritis (H)     Encounter for long-term current use of medication     Pyoderma gangrenosum     Hypothyroidism     Osteoporosis     Primary open angle glaucoma of both eyes, severe stage     Pseudophakia of both eyes     Past Medical History:   Diagnosis Date     Abscess, toe 2009    Right great toe     Cataract 8/2011    Right s/p surgery 8/2011; left s/p surgery     Glaucoma      Headache(784.0)     Relieved with gabapentin. Chronic pain     HTN (hypertension)      RA (rheumatoid arthritis) (H)      Wrist fracture     Right, s/p ORIF     Past Surgical History:   Procedure Laterality Date     BIOPSY ARTERY TEMPORAL       EXTRACAPSULAR CATARACT EXTRATION WITH INTRAOCULAR LENS  IMPLANT  8/2011    Left 2 years ago as well     OPEN REDUCTION INTERNAL FIXATION WRIST BILATERAL       Social History:  The patient is single. The patient denies use of tanning beds. Patiently awaiting great grandchildren from her 24-year-old granddaughter, whom she loves very much.    Family History:  There is no family history of asthma, eczema or allergies.    Medications:  Current Outpatient Prescriptions   Medication Sig Dispense Refill     lisinopril (PRINIVIL/ZESTRIL) 40 MG tablet Take 1 tablet (40 mg) by mouth daily 90 tablet 1     methotrexate 2.5 MG tablet CHEMO Take 8 tablets (20mg) by mouth once a week Take 8 tablets per week 32 tablet 3     latanoprost (XALATAN) 0.005 % ophthalmic solution Place 1 drop into both eyes At Bedtime 1 Bottle 11     aspirin 325 MG tablet Take 650 mg by mouth every 6 hours as needed for moderate pain       omeprazole (PRILOSEC) 20 MG CR capsule Take 1 capsule (20 mg) by mouth daily 30 capsule 3     GLUCOSAMINE SULFATE PO Take 1,000 mg by mouth daily       mupirocin (BACTROBAN) 2 % ointment Apply twice daily to areas on the left ankle. 30 g 3     hydrocortisone 1 % ointment Apply topically 2 times daily To affected area on right upper cheek 25 g 1     folic acid 800 MCG TABS Take by mouth 2 times daily       Cholecalciferol (VITAMIN D-3) 1000 UNITS CAPS Take by mouth 2 times daily       acetaminophen (TYLENOL) 500 MG tablet Take 500-1,000 mg by mouth every 6 hours as needed       levothyroxine (SYNTHROID, LEVOTHROID) 88 MCG tablet Take 88 mcg by mouth daily       ARTIFICIAL TEAR SOLUTION OP Apply  to eye as needed.       Calcium-Vitamin D (CALCIUM + D PO) Take 1 tablet by mouth 2 times daily       Multiple Vitamin (DAILY MULTIVITAMIN PO) Take 1 tablet by mouth daily And minerals per pt       Allergies   Allergen Reactions     Ibuprofen Sodium      Penicillins      Triple Antibiotic [Neomycin-Polymyxin-Dexameth]      Aspirin Rash     Codeine Rash     Review of Systems:  - Skin:  per HPI    Physical exam:  Vitals: /87 (BP Location: Left arm, Patient Position: Chair, Cuff Size: Adult Large)  Pulse 73  GEN: This is a well developed, well-nourished female in no acute distress, in a pleasant mood.      SKIN: Focused examination of the lower left leg and neck was performed.  - Skin colored plaques with gray rolling borders to her left anterior and lateral lower leg, no erythema. The ulcer on the left lower anterior ankle measures 2 x 1 cm. Non tender. Granulation tissue noted to all sites, no weeping.   - There is(are) skin colored pedunculated papules with erythema on the right neck x 4.   - No other lesions of concern on areas examined.     Impression/Plan:  1. Pyoderma gangrenosum with minocycline hyperpigmentation- improving s/pminocycline and monthly kenalog injections. All areas are closed. Skin eruption primarily to the left lower extremity.   - Areas were curetted at today's visit.  - No indication for further kenalog injections or minocycline as there is no longer inflammation.    2. Inflamed acrochordon(s) - right neck x 4  - Cryotherapy procedure note: After verbal consent and discussion of risks and benefits including but no limited to dyspigmentation/scar, blister, and pain, 4 was(were) treated with 1-2mm freeze border for 2 cycles with liquid nitrogen. Post cryotherapy instructions were provided.     Follow up in 3 months, earlier for new or changing lesions.     Staff Involved:  Scribe Disclosure:   Eboni AVILES, am serving as a scribe to document services personally performed by Lillian De Luna PA-C, based on data collection and the provider's statements to me.    Provider Disclosure:   The documentation recorded by the scribe accurately reflects the services I personally performed and the decisions made by me.    All risks, benefits and alternatives were discussed with patient.  Patient is in agreement and understands the assessment and plan.  All questions were  answered.  Sun Screen Education was given.   Return to Clinic in 3 months or sooner as needed.   Lillian De Luna PA-C   [facility]ermat[facility]

## 2017-08-30 NOTE — NURSING NOTE
Dermatology Rooming Note    Abida Hahn's goals for this visit include:   Chief Complaint   Patient presents with     Derm Problem     Valerie is here for a wound follow up.  States the area has gotten better.         Ashley Montiel LPN

## 2017-08-30 NOTE — MR AVS SNAPSHOT
After Visit Summary   8/30/2017    Abida Hahn    MRN: [id number]           Patient Information     Date Of Birth          1940        Visit Information        Provider Department      8/30/2017 2:15 PM Lillian De Luna PA-C [facility] Dermatology        Care Instructions    Cryotherapy    What is it?    Use of a very cold liquid, such as liquid nitrogen, to freeze and destroy abnormal skin cells that need to be removed    What should I expect?    Tenderness and redness    A small blister that might grow and fill with dark purple blood. There may be crusting.    More than one treatment may be needed if the lesions do not go away.    How do I care for the treated area?    Gently wash the area with your hands when bathing.    Use a thin layer of Vaseline to help with healing. You may use a Band-Aid.     The area should heal within 7-10 days and may leave behind a pink or lighter color.     Do not use an antibiotic or Neosporin ointment.     You may take acetaminophen (Tylenol) for pain.     Call your Doctor if you have:    Severe pain    Signs of infection (warmth, redness, cloudy yellow drainage, and or a bad smell)    Questions or concerns    Who should I call with questions?       [facility]: [phone]       [facility]: [phone]       For urgent needs outside of business hours call the [facility] at [phone]        and ask for the dermatology resident on call          Follow-ups after your visit        Your next 10 appointments already scheduled     Sep 07, 2017 11:30 AM CDT   (Arrive by 11:15 AM)   Return Visit with Mario Marks MD   [facility] ([facility])    [address] [place] [address] [phone]            Oct 12, 2017 11:25 AM CDT   (Arrive by 11:10 AM)   Return Visit with Amarilys Iyer MD   [facility]  Primary Care Clinic ([facility])    [address]  4th [address]  [facility] [address]   [phone]            Oct 18, 2017  1:00 PM CDT   (Arrive by 12:45 PM)   New Patient Visit with NAKUL Lew CNP   [facility] Rheumatology ([facility])    [address]  3rd [address]  [facility] [address]   [phone]            Nov 30, 2017 12:30 PM CST   (Arrive by 12:15 PM)   Return Visit with VIKTORIA Rocha [facility] Dermatology ([facility])    [address]  3[address] Floor  [facility] [id number] [phone]            Dec 19, 2017 12:45 PM CST   VISUAL FIELD with [facility] EYE VISUAL FIELD   Eye Clinic ([facility])    [address]  [address]  [address]  [facility] [address] [phone]            Dec 19, 2017  1:15 PM CST   RETURN GLAUCOMA with Corazon Addison MD   Eye Clinic ([facility])    [address]  [address]  [address]  [facility] [address] [phone]              Who to contact     Please call your clinic at [phone] to:    Ask questions about your health    Make or cancel appointments    Discuss your medicines    Learn about your test results    Speak to your doctor   If you have compliments or concerns about an experience at your clinic, or if you wish to file a complaint, please contact [facility] Patient Relations at [phone] or email us at [email or web address]         Additional Information About Your Visit        Care EveryWhere ID     This is your Care EveryWhere ID. This could be used by other organizations to access your [place] medical records  [id number]        Your Vitals Were     Pulse                   73            Blood Pressure from Last 3 Encounters:   08/30/17 155/87   07/06/17 (!) 149/93   06/07/17 149/84    Weight from Last 3 Encounters:   07/06/17  88.5 kg (195 lb)   06/07/17 89.5 kg (197 lb 4.8 oz)   05/04/17 90.1 kg (198 lb 11.2 oz)              Today, you had the following     No orders found for display       Primary Care Provider Office Phone # Fax #    Amarilys Iyer [phone]       [address] [place] [address] [address]  [facility] [address]        Equal Access to Services     DADA ALMARAZ : Hadii aad ku hadasho Soomaali, waaxda luqadaha, qaybta kaalmada adeegyada, waxay idiin hayaan dakota jacksonsamaralynda lajoseph . So [facility] [phone].    ATENCIÓN: Si gregla vidya, tiene a be disposición servicios gratuitos de asistencia lingüística. Llame al [phone].    We comply with applicable federal civil rights laws and [place] laws. We do not discriminate on the basis of race, color, national origin, age, disability sex, sexual orientation or gender identity.            Thank you!     Thank you for choosing [facility] DERMATOLOGY  for your care. Our goal is always to provide you with excellent care. Hearing back from our patients is one way we can continue to improve our services. Please take a few minutes to complete the written survey that you may receive in the mail after your visit with us. Thank you!             Your Updated Medication List - Protect others around you: Learn how to safely use, store and throw away your medicines at www.disposemymeds.org.          This list is accurate as of: 8/30/17  2:36 PM.  Always use your most recent med list.                   Brand Name Dispense Instructions for use Diagnosis    acetaminophen 500 MG tablet    TYLENOL     Take 500-1,000 mg by mouth every 6 hours as needed        ARTIFICIAL TEAR SOLUTION OP      Apply  to eye as needed.        aspirin 325 MG tablet      Take 650 mg by mouth every 6 hours as needed for moderate pain        CALCIUM + D PO      Take 1 tablet by mouth 2 times daily        DAILY MULTIVITAMIN PO      Take 1 tablet by mouth daily And minerals per pt        folic acid 800 MCG Tabs       Take by mouth 2 times daily        GLUCOSAMINE SULFATE PO      Take 1,000 mg by mouth daily        hydrocortisone 1 % ointment     25 g    Apply topically 2 times daily To affected area on right upper cheek    Ulceration (H)       latanoprost 0.005 % ophthalmic solution    XALATAN    1 Bottle    Place 1 drop into both eyes At Bedtime    Open-angle glaucoma of both eyes, severe stage, unspecified open-angle glaucoma type       levothyroxine 88 MCG tablet    SYNTHROID/LEVOTHROID     Take 88 mcg by mouth daily        lisinopril 40 MG tablet    PRINIVIL/ZESTRIL    90 tablet    Take 1 tablet (40 mg) by mouth daily    Essential hypertension       methotrexate 2.5 MG tablet CHEMO     32 tablet    Take 8 tablets (20mg) by mouth once a week Take 8 tablets per week    Rheumatoid arthritis flare (H)       mupirocin 2 % ointment    BACTROBAN    30 g    Apply twice daily to areas on the left ankle.    Pyoderma gangrenosum       omeprazole 20 MG CR capsule    priLOSEC    30 capsule    Take 1 capsule (20 mg) by mouth daily    Rheumatoid arthritis flare (H)       Vitamin D-3 1000 UNITS Caps      Take by mouth 2 times daily

## 2017-09-01 ENCOUNTER — OFFICE VISIT (OUTPATIENT)
Dept: INTERNAL MEDICINE | Facility: CLINIC | Age: 77
End: 2017-09-01

## 2017-09-01 VITALS
DIASTOLIC BLOOD PRESSURE: 108 MMHG | HEART RATE: 83 BPM | BODY MASS INDEX: 31.44 KG/M2 | RESPIRATION RATE: 16 BRPM | SYSTOLIC BLOOD PRESSURE: 173 MMHG | WEIGHT: 196 LBS

## 2017-09-01 DIAGNOSIS — Z79.899 POLYPHARMACY: ICD-10-CM

## 2017-09-01 DIAGNOSIS — R73.09 ELEVATED GLUCOSE: ICD-10-CM

## 2017-09-01 DIAGNOSIS — D53.9 MACROCYTIC ANEMIA: ICD-10-CM

## 2017-09-01 DIAGNOSIS — E78.5 HYPERLIPIDEMIA LDL GOAL <100: ICD-10-CM

## 2017-09-01 DIAGNOSIS — I10 ESSENTIAL HYPERTENSION: Primary | ICD-10-CM

## 2017-09-01 LAB
ANION GAP SERPL CALCULATED.3IONS-SCNC: 8 MMOL/L (ref 3–14)
BUN SERPL-MCNC: 19 MG/DL (ref 7–30)
CALCIUM SERPL-MCNC: 10.1 MG/DL (ref 8.5–10.1)
CHLORIDE SERPL-SCNC: 105 MMOL/L (ref 94–109)
CHOLEST SERPL-MCNC: 191 MG/DL
CO2 SERPL-SCNC: 28 MMOL/L (ref 20–32)
CREAT SERPL-MCNC: 0.9 MG/DL (ref 0.52–1.04)
CREAT UR-MCNC: 21 MG/DL
FOLATE SERPL-MCNC: 41.2 NG/ML
GFR SERPL CREATININE-BSD FRML MDRD: 61 ML/MIN/1.7M2
GLUCOSE SERPL-MCNC: 95 MG/DL (ref 70–99)
HBA1C MFR BLD: 5.6 % (ref 4.3–6)
HDLC SERPL-MCNC: 49 MG/DL
LDLC SERPL CALC-MCNC: 103 MG/DL
NONHDLC SERPL-MCNC: 142 MG/DL
POTASSIUM SERPL-SCNC: 4 MMOL/L (ref 3.4–5.3)
PROT UR-MCNC: <0.05 G/L
PROT/CREAT 24H UR: NORMAL G/G CR (ref 0–0.2)
SODIUM SERPL-SCNC: 140 MMOL/L (ref 133–144)
TRIGL SERPL-MCNC: 194 MG/DL
TSH SERPL DL<=0.005 MIU/L-ACNC: 0.79 MU/L (ref 0.4–4)
VIT B12 SERPL-MCNC: 613 PG/ML (ref 193–986)

## 2017-09-01 RX ORDER — AMLODIPINE BESYLATE 2.5 MG/1
2.5 TABLET ORAL DAILY
Qty: 30 TABLET | Refills: 1 | Status: SHIPPED | OUTPATIENT
Start: 2017-09-01 | End: 2017-09-08

## 2017-09-01 ASSESSMENT — PAIN SCALES - GENERAL: PAINLEVEL: NO PAIN (0)

## 2017-09-01 NOTE — MR AVS SNAPSHOT
After Visit Summary   9/1/2017    Abida Hahn    MRN: [id number]           Patient Information     Date Of Birth          1940        Visit Information        Provider Department      9/1/2017 9:20 AM Zeinab Giang MD [facility]        Today's Diagnoses     Essential hypertension    -  1    Macrocytic anemia        Elevated glucose        Polypharmacy          Care Instructions    Primary Care Center Medication Refill Request Information:  * Please contact your pharmacy regarding ANY request for medication refills.  ** [facility] Prescription Fax = [phone]  * Please allow 3 business days for routine medication refills.  * Please allow 5 business days for controlled substance medication refills.     Primary Care Center Test Result notification information:  *You will be notified with in 7-10 days of your appointment day regarding the results of your test.  If you are on MyChart you will be notified as soon as the provider has reviewed the results and signed off on them.    Primary Care Center [phone]             Follow-ups after your visit        Additional Services     NUTRITION REFERRAL       Evaluate and treat elevated glucose, chronic medical conditions            PHARMACY ([facility]) REFERRAL       Evaluate and treat polypharmacy, uncontrolled hypertension and elevated glucose                  Follow-up notes from your care team     Return Keep Oct 12 appt with Dr. Iyer.      Your next 10 appointments already scheduled     Sep 01, 2017 10:30 AM CDT   LAB with  LAB   [facility] ([facility])    [address] [place] [address] [phone]           Patient must bring picture ID. Patient should be prepared to give a urine specimen  Please do not eat 10-12 hours before your appointment if you are coming in fasting for labs on lipids, cholesterol, or glucose (sugar). Pregnant women should follow their Care Team  instructions. Water with medications is okay. Do not drink coffee or other fluids. If you have concerns about taking  your medications, please ask at office or if scheduling via [employer], send a message by clicking on Secure Messaging, Message Your Care Team.            Sep 07, 2017 10:30 AM CDT   US RENAL COMPLETE WITH DUPLEX COMPLETE with [id number]   [facility] ([facility])    [address] [place] [address]  [address] [facility] [id number] [phone]           Please bring a list of your medicines (including vitamins, minerals and over-the-counter drugs). Also, tell your doctor about any allergies you may have. Wear comfortable clothes and leave your valuables at home.  Adults: No eating or drinking for 8 hours before the exam. You may take medicine with a small sip of water.  Children: - Children 6+ years: No food or drink for 6 hours before exam. - Children 1-5 years: No food or drink for 4 hours before exam. - Infants, breast-fed: may have breast milk up to 2 hours before exam. - Infants, formula: may have bottle until 4 hours before exam.  Please call the Imaging Department at your exam site with any questions.            Sep 07, 2017 11:30 AM CDT   (Arrive by 11:15 AM)   Return Visit with Mario Marks MD   [facility] ([facility])    [address] [place] [address]  [address] [facility] [address]   [phone]            Sep 08, 2017 12:00 PM CDT   (Arrive by 11:45 AM)   Office Visit with Desiree Chavis RPH   [facility] Therapy Management ([facility])    [address] [place] [address]  [address] [facility] [id number] [phone]           Bring a current list of meds and any records pertaining to this visit. For Physicals, please bring immunization records and any forms needing to be filled out. Please arrive 10 minutes early to complete paperwork.            Oct 12, 2017 11:25  AM CDT   (Arrive by 11:10 AM)   Return Visit with Amarilys Iyer MD   [facility] ([facility])    [address]  4th Floor  [facility] [id number] [phone]            Oct 18, 2017  1:00 PM CDT   (Arrive by 12:45 PM)   New Patient Visit with NAKUL Lew CNP   [facility] Rheumatology ([facility] [place])    [address]  [address]rd [facility] [id number] [phone]            Nov 30, 2017 12:30 PM CST   (Arrive by 12:15 PM)   Return Visit with VIKTORIA Rocha [facility]at[facility] ([facility])    [address]  3rd [facility] [id number] [phone]            Dec 19, 2017 12:45 PM CST   VISUAL FIELD with [facility] EYE VISUAL FIELD   Eye Clinic ([facility])    [address]  [address]   [address] [address]  [facility] [address] [phone]            Dec 19, 2017  1:15 PM CST   RETURN GLAUCOMA with Corazon Addison MD   Eye Clinic ([facility])    [address]  [address]  [address] [address]  [facility] [address] [phone]              Future tests that were ordered for you today     Open Future Orders        Priority Expected Expires Ordered    Protein  random urine with Creat Ratio Routine 9/1/2017 9/1/2018 9/1/2017    Vitamin B12 Routine 9/1/2017 9/1/2018 9/1/2017    Folate Routine 9/1/2017 9/1/2018 9/1/2017    TSH with free T4 reflex Routine 9/1/2017 9/15/2017 9/1/2017    Hemoglobin A1c Routine 9/1/2017 9/15/2017 9/1/2017    US Renal Complete w Doppler Complete Routine  9/1/2018 9/1/2017            Who to contact     Please call your clinic at [phone] to:    Ask questions about your health    Make or cancel appointments    Discuss your medicines    Learn about your test results    Speak to your doctor   If you have compliments or concerns about an experience at your clinic, or if you wish to file  a complaint, please contact [facility] Patient Relations at [phone] or email us at [email or web address]         Additional Information About Your Visit        Care EveryWhere ID     This is your Care EveryWhere ID. This could be used by other organizations to access your [facility] medical records  [id number]        Your Vitals Were     Pulse Respirations Breastfeeding? BMI (Body Mass Index)          83 16 No 31.44 kg/m2         Blood Pressure from Last 3 Encounters:   09/01/17 (!) 173/108   08/30/17 155/87   07/06/17 (!) 149/93    Weight from Last 3 Encounters:   09/01/17 88.9 kg (196 lb)   07/06/17 88.5 kg (195 lb)   06/07/17 89.5 kg (197 lb 4.8 oz)              We Performed the Following     NUTRITION REFERRAL     PHARMACY ([facility]) REFERRAL          Today's Medication Changes          These changes are accurate as of: 9/1/17 10:29 AM.  If you have any questions, ask your nurse or doctor.               Start taking these medicines.        Dose/Directions    amLODIPine 2.5 MG tablet   Commonly known as:  NORVASC   Used for:  Essential hypertension   Started by:  Zeinab Giang MD        Dose:  2.5 mg   Take 1 tablet (2.5 mg) by mouth daily   Quantity:  30 tablet   Refills:  1            Where to get your medicines      These medications were sent to [facility] [address] [place] [address] [address] AT [facility] [address]  [address], [facility] [address]    Hours:  24-hours Phone:  [phone]     amLODIPine 2.5 MG tablet                Primary Care Provider Office Phone # Fax #    Amarilys Iyer [phone]       [address] [place] [address]  [facility] [address]        Equal Access to Services     DADA ALMARAZ AH: Hadii anu lymano Sochandler, waaxda luqadaha, qaybta kaalmada adeegyada, neo otto. So [facility] [phone].    ATENCIÓN: Si habla español, tiene a be disposición servicios  bhaskar de asistencia lingüística. Raheem ham [phone].    We comply with applicable federal civil rights laws and [place] laws. We do not discriminate on the basis of race, color, national origin, age, disability sex, sexual orientation or gender identity.            Thank you!     Thank you for choosing [facility]  for your care. Our goal is always to provide you with excellent care. Hearing back from our patients is one way we can continue to improve our services. Please take a few minutes to complete the written survey that you may receive in the mail after your visit with us. Thank you!             Your Updated Medication List - Protect others around you: Learn how to safely use, store and throw away your medicines at www.disposemymeds.org.          This list is accurate as of: 9/1/17 10:29 AM.  Always use your most recent med list.                   Brand Name Dispense Instructions for use Diagnosis    acetaminophen 500 MG tablet    TYLENOL     Take 500-1,000 mg by mouth every 6 hours as needed        amLODIPine 2.5 MG tablet    NORVASC    30 tablet    Take 1 tablet (2.5 mg) by mouth daily    Essential hypertension       ARTIFICIAL TEAR SOLUTION OP      Apply  to eye as needed.        aspirin 325 MG tablet      Take 650 mg by mouth every 6 hours as needed for moderate pain        CALCIUM + D PO      Take 1 tablet by mouth 2 times daily        DAILY MULTIVITAMIN PO      Take 1 tablet by mouth daily And minerals per pt        folic acid 800 MCG Tabs      Take by mouth 2 times daily        GLUCOSAMINE SULFATE PO      Take 1,000 mg by mouth daily        hydrocortisone 1 % ointment     25 g    Apply topically 2 times daily To affected area on right upper cheek    Ulceration (H)       latanoprost 0.005 % ophthalmic solution    XALATAN    1 Bottle    Place 1 drop into both eyes At Bedtime    Open-angle glaucoma of both eyes, severe stage, unspecified open-angle glaucoma type        levothyroxine 88 MCG tablet    SYNTHROID/LEVOTHROID     Take 88 mcg by mouth daily        lisinopril 40 MG tablet    PRINIVIL/ZESTRIL    90 tablet    Take 1 tablet (40 mg) by mouth daily    Essential hypertension       methotrexate 2.5 MG tablet CHEMO     32 tablet    Take 8 tablets (20mg) by mouth once a week Take 8 tablets per week    Rheumatoid arthritis flare (H)       mupirocin 2 % ointment    BACTROBAN    30 g    Apply twice daily to areas on the left ankle.    Pyoderma gangrenosum       omeprazole 20 MG CR capsule    priLOSEC    30 capsule    Take 1 capsule (20 mg) by mouth daily    Rheumatoid arthritis flare (H)       Vitamin D-3 1000 UNITS Caps      Take by mouth 2 times daily

## 2017-09-01 NOTE — LETTER
Patient:  Abida Hahn  :   1940  MRN:     [id number]        Ms. Abida Hahn  [address] [place] [address]        2017    Dear Ms. Hahn,    Thank you for choosing the [facility] for your healthcare needs.  We appreciate the opportunity to serve you.    The following are your recent test results.     Your urine protein test result is normal.     Results for orders placed or performed in visit on 17   Protein  random urine with Creat Ratio   Result Value Ref Range    Protein Random Urine <0.05 g/L    Protein Total Urine g/gr Creatinine Unable to calculate due to low value 0 - 0.2 g/g Cr   Creatinine urine calculation only   Result Value Ref Range    Creatinine Urine 21 mg/dL     Please contact your provider if you have any questions or concerns.  We look forward to serving your needs in the future.      Sincerely,     Dr. Giang

## 2017-09-01 NOTE — PROGRESS NOTES
CC:  Hypertension    S:  BP's elevated despite increase in lisinopril earlier this year.  Asymptomatic.  She does check home BP's which have consistently been running in the 153 range  Is compliant with medications, no other changes that would affect BP, no OTC meds nor supplements  Not exercising much, pain has been managed well, keeps dietary sodium as low as possible, has been gaining weight, notes difficulty following ideal diet provided by her [facility].  I reviewed her chart including labs and any pertinent imaging.  We discussed the results.  We talked about the potential etiologies for why her blood pressure has not been controlled including age related arterial changes, kidney function, etc.  She liked the idea of meeting with our pharmacist and a nutritionist to review her meds and get advice about managing her diet given her chronic health conditions and food options at her [facility] facility.    Patient Active Problem List   Diagnosis     Chronic pain syndrome     Essential hypertension     Rheumatoid arthritis (H)     Encounter for long-term current use of medication     Pyoderma gangrenosum     Hypothyroidism     Osteoporosis     Primary open angle glaucoma of both eyes, severe stage     Pseudophakia of both eyes     Current Outpatient Prescriptions   Medication Sig Dispense Refill     lisinopril (PRINIVIL/ZESTRIL) 40 MG tablet Take 1 tablet (40 mg) by mouth daily 90 tablet 1     methotrexate 2.5 MG tablet CHEMO Take 8 tablets (20mg) by mouth once a week Take 8 tablets per week 32 tablet 3     latanoprost (XALATAN) 0.005 % ophthalmic solution Place 1 drop into both eyes At Bedtime 1 Bottle 11     aspirin 325 MG tablet Take 650 mg by mouth every 6 hours as needed for moderate pain       omeprazole (PRILOSEC) 20 MG CR capsule Take 1 capsule (20 mg) by mouth daily 30 capsule 3     GLUCOSAMINE SULFATE PO Take 1,000 mg by mouth daily       mupirocin (BACTROBAN) 2 % ointment Apply twice daily to  areas on the left ankle. 30 g 3     hydrocortisone 1 % ointment Apply topically 2 times daily To affected area on right upper cheek 25 g 1     folic acid 800 MCG TABS Take by mouth 2 times daily       Cholecalciferol (VITAMIN D-3) 1000 UNITS CAPS Take by mouth 2 times daily       acetaminophen (TYLENOL) 500 MG tablet Take 500-1,000 mg by mouth every 6 hours as needed       levothyroxine (SYNTHROID, LEVOTHROID) 88 MCG tablet Take 88 mcg by mouth daily       ARTIFICIAL TEAR SOLUTION OP Apply  to eye as needed.       Calcium-Vitamin D (CALCIUM + D PO) Take 1 tablet by mouth 2 times daily       Multiple Vitamin (DAILY MULTIVITAMIN PO) Take 1 tablet by mouth daily And minerals per pt       Allergies   Allergen Reactions     Ibuprofen Sodium      Penicillins      Triple Antibiotic [Neomycin-Polymyxin-Dexameth]      Aspirin Rash     Codeine Rash     BP (!) 173/108 (BP Location: Left arm, Patient Position: Sitting, Cuff Size: Adult Regular)  Pulse 83  Resp 16  Wt 88.9 kg (196 lb)  Breastfeeding? No  BMI 31.44 kg/m2  BP Readings from Last 6 Encounters:   09/01/17 (!) 173/108   08/30/17 155/87   07/06/17 (!) 149/93   06/07/17 149/84   05/04/17 163/83   04/06/17 (!) 148/91     Physical Examination:  General:  Pleasant, alert, no acute distress  Eyes:  Pupils 2-3 mm, sclera white, EOM's full.    Lungs:  Clear to auscultation throughout, no wheezes, rhonchi or rales.  C/V:  Regular rate and rhythm, no murmurs, rubs or gallops.  No JVD, no carotid bruits.  No abdominal bruits.  No peripheral edema. Does have some non pitting but it is around the ankle joints, right more than left.  Abdomen:  Not distended.  Bowel sounds active.  No tenderness, no hepatosplenomegaly or masses.  No CVA tenderness or masses.  Neuro:  Alert and oriented, face symmetric. No tremor.  Required my assistance to get on and off table.  Somewhat unsteady.    M/S:   Hand joint deformities c/w RA  Skin:   No rashes or jaundice.  Normal moisture, good  skin turgor.  Psych:  Narrow range, flat affect.    Component      Latest Ref Rng & Units 2/2/2017 2/8/2017 4/6/2017 5/11/2017   Color Urine       Yellow      Appearance Urine       Clear      Glucose Urine      NEG mg/dL Negative      Bilirubin Urine      NEG Negative      Ketones Urine      NEG mg/dL Negative      Specific Gravity Urine      1.003 - 1.035 1.010      Blood Urine      NEG Negative      pH Urine      5.0 - 7.0 pH 6.0      Protein Albumin Urine      NEG mg/dL Negative      Urobilinogen mg/dL      0.0 - 2.0 mg/dL 0.0      Nitrite Urine      NEG Negative      Leukocyte Esterase Urine      NEG Negative      Source       Midstream Urine      WBC Urine      0 - 2 /HPF <1      RBC Urine      0 - 2 /HPF 1      Mucous Urine      NEG /LPF Present (A)      Hyaline Casts      0 - 2 /LPF 1 (A)      Sodium      133 - 144 mmol/L 140  140    Potassium      3.4 - 5.3 mmol/L 4.1  4.4    Chloride      94 - 109 mmol/L 102  104    Carbon Dioxide      20 - 32 mmol/L 31  30    Anion Gap      3 - 14 mmol/L 7  5    Glucose      70 - 99 mg/dL 113 (H)  112 (H)    Urea Nitrogen      7 - 30 mg/dL 18  18    Creatinine      0.52 - 1.04 mg/dL 0.81  0.89    GFR Estimate      >60 mL/min/1.7m2 69  62    GFR Estimate If Black      >60 mL/min/1.7m2 83  75    Calcium      8.5 - 10.1 mg/dL 10.3 (H)  10.0    Bilirubin Total      0.2 - 1.3 mg/dL 0.5  0.4    Albumin      3.4 - 5.0 g/dL 3.8  3.9    Protein Total      6.8 - 8.8 g/dL 7.5  7.5    Alkaline Phosphatase      40 - 150 U/L 334 (H)  133    ALT      0 - 50 U/L 22  25    AST      0 - 45 U/L 14  17    WBC      4.0 - 11.0 10e9/L  6.8  4.5   RBC Count      3.8 - 5.2 10e12/L  4.65  4.12   Hemoglobin      11.7 - 15.7 g/dL  15.0  14.1   Hematocrit      35.0 - 47.0 %  46.2  42.4   MCV      78 - 100 fl  99  103 (H)   MCH      26.5 - 33.0 pg  32.3  34.2 (H)   MCHC      31.5 - 36.5 g/dL  32.5  33.3   RDW      10.0 - 15.0 %  13.1  14.9   Platelet Count      150 - 450 10e9/L  204  236      Component      Latest Ref Rng & Units 8/30/2017   Color Urine          Appearance Urine          Glucose Urine      NEG mg/dL    Bilirubin Urine      NEG    Ketones Urine      NEG mg/dL    Specific Gravity Urine      1.003 - 1.035    Blood Urine      NEG    pH Urine      5.0 - 7.0 pH    Protein Albumin Urine      NEG mg/dL    Urobilinogen mg/dL      0.0 - 2.0 mg/dL    Nitrite Urine      NEG    Leukocyte Esterase Urine      NEG    Source          WBC Urine      0 - 2 /HPF    RBC Urine      0 - 2 /HPF    Mucous Urine      NEG /LPF    Hyaline Casts      0 - 2 /LPF    Sodium      133 - 144 mmol/L    Potassium      3.4 - 5.3 mmol/L    Chloride      94 - 109 mmol/L    Carbon Dioxide      20 - 32 mmol/L    Anion Gap      3 - 14 mmol/L    Glucose      70 - 99 mg/dL    Urea Nitrogen      7 - 30 mg/dL    Creatinine      0.52 - 1.04 mg/dL    GFR Estimate      >60 mL/min/1.7m2    GFR Estimate If Black      >60 mL/min/1.7m2    Calcium      8.5 - 10.1 mg/dL    Bilirubin Total      0.2 - 1.3 mg/dL    Albumin      3.4 - 5.0 g/dL    Protein Total      6.8 - 8.8 g/dL    Alkaline Phosphatase      40 - 150 U/L    ALT      0 - 50 U/L    AST      0 - 45 U/L    WBC      4.0 - 11.0 10e9/L 5.6   RBC Count      3.8 - 5.2 10e12/L 4.09   Hemoglobin      11.7 - 15.7 g/dL 13.9   Hematocrit      35.0 - 47.0 % 42.9   MCV      78 - 100 fl 105 (H)   MCH      26.5 - 33.0 pg 34.0 (H)   MCHC      31.5 - 36.5 g/dL 32.4   RDW      10.0 - 15.0 % 12.5   Platelet Count      150 - 450 10e9/L 205     Abida was seen today for hypertension.    Diagnoses and all orders for this visit:    Essential hypertension  -     Protein  random urine with Creat Ratio  -     PHARMACY ([facility]) REFERRAL  -     US Renal Complete w Doppler Complete; Future  -     Add amLODIPine (NORVASC) 2.5 MG tablet; Take 1 tablet (2.5 mg) by mouth daily, continue current dose of losartan  -     BMP    Macrocytic anemia  -     Vitamin B12; Future  -     Folate; Future  -     TSH with free T4  reflex; Future    Elevated glucose  -     NUTRITION REFERRAL  -     PHARMACY (MTM) REFERRAL  -     Hemoglobin A1c; Future    Polypharmacy  -     PHARMACY (MTM) REFERRAL    Total time spent 40 minutes.  More than 50% of the time spent with Ms. Hahn on counseling / coordinating her care    Keep upcoming appointment with PCP.     Zeinab Giang M.D.  Internal Medicine  Primary Care Center   pager [phone]

## 2017-09-01 NOTE — NURSING NOTE
Chief Complaint   Patient presents with     Hypertension     Patient is concerns with high BP     Ro Donald CMA 9:31 AM on 9/1/2017.  \

## 2017-09-01 NOTE — PATIENT INSTRUCTIONS
[facility] Medication Refill Request Information:  * Please contact your pharmacy regarding ANY request for medication refills.  ** [facility] Prescription Fax = [phone]  * Please allow 3 business days for routine medication refills.  * Please allow 5 business days for controlled substance medication refills.     [facility] Test Result notification information:  *You will be notified with in 7-10 days of your appointment day regarding the results of your test.  If you are on MyChart you will be notified as soon as the provider has reviewed the results and signed off on them.    [facility] [phone]

## 2017-09-07 ENCOUNTER — OFFICE VISIT (OUTPATIENT)
Dept: PULMONOLOGY | Facility: CLINIC | Age: 77
End: 2017-09-07
Attending: INTERNAL MEDICINE
Payer: MEDICARE

## 2017-09-07 VITALS
HEIGHT: 67 IN | WEIGHT: 195 LBS | DIASTOLIC BLOOD PRESSURE: 86 MMHG | HEART RATE: 72 BPM | BODY MASS INDEX: 30.61 KG/M2 | SYSTOLIC BLOOD PRESSURE: 152 MMHG | OXYGEN SATURATION: 91 %

## 2017-09-07 DIAGNOSIS — R06.09 DYSPNEA ON EXERTION: ICD-10-CM

## 2017-09-07 DIAGNOSIS — J84.9 ILD (INTERSTITIAL LUNG DISEASE) (H): Primary | ICD-10-CM

## 2017-09-07 ASSESSMENT — PAIN SCALES - GENERAL: PAINLEVEL: NO PAIN (0)

## 2017-09-07 NOTE — LETTER
9/7/2017       RE: Abida Hahn  [address]  [facility] [address]     Dear Colleague,    Thank you for referring your patient, Abida Hahn, to the [facility] at [facility]. Please see a copy of my visit note below.    Reason for Visit  Abida Hahn is a 76 year old year old female who is being seen for evaluation of cough and SOB, particularly upon lying down, by Dr. Nataliia Freitas    Pulmonary HPI    The patient was seen and examined by Mario Marks   I had the pleasure of seeing Ms. Abida Hahn again for follow-up at the [facility] in referral by Dr. Carmel Freitas as above.  She is a very pleasant 76-year-old woman with a longstanding history of rheumatoid arthritis plus hypothyroidism, hypertension, osteoporosis, and glaucoma, sent to evaluate significant shortness of breath and cough.  The patient has been having difficulty taking a deep breath and getting out of breath when lying down, particularly since approximately 11/2016, as well as slight intermittent shortness of breath just while sitting.  She has been on methotrexate for her rheumatoid arthritis for more than 10 years up until approximately 9 months ago when it was stopped for approximately 7 months to assist healing of a chronic L ankle wound.  She has been back on methotrexate again for her rheumatoid arthritis since 02/2017.  She last saw Internal Medicine (Dr Giang) on 09/01/2017.    Today she reports symptoms that are slightly worse than at her initial visit but qualitatively unchanged. She now reports being able to walk for 5-10 min on flat ground, as opposed to 15 min reported at her initial visit. She cannot say whether this is due to increased pulmonary symptoms or deconditioning due to her ankle wound and temporary discontinuation of her methotrexate. A 6min walk test in 2/2017 shows a  "total distance of 206m with little change in her HR or Ugarte score (3 pre/3 post). She denies fever, myalgia, weight loss, or any other new symptoms.     In terms of pulmonary history, she was hospitalized for 2-3 weeks with pleurisy and bronchitis is a 6-year-old and she says that she remembers being told they had to \"pull fluid off\" multiple times.  She was a low-grade smoker, smoking less than a pack per day for 25-30 years from age 20 to approximately age 50.  She has not smoked for 25-30 years.  She has never had pneumonia, wheezing, blood clots or another episode of pleurisy.  She feels her cough has been worse over the last 2-3 weeks.  She did have a significant ankle swelling in 2016 with decreased mobility that led to having a chest PA gram done at [facility] that was negative for pulmonary embolism (by report) and I do not have the formal interpretation from that study, but upon my review it looks like there is some ground-glass changes.      PAST MEDICAL HISTORY:  Notable for shingles, chronic rheumatoid arthritis, glaucoma and eye difficulties, hypothyroidism, hypertension, osteoporosis.      SOCIAL HISTORY:  She is  for more than 30 years and has one daughter and one granddaughter (daughter is local, granddaughter in [place]).      FAMILY HISTORY:  Negative for most lung disease, apart from her father  at age 65.  He was a  at a [employer] production plant and was a tinsmith.  Her mother  at age 55 of myocardial infarction and also had rheumatoid arthritis.  She has 2 sisters and 1 brother, and they have some RA.       PULMONARY REVIEW OF SYSTEMS:  Notable for an aunt who had TB, but she does not believe she was exposed to her.  She denies any trauma to the chest, sinus trouble.  Her weight has increased approximately 10 pounds over the last year.  She does snore somewhat and has mild daytime hypersomnolence, falling asleep watching TV shows but not during meals or " conversations.         Current Outpatient Prescriptions   Medication     amLODIPine (NORVASC) 2.5 MG tablet     lisinopril (PRINIVIL/ZESTRIL) 40 MG tablet     methotrexate 2.5 MG tablet CHEMO     latanoprost (XALATAN) 0.005 % ophthalmic solution     aspirin 325 MG tablet     omeprazole (PRILOSEC) 20 MG CR capsule     GLUCOSAMINE SULFATE PO     mupirocin (BACTROBAN) 2 % ointment     hydrocortisone 1 % ointment     folic acid 800 MCG TABS     Cholecalciferol (VITAMIN D-3) 1000 UNITS CAPS     acetaminophen (TYLENOL) 500 MG tablet     levothyroxine (SYNTHROID, LEVOTHROID) 88 MCG tablet     ARTIFICIAL TEAR SOLUTION OP     Calcium-Vitamin D (CALCIUM + D PO)     Multiple Vitamin (DAILY MULTIVITAMIN PO)     No current facility-administered medications for this visit.      Allergies   Allergen Reactions     Ibuprofen Sodium      Penicillins      Triple Antibiotic [Neomycin-Polymyxin-Dexameth]      Aspirin Rash     Codeine Rash     Past Medical History:   Diagnosis Date     Abscess, toe 2009    Right great toe     Cataract 8/2011    Right s/p surgery 8/2011; left s/p surgery     Glaucoma      Headache(784.0)     Relieved with gabapentin. Chronic pain     HTN (hypertension)      RA (rheumatoid arthritis) (H)      Wrist fracture     Right, s/p ORIF       Past Surgical History:   Procedure Laterality Date     BIOPSY ARTERY TEMPORAL       EXTRACAPSULAR CATARACT EXTRATION WITH INTRAOCULAR LENS IMPLANT  8/2011    Left 2 years ago as well     OPEN REDUCTION INTERNAL FIXATION WRIST BILATERAL         Social History     Social History     Marital status: Single     Spouse name: N/A     Number of children: N/A     Years of education: N/A     Occupational History     Not on file.     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 Enjoys reading, friends, children and shopping.        ROS Pulmonary  A complete ROS was otherwise negative except as noted in the HPI.  /86  Pulse 72  Ht 1.702 m (5' 7\")  Wt 88.5 kg (195 lb)  SpO2 91%  BMI 30.54 kg/m2  Exam:   GENERAL APPEARANCE: Well developed, well nourished, alert, and in no apparent distress. No cough, tachypnea, wheeze  EYES: PERRL, EOMI  HENT: Nasal mucosa with no edema and no hyperemia. No nasal polyps.  EARS: deferred  MOUTH: Oral mucosa is moist, without any lesions, no tonsillar enlargement, no oropharyngeal exudate.  NECK: supple, no masses, no thyromegaly.  LYMPHATICS: No significant axillary, cervical, or supraclavicular nodes.  RESP: normal inspection, palpation, percussion, good air flow throughout.  No crackles. No rhonchi. No wheezes. Subjective inability to take a \"full breath\"  CV: RRR Normal S1, S2, regular rhythm, normal rate. No murmur.  No rub. No gallop. No LE edema.   ABDOMEN:  Soft, Bowel sounds normal, soft, nontender, no HSM or masses.   MS: extremities normal. No clubbing. No cyanosis.  SKIN: no rash on limited exam  NEURO: Mentation intact, speech normal, normal strength and tone, normal gait and stance  PSYCH: mentation appears normal. and affect normal/bright  Supine - no paradoxical abdominal motion and O2 Sat 95% on RA  Results:  Recent Results (from the past 168 hour(s))   Protein  random urine with Creat Ratio    Collection Time: 09/01/17 10:10 AM   Result Value Ref Range    Protein Random Urine <0.05 g/L    Protein Total Urine g/gr Creatinine Unable to calculate due to low value 0 - 0.2 g/g Cr   Creatinine urine calculation only    Collection Time: 09/01/17 10:10 AM   Result Value Ref Range    Creatinine Urine 21 mg/dL   Vitamin B12    Collection Time: 09/01/17 11:03 AM   Result Value Ref Range    Vitamin B12 613 193 " - 986 pg/mL   TSH with free T4 reflex    Collection Time: 09/01/17 11:03 AM   Result Value Ref Range    TSH 0.79 0.40 - 4.00 mU/L   Hemoglobin A1c    Collection Time: 09/01/17 11:03 AM   Result Value Ref Range    Hemoglobin A1C 5.6 4.3 - 6.0 %   Lipid panel reflex to direct LDL - -(Today)    Collection Time: 09/01/17 11:03 AM   Result Value Ref Range    Cholesterol 191 <200 mg/dL    Triglycerides 194 (H) <150 mg/dL    HDL Cholesterol 49 (L) >49 mg/dL    LDL Cholesterol Calculated 103 (H) <100 mg/dL    Non HDL Cholesterol 142 (H) <130 mg/dL   Basic metabolic panel    Collection Time: 09/01/17 11:03 AM   Result Value Ref Range    Sodium 140 133 - 144 mmol/L    Potassium 4.0 3.4 - 5.3 mmol/L    Chloride 105 94 - 109 mmol/L    Carbon Dioxide 28 20 - 32 mmol/L    Anion Gap 8 3 - 14 mmol/L    Glucose 95 70 - 99 mg/dL    Urea Nitrogen 19 7 - 30 mg/dL    Creatinine 0.90 0.52 - 1.04 mg/dL    GFR Estimate 61 >60 mL/min/1.7m2    GFR Estimate If Black 74 >60 mL/min/1.7m2    Calcium 10.1 8.5 - 10.1 mg/dL   Folate    Collection Time: 09/01/17 11:04 AM   Result Value Ref Range    Folate 41.2 >5.4 ng/mL       ASSESSMENT/PLAN:     Ms Freeman had a chest CT scan on 02/16/2017 that was reviewed by Dr. Jeannie Alexis.  It showed numerous scattered small thin-walled cysts associated with pulmonary vessels, consistent with LIP or emphysema related cyst (less likely); centrilobular nodules and tree-in-bud opacities in the right upper lobe consistent with follicular bronchiolitis or infection.  She did not have inspiratory, expiratory views done but on review, there is a suggestion of possible ground-glass, suggesting some small airways air trapping.  The PFTs done on 02/08/2017 show FEV1/FVC of 0.8/1.25 (38/43% predicted) for a ratio of 67%.  The RV TLC shows 2.8/4.2 (121/77% of predicted) for a ratio of 67%.  The DLCO corrected for hemoglobin is 15.8 (76% of predicted).  There are no prior PFTs for comparison.  A 6-minute walk test done  on the same day show a mild increase in heart rate from the high 60 low 70 range to the mid 80s, but no desaturation with O2 sat on room air maintained 93% or above virtually all the time.  Her [place] dyspnea and fatigue did not change and post-walk (each 3 throughout).        1.  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  I have little suspicion for methotrexate side effects on the lung; these seem unlikely given the long duration of methotrexate exposure and non-specificity to the findings.  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 Other differentials considered include a chronic subacute atypical mycobacterial infection or small airway bronchiolitis.     PLAN:   1.  Obtain inspiratory and expiratory CT imaging of the lungs to better characterize interstitial lung changes.  2. Considering the duration since her last 6-minute walk test and the few changes since then, we will repeat a 6-minute walk test before her next appointment.  3.  I have spoken to our interstitial lung specialists and will refer her to their clinic for ongoing management. She may be scheduled following the abovementioned chest CT and 6-minute walk test.      Andre Montoya-Barthelemy, MD  9/7/2017   Resident Physician, Occupational Medicine    Attending Attestation  I saw and examined the patient with Dr. Colby confirming pat aspects of the history and exam.  I personally reviewed the recent Xrays and other labs.  The fellow s note reflects our detailed discussion of the findings, assessment and plan.  I reviewed her CT scan and discussed her case with Dr. Nirmala Muñoz, specialist  in collagen vascular disease - associated lung problems and she will see Dr Muñoz and ILD group in followup.  The patient was given contact information for them.     If possible obtain sputum for mycobacterial studies.  Bronch not essential at present.  Consider with possible TBBx in future.    I personally spent 30 minutes of face to face time with the patient.    Mario Marks MD

## 2017-09-07 NOTE — MR AVS SNAPSHOT
After Visit Summary   9/7/2017    Abida Hahn    MRN: [id number]           Patient Information     Date Of Birth          1940        Visit Information        Provider Department      9/7/2017 11:30 AM Mario Marks MD [facility]        Today's Diagnoses     ILD (interstitial lung disease) (H)    -  1    Dyspnea on exertion           Follow-ups after your visit        Follow-up notes from your care team     Return in about 3 weeks (around 9/28/2017).      Your next 10 appointments already scheduled     Sep 07, 2017  8:00 PM CDT   (Arrive by 7:45 PM)   CT CHEST W/O CONTRAST with UCCT2   [facility] [place] [facility] ([facility])    [address]  [address] [facility] [address] [phone]           Please bring any scans or X-rays taken at other hospitals, if similar tests were done. Also bring a list of your medicines, including vitamins, minerals and over-the-counter drugs. It is safest to leave personal items at home.  Be sure to tell your doctor:   If you have any allergies.   If there s any chance you are pregnant.   If you are breastfeeding.   If you have any special needs.  You do not need to do anything special to prepare.  Please wear loose clothing, such as a sweat suit or jogging clothes. Avoid snaps, zippers and other metal. We may ask you to undress and put on a hospital gown.            Sep 08, 2017 11:30 AM CDT   Six Minute Walk with UC PFL 6 MINUTE WALK 1   [facility] Pulmonary Function Testing ([facility])    [address]  [address] [facility] [id number] [phone]            Sep 08, 2017 12:00 PM CDT   (Arrive by 11:45 AM)   Office Visit with Desiree Chavis RPH   [facility] Medication Therapy Management ([facility])    [address]  [address] [facility] [address] [phone]           Bring a current list of meds and any  records pertaining to this visit. For Physicals, please bring immunization records and any forms needing to be filled out. Please arrive 10 minutes early to complete paperwork.            Oct 12, 2017 11:25 AM CDT   (Arrive by 11:10 AM)   Return Visit with Amarilys Iyer MD   [facility] ([facility] [place])    [address]  4th Floor  [facility] [address]   [phone]            Oct 18, 2017  1:00 PM CDT   (Arrive by 12:45 PM)   New Patient Visit with NAKUL Lew CNP   [facility] Rheumatology ([facility] [place])    [address]  3rd Floor  [facility] [address]   [phone]            Nov 30, 2017 12:30 PM CST   (Arrive by 12:15 PM)   Return Visit with Lillian De Luna PA-C   [facility] Dermatology ([facility] [place])    [address]  [address] [facility] [address]   [phone]            Dec 19, 2017 12:45 PM CST   VISUAL FIELD with [facility] EYE VISUAL FIELD   Eye Clinic ([facility])    [address]  [address]  [address]  [facility] [address]   [phone]            Dec 19, 2017  1:15 PM CST   RETURN GLAUCOMA with Corazon Addiosn MD   Eye Clinic ([facility])    [address]  [address]  [address]  [facility] [address]   [phone]              Future tests that were ordered for you today     Open Future Orders        Priority Expected Expires Ordered    6 minute walk test Routine  9/7/2018 9/7/2017            Who to contact     If you have questions or need follow up information about today's clinic visit or your schedule please contact [facility] directly at [phone].  Normal or non-critical lab and imaging results will be communicated to you by MyChart, letter or phone within 4 business days after the clinic has received the results. If you do not hear from us within 7  "days, please contact the clinic through Access Mobilet or phone. If you have a critical or abnormal lab result, we will notify you by phone as soon as possible.  Submit refill requests through [employer] or call your pharmacy and they will forward the refill request to us. Please allow 3 business days for your refill to be completed.          Additional Information About Your Visit        Care EveryWhere ID     This is your Care EveryWhere ID. This could be used by other organizations to access your [facility] medical records  [id number]        Your Vitals Were     Pulse Height Pulse Oximetry BMI (Body Mass Index)          72 1.702 m (5' 7\") 91% 30.54 kg/m2         Blood Pressure from Last 3 Encounters:   09/07/17 152/86   09/01/17 (!) 173/108   08/30/17 155/87    Weight from Last 3 Encounters:   09/07/17 88.5 kg (195 lb)   09/01/17 88.9 kg (196 lb)   07/06/17 88.5 kg (195 lb)               Primary Care Provider Office Phone # Fax #    Amarilys Iyer [phone]       [address] [place] [address]  [facility] [address]        Equal Access to Services     DADA ALMARAZ : Hadii anu jones hadasho Somanpreetali, waaxda luqadaha, qaybta kaalmada adebernieyada, neo otto. So [facility] [phone].    ATENCIÓN: Si habla español, tiene a be disposición servicios gratuitos de asistencia lingüística. [facility] [phone].    We comply with applicable federal civil rights laws and [place] laws. We do not discriminate on the basis of race, color, national origin, age, disability sex, sexual orientation or gender identity.            Thank you!     Thank you for choosing [facility]  for your care. Our goal is always to provide you with excellent care. Hearing back from our patients is one way we can continue to improve our services. Please take a few minutes to complete the written survey that you may receive in the mail after your visit with us. Thank you!           "   Your Updated Medication List - Protect others around you: Learn how to safely use, store and throw away your medicines at www.disposemymeds.org.          This list is accurate as of: 9/7/17 12:17 PM.  Always use your most recent med list.                   Brand Name Dispense Instructions for use Diagnosis    acetaminophen 500 MG tablet    TYLENOL     Take 500-1,000 mg by mouth every 6 hours as needed        amLODIPine 2.5 MG tablet    NORVASC    30 tablet    Take 1 tablet (2.5 mg) by mouth daily    Essential hypertension       ARTIFICIAL TEAR SOLUTION OP      Apply  to eye as needed.        aspirin 325 MG tablet      Take 650 mg by mouth every 6 hours as needed for moderate pain        CALCIUM + D PO      Take 1 tablet by mouth 2 times daily        DAILY MULTIVITAMIN PO      Take 1 tablet by mouth daily And minerals per pt        folic acid 800 MCG Tabs      Take by mouth 2 times daily        GLUCOSAMINE SULFATE PO      Take 1,000 mg by mouth daily        hydrocortisone 1 % ointment     25 g    Apply topically 2 times daily To affected area on right upper cheek    Ulceration (H)       latanoprost 0.005 % ophthalmic solution    XALATAN    1 Bottle    Place 1 drop into both eyes At Bedtime    Open-angle glaucoma of both eyes, severe stage, unspecified open-angle glaucoma type       levothyroxine 88 MCG tablet    SYNTHROID/LEVOTHROID     Take 88 mcg by mouth daily        lisinopril 40 MG tablet    PRINIVIL/ZESTRIL    90 tablet    Take 1 tablet (40 mg) by mouth daily    Essential hypertension       methotrexate 2.5 MG tablet CHEMO     32 tablet    Take 8 tablets (20mg) by mouth once a week Take 8 tablets per week    Rheumatoid arthritis flare (H)       mupirocin 2 % ointment    BACTROBAN    30 g    Apply twice daily to areas on the left ankle.    Pyoderma gangrenosum       omeprazole 20 MG CR capsule    priLOSEC    30 capsule    Take 1 capsule (20 mg) by mouth daily    Rheumatoid arthritis flare (H)       Vitamin D-3  1000 UNITS Caps      Take by mouth 2 times daily

## 2017-09-07 NOTE — PROGRESS NOTES
Reason for Visit  Abida Hahn is a 76 year old year old female who is being seen for evaluation of cough and SOB, particularly upon lying down, by Dr. Nataliia Freitas    Pulmonary HPI    The patient was seen and examined by Mario Marks   I had the pleasure of seeing Ms. Abida Hahn again for follow-up at the [facility] in referral by Dr. Carmel Freitas as above.  She is a very pleasant 76-year-old woman with a longstanding history of rheumatoid arthritis plus hypothyroidism, hypertension, osteoporosis, and glaucoma, sent to evaluate significant shortness of breath and cough.  The patient has been having difficulty taking a deep breath and getting out of breath when lying down, particularly since approximately 11/2016, as well as slight intermittent shortness of breath just while sitting.  She has been on methotrexate for her rheumatoid arthritis for more than 10 years up until approximately 9 months ago when it was stopped for approximately 7 months to assist healing of a chronic L ankle wound.  She has been back on methotrexate again for her rheumatoid arthritis since 02/2017.  She last saw Internal Medicine (Dr Giang) on 09/01/2017.    Today she reports symptoms that are slightly worse than at her initial visit but qualitatively unchanged. She now reports being able to walk for 5-10 min on flat ground, as opposed to 15 min reported at her initial visit. She cannot say whether this is due to increased pulmonary symptoms or deconditioning due to her ankle wound and temporary discontinuation of her methotrexate. A 6min walk test in 2/2017 shows a total distance of 206m with little change in her HR or Ugarte score (3 pre/3 post). She denies fever, myalgia, weight loss, or any other new symptoms.     In terms of pulmonary history, she was hospitalized for 2-3 weeks with pleurisy and bronchitis is a 6-year-old and she says that she remembers being told  "they had to \"pull fluid off\" multiple times.  She was a low-grade smoker, smoking less than a pack per day for 25-30 years from age 20 to approximately age 50.  She has not smoked for 25-30 years.  She has never had pneumonia, wheezing, blood clots or another episode of pleurisy.  She feels her cough has been worse over the last 2-3 weeks.  She did have a significant ankle swelling in 2016 with decreased mobility that led to having a chest PA gram done at [facility] that was negative for pulmonary embolism (by report) and I do not have the formal interpretation from that study, but upon my review it looks like there is some ground-glass changes.      PAST MEDICAL HISTORY:  Notable for shingles, chronic rheumatoid arthritis, glaucoma and eye difficulties, hypothyroidism, hypertension, osteoporosis.      SOCIAL HISTORY:  She is  for more than 30 years and has one daughter and one granddaughter (daughter is local, granddaughter in [place]).      FAMILY HISTORY:  Negative for most lung disease, apart from her father  at age 65.  He was a  at a [employer] plant and was a tinsmith.  Her mother  at age 55 of myocardial infarction and also had rheumatoid arthritis.  She has 2 sisters and 1 brother, and they have some RA.       PULMONARY REVIEW OF SYSTEMS:  Notable for an aunt who had TB, but she does not believe she was exposed to her.  She denies any trauma to the chest, sinus trouble.  Her weight has increased approximately 10 pounds over the last year.  She does snore somewhat and has mild daytime hypersomnolence, falling asleep watching TV shows but not during meals or conversations.         Current Outpatient Prescriptions   Medication     amLODIPine (NORVASC) 2.5 MG tablet     lisinopril (PRINIVIL/ZESTRIL) 40 MG tablet     methotrexate 2.5 MG tablet CHEMO     latanoprost (XALATAN) 0.005 % ophthalmic solution     aspirin 325 MG tablet     omeprazole (PRILOSEC) 20 MG CR " capsule     GLUCOSAMINE SULFATE PO     mupirocin (BACTROBAN) 2 % ointment     hydrocortisone 1 % ointment     folic acid 800 MCG TABS     Cholecalciferol (VITAMIN D-3) 1000 UNITS CAPS     acetaminophen (TYLENOL) 500 MG tablet     levothyroxine (SYNTHROID, LEVOTHROID) 88 MCG tablet     ARTIFICIAL TEAR SOLUTION OP     Calcium-Vitamin D (CALCIUM + D PO)     Multiple Vitamin (DAILY MULTIVITAMIN PO)     No current facility-administered medications for this visit.      Allergies   Allergen Reactions     Ibuprofen Sodium      Penicillins      Triple Antibiotic [Neomycin-Polymyxin-Dexameth]      Aspirin Rash     Codeine Rash     Past Medical History:   Diagnosis Date     Abscess, toe 2009    Right great toe     Cataract 8/2011    Right s/p surgery 8/2011; left s/p surgery     Glaucoma      Headache(784.0)     Relieved with gabapentin. Chronic pain     HTN (hypertension)      RA (rheumatoid arthritis) (H)      Wrist fracture     Right, s/p ORIF       Past Surgical History:   Procedure Laterality Date     BIOPSY ARTERY TEMPORAL       EXTRACAPSULAR CATARACT EXTRATION WITH INTRAOCULAR LENS IMPLANT  8/2011    Left 2 years ago as well     OPEN REDUCTION INTERNAL FIXATION WRIST BILATERAL         Social History     Social History     Marital status: Single     Spouse name: N/A     Number of children: N/A     Years of education: N/A     Occupational History     Not on file.     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 Enjoys reading, friends, children and shopping.        ROS Pulmonary  A complete ROS was otherwise negative except as noted in the HPI.  /86  Pulse 72  Ht 1.702 m (5'  "7\")  Wt 88.5 kg (195 lb)  SpO2 91%  BMI 30.54 kg/m2  Exam:   GENERAL APPEARANCE: Well developed, well nourished, alert, and in no apparent distress. No cough, tachypnea, wheeze  EYES: PERRL, EOMI  HENT: Nasal mucosa with no edema and no hyperemia. No nasal polyps.  EARS: deferred  MOUTH: Oral mucosa is moist, without any lesions, no tonsillar enlargement, no oropharyngeal exudate.  NECK: supple, no masses, no thyromegaly.  LYMPHATICS: No significant axillary, cervical, or supraclavicular nodes.  RESP: normal inspection, palpation, percussion, good air flow throughout.  No crackles. No rhonchi. No wheezes. Subjective inability to take a \"full breath\"  CV: RRR Normal S1, S2, regular rhythm, normal rate. No murmur.  No rub. No gallop. No LE edema.   ABDOMEN:  Soft, Bowel sounds normal, soft, nontender, no HSM or masses.   MS: extremities normal. No clubbing. No cyanosis.  SKIN: no rash on limited exam  NEURO: Mentation intact, speech normal, normal strength and tone, normal gait and stance  PSYCH: mentation appears normal. and affect normal/bright  Supine - no paradoxical abdominal motion and O2 Sat 95% on RA  Results:  Recent Results (from the past 168 hour(s))   Protein  random urine with Creat Ratio    Collection Time: 09/01/17 10:10 AM   Result Value Ref Range    Protein Random Urine <0.05 g/L    Protein Total Urine g/gr Creatinine Unable to calculate due to low value 0 - 0.2 g/g Cr   Creatinine urine calculation only    Collection Time: 09/01/17 10:10 AM   Result Value Ref Range    Creatinine Urine 21 mg/dL   Vitamin B12    Collection Time: 09/01/17 11:03 AM   Result Value Ref Range    Vitamin B12 613 193 - 986 pg/mL   TSH with free T4 reflex    Collection Time: 09/01/17 11:03 AM   Result Value Ref Range    TSH 0.79 0.40 - 4.00 mU/L   Hemoglobin A1c    Collection Time: 09/01/17 11:03 AM   Result Value Ref Range    Hemoglobin A1C 5.6 4.3 - 6.0 %   Lipid panel reflex to direct LDL - -(Today)    Collection Time: " 09/01/17 11:03 AM   Result Value Ref Range    Cholesterol 191 <200 mg/dL    Triglycerides 194 (H) <150 mg/dL    HDL Cholesterol 49 (L) >49 mg/dL    LDL Cholesterol Calculated 103 (H) <100 mg/dL    Non HDL Cholesterol 142 (H) <130 mg/dL   Basic metabolic panel    Collection Time: 09/01/17 11:03 AM   Result Value Ref Range    Sodium 140 133 - 144 mmol/L    Potassium 4.0 3.4 - 5.3 mmol/L    Chloride 105 94 - 109 mmol/L    Carbon Dioxide 28 20 - 32 mmol/L    Anion Gap 8 3 - 14 mmol/L    Glucose 95 70 - 99 mg/dL    Urea Nitrogen 19 7 - 30 mg/dL    Creatinine 0.90 0.52 - 1.04 mg/dL    GFR Estimate 61 >60 mL/min/1.7m2    GFR Estimate If Black 74 >60 mL/min/1.7m2    Calcium 10.1 8.5 - 10.1 mg/dL   Folate    Collection Time: 09/01/17 11:04 AM   Result Value Ref Range    Folate 41.2 >5.4 ng/mL       ASSESSMENT/PLAN:     Ms Freeman had a chest CT scan on 02/16/2017 that was reviewed by Dr. Jeannie Alexis.  It showed numerous scattered small thin-walled cysts associated with pulmonary vessels, consistent with LIP or emphysema related cyst (less likely); centrilobular nodules and tree-in-bud opacities in the right upper lobe consistent with follicular bronchiolitis or infection.  She did not have inspiratory, expiratory views done but on review, there is a suggestion of possible ground-glass, suggesting some small airways air trapping.  The PFTs done on 02/08/2017 show FEV1/FVC of 0.8/1.25 (38/43% predicted) for a ratio of 67%.  The RV TLC shows 2.8/4.2 (121/77% of predicted) for a ratio of 67%.  The DLCO corrected for hemoglobin is 15.8 (76% of predicted).  There are no prior PFTs for comparison.  A 6-minute walk test done on the same day show a mild increase in heart rate from the high 60 low 70 range to the mid 80s, but no desaturation with O2 sat on room air maintained 93% or above virtually all the time.  Her [place] dyspnea and fatigue did not change and post-walk (each 3 throughout).        1.  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  I have little suspicion for methotrexate side effects on the lung; these seem unlikely given the long duration of methotrexate exposure and non-specificity to the findings.  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 Other differentials considered include a chronic subacute atypical mycobacterial infection or small airway bronchiolitis.     PLAN:   1.  Obtain inspiratory and expiratory CT imaging of the lungs to better characterize interstitial lung changes.  2. Considering the duration since her last 6-minute walk test and the few changes since then, we will repeat a 6-minute walk test before her next appointment.  3.  I have spoken to our interstitial lung specialists and will refer her to their clinic for ongoing management. She may be scheduled following the abovementioned chest CT and 6-minute walk test.      Andre Montoya-Barthelemy, MD  9/7/2017   Resident Physician, Occupational Medicine    Attending Attestation  I saw and examined the patient with Dr. Colby confirming pat aspects of the history and exam.  I personally reviewed the recent Xrays and other labs.  The fellow s note reflects our detailed discussion of the findings, assessment and plan.  I reviewed her CT scan and discussed her case with Dr. Nirmala Muñoz, specialist in collagen vascular disease - associated lung problems and she will see Dr Muñoz and ILD group in followup.  The patient was given contact information for them.     If possible obtain sputum for mycobacterial studies.  Bronch not essential at present.  Consider with possible TBBx in future.    I  personally spent 30 minutes of face to face time with the patient.    Mario Marks MD

## 2017-09-08 ENCOUNTER — OFFICE VISIT (OUTPATIENT)
Dept: PHARMACY | Facility: CLINIC | Age: 77
End: 2017-09-08
Payer: COMMERCIAL

## 2017-09-08 VITALS — DIASTOLIC BLOOD PRESSURE: 84 MMHG | HEART RATE: 64 BPM | SYSTOLIC BLOOD PRESSURE: 144 MMHG

## 2017-09-08 DIAGNOSIS — M06.9 RHEUMATOID ARTHRITIS, INVOLVING UNSPECIFIED SITE, UNSPECIFIED RHEUMATOID FACTOR PRESENCE: Primary | ICD-10-CM

## 2017-09-08 DIAGNOSIS — E56.9 VITAMIN DEFICIENCY: ICD-10-CM

## 2017-09-08 DIAGNOSIS — H40.50X0 GLAUCOMA ASSOCIATED WITH ANOMALY OF IRIS: ICD-10-CM

## 2017-09-08 DIAGNOSIS — K21.9 GASTROESOPHAGEAL REFLUX DISEASE WITHOUT ESOPHAGITIS: ICD-10-CM

## 2017-09-08 DIAGNOSIS — M81.0 AGE-RELATED OSTEOPOROSIS WITHOUT CURRENT PATHOLOGICAL FRACTURE: ICD-10-CM

## 2017-09-08 DIAGNOSIS — I10 BENIGN ESSENTIAL HYPERTENSION: Primary | ICD-10-CM

## 2017-09-08 DIAGNOSIS — Q13.2 GLAUCOMA ASSOCIATED WITH ANOMALY OF IRIS: ICD-10-CM

## 2017-09-08 DIAGNOSIS — J84.9 ILD (INTERSTITIAL LUNG DISEASE) (H): Primary | ICD-10-CM

## 2017-09-08 DIAGNOSIS — E03.9 HYPOTHYROIDISM, UNSPECIFIED TYPE: ICD-10-CM

## 2017-09-08 DIAGNOSIS — I10 ESSENTIAL HYPERTENSION: ICD-10-CM

## 2017-09-08 DIAGNOSIS — K21.9 ESOPHAGEAL REFLUX: ICD-10-CM

## 2017-09-08 LAB
6 MIN WALK (FT): 970 FT
6 MIN WALK (M): 296 M

## 2017-09-08 PROCEDURE — 99605 MTMS BY PHARM NP 15 MIN: CPT | Performed by: PHARMACIST

## 2017-09-08 PROCEDURE — 99607 MTMS BY PHARM ADDL 15 MIN: CPT | Performed by: PHARMACIST

## 2017-09-08 RX ORDER — AMLODIPINE BESYLATE 5 MG/1
5 TABLET ORAL DAILY
Qty: 90 TABLET | Refills: 0 | Status: SHIPPED | OUTPATIENT
Start: 2017-09-08 | End: 2017-12-05

## 2017-09-08 NOTE — MR AVS SNAPSHOT
After Visit Summary   9/8/2017    Abida Hahn    MRN: [id number]           Patient Information     Date Of Birth          1940        Visit Information        Provider Department      9/8/2017 12:00 PM [facility]        Today's Diagnoses     Essential hypertension          Care Instructions    Recommendations from today's MTM visit:                                                    MTM (medication therapy management) is a service provided by a clinical pharmacist designed to help you get the most of out of your medicines.   Today we reviewed what your medicines are for, how to know if they are working, that your medicines are safe and how to make your medicine regimen as easy as possible.     1. I have no concerns about using over the counter folic acid/folate, other than the suggestion you take at least 1000mcg (1mg) per day. I would suggest you get a USP certified product to make sure it is potent.     2. Speak with primary care about whether you would like to do a Reclast infusion for your bones.    3. Your calcium goal is 1200mg daily from both diet and supplements.    4. I would support not taking omeprazole if    5. Stop aspirin for 2 weeks, then restart at 81mg daily.    6. Increase your amlodipine to 5mg daily. I sent an order to [facility].    Next MTM visit: October 18th at 12:30pm     To schedule another MTM appointment, please call the clinic directly or you may call the MTM scheduling line at [phone] or toll-free at [phone].     My Clinical Pharmacist's contact information:                                                      It was a pleasure seeing you today!  Please feel free to contact me with any questions or concerns you have.      Desiree Chavis, Raghav  Medication Therapy Management Provider  Phone:[phone]  Pager: [phone]    You may receive a survey about the MTM services you received.  I would  appreciate your feedback to help me serve you better in the future. Please fill it out and return it when you can. Your comments will be anonymous.                  Follow-ups after your visit        Your next 10 appointments already scheduled     Oct 12, 2017 11:25 AM CDT   (Arrive by 11:10 AM)   Return Visit with Amarilys Iyer MD   [facility] ([facility])    [address]  [address] [facility] [address]   [phone]            Oct 18, 2017 12:30 PM CDT   (Arrive by 12:15 PM)   SHORT with Desiree Chavis [facility] Medication Therapy Management ([facility])    [address]  [address] [facility] [address]   [phone]            Oct 18, 2017  1:00 PM CDT   (Arrive by 12:45 PM)   New Patient Visit with NAKUL Lew [facility] ([facility])    [address]  [address] [facility] [address]   [phone]            Nov 30, 2017 12:30 PM CST   (Arrive by 12:15 PM)   Return Visit with Lillian De Luna PA-C   [facility] ([facility])    [address]  [address] [facility] [address]   [phone]            Dec 19, 2017 12:45 PM CST   VISUAL FIELD with [facility] EYE VISUAL FIELD   Eye Clinic ([facility])    [address]  [address]  [address] Clin [address] [place] [address]   [phone]            Dec 19, 2017  1:15 PM CST   RETURN GLAUCOMA with Corazon Addison MD   Eye Clinic ([facility])    [address]  [address]  [address] Clin [address] [place] [address]   [phone]              Future tests that were ordered for you today     Open Future Orders        Priority Expected Expires Ordered    Echocardiogram Routine  9/8/2018 9/8/2017    6 minute walk test Routine  9/7/2018 9/7/2017            Who to contact     If you have questions or need  follow up information about today's clinic visit or your schedule please contact [facility] MEDICATION THERAPY MANAGEMENT directly at [phone].  Normal or non-critical lab and imaging results will be communicated to you by MyChart, letter or phone within 4 business days after the clinic has received the results. If you do not hear from us within 7 days, please contact the clinic through MyChart or phone. If you have a critical or abnormal lab result, we will notify you by phone as soon as possible.  Submit refill requests through [employer] or call your pharmacy and they will forward the refill request to us. Please allow 3 business days for your refill to be completed.          Additional Information About Your Visit        Care EveryWhere ID     This is your Care EveryWhere ID. This could be used by other organizations to access your [place] medical records  [id number]        Your Vitals Were     Pulse                   64            Blood Pressure from Last 3 Encounters:   09/08/17 144/84   09/07/17 152/86   09/01/17 (!) 173/108    Weight from Last 3 Encounters:   09/07/17 195 lb (88.5 kg)   09/01/17 196 lb (88.9 kg)   07/06/17 195 lb (88.5 kg)              Today, you had the following     No orders found for display         Today's Medication Changes          These changes are accurate as of: 9/8/17  1:15 PM.  If you have any questions, ask your nurse or doctor.               These medicines have changed or have updated prescriptions.        Dose/Directions    amLODIPine 5 MG tablet   Commonly known as:  NORVASC   This may have changed:    - medication strength  - how much to take   Used for:  Essential hypertension   Changed by:  Desiree Chavis RP        Dose:  5 mg   Take 1 tablet (5 mg) by mouth daily   Quantity:  90 tablet   Refills:  0       aspirin 81 MG tablet   This may have changed:  Another medication with the same name was removed. Continue taking this medication, and follow the directions  you see here.   Changed by:  Desiree Chavis [facility]        Dose:  81 mg   Take 81 mg by mouth daily   Refills:  0            Where to get your medicines      These medications were sent to [id number] [place] [id number]  [address] [address]    Hours:  24-hours Phone:  [phone]     amLODIPine 5 MG tablet                Primary Care Provider Office Phone # Fax #    Amarilys Iyer [phone]       [address] [place] [address]  [facility] [address]        Equal Access to Services     ANIBAL ALMARAZ : Hadii anu jones hadasho Sochandler, waaxda luqadaha, qaybta kaalmada adeegyada, neo quinn . So [facility] [phone].    ATENCIÓN: Si habla español, tiene a be disposición servicios gratuitos de asistencia lingüística. Llame al [phone].    We comply with applicable federal civil rights laws and [place] laws. We do not discriminate on the basis of race, color, national origin, age, disability sex, sexual orientation or gender identity.            Thank you!     Thank you for choosing [facility] MEDICATION THERAPY MANAGEMENT  for your care. Our goal is always to provide you with excellent care. Hearing back from our patients is one way we can continue to improve our services. Please take a few minutes to complete the written survey that you may receive in the mail after your visit with us. Thank you!             Your Updated Medication List - Protect others around you: Learn how to safely use, store and throw away your medicines at www.disposemymeds.org.          This list is accurate as of: 9/8/17  1:15 PM.  Always use your most recent med list.                   Brand Name Dispense Instructions for use Diagnosis    acetaminophen 500 MG tablet    TYLENOL     Take 500-1,000 mg by mouth every 6 hours as needed        amLODIPine 5 MG tablet    NORVASC    90 tablet    Take 1 tablet (5  mg) by mouth daily    Essential hypertension       ARTIFICIAL TEAR SOLUTION OP      Apply  to eye as needed.        aspirin 81 MG tablet      Take 81 mg by mouth daily        CALCIUM + D PO      Take 1 tablet by mouth 2 times daily        DAILY MULTIVITAMIN PO      Take 1 tablet by mouth daily And minerals per pt        folic acid 800 MCG Tabs      Take by mouth 2 times daily        GLUCOSAMINE SULFATE PO      Take 1,000 mg by mouth daily        latanoprost 0.005 % ophthalmic solution    XALATAN    1 Bottle    Place 1 drop into both eyes At Bedtime    Open-angle glaucoma of both eyes, severe stage, unspecified open-angle glaucoma type       levothyroxine 88 MCG tablet    SYNTHROID/LEVOTHROID     Take 88 mcg by mouth daily        lisinopril 40 MG tablet    PRINIVIL/ZESTRIL    90 tablet    Take 1 tablet (40 mg) by mouth daily    Essential hypertension       methotrexate 2.5 MG tablet CHEMO     32 tablet    Take 8 tablets (20mg) by mouth once a week Take 8 tablets per week    Rheumatoid arthritis flare (H)       Vitamin D-3 1000 UNITS Caps      Take by mouth 2 times daily

## 2017-09-08 NOTE — LETTER
"     [facility] MEDICATION THERAPY MANAGEMENT     Date: 2017    Abida Hahn  [address]  [facility] [address]    Dear Ms. Hahn,    Thank you for talking with me on 17 about your health and medications. Medicare s MTM (Medication Therapy Management) program helps you understand your medications and use them safely.      This letter includes an action plan (Medication Action Plan) and medication list (Personal Medication List). The action plan has steps you should take to help you get the best results from your medications. The medication list will help you keep track of your medications and how to use them the right way.       Have your action plan and medication list with you when you talk with your doctors, pharmacists, and other healthcare providers in your care team.     Ask your doctors, pharmacists, and other healthcare providers to update the action plan and medication list at every visit.     Take your medication list with you if you go to the hospital or emergency room.     Give a copy of the action plan and medication list to your family or caregivers.     If you want to talk about this letter or any of the papers with it, please call   [phone].   We look forward to working with you, your doctors, and other healthcare providers to help you stay healthy.     Sincerely,    Desiree Chavis      Enclosed: Medication Action Plan and Personal Medication List    MEDICATION ACTION PLAN FOR Abida Hahn,  1940     This action plan will help you get the best results from your medications if you:   1. Read \"What we talked about.\"   2. Take the steps listed in the \"What I need to do\" boxes.   3. Fill in \"What I did and when I did it.\"   4. Fill in \"My follow-up plan\" and \"Questions I want to ask.\"     Have this action plan with you when you talk with your doctors, pharmacists, and other healthcare providers in your care team. Share this with your family or caregivers " too.  DATE PREPARED: 2017  What we talked about: Your calcium goal is 1200mg daily from diet and supplements.                                                What I need to do: Check your multivitamin and calcium bottle at home and make sure you are not getting too much or too little supplemental calcium.     What I did and when I did it:                                              What we talked about: Your blood pressure was high on two out of three checks today.                                             What I need to do: Increase amlodipine to 5mg daily.       What I did and when I did it:                                              What we talked about: Your aspirin is likely one of the primary causes of your stomach issues.                                                  What I need to do: Stop taking aspirin for two weeks, then restart at 81mg daily. Do not take extra for pain.       What I did and when I did it:                                                My follow-up plan:                 Questions I want to ask:              If you have any questions about your action plan, call Desiree Chavis, Phone: [phone] , Monday-Friday 8-4:30pm.           MEDICATION LIST FOR Abida HahnGREG 1940     This medication list was made for you after we talked. We also used information from your doctor's chart.      Use blank rows to add new medications. Then fill in the dates you started using them.    Cross out medications when you no longer use them. Then write the date and why you stopped using them.    Ask your doctors, pharmacists, and other healthcare providers to update this list at every visit. Keep this list up-to-date with:       Prescription medications    Over the counter drugs     Herbals    Vitamins    Minerals      If you go to the hospital or emergency room, take this list with you. Share this with your family or caregivers too.     DATE PREPARED: 2017  Allergies or side  effects: Ibuprofen sodium; Penicillins; Triple antibiotic [neomycin-polymyxin-dexameth]; Aspirin; and Codeine     Medication:  ACETAMINOPHEN 500 MG PO TABS      How I use it:  Take 500-1,000 mg by mouth every 6 hours as needed      Why I use it:  Pain    Prescriber:  Patient Reported      Date I started using it:       Date I stopped using it:         Why I stopped using it:            Medication:  AMLODIPINE BESYLATE 5 MG PO TABS      How I use it:  Take 1 tablet (5 mg) by mouth daily      Why I use it: Essential hypertension    Prescriber:  Amarilys Iyer MD      Date I started using it:       Date I stopped using it:         Why I stopped using it:            Medication:  ARTIFICIAL TEAR SOLUTION OP      How I use it:  Apply  to eye as needed.      Why I use it:  Dry eye    Prescriber:  Patient Reported      Date I started using it:       Date I stopped using it:         Why I stopped using it:            Medication:  ASPIRIN 81 MG PO TABS      How I use it:  Take 81 mg by mouth daily      Why I use it:  Heart Health    Prescriber:  Patient Reported      Date I started using it:       Date I stopped using it:         Why I stopped using it:            Medication:  CALCIUM + D PO      How I use it:  Take 1 tablet by mouth 2 times daily      Why I use it:  Bone Health    Prescriber:  Patient Reported      Date I started using it:       Date I stopped using it:         Why I stopped using it:            Medication:  DAILY MULTIVITAMIN PO      How I use it:  Take 1 tablet by mouth daily And minerals per pt      Why I use it: General Health    Prescriber:  Patient Reported      Date I started using it:       Date I stopped using it:         Why I stopped using it:            Medication:  FOLIC ACID 800 MCG PO TABS      How I use it:  Take by mouth 2 times daily      Why I use it:  Prevent Methotrexate Toxicity    Prescriber:  Patient Reported      Date I started using it:       Date I stopped using it:          Why I stopped using it:            Medication:  GLUCOSAMINE SULFATE PO      How I use it:  Take 1,000 mg by mouth daily      Why I use it:  Joint Pain    Prescriber:  Patient Reported      Date I started using it:       Date I stopped using it:         Why I stopped using it:            Medication:  LATANOPROST 0.005 % OP SOLN      How I use it:  Place 1 drop into both eyes At Bedtime      Why I use it: Open-angle glaucoma of both eyes, severe stage, unspecified open-angle glaucoma type    Prescriber:  Corazon Addison MD      Date I started using it:       Date I stopped using it:         Why I stopped using it:            Medication:  LEVOTHYROXINE SODIUM 88 MCG PO TABS      How I use it:  Take 88 mcg by mouth daily      Why I use it:  Thyroid    Prescriber:  Patient Reported      Date I started using it:       Date I stopped using it:         Why I stopped using it:            Medication:  LISINOPRIL 40 MG PO TABS      How I use it:  Take 1 tablet (40 mg) by mouth daily      Why I use it: Essential hypertension    Prescriber:  Bebeto Mccurdy MD      Date I started using it:       Date I stopped using it:         Why I stopped using it:            Medication:  METHOTREXATE 2.5 MG PO TABS      How I use it:  Take 8 tablets (20mg) by mouth once a week Take 8 tablets per week      Why I use it: Rheumatoid arthritis flare (H)    Prescriber:  Bebeto Mccurdy MD      Date I started using it:       Date I stopped using it:         Why I stopped using it:            Medication:  VITAMIN D-3 1000 UNITS PO CAPS      How I use it:  Take by mouth 2 times daily      Why I use it:  Bone Health    Prescriber:  Patient Reported      Date I started using it:       Date I stopped using it:         Why I stopped using it:            Medication:         How I use it:         Why I use it:      Prescriber:         Date I started using it:       Date I stopped using it:         Why I stopped using it:            Medication:          How I use it:         Why I use it:      Prescriber:         Date I started using it:       Date I stopped using it:         Why I stopped using it:            Medication:         How I use it:         Why I use it:      Prescriber:         Date I started using it:       Date I stopped using it:         Why I stopped using it:              Other Information:     If you have any questions about your action plan, call [phone].    According to the Paperwork Reduction Act of 1995, no persons are required to respond to a collection of information unless it displays a valid OMB control number. The valid OMB number for this information collection is [id number]. The time required to complete this information collection is estimated to average 40 minutes per response, including the time to review instructions, searching existing data resources, gather the data needed, and complete and review the information collection. If you have any comments concerning the accuracy of the time estimate(s) or suggestions for improving this form, please write to: CMS, Attn: MELY Reports Clearance Officer, [address] [place] [id number].

## 2017-09-08 NOTE — PROGRESS NOTES
"SUBJECTIVE/OBJECTIVE:                           Abida Hahn is a 77 year old female coming in for an initial visit for Medication Therapy Management.  She was referred to me from Dr. Giang.       Chief Complaint: Upset stomach.  Personal Healthcare Goals: Getting BP under control     Allergies/ADRs: Reviewed in Epic  Tobacco: History of tobacco dependence - quit 7/6/1992    Alcohol: none  Caffeine: 1 cups/day of coffee  Activity: Tries to walk outside her senior living complex    PMH: Reviewed in Epic    Medication Adherence: no issues reported    Hypertension: Current medications include lisinopril 40mg daily, amlodipine 2.5mg daily started last week by Dr. Giang.  Patient does not self-monitor BP.  Patient reports no current medication side effects.    RA and Related Pain: Pt is taking MTX 20mg weekly (barely tolerable so she splits up the tablets over 24h), folate 800mcg daily (reduced dose recently because she heard it could upset her stomach). Pt has been getting folate OTC because it is cheaper and is wondering if this is okay.   Pt is taking glucosamine 1000mg daily and feels it is beneficial (notices a difference without it).  Pt used to alternate APAP and aspirin for pain while she was off of MTX but now needs it less and still takes aspirin 325mg daily \"because I like aspirin\". Admits she would be willing to stop it if needed.    GERD: Current medications include: Potentially Robitussin DM (she is not sure, says a cough syrup recommended by a physician). Patient feels that current regimen is effective. Pt was prescribed omeprazole but is not taking it.    Glaucoma: Pt is currently using Xalatan as directed which causes \"gravelly\" sensation in her eyes occasionally, for which she uses artificial tears.    Osteoporosis: Current therapy includes: Calcium/Vitamin D and Vitamin D supplements: 2000 IU. Pt is not experiencing side effects. Fosamax was D/C'd 7/2017 because pt was complaining of irritation " to the stomach and was wondering if she should restart it.  Pt is getting the following sources of dietary calcium: Pt is lactose intolerant, does not tolerate cruciferous vegetables  Last vitamin D level: 3/11/2009 53 ug/dL  DEXA History: 2/8/2017 - lowest T score -3.7  Risk factors: post-menopausal     Hypothyroidism: Pt takes levothyroxine 88mcg daily without complaint of issues, aside from some annoyance with morning dosing (still willing to take it at that time).     Other Supplements: Multivitamin daily without complaint of issues.    Current labs include:  BP Readings from Last 3 Encounters:   09/08/17 144/84   09/07/17 152/86   09/01/17 (!) 173/108     Today's Vitals: /84  Pulse 64 first reading was 157/113  Lab Results   Component Value Date    A1C 5.6 09/01/2017   .  Lab Results   Component Value Date    CHOL 191 09/01/2017     Lab Results   Component Value Date    TRIG 194 09/01/2017     Lab Results   Component Value Date    HDL 49 09/01/2017     Lab Results   Component Value Date     09/01/2017       Liver Function Studies -   Recent Labs   Lab Test  08/30/17   1401   04/06/17   1442   PROTTOTAL   --    --   7.5   ALBUMIN   --    --   3.9   BILITOTAL   --    --   0.4   ALKPHOS   --    --   133   AST  16   < >  17   ALT  23   < >  25    < > = values in this interval not displayed.       Lab Results   Component Value Date    UCRR 21 09/01/2017       Last Basic Metabolic Panel:  Lab Results   Component Value Date     09/01/2017      Lab Results   Component Value Date    POTASSIUM 4.0 09/01/2017     Lab Results   Component Value Date    CHLORIDE 105 09/01/2017     Lab Results   Component Value Date    BUN 19 09/01/2017     Lab Results   Component Value Date    CR 0.90 09/01/2017     GFR Estimate   Date Value Ref Range Status   09/01/2017 61 >60 mL/min/1.7m2 Final     Comment:     Non  GFR Calc   08/30/2017 58 (L) >60 mL/min/1.7m2 Final     Comment:     Non   GFR Calc   05/11/2017 79 >60 mL/min/1.7m2 Final     Comment:     Non  GFR Calc     GFR Estimate If Black   Date Value Ref Range Status   09/01/2017 74 >60 mL/min/1.7m2 Final     Comment:      GFR Calc   08/30/2017 70 >60 mL/min/1.7m2 Final     Comment:      GFR Calc   05/11/2017 >90   GFR Calc   >60 mL/min/1.7m2 Final     TSH   Date Value Ref Range Status   09/01/2017 0.79 0.40 - 4.00 mU/L Final   ]    Most Recent Immunizations   Administered Date(s) Administered     Influenza (High Dose) 3 valent vaccine 09/27/2015     Influenza (IIV3) 01/01/2016     Pneumococcal (PCV 13) 10/05/2015     Pneumococcal 23 valent 11/21/2006     TD (ADULT, 7+) 06/01/2006     TDAP Vaccine (Boostrix) 11/01/2007     Zoster vaccine, live 10/17/2011       ASSESSMENT:                             Current medications were reviewed today. Medicare Part D topics discussed:OTC products, Medication safety and Medication changes    Medication Adherence: no issues identified    Hypertension: Need improvement - Pt's BP was at goal today, but it was on the high end of goal and first reading was extremely high. Pt would benefit from an increase in the amlodipine dose. Pt agrees with this plan.     RA and Related Pain: Need improvement - Pt would benefit from not using Aspirin 325mg for pain as there's a high risk of a GI bleed. Discussed with pt that ASA at such a dose is most likely the cause of her stomach upset. However, ASA 81mg will be beneficial for heart health. Discussed stopping ASA 325mg and then restarting 81mg once stomach lining has time to heal.   Pt would also benefit from either obtaining an Rx for folic acid or buying OTC folic acid that's USP certified to ensure potency. Clarified with pt that folic acid is unlikely to cause stomach upset.     GERD: Unable to assess current therapy with limited information. Will address at next visit once pt has been off high-dose ASA.      Glaucoma: Stable - artificial tears is adequately helping in alleviating the gravelly feeling from latanoprost usage.      Osteoporosis: Need improvement - Pt would benefit from obtaining 1200mg calcium per day from both diet and supplements. She would also benefit from either Prolia subQ or IV Reclast therapy as oral therapy is intolerable (Reclast first line, Prolia second line).    Hypothyroidism: Stable - TSH levels WNL. Pt tolerating levothyroxine well.     Other Supplements: Stable      PLAN:                            1.  Pt to take at least 1000mcg (1mg) of folic acid per day and to get a USP certified product if possible to make sure it is potent (likely taking between supplement and MVI).     2. Pt to speak with primary care about possible Reclast or Prolia start.    3. Pt to try to take in 1200mg daily of calcium from both diet and supplements.    4. Pt to stop aspirin for 2 weeks, then restart at 81mg daily.    5. Pt to increase amlodipine to 5mg daily. I sent an order to [facility].    I spent 70 minutes with this patient today (an extra 15 minutes was spent creating the Medication Action Plan). All changes were made via collaborative practice agreement with Amarilys Iyer. A copy of the visit note was provided to the patient's primary care provider.    Will follow up on October 18th at 12:30pm in clinic.    The patient was given a summary of these recommendations as an after visit summary.     Tammy Moran, 4th Year Pharmacy Student     Desiree Chavis, PharmD  Medication Therapy Management Provider  Phone:[phone]  Pager: [phone]

## 2017-09-08 NOTE — PATIENT INSTRUCTIONS
Recommendations from today's MTM visit:                                                    MTM (medication therapy management) is a service provided by a clinical pharmacist designed to help you get the most of out of your medicines.   Today we reviewed what your medicines are for, how to know if they are working, that your medicines are safe and how to make your medicine regimen as easy as possible.     1. I have no concerns about using over the counter folic acid/folate, other than the suggestion you take at least 1000mcg (1mg) per day. I would suggest you get a USP certified product to make sure it is potent.     2. Speak with primary care about whether you would like to do a Reclast infusion for your bones.    3. Your calcium goal is 1200mg daily from both diet and supplements.    4. I would support not taking omeprazole.    5. Stop aspirin for 2 weeks, then restart at 81mg daily.    6. Increase your amlodipine to 5mg daily. I sent an order to [facility].    Next MTM visit: October 18th at 12:30pm     To schedule another MTM appointment, please call the clinic directly or you may call the MTM scheduling line at [phone] or toll-free at [phone].     My Clinical Pharmacist's contact information:                                                      It was a pleasure seeing you today!  Please feel free to contact me with any questions or concerns you have.      Desiree Chavis, Raghav  Medication Therapy Management Provider  Phone:[phone]  Pager: [phone]    You may receive a survey about the MTM services you received.  I would appreciate your feedback to help me serve you better in the future. Please fill it out and return it when you can. Your comments will be anonymous.

## 2017-09-12 ENCOUNTER — TELEPHONE (OUTPATIENT)
Dept: INTERNAL MEDICINE | Facility: CLINIC | Age: 77
End: 2017-09-12

## 2017-09-12 NOTE — TELEPHONE ENCOUNTER
Notes Recorded by Zeinab Giang MD on 9/8/2017 at 1:02 PM  Your kidney ultrasound did not show any problems with narrowing of the arteries.  Your kidneys themselves look fine.  The radiologist did see something that might suggest a possible heart related problem with circulation to the kidneys.  I would like you to get an echocardiogram.  We will contact you to arrange for an appointment.  Dr. Padmaja Teixeira, please help coordinate an appointment for an echo.  Thank you.  SB        Spoke to pt and transferred pt to make the ECHO cardiogram. All questions answered at this time.  Lluvia García RN 11:12 AM on 9/12/2017.

## 2017-09-13 LAB — FIO2-PRE: 21 %

## 2017-09-18 ENCOUNTER — RADIANT APPOINTMENT (OUTPATIENT)
Dept: CARDIOLOGY | Facility: CLINIC | Age: 77
End: 2017-09-18
Attending: INTERNAL MEDICINE

## 2017-09-18 DIAGNOSIS — I10 BENIGN ESSENTIAL HYPERTENSION: ICD-10-CM

## 2017-09-18 DIAGNOSIS — J84.9 ILD (INTERSTITIAL LUNG DISEASE) (H): Primary | ICD-10-CM

## 2017-09-18 RX ADMIN — Medication 3 ML: at 15:00

## 2017-10-12 ENCOUNTER — OFFICE VISIT (OUTPATIENT)
Dept: INTERNAL MEDICINE | Facility: CLINIC | Age: 77
End: 2017-10-12

## 2017-10-12 VITALS
SYSTOLIC BLOOD PRESSURE: 129 MMHG | OXYGEN SATURATION: 91 % | HEART RATE: 75 BPM | RESPIRATION RATE: 20 BRPM | DIASTOLIC BLOOD PRESSURE: 78 MMHG

## 2017-10-12 DIAGNOSIS — Z23 NEED FOR PROPHYLACTIC VACCINATION WITH TETANUS-DIPHTHERIA (TD): ICD-10-CM

## 2017-10-12 DIAGNOSIS — R60.9 EDEMA, UNSPECIFIED TYPE: ICD-10-CM

## 2017-10-12 DIAGNOSIS — Z23 NEED FOR PROPHYLACTIC VACCINATION AND INOCULATION AGAINST INFLUENZA: ICD-10-CM

## 2017-10-12 DIAGNOSIS — K29.70 GASTRITIS WITHOUT BLEEDING, UNSPECIFIED CHRONICITY, UNSPECIFIED GASTRITIS TYPE: Primary | ICD-10-CM

## 2017-10-12 RX ORDER — OMEPRAZOLE 40 MG/1
40 CAPSULE, DELAYED RELEASE ORAL DAILY
Qty: 90 CAPSULE | Refills: 0 | Status: SHIPPED | OUTPATIENT
Start: 2017-10-12 | End: 2018-01-08

## 2017-10-12 ASSESSMENT — PAIN SCALES - GENERAL: PAINLEVEL: MILD PAIN (3)

## 2017-10-12 NOTE — PATIENT INSTRUCTIONS
[facility] Phone Number [phone]  [facility] Medication Refill Request Information:  * Please contact your pharmacy regarding ANY request for medication refills.  ** [facility] Prescription Fax = [phone]  * Please allow 3 business days for routine medication refills.  * Please allow 5 business days for controlled substance medication refills.     [facility] Test Result notification information:  *You will be notified with in 7-10 days of your appointment day regarding the results of your test.  If you are on MyChart you will be notified as soon as the provider has reviewed the results and signed off on them.

## 2017-10-12 NOTE — PROGRESS NOTES
Subjective:     Abida Hahn is a 77 year old female with history of hypothyroidism, hypertension, rheumatoid arthritis who is being seen today in clinic for chief complaint of abdominal pain and follow up for pyoderma gangrenosum and recent spirometry results. In brief, patient has long standing rheumatoid arthritis diagnosed 15 years ago which she takes methotrexate for. She took herself off of it for 7 mo last year following issues regarding pyoderma gangrenosum which she sees dermatology for and is healing well with curettage/kenolog injections. She reports that abdominal pain developed in the past year and has not gotten worse. Reports colicky pain that is worse immediately after eating and better in the morning. States vegetables make it worse, though patient does not eat a lot of vegetables normally. Of note patient has unintentionally lost about 10 lbs over this year. Denies any history of anemia, fatigue, malaise, fevers, chills, N/V/D, stool changes (consistency, size, bloody, tarry). Last colonoscopy revealed benign polyps and diverticulosis. No history of cholestasis, gallstones, pancreatitis, and does not smoke or drink. Does not endorse reflux symptoms or bitter/acidic taste in back of mouth. Until last month she had taken full dose aspirin and stopped per pharmacy consult.  In regards to her lung history, patient was seen by Dr. Marks 9/7 for SOB and is being worked up for possible ILD/lymphocytic interstitial pneumonitis. She had an abnormal chest CT as well as PFTs consistent with a restrictive lung disease pattern. She will see Dr. Muñoz in pulmonology in 2 weeks for further evaluation. Today she reports similar symptoms of SOB and exercise intolerance though she denies any chest pain or syncope.   Hypertension is well controlled on current regimen of lisinopril and amlodipine which was increased to 5 mg in September, patient reports pressures have been in the mid to low 130's at home. Of  note, renal U/S 9/7 did not show evidence for ORIANA.   ROS: 4 point ROS neg other than the symptoms noted above in the HPI.       Past Medical History:     Past Medical History:   Diagnosis Date     Abscess, toe 2009    Right great toe     Cataract 8/2011    Right s/p surgery 8/2011; left s/p surgery     Glaucoma      Headache(784.0)     Relieved with gabapentin. Chronic pain     HTN (hypertension)      RA (rheumatoid arthritis) (H)      Wrist fracture     Right, s/p ORIF            Past Surgical History:     Past Surgical History:   Procedure Laterality Date     BIOPSY ARTERY TEMPORAL       EXTRACAPSULAR CATARACT EXTRATION WITH INTRAOCULAR LENS IMPLANT  8/2011    Left 2 years ago as well     OPEN REDUCTION INTERNAL FIXATION WRIST BILATERAL              Family History:     Family History   Problem Relation Age of Onset     CANCER Father      colon     Arthritis Mother      RA at 31 y/o, glaucome     Glaucoma Mother      Arthritis Brother      RA on Mtx and humira     Melanoma No family hx of      Skin Cancer No family hx of             Social History:     Social History   Substance Use Topics     Smoking status: Former Smoker     Packs/day: 0.50     Quit date: 7/6/1992     Smokeless tobacco: Never Used      Comment: Quit in 20yrs      Alcohol use No     History   Sexual Activity     Sexual activity: Not on file            Current Medications:     Current Outpatient Prescriptions on File Prior to Visit:  aspirin 81 MG tablet Take 81 mg by mouth daily   amLODIPine (NORVASC) 5 MG tablet Take 1 tablet (5 mg) by mouth daily   lisinopril (PRINIVIL/ZESTRIL) 40 MG tablet Take 1 tablet (40 mg) by mouth daily   methotrexate 2.5 MG tablet CHEMO Take 8 tablets (20mg) by mouth once a week Take 8 tablets per week   latanoprost (XALATAN) 0.005 % ophthalmic solution Place 1 drop into both eyes At Bedtime   GLUCOSAMINE SULFATE PO Take 1,000 mg by mouth daily   folic acid 800 MCG TABS Take by mouth 2 times daily   Cholecalciferol  (VITAMIN D-3) 1000 UNITS CAPS Take by mouth 2 times daily   acetaminophen (TYLENOL) 500 MG tablet Take 500-1,000 mg by mouth every 6 hours as needed   levothyroxine (SYNTHROID, LEVOTHROID) 88 MCG tablet Take 88 mcg by mouth daily   ARTIFICIAL TEAR SOLUTION OP Apply  to eye as needed.   Calcium-Vitamin D (CALCIUM + D PO) Take 1 tablet by mouth 2 times daily   Multiple Vitamin (DAILY MULTIVITAMIN PO) Take 1 tablet by mouth daily And minerals per pt     No current facility-administered medications on file prior to visit.          Allergies:     Allergies   Allergen Reactions     Ibuprofen Sodium      Penicillins      Triple Antibiotic [Neomycin-Polymyxin-Dexameth]      Aspirin Rash     Codeine Rash            Physical Exam:   Vitals were reviewed  Patient Vitals for the past 24 hrs:   BP Pulse Resp SpO2   10/12/17 1133 129/78 75 20 91 %     Constitutional: no distress, comfortable, pleasant   Eyes: anicteric, normal extra-ocular movements   Ears, Nose and Throat: throat clear, neck supple with full range of motion, no thyromegaly.   Cardiovascular: regular rate and rhythm, normal S1 and S2, no murmurs, rubs or gallops, peripheral pulses full and symmetric   Respiratory: Dry rales auscultated in lung lobes bilaterally, no rhonchi  Gastrointestinal: Diffuse tenderness noted, with some worsening in the LLQ, RUQ, and epigastric region, no guarding or rebound tenderness, positive bowel sounds, no hepatosplenomegaly, no masses   Musculoskeletal: full range of motion, 2+ edema in legs, L worse vs R but not red, swollen, or tender with palpation of calf, ulnar deviation of bilat hands with boutonierre and swan neck deformities  Skin: Left ankle with several deep dermal wounds that are not red, erythematous, swollen, tender, or oozing--well epithelialized. Not painful, no jaundice   Neurological: normal strength and sensation, normal gait, no tremor   Psychological: appropriate mood   Lymphatic: no cervical, axillary or  inguinal lymphadenopathy        Assessment/Plan   Abida Hahn is a 77 year old female with history of hypothyroidism, hypertension, rheumatoid arthritis who is being seen today in clinic for chief complaint of abdominal pain and follow up for pyoderma gangrenosum and recent spirometry results.  1. Gastritis: Likely 2/2 to aspirin use and methotrexate, now resolving. Consistent given immediate onset after eating and improvement in morning. Pt has lost some weight since beginning of year but no other alarm symtpoms. Other less likely etiologies include malignancy, duodenal ulcer, diverticulitis, cholecystitis, pancreatitis given physical exam findings, and clinical presentation such as stool changes and systemic symptoms. Reflux symptoms not present. Vitamin B12 and folate levels also normal.  - Will start trial of omeprazole with follow up in 3-4 weeks if symptoms do not improve    2. Asymmetric leg swelling: Patient does not have history of DVT or PE and no clinical symptoms of chest pain.   - U/S of left leg to check for DVT    3. Hypertension: Well controlled with current regimen of lisinopril and amlodipine with systolic pressures in the 130s and 129 today. No plans to change today.    4. Pyoderma gangrenosum: Well appearing on today's visit without evidence for active infection or pain. Well managed with kenalog injections and curettage by dermatology with patient follow up as necessary.    5. 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 Etiologies include methotrexate though patient has been on it for a while and PFTs consistent with a restrictive lung disease pattern  -Follow up with pulmonology in 2 weeks with scheduled repeat PFT prior to visit with Dr. Muñoz.    6. Health maintenance  -Flu shot     BRAYDON Goldman acting as scribe     I spent a total of 25 minutes face-to-face with the patient during today's  office visit.  Over 50% of this time was spent counseling the patient and/or coordinating care regarding gastritis, medications, weight management, spirometry, hypertension, and rheumatoid arthritis. See note for details.      Attending Addendum:  The medical student acted as scribe.  The patient was seen and examined with medical student.  The history and physical were independently verified by myself.  The above documentation represents our joint assessment and plan.  Amarilys Iyer MD  Internal Medicine    40 min spent face to face, of which >50% time spent on counseling/coordinating care exclusive of any procedure time

## 2017-10-12 NOTE — MR AVS SNAPSHOT
After Visit Summary   10/12/2017    Abida Hahn    MRN: [id number]           Patient Information     Date Of Birth          1940        Visit Information        Provider Department      10/12/2017 11:25 AM Amarilys Iyer MD [facility]        Today's Diagnoses     Gastritis without bleeding, unspecified chronicity, unspecified gastritis type    -  1    Need for prophylactic vaccination and inoculation against influenza        Need for prophylactic vaccination with tetanus-diphtheria (TD)        Edema, unspecified type          Care Instructions    Primary Care Center Phone Number [phone]  Primary Care Center Medication Refill Request Information:  * Please contact your pharmacy regarding ANY request for medication refills.  ** [facility] Prescription Fax = [phone]  * Please allow 3 business days for routine medication refills.  * Please allow 5 business days for controlled substance medication refills.     Primary Care Center Test Result notification information:  *You will be notified with in 7-10 days of your appointment day regarding the results of your test.  If you are on MyChart you will be notified as soon as the provider has reviewed the results and signed off on them.                  Follow-ups after your visit        Follow-up notes from your care team     Return in about 6 weeks (around 11/23/2017) for Routine Visit.      Your next 10 appointments already scheduled     Oct 18, 2017 11:30 AM CDT   US VENOUS with [facility] [address] [facility]  ([facility])    [address] [place] [address] [phone]           Please bring a list of your medicines (including vitamins, minerals and over-the-counter drugs). Also, tell your doctor about any allergies you may have. Wear comfortable clothes and leave your valuables at home.  You do not need to do anything special to prepare for your exam.  Please call  the Imaging Department at your exam site with any questions.            Oct 18, 2017 12:30 PM CDT   (Arrive by 12:15 PM)   SHORT with Desiree Chavis RPH   [facility] Medication Therapy Management ([facility])    [address] [place] [address]   [phone]            Oct 18, 2017  1:00 PM CDT   (Arrive by 12:45 PM)   New Patient Visit with NAKUL Lew CNP   [facility] Rheumatology ([facility])    [address] [place] [address]   [phone]            Oct 27, 2017 12:00 PM CDT   FULL PULMONARY FUNCTION with  PF CRYS   [facility] Pulmonary Function Testing ([facility])    [address] [place] [address]   [phone]            Oct 27, 2017  1:00 PM CDT   (Arrive by 12:45 PM)   New Interstitial Lung with Nirmala Muñoz MD   [facility] ([facility])    [address] [place] [address]   [phone]            Nov 30, 2017 10:55 AM CST   (Arrive by 10:40 AM)   Return Visit with Amarilys Iyer MD   [facility] ([facility])    [address] [place] [address]   [phone]            Nov 30, 2017 12:30 PM CST   (Arrive by 12:15 PM)   Return Visit with Lillian De Luna PA-C   [facility] Dermatology ([facility])    [address] [place] [address]   [phone]            Dec 19, 2017 12:45 PM CST   [facility]   Eye Clinic ([facility])    [address]  [address]  [address]  [facility] [address]   [phone]            Dec 19, 2017  1:15 PM CST   RETURN GLAUCOMA with Corazon Addison MD   Eye Clinic ([facility])    [address]  [address]  [address]  [address]  [facility] [address]   [phone]              Future tests that were ordered for you today     Open Future Orders        Priority Expected Expires Ordered    US Upper Extremity Venous Duplex Left Routine  10/12/2018 10/12/2017            Who to contact     Please call your clinic at [phone] to:    Ask questions about your health    Make or cancel appointments    Discuss your medicines    Learn about your test results    Speak to your doctor   If you have compliments or concerns about an experience at your clinic, or if you wish to file a complaint, please contact [facility] Patient Relations at [phone] or email us at [email or web address]         Additional Information About Your Visit        Care EveryWhere ID     This is your Care EveryWhere ID. This could be used by other organizations to access your [place] medical records  [id number]        Your Vitals Were     Pulse Respirations Pulse Oximetry Breastfeeding?          75 20 91% No         Blood Pressure from Last 3 Encounters:   10/12/17 129/78   09/08/17 144/84   09/07/17 152/86    Weight from Last 3 Encounters:   09/07/17 88.5 kg (195 lb)   09/01/17 88.9 kg (196 lb)   07/06/17 88.5 kg (195 lb)              We Performed the Following     FLU VACCINE, INCREASED ANTIGEN, PRESV FREE, AGE 65+ [[address]35]          Today's Medication Changes          These changes are accurate as of: 10/12/17  1:01 PM.  If you have any questions, ask your nurse or doctor.               Start taking these medicines.        Dose/Directions    omeprazole 40 MG capsule   Commonly known as:  priLOSEC   Used for:  Gastritis without bleeding, unspecified chronicity, unspecified gastritis type   Started by:  Amarilys Iyer MD        Dose:  40 mg   Take 1 capsule (40 mg) by mouth daily IN AM before breakfast   Quantity:  90 capsule   Refills:  0            Where to get your medicines      These medications [facility]  [facility] [place], [place] - [address] AT [facility] [address]  [address], [facility] [address]    Hours:  24-hours Phone:  [phone]     omeprazole 40 MG capsule                Primary Care Provider Office Phone # Fax #    [place] Iyer [phone]       [address] [place] [address]  [facility] [address]        Equal Access to Services     DADA ALMARAZ : Hadii aad ku hadasho Soomaali, waaxda luqadaha, qaybta kaalmada adeegyada, waxay idiin hayaan adeeg kharash la'isidoro . So [facility] [phone].    ATENCIÓN: Si habla español, tiene a be disposición servicios gratuitos de asistencia lingüística. [facility] [phone].    We comply with applicable federal civil rights laws and [place] laws. We do not discriminate on the basis of race, color, national origin, age, disability, sex, sexual orientation, or gender identity.            Thank you!     Thank you for choosing [facility]  for your care. Our goal is always to provide you with excellent care. Hearing back from our patients is one way we can continue to improve our services. Please take a few minutes to complete the written survey that you may receive in the mail after your visit with us. Thank you!             Your Updated Medication List - Protect others around you: Learn how to safely use, store and throw away your medicines at www.disposemymeds.org.          This list is accurate as of: 10/12/17  1:01 PM.  Always use your most recent med list.                   Brand Name Dispense Instructions for use Diagnosis    acetaminophen 500 MG tablet    TYLENOL     Take 500-1,000 mg by mouth every 6 hours as needed        amLODIPine 5 MG tablet    NORVASC    90 tablet    Take 1 tablet (5 mg) by mouth daily    Essential hypertension       ARTIFICIAL TEAR SOLUTION OP      Apply  to eye as needed.        aspirin 81 MG tablet      Take 81 mg by mouth daily        CALCIUM + D PO      Take  1 tablet by mouth 2 times daily        DAILY MULTIVITAMIN PO      Take 1 tablet by mouth daily And minerals per pt        folic acid 800 MCG Tabs      Take by mouth 2 times daily        GLUCOSAMINE SULFATE PO      Take 1,000 mg by mouth daily        latanoprost 0.005 % ophthalmic solution    XALATAN    1 Bottle    Place 1 drop into both eyes At Bedtime    Open-angle glaucoma of both eyes, severe stage, unspecified open-angle glaucoma type       levothyroxine 88 MCG tablet    SYNTHROID/LEVOTHROID     Take 88 mcg by mouth daily        lisinopril 40 MG tablet    PRINIVIL/ZESTRIL    90 tablet    Take 1 tablet (40 mg) by mouth daily    Essential hypertension       methotrexate 2.5 MG tablet CHEMO     32 tablet    Take 8 tablets (20mg) by mouth once a week Take 8 tablets per week    Rheumatoid arthritis flare (H)       omeprazole 40 MG capsule    priLOSEC    90 capsule    Take 1 capsule (40 mg) by mouth daily IN AM before breakfast    Gastritis without bleeding, unspecified chronicity, unspecified gastritis type       Vitamin D-3 1000 UNITS Caps      Take by mouth 2 times daily

## 2017-10-12 NOTE — NURSING NOTE
Chief Complaint   Patient presents with     Recheck Medication     pt is here for a medication follow up        Doris Kerns CMA at 11:33 AM on 10/12/2017

## 2017-10-12 NOTE — NURSING NOTE
"Injectable Influenza Immunization Documentation    1.  Has the patient received the information for the injectable influenza vaccine? YES     2. Is the patient 6 months of age or older? YES     3. Does the patient have any of the following contraindications?         Severe allergy to eggs? No     Severe allergic reaction to previous influenza vaccines? No   Severe allergy to latex? No       History of Guillain-[place] syndrome? No     Currently have a temperature greater than 100.4F? No        4.  Severely egg allergic patients should have flu vaccine eligibility assessed by an MD, RN, or pharmacist, and those who received flu vaccine should be observed for 15 min by an MD, RN, Pharmacist, Medical Technician, or member of clinic staff.\": YES    5. Latex-allergic patients should be given latex-free influenza vaccine No. Please reference the Vaccine latex table to determine if your clinic s product is latex-containing.       Vaccination given by MARY AN at 12:58 PM on 10/12/2017.          "

## 2017-10-18 ENCOUNTER — OFFICE VISIT (OUTPATIENT)
Dept: RHEUMATOLOGY | Facility: CLINIC | Age: 77
End: 2017-10-18
Attending: NURSE PRACTITIONER
Payer: MEDICARE

## 2017-10-18 VITALS
HEIGHT: 67 IN | DIASTOLIC BLOOD PRESSURE: 70 MMHG | SYSTOLIC BLOOD PRESSURE: 117 MMHG | BODY MASS INDEX: 29.68 KG/M2 | WEIGHT: 189.1 LBS | OXYGEN SATURATION: 90 % | HEART RATE: 82 BPM

## 2017-10-18 DIAGNOSIS — R76.8 POSITIVE ANTI-CCP TEST: ICD-10-CM

## 2017-10-18 DIAGNOSIS — M05.79 RHEUMATOID ARTHRITIS INVOLVING MULTIPLE SITES WITH POSITIVE RHEUMATOID FACTOR (H): Primary | ICD-10-CM

## 2017-10-18 PROCEDURE — 99212 OFFICE O/P EST SF 10 MIN: CPT | Mod: ZF

## 2017-10-18 ASSESSMENT — PAIN SCALES - GENERAL: PAINLEVEL: NO PAIN (0)

## 2017-10-18 NOTE — NURSING NOTE
"Chief Complaint   Patient presents with     RECHECK     Follow up for RA       Initial /70  Pulse 82  Ht 1.695 m (5' 6.75\")  Wt 85.8 kg (189 lb 1.6 oz)  SpO2 90%  BMI 29.84 kg/m2 Estimated body mass index is 29.84 kg/(m^2) as calculated from the following:    Height as of this encounter: 1.695 m (5' 6.75\").    Weight as of this encounter: 85.8 kg (189 lb 1.6 oz).  Medication Reconciliation: complete   Fatimah Smith CMA    "

## 2017-10-18 NOTE — PROGRESS NOTES
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 +RF 1990 +CC >250. +YEYO >13 (8-2016) -CAROL ANN panel -vasculitis -MTB QG 8-2016. Monotherapy MTX since 6/2009, never started humira. Osteoporosis --seeing endocrine. X-rays hands 3-2009 advanced erosive changes. Pyoderma gangrenosum infection LLE-seen derm. Labs 8-2017 macrocyotis NL HGB and other labs are NL.       Copy forward 6-2017 with Dr. Mccurdy:   She notes aching pain in both shoulders, ankles, and feet. She reports reduced mobility due to hip and low back discomfort.  There is minimal visible swelling in previously affected finger and hand joints. She has EAS at least 2 hours. She restarted methotrexate in 1-17. She \"doesn't feel right\" for a couple of days after the dose. Not nauseated, just blah.  She is not taking prescribed fosomax due to concerns about GI AE.    She has been working with Derm to treat pyoderma gangrenosum of the L ankle for the last 11 months. She has continued using a walker, which has greatly improved her ability to get around. Her primary concern is that she does not want her symptoms to get worse and is hoping that she can restart treatment so that she can regain some independence.She reports that the lesion has been slow to heal and that she continues to take Minocyclin, which she just discontinued (total of 11 months' treatment).    October 18, 2017  Taking methotrexate 20 mg once Q Sat (takes over 12 hours), folic acid 1600 mcg once a day. Tolerating --no diarrhea, upset stomach, hairloss and oral sores. Recently started omeprazole, now no longer having upset stomach only taken recently \"this is the only 1st good day\" . Tylenol taking once a day 2 tablet 500 mg then may 500 mg later in the day    Ankles, hips, shoulders less in her hands \"knuckles\". No RA flaring nor any " "swelling or redness. EAS for 2 hours. No pain in wrists. No hand pain in mornings. Neck mild pain only with turn, no pain, no arm or leg neurological changes. No changes in BB. No headaches or vision issues. General swelling of her ankles.     In Feb 2017, \"couldnt catch my breath\" and getting worse this past several month. Uses a walker. If any movements, will get have difficulty catching her breath. No cough, fever, chills, CP or chest heaviness. Seen pulmonary 9-2017. low-grade smoker, smoking less than a pack per day for 25-30 years from age 20 to approximately age 50.  She has not smoked for 25-30 years. She has never had pneumonia, wheezing, blood clots or another episode of pleurisy. CT scan showing some ground glass changes. Plan was to see Dr. Muñoz and If possible obtain sputum for mycobacterial studies.  Bronch not essential at present. Consider with possible TBBx in future.--not done recently. desat 89% with walking     No recurrent of the PG. Seeing derm hx Pyoderma gangrenosum. s/p minocycyline, mupirocin, kenalog injection. Off minocyline at this time,     PMH:  Medical-DEXA 2-2017 T-3.7, shingles  Past Medical History:   Diagnosis Date     Abscess, toe 2009    Right great toe     Cataract 8/2011    Right s/p surgery 8/2011; left s/p surgery     Glaucoma      Headache(784.0)     Relieved with gabapentin. Chronic pain     HTN (hypertension)      RA (rheumatoid arthritis) (H)      Wrist fracture     Right, s/p ORIF     Patient Active Problem List    Diagnosis Date Noted     Primary open angle glaucoma of both eyes, severe stage 03/23/2017     Priority: Medium     Pseudophakia of both eyes 03/23/2017     Priority: Medium     Osteoporosis 02/16/2017     Priority: Medium     Hypothyroidism 02/02/2017     Priority: Medium     Pyoderma gangrenosum 10/06/2016     Priority: Medium     Encounter for long-term current use of medication 08/18/2011     Priority: Medium     Problem list name updated by automated " process. Provider to review       Chronic pain syndrome 08/01/2011     Priority: Medium     Essential hypertension 08/01/2011     Priority: Medium     Problem list name updated by automated process. Provider to review       Rheumatoid arthritis (H) 08/01/2011     Priority: Medium     Sero-positive, deforming RA with erosions on hand x-ray films 2009  RF 1990 IU; anti-CCP 38 (6/2009). Mantoux neg 2009.   Monotherapy mtx 6/2009 (alalimumab contemplated 2010 but never started)  Prior patient Dr. Shukla.   Problem list name updated by automated process. Provider to review         Surgical-  Past Surgical History:   Procedure Laterality Date     BIOPSY ARTERY TEMPORAL       EXTRACAPSULAR CATARACT EXTRATION WITH INTRAOCULAR LENS IMPLANT  8/2011    Left 2 years ago as well     OPEN REDUCTION INTERNAL FIXATION WRIST BILATERAL         No blood transfusions      FH:  No autoimmune disorders, psoriasis, UC, crohn's, SLE, RA, PsA, gout.  No MS or cancer in family  Mother-RA, MI  Father-colon CA   Siblings-2 sisters and brother (RA- humira and MTX) unsure of medical hx   Children-  Aunt-TB    SH:  No Alcohol. Quit Smoking. No IVDU. 1 Children-healthy. Right or left handed.      [facility] and updated [facility].    ROS:  Negative raynaud s phenomena, hairloss, sun sensitivities, keratoconjunctivitis sicca, pleurisy, significant rashes like malar, oral/nasal or ulcerations, inflammatory eye disease, inflammatory bowel disease, dactylitis, tenosynovitis, or enthespathy. Negative for miscarriages over 2 months into pregnancy, blood clots, gout, psoriasis, UC, crohn's. No temporal headache, no jaw claudication, no scalp tenderness, vision changes, carotidynia, cough  CONSTITUTIONAL: No fevers, night sweats or unintentional weight change. No acute distress, swollen glands  EYES: No vision change, diplopia, pain in eyes or red eyes   EARS, NOSE, MOUTH, THROAT: No tinnitus or hearing change, no epistaxis or nasal  "discharge, no oral lesions, throat clear. Normal saliva pool.  No drymouth. No thyroid  enlargement  CARDIOVASCULAR: No chest pain, palpitations, or pain with walking, no orthopnea or PND   RESPIRATORY: See above   GI: No nausea, vomiting, diarrhea or constipation, no abdominal pain, or blood in stools.   : No change in urine, no dysuria or hematuria   MUSCKL: see above    INTEGUMENTARY: No concerning lesions or moles   NEURO: No loss of strength or sensation, no numbness or tingling, no tremor, no dizziness, no headache. No falls   ENDO: No polyuria or polydipsia, no temperature intolerance   HEME/LYMPH:No concerning bumps, bleeding problems, or swollen lymph nodes. No recent infections, hospitalizations or new illnesses.   ALLERGY: No environmental allergies   PSYCH:No depression or anxiety, no sleep problems.  Otherwise 14 point ROS obtained, reviewed and found negative.     Physical exam:  Vitals: Blood pressure 117/70, pulse 82, height 1.695 m (5' 6.75\"), weight 85.8 kg (189 lb 1.6 oz), SpO2 90 %, not currently breastfeeding.  Wt Readings from Last 4 Encounters:   10/18/17 85.8 kg (189 lb 1.6 oz)   09/07/17 88.5 kg (195 lb)   09/01/17 88.9 kg (196 lb)   07/06/17 88.5 kg (195 lb)     Constitutional: WD-WN-WG cooperative  Eyes: nl EOM, PERRLA, vision, conjunctiva, sclera  ENT: nl external ears, nose, hearing, lips, teeth, gums, throat  Neck: no mass or thyroid enlargement  Resp: lungs clear to auscultation, nl to palpation, nl effort  CV: RRR, no murmurs, rubs or gallops, no edema  GI: no ABD mass or tenderness, no HSM  : not tested  Lymph: no cervical or epitrochlear nodes  MS:  R 4th and 5th PIPs, L 2nd MCP shows angulation. L 4th PIP hyperflexion, DIP hyperextension. L 5th DIP hyperflexion.  Decreased fist closure bilaterally. Normal  strength. All TMJ, neck, shoulder, elbow, wrist, hand, spine, hip, knee, ankle, and foot joints were examined and otherwise found normal. Normal  strength. No active " synovitis or deformity. Full ROM. Normal prayer sign. Negative Negative Lhermitte's sign. No periuncle erythema  Skin: no nail pitting, alopecia, rash. Dark, well circumscribed, healing lesions L medial leg near ankle. Gray coloration likely due to minocycline treatment. .   Neuro: nl cranial nerves, strength, sensation, DTRs.   Psych: nl judgement, orientation, memory, affect.      Labs/Imaging:  Results for orders placed or performed in visit on 17   ECHO COMPLETE WITH OPTISON    Narrative    [id number]  [id number]  [id number]  [id number]^BERNICE^GAVINO^DUNG           [facility]  Diagnostic and Treamtent-3rd Floor  [address] [place] [address]     Name: TYSHAWN LUKE  MRN: [id number]  : 1940  Study Date: 2017 02:10 PM  Age: 77 yrs  Gender: Female  [place] [facility] [place]  Reason For Study: , Essential (primary) hypertension  Ordering Physician: GAVINO QUEEN  Referring Physician: GAVINO QUEEN  Performed By: Rick Fischer RDCS     BSA: 2.0 m2  Height: 67 in  Weight: 195 lb  _____________________________________________________________________________  __        Procedure  Echocardiogram with two-dimensional, color and spectral Doppler performed.  Contrast Optison. Optison (NDC #5949-1806-37) given intravenously. Patient was  given 3 ml mixture of 3 ml Optison and 6 ml saline. 6 ml wasted.  _____________________________________________________________________________  __        Interpretation Summary  Global and regional left ventricular function is normal with an EF of 60-65%.  Right ventricular function, chamber size, wall motion, and thickness are  normal.  Pulmonary artery systolic pressure cannot be assessed.  Ascending aorta 4 cm.  The inferior vena cava is normal.  _____________________________________________________________________________  __        Left Ventricle  Global and regional left ventricular function is normal with an EF  of 60-65%.  Left ventricular wall thickness is normal. Left ventricular size is normal.  Normal left ventricular filling for age. No regional wall motion abnormalities  are seen.     Right Ventricle  Right ventricular function, chamber size, wall motion, and thickness are  normal.     Atria  Both atria appear normal. The atrial septum is intact as assessed by color  Doppler .        Mitral Valve  The mitral valve is normal. Trace to mild mitral insufficiency is present.     Aortic Valve  The aortic valve is tricuspid. Trace aortic insufficiency is present.     Tricuspid Valve  The tricuspid valve is normal. Trace to mild tricuspid insufficiency is  present. Pulmonary artery systolic pressure cannot be assessed.     Pulmonic Valve  The pulmonic valve is normal. Trace pulmonic insufficiency is present.     Vessels  The pulmonary artery is normal. The inferior vena cava is normal. Ascending  aorta 4 cm. Sinuses of Valsalva 3.7 cm.     Pericardium  No pericardial effusion is present.     _____________________________________________________________________________  __  MMode/2D Measurements & Calculations  Ao root diam: 3.4 cm  asc Aorta Diam: 4.0 cm     LVOT diam: 2.0 cm  LVOT area: 3.3 cm2  LA Volume (BP): 52.5 ml  LA Volume Index (BP): 26.3 ml/m2        Doppler Measurements & Calculations  MV E max elizabeth: 48.0 cm/sec  MV A max elizabeth: 67.9 cm/sec  MV E/A: 0.71  MV dec time: 0.35 sec  AI P1/2t: 396.2 msec  Lateral E/e': 8.8  Medial E/e': 13.4        _____________________________________________________________________________  __           Report approved by: Too Collins 09/18/2017 03:40 PM          Impression/Plan:  RA: seropositive, erosive:   Overall improved small joint predominant arthralgia since restarting methotrexate in early 2017. Exam no active synovitis, some deformities loss ROM of the hands.  Labs Aug/Sept NL CBC, liver and kidney tests (mild reduced GFR 58) and high MCV, no anemia). No active peripheral  arthritis, but she is now undergoing pulmonary work-up for possible ILD or other lung disease. Strong seropositivity and the presence of established erosions/deformities portends high risk for chronic destructive disease and extra-articular manifestations. I discussed with her this and risks of extra-articular manisfestations and lung disease, further how DMARD Rx can halt and prevent joint-damaging disease. No further PG, or other infections. I recommend she continue this and then will wait until input from Dr. Muñoz.     The plan is as follows:  1) LFT, CRP, and CRP  2) Continue methotrexate (20 mg/week).   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 Will be seeing Dr. Nirmala Muñoz in ILD clinic 10/27. If ILD from Rheumatoid Arthritis, then we will ask Dr. Muñoz input on her RA medications and also if safe to continue methotrexate.     High YEYO. No s/sx SLE and negative CAROL ANN panel    Post-MTx dosing nausea: Controlled. Prn try acetaminophen 1000 mg twice a day prn, augmented by dextromethorphan (Robitussin DM) 1 to 2 teaspoons every 8 hours after methotrexate dose.     The patient understood the rational for the diagnosis and treatment plan. Patient shared in the decision making. All questions were answered to best of my ability and the patient's satisfaction  Alessio MOTA, KRISTINE, MSN  [facility]  Department of Rheumatology & Autoimmune Disorders

## 2017-10-18 NOTE — LETTER
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 +RF 1990 +CC >250. +YEYO >13 (8-2016) -CAROL ANN panel -vasculitis -MTB QG 8-2016. Monotherapy MTX since 6/2009, never started humira. Osteoporosis --seeing endocrine. X-rays hands 3-2009 advanced erosive changes. Pyoderma gangrenosum infection LLE-seen derm. Labs 8-2017 macrocyotis NL HGB and other labs are NL.       Copy forward 6-2017 with Dr. Mccurdy:   She notes aching pain in both shoulders, ankles, and feet. She reports reduced mobility due to hip and low back discomfort.  There is minimal visible swelling in previously affected finger and hand joints. She has EAS at least 2 hours. She restarted methotrexate in 1-17. She \"doesn't feel right\" for a couple of days after the dose. Not nauseated, just blah.  She is not taking prescribed fosomax due to concerns about GI AE.    She has been working with Derm to treat pyoderma gangrenosum of the L ankle for the last 11 months. She has continued using a walker, which has greatly improved her ability to get around. Her primary concern is that she does not want her symptoms to get worse and is hoping that she can restart treatment so that she can regain some independence.She reports that the lesion has been slow to heal and that she continues to take Minocyclin, which she just discontinued (total of 11 months' treatment).    October 18, 2017  Taking methotrexate 20 mg once Q Sat (takes over 12 hours), folic acid 1600 mcg once a day. Tolerating --no diarrhea, upset stomach, hairloss and oral sores. Recently started omeprazole, now no longer having upset stomach only taken recently \"this is the only 1st good day\" . Tylenol taking once a day 2 tablet 500 mg then may 500 mg later in the " "day    Ankles, hips, shoulders less in her hands \"knuckles\". No RA flaring nor any swelling or redness. EAS for 2 hours. No pain in wrists. No hand pain in mornings. Neck mild pain only with turn, no pain, no arm or leg neurological changes. No changes in BB. No headaches or vision issues. General swelling of her ankles.     In Feb 2017, \"couldnt catch my breath\" and getting worse this past several month. Uses a walker. If any movements, will get have difficulty catching her breath. No cough, fever, chills, CP or chest heaviness. Seen pulmonary 9-2017. low-grade smoker, smoking less than a pack per day for 25-30 years from age 20 to approximately age 50.  She has not smoked for 25-30 years. She has never had pneumonia, wheezing, blood clots or another episode of pleurisy. CT scan showing some ground glass changes. Plan was to see Dr. Muñoz and If possible obtain sputum for mycobacterial studies.  Bronch not essential at present. Consider with possible TBBx in future.--not done recently. desat 89% with walking     No recurrent of the PG. Seeing derm hx Pyoderma gangrenosum. s/p minocycyline, mupirocin, kenalog injection. Off minocyline at this time,     PMH:  Medical-DEXA 2-2017 T-3.7, shingles  Past Medical History:   Diagnosis Date     Abscess, toe 2009    Right great toe     Cataract 8/2011    Right s/p surgery 8/2011; left s/p surgery     Glaucoma      Headache(784.0)     Relieved with gabapentin. Chronic pain     HTN (hypertension)      RA (rheumatoid arthritis) (H)      Wrist fracture     Right, s/p ORIF     Patient Active Problem List    Diagnosis Date Noted     Primary open angle glaucoma of both eyes, severe stage 03/23/2017     Priority: Medium     Pseudophakia of both eyes 03/23/2017     Priority: Medium     Osteoporosis 02/16/2017     Priority: Medium     Hypothyroidism 02/02/2017     Priority: Medium     Pyoderma gangrenosum 10/06/2016     Priority: Medium     Encounter for long-term current use of " medication 08/18/2011     Priority: Medium     Problem list name updated by automated process. Provider to review       Chronic pain syndrome 08/01/2011     Priority: Medium     Essential hypertension 08/01/2011     Priority: Medium     Problem list name updated by automated process. Provider to review       Rheumatoid arthritis (H) 08/01/2011     Priority: Medium     Sero-positive, deforming RA with erosions on hand x-ray films 2009  RF 1990 IU; anti-CCP 38 (6/2009). Mantoux neg 2009.   Monotherapy mtx 6/2009 (alalimumab contemplated 2010 but never started)  Prior patient Dr. Shukla.   Problem list name updated by automated process. Provider to review         Surgical-  Past Surgical History:   Procedure Laterality Date     BIOPSY ARTERY TEMPORAL       EXTRACAPSULAR CATARACT EXTRATION WITH INTRAOCULAR LENS IMPLANT  8/2011    Left 2 years ago as well     OPEN REDUCTION INTERNAL FIXATION WRIST BILATERAL         No blood transfusions      FH:  No autoimmune disorders, psoriasis, UC, crohn's, SLE, RA, PsA, gout.  No MS or cancer in family  Mother-RA, MI  Father-colon CA   Siblings-2 sisters and brother (RA- humira and MTX) unsure of medical hx   Children-  Aunt-TB    SH:  No Alcohol. Quit Smoking. No IVDU. 1 Children-healthy. Right or left handed.      PMSH personally reviewed and updated by me.    ROS:  Negative raynaud s phenomena, hairloss, sun sensitivities, keratoconjunctivitis sicca, pleurisy, significant rashes like malar, oral/nasal or ulcerations, inflammatory eye disease, inflammatory bowel disease, dactylitis, tenosynovitis, or enthespathy. Negative for miscarriages over 2 months into pregnancy, blood clots, gout, psoriasis, UC, crohn's. No temporal headache, no jaw claudication, no scalp tenderness, vision changes, carotidynia, cough  CONSTITUTIONAL: No fevers, night sweats or unintentional weight change. No acute distress, swollen glands  EYES: No vision change, diplopia, pain in eyes or red eyes  "  EARS, NOSE, MOUTH, THROAT: No tinnitus or hearing change, no epistaxis or nasal discharge, no oral lesions, throat clear. Normal saliva pool.  No drymouth. No thyroid  enlargement  CARDIOVASCULAR: No chest pain, palpitations, or pain with walking, no orthopnea or PND   RESPIRATORY: See above   GI: No nausea, vomiting, diarrhea or constipation, no abdominal pain, or blood in stools.   : No change in urine, no dysuria or hematuria   MUSCKL: see above    INTEGUMENTARY: No concerning lesions or moles   NEURO: No loss of strength or sensation, no numbness or tingling, no tremor, no dizziness, no headache. No falls   ENDO: No polyuria or polydipsia, no temperature intolerance   HEME/LYMPH:No concerning bumps, bleeding problems, or swollen lymph nodes. No recent infections, hospitalizations or new illnesses.   ALLERGY: No environmental allergies   PSYCH:No depression or anxiety, no sleep problems.  Otherwise 14 point ROS obtained, reviewed and found negative.     Physical exam:  Vitals: Blood pressure 117/70, pulse 82, height 1.695 m (5' 6.75\"), weight 85.8 kg (189 lb 1.6 oz), SpO2 90 %, not currently breastfeeding.  Wt Readings from Last 4 Encounters:   10/18/17 85.8 kg (189 lb 1.6 oz)   09/07/17 88.5 kg (195 lb)   09/01/17 88.9 kg (196 lb)   07/06/17 88.5 kg (195 lb)     Constitutional: WD-WN-WG cooperative  Eyes: nl EOM, PERRLA, vision, conjunctiva, sclera  ENT: nl external ears, nose, hearing, lips, teeth, gums, throat  Neck: no mass or thyroid enlargement  Resp: lungs clear to auscultation, nl to palpation, nl effort  CV: RRR, no murmurs, rubs or gallops, no edema  GI: no ABD mass or tenderness, no HSM  : not tested  Lymph: no cervical or epitrochlear nodes  MS:  R 4th and 5th PIPs, L 2nd MCP shows angulation. L 4th PIP hyperflexion, DIP hyperextension. L 5th DIP hyperflexion.  Decreased fist closure bilaterally. Normal  strength. All TMJ, neck, shoulder, elbow, wrist, hand, spine, hip, knee, ankle, and foot " joints were examined and otherwise found normal. Normal  strength. No active synovitis or deformity. Full ROM. Normal prayer sign. Negative Negative Lhermitte's sign. No periuncle erythema  Skin: no nail pitting, alopecia, rash. Dark, well circumscribed, healing lesions L medial leg near ankle. Gray coloration likely due to minocycline treatment. .   Neuro: nl cranial nerves, strength, sensation, DTRs.   Psych: nl judgement, orientation, memory, affect.      Labs/Imaging:  Results for orders placed or performed in visit on 17   ECHO COMPLETE WITH OPTISON    Narrative    [id number]  [id number]  [id number]  [id number]^BERNICE^GAVINO^DUNG           [facility]  Diagnostic and Treamtent-3rd Floor  [address] [place] [address]     Name: TYSHAWN LUKE  MRN: [id number]  : 1940  Study Date: 2017 02:10 PM  Age: 77 yrs  Gender: Female  Patient Location: [facility] [place]  Reason For Study: , Essential (primary) hypertension  Ordering Physician: GAVINO QUEEN  Referring Physician: GAVINO QUEEN  Performed By: Rick Fischer RDCS     BSA: 2.0 m2  Height: 67 in  Weight: 195 lb  _____________________________________________________________________________  __        Procedure  Echocardiogram with two-dimensional, color and spectral Doppler performed.  Contrast Optison. Optison (NDC #1223-3404-87) given intravenously. Patient was  given 3 ml mixture of 3 ml Optison and 6 ml saline. 6 ml wasted.  _____________________________________________________________________________  __        Interpretation Summary  Global and regional left ventricular function is normal with an EF of 60-65%.  Right ventricular function, chamber size, wall motion, and thickness are  normal.  Pulmonary artery systolic pressure cannot be assessed.  Ascending aorta 4 cm.  The inferior vena cava is normal.  _____________________________________________________________________________  __         Left Ventricle  Global and regional left ventricular function is normal with an EF of 60-65%.  Left ventricular wall thickness is normal. Left ventricular size is normal.  Normal left ventricular filling for age. No regional wall motion abnormalities  are seen.     Right Ventricle  Right ventricular function, chamber size, wall motion, and thickness are  normal.     Atria  Both atria appear normal. The atrial septum is intact as assessed by color  Doppler .        Mitral Valve  The mitral valve is normal. Trace to mild mitral insufficiency is present.     Aortic Valve  The aortic valve is tricuspid. Trace aortic insufficiency is present.     Tricuspid Valve  The tricuspid valve is normal. Trace to mild tricuspid insufficiency is  present. Pulmonary artery systolic pressure cannot be assessed.     Pulmonic Valve  The pulmonic valve is normal. Trace pulmonic insufficiency is present.     Vessels  The pulmonary artery is normal. The inferior vena cava is normal. Ascending  aorta 4 cm. Sinuses of Valsalva 3.7 cm.     Pericardium  No pericardial effusion is present.     _____________________________________________________________________________  __  MMode/2D Measurements & Calculations  Ao root diam: 3.4 cm  asc Aorta Diam: 4.0 cm     LVOT diam: 2.0 cm  LVOT area: 3.3 cm2  LA Volume (BP): 52.5 ml  LA Volume Index (BP): 26.3 ml/m2        Doppler Measurements & Calculations  MV E max elizabeth: 48.0 cm/sec  MV A max elizabeth: 67.9 cm/sec  MV E/A: 0.71  MV dec time: 0.35 sec  AI P1/2t: 396.2 msec  Lateral E/e': 8.8  Medial E/e': 13.4        _____________________________________________________________________________  __           Report approved by: Too Collins 09/18/2017 03:40 PM          Impression/Plan:  RA: seropositive, erosive:   Overall improved small joint predominant arthralgia since restarting methotrexate in early 2017. Exam no active synovitis, some deformities loss ROM of the hands.  Labs Aug/Sept NL CBC, liver  and kidney tests (mild reduced GFR 58) and high MCV, no anemia). No active peripheral arthritis, but she is now undergoing pulmonary work-up for possible ILD or other lung disease. Strong seropositivity and the presence of established erosions/deformities portends high risk for chronic destructive disease and extra-articular manifestations. I discussed with her this and risks of extra-articular manisfestations and lung disease, further how DMARD Rx can halt and prevent joint-damaging disease. No further PG, or other infections. I recommend she continue this and then will wait until input from Dr. Muñoz.     The plan is as follows:  1) LFT, CRP, and CRP  2) Continue methotrexate (20 mg/week).   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 Will be seeing Dr. Nirmala Muñoz in ILD clinic 10/27. If ILD from Rheumatoid Arthritis, then we will ask Dr. Muñoz input on her RA medications and also if safe to continue methotrexate.     High YEYO. No s/sx SLE and negative CAROL ANN panel    Post-MTx dosing nausea: Controlled. Prn try acetaminophen 1000 mg twice a day prn, augmented by dextromethorphan (Robitussin DM) 1 to 2 teaspoons every 8 hours after methotrexate dose.     The patient understood the rational for the diagnosis and treatment plan. Patient shared in the decision making. All questions were answered to best of my ability and the patient's satisfaction  Alessio MOTA, CNP, MSN  [facility]  Department of Rheumatology & Autoimmune Disorders

## 2017-10-18 NOTE — MR AVS SNAPSHOT
After Visit Summary   10/18/2017    Abida Hahn    MRN: [id number]           Patient Information     Date Of Birth          1940        Visit Information        Provider Department      10/18/2017 1:00 PM Alessio Lofton APRN CNP [facility] Rheumatology        Today's Diagnoses     Rheumatoid arthritis involving multiple sites with positive rheumatoid factor (H)    -  1    Positive anti-CCP test           Follow-ups after your visit        Follow-up notes from your care team     Return in about 3 months (around 1/18/2018) for Labs every 8-12 weeks.      Your next 10 appointments already scheduled     Oct 27, 2017 12:00 PM CDT   FULL PULMONARY FUNCTION with  PFL D   [facility] Pulmonary Function Testing ([facility])    [address]  [address] [facility] [address]   [phone]            Oct 27, 2017  1:00 PM CDT   (Arrive by 12:45 PM)   New Interstitial Lung with Nirmala Muñoz MD   [facility] ([facility])    [address]  [address] [facility] [address]   [phone]            Nov 30, 2017 10:55 AM CST   (Arrive by 10:40 AM)   Return Visit with Amarilys Iyer MD   [facility] ([facility])    [address]  [address] [facility] [address]   [phone]            Nov 30, 2017 12:30 PM CST   (Arrive by 12:15 PM)   Return Visit with Lillian De Luna PA-C   [facility] Dermatology ([facility])    [address]  [address] [facility] [address]   [phone]            Dec 19, 2017 12:45 PM CST   VISUAL FIELD with [facility] EYE VISUAL FIELD   [facility] Clinic ([facility])    [address]  [address]  [address]  [facility] [address]   [phone]            Dec 19, 2017  1:15 PM CST   RETURN GLAUCOMA with Corazon Addison MD   [facility] Clinic ([facility])     "[address]  [address]  [address]  [facility] [address]   [phone]            Jan 17, 2018  2:00 PM CST   (Arrive by 1:45 PM)   Return Visit with NAKUL Lew CNP   [facility] ([facility])    [address]  3rd Floor  [facility] [address] [phone]              Who to contact     If you have questions or need follow up information about today's clinic visit or your schedule please contact [facility] RHEUMATOLOGY directly at [phone].  Normal or non-critical lab and imaging results will be communicated to you by MyChart, letter or phone within 4 business days after the clinic has received the results. If you do not hear from us within 7 days, please contact the clinic through MyChart or phone. If you have a critical or abnormal lab result, we will notify you by phone as soon as possible.  Submit refill requests through [employer] or call your pharmacy and they will forward the refill request to us. Please allow 3 business days for your refill to be completed.          Additional Information About Your Visit        Care EveryWhere ID     This is your Care EveryWhere ID. This could be used by other organizations to access your [facility] medical records  [id number]        Your Vitals Were     Pulse Height Pulse Oximetry BMI (Body Mass Index)          82 1.695 m (5' 6.75\") 90% 29.84 kg/m2         Blood Pressure from Last 3 Encounters:   10/18/17 117/70   10/12/17 129/78   09/08/17 144/84    Weight from Last 3 Encounters:   10/18/17 85.8 kg (189 lb 1.6 oz)   09/07/17 88.5 kg (195 lb)   09/01/17 88.9 kg (196 lb)              Today, you had the following     No orders found for display       Primary Care Provider Office Phone # Fax #    Amarilys Iyer [phone]       [address]  [facility] [address]        Equal Access to Services     DADA ALMARAZ AH: jorge Harley " solomon citlalyalfredelizabeth luis eneo jones. So [facility] [phone].    ATENCIÓN: Si ruben dasilva, tiene a be disposición servicios gratuitos de asistencia lingüística. Raheem al [phone].    We comply with applicable federal civil rights laws and [place] laws. We do not discriminate on the basis of race, color, national origin, age, disability, sex, sexual orientation, or gender identity.            Thank you!     Thank you for choosing [facility] RHEUMATOLOGY  for your care. Our goal is always to provide you with excellent care. Hearing back from our patients is one way we can continue to improve our services. Please take a few minutes to complete the written survey that you may receive in the mail after your visit with us. Thank you!             Your Updated Medication List - Protect others around you: Learn how to safely use, store and throw away your medicines at www.disposemymeds.org.          This list is accurate as of: 10/18/17  2:15 PM.  Always use your most recent med list.                   Brand Name Dispense Instructions for use Diagnosis    acetaminophen 500 MG tablet    TYLENOL     Take 500-1,000 mg by mouth every 6 hours as needed        amLODIPine 5 MG tablet    NORVASC    90 tablet    Take 1 tablet (5 mg) by mouth daily    Essential hypertension       ARTIFICIAL TEAR SOLUTION OP      Apply  to eye as needed.        aspirin 81 MG tablet      Take 81 mg by mouth daily        CALCIUM + D PO      Take 1 tablet by mouth 2 times daily        DAILY MULTIVITAMIN PO      Take 1 tablet by mouth daily And minerals per pt        folic acid 800 MCG Tabs      Take by mouth 2 times daily        GLUCOSAMINE SULFATE PO      Take 1,000 mg by mouth daily        latanoprost 0.005 % ophthalmic solution    XALATAN    1 Bottle    Place 1 drop into both eyes At Bedtime    Open-angle glaucoma of both eyes, severe stage, unspecified open-angle glaucoma type       levothyroxine 88 MCG  tablet    SYNTHROID/LEVOTHROID     Take 88 mcg by mouth daily        lisinopril 40 MG tablet    PRINIVIL/ZESTRIL    90 tablet    Take 1 tablet (40 mg) by mouth daily    Essential hypertension       methotrexate 2.5 MG tablet CHEMO     32 tablet    Take 8 tablets (20mg) by mouth once a week Take 8 tablets per week    Rheumatoid arthritis flare (H)       omeprazole 40 MG capsule    priLOSEC    90 capsule    Take 1 capsule (40 mg) by mouth daily IN AM before breakfast    Gastritis without bleeding, unspecified chronicity, unspecified gastritis type       Vitamin D-3 1000 UNITS Caps      Take by mouth 2 times daily

## 2017-10-27 ENCOUNTER — OFFICE VISIT (OUTPATIENT)
Dept: PULMONOLOGY | Facility: CLINIC | Age: 77
End: 2017-10-27
Attending: INTERNAL MEDICINE
Payer: MEDICARE

## 2017-10-27 VITALS
WEIGHT: 187.7 LBS | SYSTOLIC BLOOD PRESSURE: 115 MMHG | BODY MASS INDEX: 30.17 KG/M2 | RESPIRATION RATE: 16 BRPM | DIASTOLIC BLOOD PRESSURE: 79 MMHG | HEART RATE: 74 BPM | OXYGEN SATURATION: 94 % | HEIGHT: 66 IN

## 2017-10-27 DIAGNOSIS — J84.9 ILD (INTERSTITIAL LUNG DISEASE) (H): Primary | ICD-10-CM

## 2017-10-27 DIAGNOSIS — M05.79 RHEUMATOID ARTHRITIS INVOLVING MULTIPLE SITES WITH POSITIVE RHEUMATOID FACTOR (H): Primary | ICD-10-CM

## 2017-10-27 PROCEDURE — 99212 OFFICE O/P EST SF 10 MIN: CPT | Mod: ZF

## 2017-10-27 RX ORDER — ALBUTEROL SULFATE 90 UG/1
2 AEROSOL, METERED RESPIRATORY (INHALATION) EVERY 6 HOURS PRN
Qty: 1 INHALER | Refills: 1 | Status: SHIPPED | OUTPATIENT
Start: 2017-10-27

## 2017-10-27 ASSESSMENT — PAIN SCALES - GENERAL: PAINLEVEL: NO PAIN (0)

## 2017-10-27 NOTE — NURSING NOTE
Chief Complaint   Patient presents with     Consult     New consult on Abida and her ILD     Abhay Iraheta CMA at 12:18 PM on 10/27/2017

## 2017-10-27 NOTE — MR AVS SNAPSHOT
After Visit Summary   10/27/2017    Abida Hahn    MRN: [id number]           Patient Information     Date Of Birth          1940        Visit Information        Provider Department      10/27/2017 1:00 PM Nirmala Muñoz MD [facility]        Today's Diagnoses     ILD (interstitial lung disease) (H)    -  1       Follow-ups after your visit        Follow-up notes from your care team     Return in about 3 months (around 1/27/2018).      Your next 10 appointments already scheduled     Nov 30, 2017 10:55 AM CST   (Arrive by 10:40 AM)   Return Visit with Amarilys Iyer MD   [facility] Primary [facility] ([facility])    [address]  [address] [facility] [id number] [phone]            Nov 30, 2017 12:30 PM CST   (Arrive by 12:15 PM)   Return Visit with Lillian De Luna PA-C   [facility] Dermatology ([facility])    [address]  [address]rd [facility] [address]   [phone]            Dec 19, 2017 12:45 PM CST   VISUAL FIELD with [facility] EYE VISUAL FIELD   Eye Clinic ([facility])    [address]  [address]  [address] Clin [address]  [facility] [address]   [phone]            Dec 19, 2017  1:15 PM CST   RETURN GLAUCOMA with Corazon Addison MD   Eye Clinic ([facility])    [address]  [address]  [address] Clin [address]a  [facility] [address]   [phone]            Jan 17, 2018  2:00 PM CST   (Arrive by 1:45 PM)   Return Visit with NAKUL Lew CNP   [facility] Rheumatology ([facility])    [address]  [address] [facility] [address]   [phone]            Mar 02, 2018 10:30 AM CST   FULL PULMONARY FUNCTION with  PFL [facility] Pulmonary Function Testing ([facility])    [address]  [address] [facility] [id number] [phone]            Mar  "02, 2018 11:30 AM CST   (Arrive by 11:15 AM)   Return Interstitial Lung with Nirmala Muñoz MD   [facility] ([facility])    [address] [address] [phone]              Future tests that were ordered for you today     Open Future Orders        Priority Expected Expires Ordered    General PFT Lab (Please always keep checked) Routine 1/27/2018 10/27/2018 10/27/2017    Pulmonary Function Test Routine 1/27/2018 10/27/2018 10/27/2017            Who to contact     If you have questions or need follow up information about today's clinic visit or your schedule please contact [facility] directly at [phone].  Normal or non-critical lab and imaging results will be communicated to you by MyChart, letter or phone within 4 business days after the clinic has received the results. If you do not hear from us within 7 days, please contact the clinic through MyChart or phone. If you have a critical or abnormal lab result, we will notify you by phone as soon as possible.  Submit refill requests through [employer] or call your pharmacy and they will forward the refill request to us. Please allow 3 business days for your refill to be completed.          Additional Information About Your Visit        Care EveryWhere ID     This is your Care EveryWhere ID. This could be used by other organizations to access your [facility] medical records  [id number]        Your Vitals Were     Pulse Respirations Height Pulse Oximetry BMI (Body Mass Index)       74 16 1.683 m (5' 6.25\") 94% 30.07 kg/m2        Blood Pressure from Last 3 Encounters:   10/27/17 115/79   10/18/17 117/70   10/12/17 129/78    Weight from Last 3 Encounters:   10/27/17 85.1 kg (187 lb 11.2 oz)   10/18/17 85.8 kg (189 lb 1.6 oz)   09/07/17 88.5 kg (195 lb)                 Today's Medication Changes          These changes are accurate as of: 10/27/17  " 1:24 PM.  If you have any questions, ask your nurse or doctor.               Start taking these medicines.        Dose/Directions    albuterol 108 (90 BASE) MCG/ACT Inhaler   Commonly known as:  PROAIR HFA/PROVENTIL HFA/VENTOLIN HFA   Used for:  ILD (interstitial lung disease) (H)   Started by:  Nirmala Muñoz MD        Dose:  2 puff   Inhale 2 puffs into the lungs every 6 hours as needed for shortness of breath / dyspnea or wheezing   Quantity:  1 Inhaler   Refills:  1       mometasone-formoterol 100-5 MCG/ACT oral inhaler   Commonly known as:  DULERA   Used for:  ILD (interstitial lung disease) (H)   Started by:  Nirmala Muñoz MD        Dose:  2 puff   Inhale 2 puffs into the lungs 2 times daily   Quantity:  13 g   Refills:  1            Where to get your medicines      These medications were sent to [email or web address] [address] [place] [address] [address] AT [facility] [address]  [address] [facility] [address]    Hours:  24-hours Phone:  [phone]     albuterol 108 (90 BASE) MCG/ACT Inhaler    mometasone-formoterol 100-5 MCG/ACT oral inhaler                Primary Care Provider Office Phone # Fax #    Amarilys Iyer [phone]       [address] [place] [address]  [facility] [address]        Equal Access to Services     [facility] AH: Hadii anu lymano Sochandler, waaxda luqadaha, qaybta kaalmada adebernieyada, neo quinn . So [facility] [phone].    ATENCIÓN: Si habla español, tiene a be disposición servicios gratuitos de asistencia lingüística. Llame al [phone].    We comply with applicable federal civil rights laws and [place] laws. We do not discriminate on the basis of race, color, national origin, age, disability, sex, sexual orientation, or gender identity.            Thank you!     Thank you for choosing [facility]  for your care. Our goal is always to provide you with excellent care.  Hearing back from our patients is one way we can continue to improve our services. Please take a few minutes to complete the written survey that you may receive in the mail after your visit with us. Thank you!             Your Updated Medication List - Protect others around you: Learn how to safely use, store and throw away your medicines at www.disposemymeds.org.          This list is accurate as of: 10/27/17  1:24 PM.  Always use your most recent med list.                   Brand Name Dispense Instructions for use Diagnosis    acetaminophen 500 MG tablet    TYLENOL     Take 500-1,000 mg by mouth every 6 hours as needed        albuterol 108 (90 BASE) MCG/ACT Inhaler    PROAIR HFA/PROVENTIL HFA/VENTOLIN HFA    1 Inhaler    Inhale 2 puffs into the lungs every 6 hours as needed for shortness of breath / dyspnea or wheezing    ILD (interstitial lung disease) (H)       amLODIPine 5 MG tablet    NORVASC    90 tablet    Take 1 tablet (5 mg) by mouth daily    Essential hypertension       ARTIFICIAL TEAR SOLUTION OP      Apply  to eye as needed.        aspirin 81 MG tablet      Take 81 mg by mouth daily        CALCIUM + D PO      Take 1 tablet by mouth 2 times daily        DAILY MULTIVITAMIN PO      Take 1 tablet by mouth daily And minerals per pt        folic acid 800 MCG Tabs      Take by mouth 2 times daily        GLUCOSAMINE SULFATE PO      Take 1,000 mg by mouth daily        latanoprost 0.005 % ophthalmic solution    XALATAN    1 Bottle    Place 1 drop into both eyes At Bedtime    Open-angle glaucoma of both eyes, severe stage, unspecified open-angle glaucoma type       levothyroxine 88 MCG tablet    SYNTHROID/LEVOTHROID     Take 88 mcg by mouth daily        lisinopril 40 MG tablet    PRINIVIL/ZESTRIL    90 tablet    Take 1 tablet (40 mg) by mouth daily    Essential hypertension       methotrexate 2.5 MG tablet CHEMO     32 tablet    Take 8 tablets (20mg) by mouth once a week Take 8 tablets per week    Rheumatoid  arthritis flare (H)       mometasone-formoterol 100-5 MCG/ACT oral inhaler    DULERA    13 g    Inhale 2 puffs into the lungs 2 times daily    ILD (interstitial lung disease) (H)       omeprazole 40 MG capsule    priLOSEC    90 capsule    Take 1 capsule (40 mg) by mouth daily IN AM before breakfast    Gastritis without bleeding, unspecified chronicity, unspecified gastritis type       Vitamin D-3 1000 UNITS Caps      Take by mouth 2 times daily

## 2017-10-27 NOTE — LETTER
10/27/2017     RE: Abida Hahn  [address]  [facility] [address]     Dear Colleague,    Thank you for referring your patient, Abida Hahn, to the [facility] at [facility]. Please see a copy of my visit note below.    [facility] Interstitial Lung Disease Clinic    Reason for Visit  Abida Hahn is a 77 year old year old female who is being seen for Consult (New consult on Abida and her ILD)    HPI    Mrs. Hahn is a 78 yo who is a new patient referred by Dr. Marks for evaluation and management of ILD.  She has had RA for ~ 20 years and has taken methotrexate for ~10 years.  It was stopped for 9 mo to speed healing of left ankle pyoderma gangrenosum and restarted 2/2017.  Mrs. Hahn reports dyspnea since last year, gradually worsening.  For example, she feels short of breath walking uphill or sometimes with activity such as getting up on the exam table in clinic. She denies cough, wheezing, paroxysmal nocturnal dyspnea, chest pain, fevers, new skin rashes or syncope.  Echo 9/2017 showed left and right ventricular function.  She was evaluated by Dr. Marks in Pulmonary Clinic in May and Sept.  He obtained a HRCT and referred her to ILD Clinic.    Mrs. Hahn reports that she received the flu vaccine.  She has not yet been treated for her dyspnea.        Current Outpatient Prescriptions   Medication     mometasone-formoterol (DULERA) 100-5 MCG/ACT oral inhaler     albuterol (PROAIR HFA/PROVENTIL HFA/VENTOLIN HFA) 108 (90 BASE) MCG/ACT Inhaler     omeprazole (PRILOSEC) 40 MG capsule     aspirin 81 MG tablet     amLODIPine (NORVASC) 5 MG tablet     lisinopril (PRINIVIL/ZESTRIL) 40 MG tablet     methotrexate 2.5 MG tablet CHEMO     latanoprost (XALATAN) 0.005 % ophthalmic solution     GLUCOSAMINE SULFATE PO     folic acid 800 MCG TABS     Cholecalciferol (VITAMIN D-3) 1000 UNITS CAPS     acetaminophen (TYLENOL) 500 MG  tablet     levothyroxine (SYNTHROID, LEVOTHROID) 88 MCG tablet     ARTIFICIAL TEAR SOLUTION OP     Calcium-Vitamin D (CALCIUM + D PO)     Multiple Vitamin (DAILY MULTIVITAMIN PO)     No current facility-administered medications for this visit.      Allergies   Allergen Reactions     Ibuprofen Sodium      Penicillins      Triple Antibiotic [Neomycin-Polymyxin-Dexameth]      Aspirin Rash     Codeine Rash     Past Medical History:   Diagnosis Date     Abscess, toe 2009    Right great toe     Cataract 8/2011    Right s/p surgery 8/2011; left s/p surgery     Glaucoma      Headache(784.0)     Relieved with gabapentin. 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 +YEYO      Wrist fracture     Right, s/p ORIF       Past Surgical History:   Procedure Laterality Date     BIOPSY ARTERY TEMPORAL       EXTRACAPSULAR CATARACT EXTRATION WITH INTRAOCULAR LENS IMPLANT  8/2011    Left 2 years ago as well     OPEN REDUCTION INTERNAL FIXATION WRIST BILATERAL         Social History     Social History     Marital status: Single     Spouse name: N/A     Number of children: N/A     Years of education: N/A     Occupational History     Not on file.     Social History Main Topics     Smoking status: Former Smoker     Packs/day: 0.50     Quit date: 7/6/1992     Smokeless tobacco: Never Used      Comment: Quit in 20yrs      Alcohol use No     Drug use: No     Sexual activity: Not on file     Other Topics Concern     Not on file     Social History Narrative    Lives in senior building.  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 Enjoys reading, friends, children and shopping.  Denies exposure to asbestos, silica, pet birds, hot tubs, feather pillows, mold.       Family History   Problem Relation Age of Onset     CANCER  "Father      colon     Emphysema Father      Arthritis Mother      RA at 31 y/o, glaucome     Glaucoma Mother      Arthritis Brother      RA on Mtx and humira     Melanoma No family hx of      Skin Cancer No family hx of              ROS Pulmonary  A complete ROS was otherwise negative except as noted in the HPI.    Vitals: /79  Pulse 74  Resp 16  Ht 1.683 m (5' 6.25\")  Wt 85.1 kg (187 lb 11.2 oz)  SpO2 94%  BMI 30.07 kg/m2    Exam:   GENERAL APPEARANCE: Well developed, well nourished, alert, and in no apparent distress.  RESP: good air flow throughout.  No crackles. No rhonchi. No wheezes.  CV: Normal S1, S2, regular rhythm, normal rate. No murmur.  No LE edema.   MS: extremities normal. No clubbing. No cyanosis.  SKIN: no rash on limited exam.  Old healed lesions on left ankle.  NEURO: Mentation intact, speech normal, normal gait and stance.  PSYCH: mentation appears normal. and affect normal/bright.    Results:  Recent Results (from the past 168 hour(s))   General PFT Lab (Please always keep checked)    Collection Time: 10/27/17 11:32 AM   Result Value Ref Range    FVC-Pred 2.83 L    FVC-Pre 1.38 L    FVC-%Pred-Pre 48 %    FEV1-Pre 1.00 L    FEV1-%Pred-Pre 46 %    FEV1FVC-Pred 77 %    FEV1FVC-Pre 72 %    FEFMax-Pred 5.39 L/sec    FEFMax-Pre 4.13 L/sec    FEFMax-%Pred-Pre 76 %    FEF2575-Pred 1.71 L/sec    FEF2575-Pre 0.64 L/sec    OQS7839-%Pred-Pre 37 %    ExpTime-Pre 5.64 sec    FIFMax-Pre 2.12 L/sec    VC-Pred 3.19 L    VC-Pre 1.53 L    VC-%Pred-Pre 47 %    IC-Pred 2.71 L    IC-Pre 1.43 L    IC-%Pred-Pre 52 %    ERV-Pred 0.48 L    ERV-Pre 0.10 L    ERV-%Pred-Pre 21 %    FEV1FEV6-Pred 78 %    FEV1FEV6-Pre 72 %    FRCPleth-Pred 2.85 L    FRCPleth-Pre 3.05 L    FRCPleth-%Pred-Pre 106 %    RVPleth-Pred 2.28 L    RVPleth-Pre 2.94 L    RVPleth-%Pred-Pre 129 %    TLCPleth-Pred 5.32 L    TLCPleth-Pre 4.47 L    TLCPleth-%Pred-Pre 84 %    DLCOunc-Pred 20.61 ml/min/mmHg    DLCOunc-Pre 13.05 ml/min/mmHg    " DLCOunc-%Pred-Pre 63 %    VA-Pre 2.43 L    VA-%Pred-Pre 44 %    FEV1SVC-Pred 67 %    FEV1SVC-Pre 65 %      I reviewed HRCT from Sept: Mosaic attenuation with air trapping, bilateral thin-walled round cysts, some centrilobular small nodules (less than 2/2017).  My impression is LIP and follicular bronchiolitis.  I also reviewed PFT from today: nonspecific spirometry pattern suggestive of airflow obstruction with normal TLC, mild diffusion defect.    I reviewed results with the patient.      Assessment and plan:   Mrs. Hahn is a 78 yo new patient referred for evaluation and management of ILD.  1.  ILD.  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  PFT is stable.  I will treat the small airways disease with Dulera 100 mcg.  I will have the nurse assistant teach her how to use the inhaler, and we will give her a spacer to use.  I recommended that she gargle with water after using Dulera.  I also will start albuterol prn as a rescue inhaler.  RTC in 3 mo with PFT.  If her symptoms do not improve, then can try daily azithromycin as an anti-inflammatory.  No need for systemic immunosuppression at this time.  I spent 45 min with the patient, more than half spent in counseling and discussing diagnosis and treatment.      Addendum:  I reviewed chest CT in ILD conference.  Cysts, micronodules, GG opacities, mosaic and air trapping: pattern is consistent with follicular bronchioliits and/or LIP.    Again, thank you for allowing me to participate in the care of your patient.      Sincerely,    Nirmala Muñoz MD

## 2017-10-27 NOTE — PROGRESS NOTES
[facility] Interstitial Lung Disease Clinic    Reason for Visit  Abida Hahn is a 77 year old year old female who is being seen for Consult (New consult on Abida and her ILD)    HPI    Mrs. Hahn is a 78 yo who is a new patient referred by Dr. Marks for evaluation and management of ILD.  She has had RA for ~ 20 years and has taken methotrexate for ~10 years.  It was stopped for 9 mo to speed healing of left ankle pyoderma gangrenosum and restarted 2/2017.  Mrs. Hahn reports dyspnea since last year, gradually worsening.  For example, she feels short of breath walking uphill or sometimes with activity such as getting up on the exam table in clinic. She denies cough, wheezing, paroxysmal nocturnal dyspnea, chest pain, fevers, new skin rashes or syncope.  Echo 9/2017 showed left and right ventricular function.  She was evaluated by Dr. Marks in Pulmonary Clinic in May and Sept.  He obtained a HRCT and referred her to ILD Clinic.    Mrs. Hahn reports that she received the flu vaccine.  She has not yet been treated for her dyspnea.        Current Outpatient Prescriptions   Medication     mometasone-formoterol (DULERA) 100-5 MCG/ACT oral inhaler     albuterol (PROAIR HFA/PROVENTIL HFA/VENTOLIN HFA) 108 (90 BASE) MCG/ACT Inhaler     omeprazole (PRILOSEC) 40 MG capsule     aspirin 81 MG tablet     amLODIPine (NORVASC) 5 MG tablet     lisinopril (PRINIVIL/ZESTRIL) 40 MG tablet     methotrexate 2.5 MG tablet CHEMO     latanoprost (XALATAN) 0.005 % ophthalmic solution     GLUCOSAMINE SULFATE PO     folic acid 800 MCG TABS     Cholecalciferol (VITAMIN D-3) 1000 UNITS CAPS     acetaminophen (TYLENOL) 500 MG tablet     levothyroxine (SYNTHROID, LEVOTHROID) 88 MCG tablet     ARTIFICIAL TEAR SOLUTION OP     Calcium-Vitamin D (CALCIUM + D PO)     Multiple Vitamin (DAILY MULTIVITAMIN PO)     No current facility-administered medications for this visit.      Allergies   Allergen Reactions     Ibuprofen Sodium       Penicillins      Triple Antibiotic [Neomycin-Polymyxin-Dexameth]      Aspirin Rash     Codeine Rash     Past Medical History:   Diagnosis Date     Abscess, toe 2009    Right great toe     Cataract 8/2011    Right s/p surgery 8/2011; left s/p surgery     Glaucoma      Headache(784.0)     Relieved with gabapentin. 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 +YEYO      Wrist fracture     Right, s/p ORIF       Past Surgical History:   Procedure Laterality Date     BIOPSY ARTERY TEMPORAL       EXTRACAPSULAR CATARACT EXTRATION WITH INTRAOCULAR LENS IMPLANT  8/2011    Left 2 years ago as well     OPEN REDUCTION INTERNAL FIXATION WRIST BILATERAL         Social History     Social History     Marital status: Single     Spouse name: N/A     Number of children: N/A     Years of education: N/A     Occupational History     Not on file.     Social History Main Topics     Smoking status: Former Smoker     Packs/day: 0.50     Quit date: 7/6/1992     Smokeless tobacco: Never Used      Comment: Quit in 20yrs      Alcohol use No     Drug use: No     Sexual activity: Not on file     Other Topics Concern     Not on file     Social History Narrative    Lives in senior building.  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 Enjoys reading, friends, children and shopping.  Denies exposure to asbestos, silica, pet birds, hot tubs, feather pillows, mold.       Family History   Problem Relation Age of Onset     CANCER Father      colon     Emphysema Father      Arthritis Mother      RA at 31 y/o, glaucome     Glaucoma Mother      Arthritis Brother      RA on Mtx and humira     Melanoma No family hx of      Skin Cancer No family hx of              ROS Pulmonary  A complete ROS was otherwise negative except as noted in  "the HPI.    Vitals: /79  Pulse 74  Resp 16  Ht 1.683 m (5' 6.25\")  Wt 85.1 kg (187 lb 11.2 oz)  SpO2 94%  BMI 30.07 kg/m2    Exam:   GENERAL APPEARANCE: Well developed, well nourished, alert, and in no apparent distress.  RESP: good air flow throughout.  No crackles. No rhonchi. No wheezes.  CV: Normal S1, S2, regular rhythm, normal rate. No murmur.  No LE edema.   MS: extremities normal. No clubbing. No cyanosis.  SKIN: no rash on limited exam.  Old healed lesions on left ankle.  NEURO: Mentation intact, speech normal, normal gait and stance.  PSYCH: mentation appears normal. and affect normal/bright.    Results:  Recent Results (from the past 168 hour(s))   General PFT Lab (Please always keep checked)    Collection Time: 10/27/17 11:32 AM   Result Value Ref Range    FVC-Pred 2.83 L    FVC-Pre 1.38 L    FVC-%Pred-Pre 48 %    FEV1-Pre 1.00 L    FEV1-%Pred-Pre 46 %    FEV1FVC-Pred 77 %    FEV1FVC-Pre 72 %    FEFMax-Pred 5.39 L/sec    FEFMax-Pre 4.13 L/sec    FEFMax-%Pred-Pre 76 %    FEF2575-Pred 1.71 L/sec    FEF2575-Pre 0.64 L/sec    SFS1394-%Pred-Pre 37 %    ExpTime-Pre 5.64 sec    FIFMax-Pre 2.12 L/sec    VC-Pred 3.19 L    VC-Pre 1.53 L    VC-%Pred-Pre 47 %    IC-Pred 2.71 L    IC-Pre 1.43 L    IC-%Pred-Pre 52 %    ERV-Pred 0.48 L    ERV-Pre 0.10 L    ERV-%Pred-Pre 21 %    FEV1FEV6-Pred 78 %    FEV1FEV6-Pre 72 %    FRCPleth-Pred 2.85 L    FRCPleth-Pre 3.05 L    FRCPleth-%Pred-Pre 106 %    RVPleth-Pred 2.28 L    RVPleth-Pre 2.94 L    RVPleth-%Pred-Pre 129 %    TLCPleth-Pred 5.32 L    TLCPleth-Pre 4.47 L    TLCPleth-%Pred-Pre 84 %    DLCOunc-Pred 20.61 ml/min/mmHg    DLCOunc-Pre 13.05 ml/min/mmHg    DLCOunc-%Pred-Pre 63 %    VA-Pre 2.43 L    VA-%Pred-Pre 44 %    FEV1SVC-Pred 67 %    FEV1SVC-Pre 65 %      I reviewed HRCT from Sept: Mosaic attenuation with air trapping, bilateral thin-walled round cysts, some centrilobular small nodules (less than 2/2017).  My impression is LIP and follicular " bronchiolitis.  I also reviewed PFT from today: nonspecific spirometry pattern suggestive of airflow obstruction with normal TLC, mild diffusion defect.    I reviewed results with the patient.      Assessment and plan:   Mrs. Hahn is a 78 yo new patient referred for evaluation and management of ILD.  1.  ILD.  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  PFT is stable.  I will treat the small airways disease with Dulera 100 mcg.  I will have the nurse assistant teach her how to use the inhaler, and we will give her a spacer to use.  I recommended that she gargle with water after using Dulera.  I also will start albuterol prn as a rescue inhaler.  RTC in 3 mo with PFT.  If her symptoms do not improve, then can try daily azithromycin as an anti-inflammatory.  No need for systemic immunosuppression at this time.  I spent 45 min with the patient, more than half spent in counseling and discussing diagnosis and treatment.      Addendum:  I reviewed chest CT in ILD conference.  Cysts, micronodules, GG opacities, mosaic and air trapping: pattern is consistent with follicular bronchioliits and/or LIP.

## 2017-10-31 DIAGNOSIS — J84.9 ILD (INTERSTITIAL LUNG DISEASE) (H): Primary | ICD-10-CM

## 2017-11-01 ENCOUNTER — CARE COORDINATION (OUTPATIENT)
Dept: PULMONOLOGY | Facility: CLINIC | Age: 77
End: 2017-11-01

## 2017-11-01 NOTE — PROGRESS NOTES
Telephone Encounter: Incoming    Reason for Call: Breo inhaler that was approved by insurance is $340 a month. Does not think it is worth it and wondering if it will even help.     Nursing Action/Patient Instruction: Discuss that the pattern of her disease show that she has airway centered pattern and that it looks like an inhaler would be beneficial. Instructed to start using Albuterol inhaler 2-4 times daily for a while and see if she has benefit. If she finds it beneficial it show that the ICS-LAMA would be helpful. Then we can try to see which one we can get and if we can get it for a reasonable cost.     Patient Response/Questions/Concerns: Agreed with plan.

## 2017-11-08 LAB
DLCOUNC-%PRED-PRE: 63 %
DLCOUNC-PRE: 13.05 ML/MIN/MMHG
DLCOUNC-PRED: 20.61 ML/MIN/MMHG
ERV-%PRED-PRE: 21 %
ERV-PRE: 0.1 L
ERV-PRED: 0.48 L
EXPTIME-PRE: 5.64 SEC
FEF2575-%PRED-PRE: 37 %
FEF2575-PRE: 0.64 L/SEC
FEF2575-PRED: 1.71 L/SEC
FEFMAX-%PRED-PRE: 76 %
FEFMAX-PRE: 4.13 L/SEC
FEFMAX-PRED: 5.39 L/SEC
FEV1-%PRED-PRE: 46 %
FEV1-PRE: 1 L
FEV1FEV6-PRE: 72 %
FEV1FEV6-PRED: 78 %
FEV1FVC-PRE: 72 %
FEV1FVC-PRED: 77 %
FEV1SVC-PRE: 65 %
FEV1SVC-PRED: 67 %
FIFMAX-PRE: 2.12 L/SEC
FRCPLETH-%PRED-PRE: 106 %
FRCPLETH-PRE: 3.05 L
FRCPLETH-PRED: 2.85 L
FVC-%PRED-PRE: 48 %
FVC-PRE: 1.38 L
FVC-PRED: 2.83 L
IC-%PRED-PRE: 52 %
IC-PRE: 1.43 L
IC-PRED: 2.71 L
RVPLETH-%PRED-PRE: 129 %
RVPLETH-PRE: 2.94 L
RVPLETH-PRED: 2.28 L
TLCPLETH-%PRED-PRE: 84 %
TLCPLETH-PRE: 4.47 L
TLCPLETH-PRED: 5.32 L
VA-%PRED-PRE: 44 %
VA-PRE: 2.43 L
VC-%PRED-PRE: 47 %
VC-PRE: 1.53 L
VC-PRED: 3.19 L

## 2017-11-30 ENCOUNTER — OFFICE VISIT (OUTPATIENT)
Dept: DERMATOLOGY | Facility: CLINIC | Age: 77
End: 2017-11-30

## 2017-11-30 ENCOUNTER — OFFICE VISIT (OUTPATIENT)
Dept: INTERNAL MEDICINE | Facility: CLINIC | Age: 77
End: 2017-11-30

## 2017-11-30 VITALS
SYSTOLIC BLOOD PRESSURE: 123 MMHG | BODY MASS INDEX: 30.39 KG/M2 | WEIGHT: 189.7 LBS | RESPIRATION RATE: 20 BRPM | HEART RATE: 70 BPM | DIASTOLIC BLOOD PRESSURE: 78 MMHG | OXYGEN SATURATION: 94 %

## 2017-11-30 DIAGNOSIS — M81.0 AGE-RELATED OSTEOPOROSIS WITHOUT CURRENT PATHOLOGICAL FRACTURE: ICD-10-CM

## 2017-11-30 DIAGNOSIS — R10.13 EPIGASTRIC PAIN: Primary | ICD-10-CM

## 2017-11-30 DIAGNOSIS — M05.79 RHEUMATOID ARTHRITIS INVOLVING MULTIPLE SITES WITH POSITIVE RHEUMATOID FACTOR (H): Primary | ICD-10-CM

## 2017-11-30 DIAGNOSIS — Z87.2 HISTORY OF PYODERMA GANGRENOSUM: Primary | ICD-10-CM

## 2017-11-30 DIAGNOSIS — M05.79 RHEUMATOID ARTHRITIS INVOLVING MULTIPLE SITES WITH POSITIVE RHEUMATOID FACTOR (H): ICD-10-CM

## 2017-11-30 DIAGNOSIS — J84.9 ILD (INTERSTITIAL LUNG DISEASE) (H): ICD-10-CM

## 2017-11-30 DIAGNOSIS — I10 ESSENTIAL HYPERTENSION: ICD-10-CM

## 2017-11-30 ASSESSMENT — PAIN SCALES - GENERAL
PAINLEVEL: MILD PAIN (3)
PAINLEVEL: NO PAIN (0)

## 2017-11-30 NOTE — NURSING NOTE
Dermatology Rooming Note    Abida Hahn's goals for this visit include:   Chief Complaint   Patient presents with     Derm Problem     Abida is here for a wound follow up, no concerns noted at this time.     Lou Brown LPN

## 2017-11-30 NOTE — PROGRESS NOTES
"[facility] Dermatology Note    Dermatology Problem List:  1. Skin eruption to her left ankle, Seen by Dr. Johnson Meraz at [facility]   2. Hx of eczema in her youth  3. Pyoderma gangrenosum  - s/p minocycyline, mupirocin, kenalog injections.  4. Inflamed acrochordon(s) s/p cryo    CC:   Chief Complaint   Patient presents with     Derm Problem     Abida is here for a wound follow up, no concerns noted at this time.     Encounter Date: Nov 30, 2017    History of Present Illness:  Ms. Abida Hahn is a 77 year old female for a follow up for pyoderma gangrenosum. The patient was last seen on 8/30/17 when areas were curetted on the left lower extremity and 4 inflamed acrochordons were treated with cryotherapy. Today the patient reports that the wound on the top of the ankle healed well, but she notes that there is other spots on the right side of the left ankle that could be curetted at today's visit. She states that her stomach has had some issues where it is painful and she \"burps\" often. This is being followed by another provider. She notes her joints are okay. The patient reports no other lesions of concern at this time.     Otherwise, the patient reports no painful, bleeding, nonhealing, or pruritic lesions, and denies new or changing moles.    Past Medical History:   Patient Active Problem List   Diagnosis     Chronic pain syndrome     Essential hypertension     Rheumatoid arthritis (H)     Encounter for long-term current use of medication     Pyoderma gangrenosum     Hypothyroidism     Osteoporosis     Primary open angle glaucoma of both eyes, severe stage     Pseudophakia of both eyes     ILD (interstitial lung disease) (H)     Past Medical History:   Diagnosis Date     Abscess, toe 2009    Right great toe     Cataract 8/2011    Right s/p surgery 8/2011; left s/p surgery     Glaucoma      Headache(784.0)     Relieved with gabapentin. 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 +YEYO      Wrist fracture     Right, s/p ORIF     Past Surgical History:   Procedure Laterality Date     BIOPSY ARTERY TEMPORAL       EXTRACAPSULAR CATARACT EXTRATION WITH INTRAOCULAR LENS IMPLANT  8/2011    Left 2 years ago as well     OPEN REDUCTION INTERNAL FIXATION WRIST BILATERAL       Social History:  The patient is single. The patient denies use of tanning beds. Patiently awaiting great grandchildren from her 24-year-old granddaughter, whom she loves very much.    Family History:  There is no family history of asthma, eczema or allergies.    Medications:  Current Outpatient Prescriptions   Medication Sig Dispense Refill     LOSARTAN POTASSIUM PO Take 1 tablet by mouth daily       fluticasone-vilanterol (BREO ELLIPTA) 100-25 MCG/INH oral inhaler Inhale 1 puff into the lungs daily 1 Inhaler 0     mometasone-formoterol (DULERA) 100-5 MCG/ACT oral inhaler Inhale 2 puffs into the lungs 2 times daily 13 g 1     albuterol (PROAIR HFA/PROVENTIL HFA/VENTOLIN HFA) 108 (90 BASE) MCG/ACT Inhaler Inhale 2 puffs into the lungs every 6 hours as needed for shortness of breath / dyspnea or wheezing 1 Inhaler 1     omeprazole (PRILOSEC) 40 MG capsule Take 1 capsule (40 mg) by mouth daily IN AM before breakfast 90 capsule 0     aspirin 81 MG tablet Take 81 mg by mouth daily       amLODIPine (NORVASC) 5 MG tablet Take 1 tablet (5 mg) by mouth daily 90 tablet 0     lisinopril (PRINIVIL/ZESTRIL) 40 MG tablet Take 1 tablet (40 mg) by mouth daily 90 tablet 1     methotrexate 2.5 MG tablet CHEMO Take 8 tablets (20mg) by mouth once a week Take 8 tablets per week 32 tablet 3     latanoprost (XALATAN) 0.005 % ophthalmic solution Place 1 drop into both eyes At Bedtime 1 Bottle 11     GLUCOSAMINE SULFATE PO Take  1,000 mg by mouth daily       folic acid 800 MCG TABS Take by mouth 2 times daily       Cholecalciferol (VITAMIN D-3) 1000 UNITS CAPS Take by mouth 2 times daily       acetaminophen (TYLENOL) 500 MG tablet Take 500-1,000 mg by mouth every 6 hours as needed       levothyroxine (SYNTHROID, LEVOTHROID) 88 MCG tablet Take 88 mcg by mouth daily       ARTIFICIAL TEAR SOLUTION OP Apply  to eye as needed.       Calcium-Vitamin D (CALCIUM + D PO) Take 1 tablet by mouth 2 times daily       Multiple Vitamin (DAILY MULTIVITAMIN PO) Take 1 tablet by mouth daily And minerals per pt       Allergies   Allergen Reactions     Ibuprofen Sodium      Penicillins      Triple Antibiotic [Neomycin-Polymyxin-Dexameth]      Aspirin Rash     Codeine Rash     Review of Systems:  - Skin: per HPI    Physical exam:  Vitals: There were no vitals taken for this visit.  GEN: This is a well developed, well-nourished female in no acute distress, in a pleasant mood.      SKIN: Focused examination of the lower left leg was performed.  - Thin, non tender, pink plaques on the left lower anterior leg and medial leg  - Some retained epidermis in the plaques  - No other lesions of concern on areas examined.     Impression/Plan:  1. Hx of Pyoderma gangrenosum with minocycline hyperpigmentation- improving s/p minocycline and monthly kenalog injections. All areas are closed.   - Significant improvement with kenalog injections and minocycline in the past.  - Areas were curetted at today's visit to removed the retained epidermis.   - No indication for further kenalog injections or minocycline as there is no longer inflammation.  - Photos obtained at today's visit.  - Recommend light scrubbing with wash cloth in the shower.   -Return as needed, as the areas will continue to improve on their own.   Follow up PRN.    Staff Involved:  Scribe Disclosure:   Eboni AVILES, am serving as a scribe to document services personally performed by Lillian De Luna PA-C,  based on data collection and the provider's statements to me.    Provider Disclosure:   The documentation recorded by the scribe accurately reflects the services I personally performed and the decisions made by me.    All risks, benefits and alternatives were discussed with patient.  Patient is in agreement and understands the assessment and plan.  All questions were answered.  Sun Screen Education was given.   Return to Clinic as needed.   Lillian De Luna PA-C   [facility]

## 2017-11-30 NOTE — MR AVS SNAPSHOT
After Visit Summary   11/30/2017    Abida Hahn    MRN: [id number]           Patient Information     Date Of Birth          1940        Visit Information        Provider Department      11/30/2017 12:30 PM Lillian De Luna PA-C [facility]        Today's Diagnoses     History of pyoderma gangrenosum    -  1       Follow-ups after your visit        Follow-up notes from your care team     Return if symptoms worsen or fail to improve.      Your next 10 appointments already scheduled     Dec 19, 2017 12:45 PM CST   VISUAL FIELD with [facility] EYE VISUAL FIELD   Eye Clinic ([facility])    [address]  [address]   [address] [address]  [facility] [address]   [phone]            Dec 19, 2017  1:15 PM CST   RETURN GLAUCOMA with Corazon Addison MD   Eye Clinic ([facility])    [address]  [address]  [address] [address]  [facility] [address]   [phone]            Jan 11, 2018 11:25 AM CST   (Arrive by 11:10 AM)   Return Visit with Amarilys Iyer MD   [facility] ([facility])    [address] [place] [address]  [address]th [facility] [id number] [phone]            Jan 17, 2018  2:00 PM CST   (Arrive by 1:45 PM)   [facility] ([facility])    [address]  3rd [facility] [id number] [phone]            Mar 02, 2018 10:30 AM CST   FULL PULMONARY FUNCTION with  [facility] Pulmonary Function Testing ([facility])    [address]  [address]rd [facility] [address]   [phone]            Mar 02, 2018 11:30 AM CST   (Arrive by 11:15 AM)   Return Interstitial Lung with Nirmala Muñoz MD   [facility] ([facility])    [address] [place] [address]  3rd [facility] [id number] [phone]               Who to contact     Please call your clinic at [phone] to:    Ask questions about your health    Make or cancel appointments    Discuss your medicines    Learn about your test results    Speak to your doctor   If you have compliments or concerns about an experience at your clinic, or if you wish to file a complaint, please contact [facility] Patient Relations at [phone] or email us at [email or web address]         Additional Information About Your Visit        [employer] Information     Seriosity is an electronic gateway that provides easy, online access to your medical records. With Seriosity, you can request a clinic appointment, read your test results, renew a prescription or communicate with your care team.     To sign up for Seriosity visit the website at [email or web address]   You will be asked to enter the access code listed below, as well as some personal information. Please follow the directions to create your username and password.     Your access code is: [email or web address]  Expires: 2018 11:42 AM     Your access code will  in 90 days. If you need help or a new code, please contact your [facility] Clinic or call [phone] for assistance.        Care EveryWhere ID     This is your Care EveryWhere ID. This could be used by other organizations to access your [place] medical records  [id number]         Blood Pressure from Last 3 Encounters:   17 123/78   10/27/17 115/79   10/18/17 117/70    Weight from Last 3 Encounters:   17 86 kg (189 lb 11.2 oz)   10/27/17 85.1 kg (187 lb 11.2 oz)   10/18/17 85.8 kg (189 lb 1.6 oz)              Today, you had the following     No orders found for display       Primary Care Provider Office Phone # Fax #    Amarilys Iyer [phone]       [address] [place] [address]  [facility] [address]        Equal Access to Services     DADA ALMARAZ AH: Karoline grant  [place]ne, maryda luqadaha, qaybta kataj ram, neo jacquesnathaniel clarita. So [facility] [phone].    ATENCIÓN: Si ruben dasilva, tiene a be disposición servicios gratuitos de asistencia lingüística. Raheem al [phone].    We comply with applicable federal civil rights laws and [place] laws. We do not discriminate on the basis of race, color, national origin, age, disability, sex, sexual orientation, or gender identity.            Thank you!     Thank you for choosing [facility] DERMAT[facility]  for your care. Our goal is always to provide you with excellent care. Hearing back from our patients is one way we can continue to improve our services. Please take a few minutes to complete the written survey that you may receive in the mail after your visit with us. Thank you!             Your Updated Medication List - Protect others around you: Learn how to safely use, store and throw away your medicines at www.disposemymeds.org.          This list is accurate as of: 11/30/17  3:20 PM.  Always use your most recent med list.                   Brand Name Dispense Instructions for use Diagnosis    acetaminophen 500 MG tablet    TYLENOL     Take 500-1,000 mg by mouth every 6 hours as needed        albuterol 108 (90 BASE) MCG/ACT Inhaler    PROAIR HFA/PROVENTIL HFA/VENTOLIN HFA    1 Inhaler    Inhale 2 puffs into the lungs every 6 hours as needed for shortness of breath / dyspnea or wheezing    ILD (interstitial lung disease) (H)       amLODIPine 5 MG tablet    NORVASC    90 tablet    Take 1 tablet (5 mg) by mouth daily    Essential hypertension       ARTIFICIAL TEAR SOLUTION OP      Apply  to eye as needed.        aspirin 81 MG tablet      Take 81 mg by mouth daily        CALCIUM + D PO      Take 1 tablet by mouth 2 times daily        DAILY MULTIVITAMIN PO      Take 1 tablet by mouth daily And minerals per pt        fluticasone-vilanterol 100-25 MCG/INH oral inhaler    BREO ELLIPTA    1 Inhaler    Inhale 1  puff into the lungs daily    ILD (interstitial lung disease) (H)       folic acid 800 MCG Tabs      Take by mouth 2 times daily        GLUCOSAMINE SULFATE PO      Take 1,000 mg by mouth daily        latanoprost 0.005 % ophthalmic solution    XALATAN    1 Bottle    Place 1 drop into both eyes At Bedtime    Open-angle glaucoma of both eyes, severe stage, unspecified open-angle glaucoma type       levothyroxine 88 MCG tablet    SYNTHROID/LEVOTHROID     Take 88 mcg by mouth daily        lisinopril 40 MG tablet    PRINIVIL/ZESTRIL    90 tablet    Take 1 tablet (40 mg) by mouth daily    Essential hypertension       LOSARTAN POTASSIUM PO      Take 1 tablet by mouth daily        methotrexate 2.5 MG tablet CHEMO     32 tablet    Take 8 tablets (20mg) by mouth once a week Take 8 tablets per week    Rheumatoid arthritis flare (H)       mometasone-formoterol 100-5 MCG/ACT oral inhaler    DULERA    13 g    Inhale 2 puffs into the lungs 2 times daily    ILD (interstitial lung disease) (H)       omeprazole 40 MG capsule    priLOSEC    90 capsule    Take 1 capsule (40 mg) by mouth daily IN AM before breakfast    Gastritis without bleeding, unspecified chronicity, unspecified gastritis type       Vitamin D-3 1000 UNITS Caps      Take by mouth 2 times daily

## 2017-11-30 NOTE — PATIENT INSTRUCTIONS
[facility]: [phone]     [facility] Medication Refill Request Information:  * Please contact your pharmacy regarding ANY request for medication refills.  ** [facility] Prescription Fax = [phone]  * Please allow 3 business days for routine medication refills.  * Please allow 5 business days for controlled substance medication refills.     [facility] Test Result notification information:  *You will be notified with in 7-10 days of your appointment day regarding the results of your test.  If you are on MyChart you will be notified as soon as the provider has reviewed the results and signed off on them.

## 2017-11-30 NOTE — NURSING NOTE
"Chief Complaint   Patient presents with     Hypertension     follow up hypertension     Gastrointestinal Problem     follow up GI \" problems, and I am still having problems.\"   Emmanuelle Rice LPN 11:10 AM on 11/30/2017  Rooming Note  Patient is up to date with all health maintenance items  Patient believes has already had a tetanus shot within last 10 years.Emmanuelle Rice LPN 11:13 AM on 11/30/2017  "

## 2017-11-30 NOTE — PROGRESS NOTES
Ms. Hahn is a 77 year old female here to establish care.    History of Present Illness:  Abida was previously seeing Dr. Freitas.  She is a pleasant 76 yo F with PMH of RA on MTX, Osteoporosis, Pyoderma gangrenosum, hypothyroidism and possible ILD.  She follows with numerous specialists.  She does take MTX for RA, diagnosed in her 60s, mostly in hands/feets, not much pain but she is stiff.  She follows with Dr. Muñoz of newly diagnosed ILD/LIP with restrictive physiology.      Abida has been having GI issues for a while.  Started around the time she stopped minocycline in June. She has pain with eating.  Even bread triggers it.  No trouble swallowing, no diarrhea or constipation.  Feels full and bloated.  Tried omaprazole and not efficacious.  No nausea or vomiting.  Restarted MTX in Feb. No blood in stools.  Thinks it was better when off MTX.    A full 10-pt Review of Systems was performed, verified and is negative except as documented in the HPI.  All health questionnaires were reviewed, verified and relevant information documented above.    Past Medical History:  Past Medical History:   Diagnosis Date     Abscess, toe 2009    Right great toe     Cataract 8/2011    Right s/p surgery 8/2011; left s/p surgery     Glaucoma      Headache(784.0)     Relieved with gabapentin. 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 +YEYO      Wrist fracture     Right, s/p ORIF       Past Surgical History:  Past Surgical History:   Procedure Laterality Date     BIOPSY ARTERY TEMPORAL       EXTRACAPSULAR CATARACT EXTRATION WITH INTRAOCULAR LENS IMPLANT  8/2011    Left 2 years ago as well     OPEN REDUCTION INTERNAL FIXATION WRIST BILATERAL         Active Meds:  Current Outpatient Prescriptions   Medication      LOSARTAN POTASSIUM PO     fluticasone-vilanterol (BREO ELLIPTA) 100-25 MCG/INH oral inhaler     mometasone-formoterol (DULERA) 100-5 MCG/ACT oral inhaler     albuterol (PROAIR HFA/PROVENTIL HFA/VENTOLIN HFA) 108 (90 BASE) MCG/ACT Inhaler     omeprazole (PRILOSEC) 40 MG capsule     aspirin 81 MG tablet     amLODIPine (NORVASC) 5 MG tablet     lisinopril (PRINIVIL/ZESTRIL) 40 MG tablet     methotrexate 2.5 MG tablet CHEMO     latanoprost (XALATAN) 0.005 % ophthalmic solution     GLUCOSAMINE SULFATE PO     folic acid 800 MCG TABS     Cholecalciferol (VITAMIN D-3) 1000 UNITS CAPS     acetaminophen (TYLENOL) 500 MG tablet     levothyroxine (SYNTHROID, LEVOTHROID) 88 MCG tablet     ARTIFICIAL TEAR SOLUTION OP     Calcium-Vitamin D (CALCIUM + D PO)     Multiple Vitamin (DAILY MULTIVITAMIN PO)     No current facility-administered medications for this visit.         Allergies:  Ibuprofen sodium; Penicillins; Triple antibiotic [neomycin-polymyxin-dexameth]; Aspirin; and Codeine    Family History:  family history includes Arthritis in her brother and mother; CANCER in her father; Emphysema in her father; Glaucoma in her mother. There is no history of Melanoma or Skin Cancer.    Social History:  Social History   Substance Use Topics     Smoking status: Former Smoker     Packs/day: 0.50     Quit date: 7/6/1992     Smokeless tobacco: Never Used      Comment: Quit in 20yrs      Alcohol use No       Physical Exam:  Vitals: /78 (BP Location: Right arm, Patient Position: Sitting, Cuff Size: Adult Regular)  Pulse 70  Resp 20  Wt 86 kg (189 lb 11.2 oz)  SpO2 94%  BMI 30.39 kg/m2  Constitutional: Alert, oriented, pleasant, no acute distress  Head: Normocephalic, atraumatic  Eyes: Extra-ocular movements intact, pupils equally round and reactive bilaterally, no scleral icterus  ENT: Oropharynx clear, moist mucus membranes, good dentition  Neck: Supple, no lymphadenopathy, no thyromegaly  Cardiovascular: Regular rate and  rhythm, no murmurs, rubs or gallops, peripheral pulses full/symmetric  Respiratory: Some decreased air movement bilaterally, lungs clear, no wheezes/rales/rhonchi  GI: Abdomen distended, soft, non masss, tender in epigastrum, no rebound/guarding  Musculoskeletal: No edema, normal muscle tone, ambulates independently but has walker  Neurologic: Alert and oriented, cranial nerves 2-12 intact,   Psychiatric: normal mentation, affect and mood        Assessment and Plan:  Abida was seen today for hypertension and gastrointestinal problem.    Diagnoses and all orders for this visit:    Epigastric pain: Unclear etiology, does not seem c/w biliary/pancreatic etiology or colonic.  May be gastritis, functional, ischemic, motility.  Also wondering if MTX could be contributing given it was previously better off the med.  Discussed whether a drug holiday might help; will d/w Rheum.  Otherwise, informed her that w/u with CT, EGD, GES may be indicated.    Rheumatoid arthritis involving multiple sites with positive rheumatoid factor (H): Stable    Essential hypertension: Improved today    ILD (interstitial lung disease) (H): Following with Pulm    Age-related osteoporosis without current pathological fracture:        - She did not tolerate bisphosphonate.  She is at higher risk d/t history of RA.  She is not eager to see Endocrinology.  We could consider prolia or reclast in the future.  Would need baseline Ca, Mg, Phos, Creatinine.  Will revisit next visit.        #Routine Health Maintenence:  Immunizations (zoster, pneumovax, flu, Tdap, Hep A/B):   Most Recent Immunizations   Administered Date(s) Administered     Influenza (High Dose) 3 valent vaccine 10/12/2017     Influenza (IIV3) PF 01/01/2016     Pneumococcal (PCV 13) 10/05/2015     Pneumococcal 23 valent 11/21/2006     TD (ADULT, 7+) 06/01/2006     TDAP Vaccine (Boostrix) 11/01/2007     Zoster vaccine, live 10/17/2011     Lipids:   Recent Labs   Lab Test  10/08/10   0994    CHOL  199   HDL  48*   LDL  121   TRIG  155*   CHOLHDLRATIO  4.2     Lung Ca Screening (>30 pk age 55-79 or >20 py age 50-79 + RF): does not qualify, had CT already  Colonoscopy (50-75 yrs): unknown  Dexa (>65W or 70M yrs): 2/17 Conclusions:  The most negative and valid T-score of -3.7  at the level of the wrist, corresponds with osteoporosis   .  Mammogram (40-75 yrs): 3/17  Pap (21-65 yrs):   Lab Results   Component Value Date    PAP NIL 09/24/2010     Depression: screen neg  Advanced Directive: deferred    Return to clinic: 6 weeks    Amarilys Iyer MD  Internal Medicine      25 min spent face to face, of which >50% time spent on counseling/coordinating care exclusive of any procedure time

## 2017-11-30 NOTE — MR AVS SNAPSHOT
After Visit Summary   11/30/2017    Abida Hahn    MRN: [id number]           Patient Information     Date Of Birth          1940        Visit Information        Provider Department      11/30/2017 10:55 AM Amarilys Iyer MD [facility]        Care Instructions    Primary Care Center: [phone]     [facility] Medication Refill Request Information:  * Please contact your pharmacy regarding ANY request for medication refills.  ** P[facility] Prescription Fax = [phone]  * Please allow 3 business days for routine medication refills.  * Please allow 5 business days for controlled substance medication refills.     Primary Care Center Test Result notification information:  *You will be notified with in 7-10 days of your appointment day regarding the results of your test.  If you are on MyChart you will be notified as soon as the provider has reviewed the results and signed off on them.          Follow-ups after your visit        Follow-up notes from your care team     Return in about 6 weeks (around 1/11/2018).      Your next 10 appointments already scheduled     Nov 30, 2017 12:30 PM CST   (Arrive by 12:15 PM)   Return Visit with Lillian De Luna PA-C   [facility] ([facility])    [address] [facility]  3rd [facility] [address]   [phone]            Dec 19, 2017 12:45 PM CST   VISUAL FIELD with [facility] EYE VISUAL FIELD   Eye Clinic ([facility])    [address]  [address] [place] [address]   [phone]            Dec 19, 2017  1:15 PM CST   RETURN GLAUCOMA with Corazon Addison MD   Eye Clinic ([facility])    [address]  [address] [place] [address]   [phone]            Jan 11, 2018 11:25 AM CST   (Arrive by 11:10 AM)   Return Visit with Amarilys Iyer MD   [facility] ([facility])    [address] [facility]  4th [facility] [address]   [phone]            Jan 17, 2018  2:00 PM CST   (Arrive by 1:45 PM)   Return Visit with NAKUL Lew CNP   [facility] ([facility])    [address] [place] [address]   [phone]            Mar 02, 2018 10:30 AM CST   FULL PULMONARY FUNCTION with  PFL C   [facility] Pulmonary Function Testing ([facility])    [address] [place] [address] [phone]            Mar 02, 2018 11:30 AM CST   (Arrive by 11:15 AM)   Return Interstitial Lung with Nirmala Muñoz MD   [facility] ([facility])    [address] [place] [id number] [phone]              Who to contact     Please call your clinic at [phone] to:    Ask questions about your health    Make or cancel appointments    Discuss your medicines    Learn about your test results    Speak to your doctor   If you have compliments or concerns about an experience at your clinic, or if you wish to file a complaint, please contact [facility] Patient Relations at [phone] or email us at [email or web address]         Additional Information About Your Visit        Piccsyhart Information     Hythiamt is an electronic gateway that provides easy, online access to your medical records. With Relavance Software, you can request a clinic appointment, read your test results, renew a prescription or communicate with your care team.     To sign up for Hythiamt visit the website at [email or web address]   You will be asked to enter the access code listed below, as well as some personal information. Please follow the directions to create your username and password.     Your access code is: [email or web address]  Expires: 2/28/2018 11:42 AM     Your access code will   in 90 days. If you need help or a new code, please contact your [facility] or call [phone] for assistance.        Care EveryWhere ID     This is your Care EveryWhere ID. This could be used by other organizations to access your [place] medical records  [id number]        Your Vitals Were     Pulse Respirations Pulse Oximetry BMI (Body Mass Index)          70 20 94% 30.39 kg/m2         Blood Pressure from Last 3 Encounters:   17 123/78   10/27/17 115/79   10/18/17 117/70    Weight from Last 3 Encounters:   17 86 kg (189 lb 11.2 oz)   10/27/17 85.1 kg (187 lb 11.2 oz)   10/18/17 85.8 kg (189 lb 1.6 oz)              Today, you had the following     No orders found for display       Primary Care Provider Office Phone # Fax #    Amarilys Iyer [phone]       [address] [place] [address] [address] [place] [address]        Equal Access to Services     DADA ALMARAZ : Hadii anu ku hadasho Soomaali, waaxda luqadaha, qaybta kaalmada adeegyada, neo quinn . So [facility] [place] [phone].    ATENCIÓN: Si habla español, tiene a be disposición servicios gratuitos de asistencia lingüística. Llame al [phone].    We comply with applicable federal civil rights laws and [place] laws. We do not discriminate on the basis of race, color, national origin, age, disability, sex, sexual orientation, or gender identity.            Thank you!     Thank you for choosing [facility]  for your care. Our goal is always to provide you with excellent care. Hearing back from our patients is one way we can continue to improve our services. Please take a few minutes to complete the written survey that you may receive in the mail after your visit with us. Thank you!             Your Updated Medication List - Protect others around you: Learn how to safely use, store and throw away your medicines at www.disposemymeds.org.          This list  is accurate as of: 11/30/17 11:42 AM.  Always use your most recent med list.                   Brand Name Dispense Instructions for use Diagnosis    acetaminophen 500 MG tablet    TYLENOL     Take 500-1,000 mg by mouth every 6 hours as needed        albuterol 108 (90 BASE) MCG/ACT Inhaler    PROAIR HFA/PROVENTIL HFA/VENTOLIN HFA    1 Inhaler    Inhale 2 puffs into the lungs every 6 hours as needed for shortness of breath / dyspnea or wheezing    ILD (interstitial lung disease) (H)       amLODIPine 5 MG tablet    NORVASC    90 tablet    Take 1 tablet (5 mg) by mouth daily    Essential hypertension       ARTIFICIAL TEAR SOLUTION OP      Apply  to eye as needed.        aspirin 81 MG tablet      Take 81 mg by mouth daily        CALCIUM + D PO      Take 1 tablet by mouth 2 times daily        DAILY MULTIVITAMIN PO      Take 1 tablet by mouth daily And minerals per pt        fluticasone-vilanterol 100-25 MCG/INH oral inhaler    BREO ELLIPTA    1 Inhaler    Inhale 1 puff into the lungs daily    ILD (interstitial lung disease) (H)       folic acid 800 MCG Tabs      Take by mouth 2 times daily        GLUCOSAMINE SULFATE PO      Take 1,000 mg by mouth daily        latanoprost 0.005 % ophthalmic solution    XALATAN    1 Bottle    Place 1 drop into both eyes At Bedtime    Open-angle glaucoma of both eyes, severe stage, unspecified open-angle glaucoma type       levothyroxine 88 MCG tablet    SYNTHROID/LEVOTHROID     Take 88 mcg by mouth daily        lisinopril 40 MG tablet    PRINIVIL/ZESTRIL    90 tablet    Take 1 tablet (40 mg) by mouth daily    Essential hypertension       LOSARTAN POTASSIUM PO      Take 1 tablet by mouth daily        methotrexate 2.5 MG tablet CHEMO     32 tablet    Take 8 tablets (20mg) by mouth once a week Take 8 tablets per week    Rheumatoid arthritis flare (H)       mometasone-formoterol 100-5 MCG/ACT oral inhaler    DULERA    13 g    Inhale 2 puffs into the lungs 2 times daily    ILD (interstitial lung  disease) (H)       omeprazole 40 MG capsule    priLOSEC    90 capsule    Take 1 capsule (40 mg) by mouth daily IN AM before breakfast    Gastritis without bleeding, unspecified chronicity, unspecified gastritis type       Vitamin D-3 1000 UNITS Caps      Take by mouth 2 times daily

## 2017-11-30 NOTE — TELEPHONE ENCOUNTER
Spoke with Dr. Iyer today about pt.  Pt having epigastric discomfort chronically; not responsive to PPI. Query contribution from methotrexate.  Plan:  Patient should switch to liquid methotrexate 0.8 mL to be mixed with juice and consumed po weekly for 4 weeks. Disp 4 mL.  Patient should report on abd pain status after a trial of 2 or 3 methotrexate doses.  Please contact patient, and help provide pharmacy and diagnosis for Rx (order started).

## 2017-12-01 RX ORDER — METHOTREXATE 25 MG/ML
INJECTION, SOLUTION INTRA-ARTERIAL; INTRAMUSCULAR; INTRAVENOUS
Qty: 4 ML | Refills: 0 | Status: SHIPPED | OUTPATIENT
Start: 2017-12-01 | End: 2018-01-08

## 2017-12-01 NOTE — TELEPHONE ENCOUNTER
Called pt with Dr Mccurdy's recommendations. Pt is agreeable to try the liquid methotrexate placed into juice. She will call after 2 or 3 doses to report on the status of her abd pain. Pt would like this sent to her listed [facility]'s pharmacy. Pt had no questions at this time, but contact information was given to the RN CC.    Dr Mccurdy asked that the methotrexate order that he had placed be signed and sent to the pharmacy. This was done.    RAQUEL LevineN RN  Rheumatology RN Coordinator   [place]

## 2017-12-04 ENCOUNTER — TELEPHONE (OUTPATIENT)
Dept: RHEUMATOLOGY | Facility: CLINIC | Age: 77
End: 2017-12-04

## 2017-12-04 NOTE — TELEPHONE ENCOUNTER
Returned call to pt. Verified that the script for the liquid methotrexate is for oral use. Recommended that she ask the pharmacist for something to measure the dose of methotrexate she is to use in her juice. Pt is agreeable to call with any additional questions or concerns.    JOSE Levine RN  Rheumatology RN Coordinator   [place]

## 2017-12-04 NOTE — TELEPHONE ENCOUNTER
"Pt requesting callback from RENEE Bellamy regarding methotrexate.    Pt states \"rx from pharmacy was injectible methotrexate & she thought it was to be an oral form of methotrexate.\"    Please callback pt at [phone].   "

## 2018-01-08 DIAGNOSIS — M05.79 RHEUMATOID ARTHRITIS INVOLVING MULTIPLE SITES WITH POSITIVE RHEUMATOID FACTOR (H): ICD-10-CM

## 2018-01-08 DIAGNOSIS — K29.70 GASTRITIS WITHOUT BLEEDING, UNSPECIFIED CHRONICITY, UNSPECIFIED GASTRITIS TYPE: ICD-10-CM

## 2018-01-09 RX ORDER — METHOTREXATE 25 MG/ML
INJECTION, SOLUTION INTRA-ARTERIAL; INTRAMUSCULAR; INTRAVENOUS
Qty: 4 ML | Refills: 0 | Status: SHIPPED | OUTPATIENT
Start: 2018-01-09 | End: 2018-01-22

## 2018-01-09 NOTE — TELEPHONE ENCOUNTER
methotrexate 50 MG/2ML  INJ  Last Written Prescription Date:  12/1/17  Last Fill Quantity: 4ML,   # refills: 0  Last Office Visit: 10/18/17  Future Office visit:  1/17/18    CBC RESULTS:   Recent Labs   Lab Test  08/30/17   1401   WBC  5.6   RBC  4.09   HGB  13.9   HCT  42.9   MCV  105*   MCH  34.0*   MCHC  32.4   RDW  12.5   PLT  205       Creatinine   Date Value Ref Range Status   09/01/2017 0.90 0.52 - 1.04 mg/dL Final   ]    Liver Function Studies -   Recent Labs   Lab Test  08/30/17   1401   04/06/17   1442   PROTTOTAL   --    --   7.5   ALBUMIN   --    --   3.9   BILITOTAL   --    --   0.4   ALKPHOS   --    --   133   AST  16   < >  17   ALT  23   < >  25    < > = values in this interval not displayed.       Routing refill request to provider for review/approval because:  LABS  8/30/17

## 2018-01-09 NOTE — TELEPHONE ENCOUNTER
prilosec    Last Written Prescription Date:  10/12/17  Last Fill Quantity: 90,   # refills: 0  Last Office Visit : 11/30/17  Future Office visit:  2/9/18    Routing refill request to clinic nurse for review/approval because:  Failed protocol due to hx of osteoporosis

## 2018-01-11 ENCOUNTER — OFFICE VISIT (OUTPATIENT)
Dept: OPHTHALMOLOGY | Facility: CLINIC | Age: 78
End: 2018-01-11
Attending: OPHTHALMOLOGY
Payer: MEDICARE

## 2018-01-11 DIAGNOSIS — Z96.1 PSEUDOPHAKIA OF BOTH EYES: ICD-10-CM

## 2018-01-11 DIAGNOSIS — H40.1133 PRIMARY OPEN ANGLE GLAUCOMA OF BOTH EYES, SEVERE STAGE: Primary | ICD-10-CM

## 2018-01-11 PROCEDURE — 92083 EXTENDED VISUAL FIELD XM: CPT | Mod: ZF | Performed by: OPHTHALMOLOGY

## 2018-01-11 PROCEDURE — G0463 HOSPITAL OUTPT CLINIC VISIT: HCPCS | Mod: ZF

## 2018-01-11 RX ORDER — LATANOPROST 50 UG/ML
1 SOLUTION/ DROPS OPHTHALMIC AT BEDTIME
Qty: 1 BOTTLE | Refills: 11 | Status: SHIPPED | OUTPATIENT
Start: 2018-01-11 | End: 2018-11-16

## 2018-01-11 RX ORDER — OMEPRAZOLE 40 MG/1
CAPSULE, DELAYED RELEASE ORAL
Qty: 90 CAPSULE | Refills: 3 | Status: SHIPPED | OUTPATIENT
Start: 2018-01-11 | End: 2018-01-22

## 2018-01-11 ASSESSMENT — REFRACTION_WEARINGRX
OD_AXIS: 148
OD_CYLINDER: +0.50
OS_SPHERE: -0.75
SPECS_TYPE: PAL
OD_SPHERE: -1.50
OD_ADD: +3.00
OS_AXIS: 109
OS_CYLINDER: +1.50
OS_ADD: +3.00

## 2018-01-11 ASSESSMENT — VISUAL ACUITY
OD_CC+: -1
OS_CC+: -1
OS_CC: 20/25
OD_PH_CC: 20/25
METHOD: SNELLEN - LINEAR
OD_CC: 20/30

## 2018-01-11 ASSESSMENT — CONF VISUAL FIELD
OD_SUPERIOR_TEMPORAL_RESTRICTION: 3
OS_SUPERIOR_TEMPORAL_RESTRICTION: 3
OD_INFERIOR_TEMPORAL_RESTRICTION: 3
OD_SUPERIOR_NASAL_RESTRICTION: 3
OD_INFERIOR_NASAL_RESTRICTION: 3
OS_SUPERIOR_NASAL_RESTRICTION: 3

## 2018-01-11 ASSESSMENT — SLIT LAMP EXAM - LIDS
COMMENTS: NORMAL
COMMENTS: NORMAL

## 2018-01-11 ASSESSMENT — EXTERNAL EXAM - LEFT EYE: OS_EXAM: NORMAL

## 2018-01-11 ASSESSMENT — CUP TO DISC RATIO
OS_RATIO: 0.9
OD_RATIO: 0.9

## 2018-01-11 ASSESSMENT — TONOMETRY
IOP_METHOD: APPLANATION
OS_IOP_MMHG: 11
OD_IOP_MMHG: 13

## 2018-01-11 ASSESSMENT — EXTERNAL EXAM - RIGHT EYE: OD_EXAM: NORMAL

## 2018-01-11 NOTE — NURSING NOTE
Chief Complaints and History of Present Illnesses   Patient presents with     Follow Up For     Primary open angle glaucoma of both eyes, severe stage     HPI    Last Eye Exam:  8/24/17   Affected eye(s):  Both   Symptoms:        Unknown duration    Frequency:  Constant       Do you have eye pain now?:  No      Comments:  Abida is here today for a follow up of Primary open angle glaucoma of both eyes, severe stage  She says her vision is about the same as last visit.  She says she sometimes sees a single floater LE  She has been using latanoprost every evening as directed OU.     Cornelius Sanz COT 2:51 PM January 11, 2018

## 2018-01-11 NOTE — MR AVS SNAPSHOT
After Visit Summary   1/11/2018    Abida Hahn    MRN: [id number]           Patient Information     Date Of Birth          1940        Visit Information        Provider Department      1/11/2018 2:30 PM Corazon Addison MD Eye Clinic        Today's Diagnoses     Primary open angle glaucoma of both eyes, severe stage    -  1    Pseudophakia of both eyes          Care Instructions    Patient will continue Latanoprost which is a teal top drop at bedtime in both eyes.  Patient will return to clinic in 3-4 months with repeat IOP check, OCT with RNFl analysis and visual field test.  A long discussion of the risks, benefits, and alternatives including potential treatment and management options were had with patient and a decision was made to observe at this time and not add additional eye drops unless evidence of progression or elevated IOPs.         This drop may cause lash growth and some darkening of the skin around the eye.  It is also possible in a patient with a freckled iris or michell eyes, that the iris could darken to brown with time, something that is not common but not reversible.          Follow-ups after your visit        Follow-up notes from your care team     Return 3-4 months with repeat IOP check, OCT with RNFl analysis and visual field test..      Your next 10 appointments already scheduled     Jan 17, 2018  2:00 PM CST   (Arrive by 1:45 PM)   Return Visit with NAKUL Lew [facility] ([facility])    [address] [place] [address]  [address] [place] [id number] [phone]            Feb 09, 2018 10:55 AM CST   (Arrive by 10:40 AM)   Return Visit with Amarilys Iyer MD   [facility] ([facility])    [address] [facility] [address] [facility] [address]  4th Floor  [facility] [address]   [phone]            Mar 02, 2018 10:30 AM CST   FULL PULMONARY FUNCTION with  PFL [facility] Pulmonary Function Testing  ([facility])    [address]  3rd Floor  [facility] [address]   [phone]            Mar 02, 2018 11:30 AM CST   (Arrive by 11:15 AM)   Return Interstitial Lung with Nirmala Muñoz MD   [facility] ([facility])    [address]  [address]  [facility] [address]   [phone]            2018 12:45 PM CDT   RETURN GLAUCOMA with Corazon Addison MD   Eye Clinic ([facility])    [address]  [address] [facility]  9th Fl Clin 9a  [facility] [address] [phone]              Who to contact     Please call your clinic at [phone] to:    Ask questions about your health    Make or cancel appointments    Discuss your medicines    Learn about your test results    Speak to your doctor   If you have compliments or concerns about an experience at your clinic, or if you wish to file a complaint, please contact [facility] Patient Relations at [phone] or email us at [email or web address]         Additional Information About Your Visit        DayakharNevro Information     [employer] Management Servicest is an electronic gateway that provides easy, online access to your medical records. With Orgenesis, you can request a clinic appointment, read your test results, renew a prescription or communicate with your care team.     To sign up for [employer] visit the website at [email or web address]   You will be asked to enter the access code listed below, as well as some personal information. Please follow the directions to create your username and password.     Your access code is: [email or web address]  Expires: 2018 11:42 AM     Your access code will  in 90 days. If you need help or a new code, please contact your [facility] or call [phone] for assistance.        Care EveryWhere ID     This is your Care EveryWhere ID. This could be used by  other organizations to access your [place] medical records  [id number]         Blood Pressure from Last 3 Encounters:   11/30/17 123/78   10/27/17 115/79   10/18/17 117/70    Weight from Last 3 Encounters:   11/30/17 86 kg (189 lb 11.2 oz)   10/27/17 85.1 kg (187 lb 11.2 oz)   10/18/17 85.8 kg (189 lb 1.6 oz)              We Performed the Following     OVF 24-2 Dynamic OU          Today's Medication Changes          These changes are accurate as of: 1/11/18  3:52 PM.  If you have any questions, ask your nurse or doctor.               These medicines have changed or have updated prescriptions.        Dose/Directions    * latanoprost 0.005 % ophthalmic solution   Commonly known as:  XALATAN   This may have changed:  Another medication with the same name was added. Make sure you understand how and when to take each.   Used for:  Open-angle glaucoma of both eyes, severe stage, unspecified open-angle glaucoma type   Changed by:  Corazon Adidson MD        Dose:  1 drop   Place 1 drop into both eyes At Bedtime   Quantity:  1 Bottle   Refills:  11       * latanoprost 0.005 % ophthalmic solution   Commonly known as:  XALATAN   This may have changed:  You were already taking a medication with the same name, and this prescription was added. Make sure you understand how and when to take each.   Used for:  Primary open angle glaucoma of both eyes, severe stage   Changed by:  Corazon Addison MD        Dose:  1 drop   Place 1 drop into both eyes At Bedtime   Quantity:  1 Bottle   Refills:  11       * Notice:  This list has 2 medication(s) that are the same as other medications prescribed for you. Read the directions carefully, and ask your doctor or other care provider to review them with you.         Where to get your medicines      [email or web address] [address] [facility] [address] [facility] [address]  [address] [place] [address]    Hours:   24-hours Phone:  [phone]     latanoprost 0.005 % ophthalmic solution                Primary Care Provider Office Phone # Fax #    Amarilys Iyer [phone]       [address] [place] [address] [address] [place] [address]        Equal Access to Services     DADA ALMARAZ : Hadii aad ku hadasho Soomaali, waaxda luqadaha, qaybta kaalmada adeegyada, waxperla stephy reinathaniel duncan kirtilynda otto. So [facility] [phone].    ATENCIÓN: Si habla español, tiene a be disposición servicios gratuitos de asistencia lingüística. [facility] [phone].    We comply with applicable federal civil rights laws and [place] laws. We do not discriminate on the basis of race, color, national origin, age, disability, sex, sexual orientation, or gender identity.            Thank you!     Thank you for choosing EYE CLINIC  for your care. Our goal is always to provide you with excellent care. Hearing back from our patients is one way we can continue to improve our services. Please take a few minutes to complete the written survey that you may receive in the mail after your visit with us. Thank you!             Your Updated Medication List - Protect others around you: Learn how to safely use, store and throw away your medicines at www.disposemymeds.org.          This list is accurate as of: 1/11/18  3:52 PM.  Always use your most recent med list.                   Brand Name Dispense Instructions for use Diagnosis    acetaminophen 500 MG tablet    TYLENOL     Take 500-1,000 mg by mouth every 6 hours as needed        albuterol 108 (90 BASE) MCG/ACT Inhaler    PROAIR HFA/PROVENTIL HFA/VENTOLIN HFA    1 Inhaler    Inhale 2 puffs into the lungs every 6 hours as needed for shortness of breath / dyspnea or wheezing    ILD (interstitial lung disease) (H)       amLODIPine 5 MG tablet    NORVASC    90 tablet    Take 1 tablet (5 mg) by mouth daily    Essential hypertension       ARTIFICIAL TEAR SOLUTION OP      Apply  to eye as needed.         aspirin 81 MG tablet      Take 81 mg by mouth daily        CALCIUM + D PO      Take 1 tablet by mouth 2 times daily        DAILY MULTIVITAMIN PO      Take 1 tablet by mouth daily And minerals per pt        fluticasone-vilanterol 100-25 MCG/INH oral inhaler    BREO ELLIPTA    1 Inhaler    Inhale 1 puff into the lungs daily    ILD (interstitial lung disease) (H)       folic acid 800 MCG Tabs      Take by mouth 2 times daily        GLUCOSAMINE SULFATE PO      Take 1,000 mg by mouth daily        * latanoprost 0.005 % ophthalmic solution    XALATAN    1 Bottle    Place 1 drop into both eyes At Bedtime    Open-angle glaucoma of both eyes, severe stage, unspecified open-angle glaucoma type       * latanoprost 0.005 % ophthalmic solution    XALATAN    1 Bottle    Place 1 drop into both eyes At Bedtime    Primary open angle glaucoma of both eyes, severe stage       levothyroxine 88 MCG tablet    SYNTHROID/LEVOTHROID     Take 88 mcg by mouth daily        lisinopril 40 MG tablet    PRINIVIL/ZESTRIL    90 tablet    Take 1 tablet (40 mg) by mouth daily    Essential hypertension       LOSARTAN POTASSIUM PO      Take 1 tablet by mouth daily        * methotrexate 2.5 MG tablet CHEMO     32 tablet    Take 8 tablets (20mg) by mouth once a week Take 8 tablets per week    Rheumatoid arthritis flare (H)       * methotrexate 50 MG/2ML injection CHEMO     4 mL    Mix 0.8 mL with juice and drink once weekly*LABS DUE EVERY 8-12 WKS*    Rheumatoid arthritis involving multiple sites with positive rheumatoid factor (H)       mometasone-formoterol 100-5 MCG/ACT oral inhaler    DULERA    13 g    Inhale 2 puffs into the lungs 2 times daily    ILD (interstitial lung disease) (H)       omeprazole 40 MG capsule    priLOSEC    90 capsule    TAKE 1 CAPSULE(40 MG) BY MOUTH DAILY IN THE MORNING BEFORE BREAKFAST    Gastritis without bleeding, unspecified chronicity, unspecified gastritis type       Vitamin D-3 1000 UNITS Caps      Take by mouth 2 times  daily        * Notice:  This list has 4 medication(s) that are the same as other medications prescribed for you. Read the directions carefully, and ask your doctor or other care provider to review them with you.

## 2018-01-11 NOTE — PROGRESS NOTES
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, s/p TRAB OU (OD:96 and OS:95 with revision OS in 1997), H/O Noncompliance with visits (lost to f/u from 1241-6234) with marked progression OS -- K pachy: 571/540   Tmax:     HVF:OD:[place] and OS:Superior Hemifield (marked progresion from 2002-9446 and HVF  OD:[place] and OS:FLuct in 2009    CDR: 0.9/0.9    HRT/OCT: OD:Severe RNFl thinning and OS:Mod RNFL thinning      FHX of Glc: Mother -- gtts, Sister -- possibly     Gonio:  Open with few PAS     Intolerant to:      Asthma/COPD: No  Steroid Use: No    Kidney Stones:  No   Sulfa Allergy:  No    IOP targets:L-M teens (?Lteens) -- IOP good  2)PCIOL OU  3)H/O HA -- s/p Negative TAB with Dr. Bellamy   4)H/O RA on MTX -- following with Rheum  5)XIN    Patient will continue Latanoprost which is a teal top drop at bedtime in both eyes.  Patient will return to clinic in 3-4 months with repeat IOP check, OCT with RNFL analysis and visual field test.  A long discussion of the risks, benefits, and alternatives including potential treatment and management options were had with patient and a decision was made to observe at this time and not add additional eye drops unless evidence of progression or elevated IOPs.         This drop may cause lash growth and some darkening of the skin around the eye.  It is also possible in a patient with a freckled iris or michell eyes, that the iris could darken to brown with time, something that is not common but not reversible.      Attending Physician Attestation:  Complete documentation of historical and exam elements from today's encounter can be found in the full encounter summary report (not reduplicated in this progress note). I personally obtained the chief complaint(s) and history of present illness.  I confirmed and edited as necessary the review of systems, past medical/surgical history, family history, social history, and examination findings as documented by others; and I  examined the patient myself. I personally reviewed the relevant tests, images, and reports as documented above. I formulated and edited as necessary the assessment and plan and discussed the findings and management plan with the patient and family.  - Corazon Addison MD

## 2018-01-11 NOTE — PATIENT INSTRUCTIONS
Patient will continue Latanoprost which is a teal top drop at bedtime in both eyes.  Patient will return to clinic in 3-4 months with repeat IOP check, OCT with RNFl analysis and visual field test.  A long discussion of the risks, benefits, and alternatives including potential treatment and management options were had with patient and a decision was made to observe at this time and not add additional eye drops unless evidence of progression or elevated IOPs.         This drop may cause lash growth and some darkening of the skin around the eye.  It is also possible in a patient with a freckled iris or michell eyes, that the iris could darken to brown with time, something that is not common but not reversible.

## 2018-01-22 ENCOUNTER — OFFICE VISIT (OUTPATIENT)
Dept: RHEUMATOLOGY | Facility: CLINIC | Age: 78
End: 2018-01-22
Attending: NURSE PRACTITIONER
Payer: MEDICARE

## 2018-01-22 VITALS
HEART RATE: 65 BPM | SYSTOLIC BLOOD PRESSURE: 137 MMHG | BODY MASS INDEX: 30.31 KG/M2 | WEIGHT: 189.2 LBS | DIASTOLIC BLOOD PRESSURE: 78 MMHG | TEMPERATURE: 98 F | OXYGEN SATURATION: 98 %

## 2018-01-22 DIAGNOSIS — M05.79 RHEUMATOID ARTHRITIS INVOLVING MULTIPLE SITES WITH POSITIVE RHEUMATOID FACTOR (H): ICD-10-CM

## 2018-01-22 DIAGNOSIS — J84.9 ILD (INTERSTITIAL LUNG DISEASE) (H): ICD-10-CM

## 2018-01-22 DIAGNOSIS — M05.79 RHEUMATOID ARTHRITIS INVOLVING MULTIPLE SITES WITH POSITIVE RHEUMATOID FACTOR (H): Primary | ICD-10-CM

## 2018-01-22 DIAGNOSIS — Z79.899 HIGH RISK MEDICATION USE: ICD-10-CM

## 2018-01-22 DIAGNOSIS — Z79.899 ENCOUNTER FOR LONG-TERM CURRENT USE OF MEDICATION: ICD-10-CM

## 2018-01-22 LAB
ALT SERPL W P-5'-P-CCNC: 20 U/L (ref 0–50)
AST SERPL W P-5'-P-CCNC: 20 U/L (ref 0–45)
CREAT SERPL-MCNC: 0.94 MG/DL (ref 0.52–1.04)
ERYTHROCYTE [DISTWIDTH] IN BLOOD BY AUTOMATED COUNT: 13.1 % (ref 10–15)
GFR SERPL CREATININE-BSD FRML MDRD: 58 ML/MIN/1.7M2
HCT VFR BLD AUTO: 43.9 % (ref 35–47)
HGB BLD-MCNC: 14.1 G/DL (ref 11.7–15.7)
MCH RBC QN AUTO: 32.1 PG (ref 26.5–33)
MCHC RBC AUTO-ENTMCNC: 32.1 G/DL (ref 31.5–36.5)
MCV RBC AUTO: 100 FL (ref 78–100)
PLATELET # BLD AUTO: 183 10E9/L (ref 150–450)
RBC # BLD AUTO: 4.39 10E12/L (ref 3.8–5.2)
WBC # BLD AUTO: 5 10E9/L (ref 4–11)

## 2018-01-22 PROCEDURE — 82565 ASSAY OF CREATININE: CPT | Performed by: INTERNAL MEDICINE

## 2018-01-22 PROCEDURE — G0463 HOSPITAL OUTPT CLINIC VISIT: HCPCS | Mod: ZF

## 2018-01-22 PROCEDURE — 36415 COLL VENOUS BLD VENIPUNCTURE: CPT | Performed by: INTERNAL MEDICINE

## 2018-01-22 PROCEDURE — 85027 COMPLETE CBC AUTOMATED: CPT | Performed by: INTERNAL MEDICINE

## 2018-01-22 PROCEDURE — 84450 TRANSFERASE (AST) (SGOT): CPT | Performed by: INTERNAL MEDICINE

## 2018-01-22 PROCEDURE — 84460 ALANINE AMINO (ALT) (SGPT): CPT | Performed by: INTERNAL MEDICINE

## 2018-01-22 RX ORDER — METHOTREXATE 25 MG/ML
INJECTION, SOLUTION INTRA-ARTERIAL; INTRAMUSCULAR; INTRAVENOUS
Qty: 4 ML | Refills: 0 | Status: SHIPPED | OUTPATIENT
Start: 2018-01-22 | End: 2018-03-14

## 2018-01-22 ASSESSMENT — PAIN SCALES - GENERAL: PAINLEVEL: MILD PAIN (2)

## 2018-01-22 ASSESSMENT — JOINT PAIN: TOTAL NUMBER OF SWOLLEN JOINTS: 0

## 2018-01-22 ASSESSMENT — CLINICAL DISEASE ACTIVITY INDEX (CDAI): TOTAL_SCORE: 4

## 2018-01-22 NOTE — LETTER
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 +RF 1990 +CC >250. +YEYO >13 (8-2016) -CAROL ANN panel -vasculitis -MTB QG 8-2016. Monotherapy MTX since 6/2009, never started humira. Osteoporosis --seeing PCP and referred her to endocrine. X-rays hands 3-2009 advanced erosive changes. Pyoderma gangrenosum infection LLE-seen derm last  improved. Labs 8-2017 macrocyotis NL HGB and other labs are NL. 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 RX dulera 1000 mcg (not able to afford) and albuterol rescue, and then if not improved start azithromycin didn't think systemic immunosuppression at this time.      Copy forward 6-2017 with Dr. Mccurdy:   She notes aching pain in both shoulders, ankles, and feet. She reports reduced mobility due to hip and low back discomfort.  There is minimal visible swelling in previously affected finger and hand joints. She has EAS at least 2 hours. She restarted methotrexate in 1-17. She \"doesn't feel right\" for a couple of days after the dose. Not nauseated, just blah.  She is not taking prescribed fosomax due to concerns about GI AE.    She has been working with Derm to treat pyoderma gangrenosum of the L ankle for the last 11 months. She has continued using a walker, which has greatly improved her ability to get around. Her primary concern is that she does not want her symptoms to get worse and is hoping that she can restart treatment so that she can regain some independence.She reports that the lesion has been slow to heal and that she continues to take Minocyclin, which she just discontinued (total of 11 months' treatment).    Copy forward October 18, 2017  Taking methotrexate 20 mg once Q Sat (takes over 12 hours), folic " "acid 1600 mcg once a day. Tolerating --no diarrhea, upset stomach, hairloss and oral sores. Recently started omeprazole, now no longer having upset stomach only taken recently \"this is the only 1st good day\" . Tylenol taking once a day 2 tablet 500 mg then may 500 mg later in the day    Ankles, hips, shoulders less in her hands \"knuckles\". No RA flaring nor any swelling or redness. EAS for 2 hours. No pain in wrists. No hand pain in mornings. Neck mild pain only with turn, no pain, no arm or leg neurological changes. No changes in BB. No headaches or vision issues. General swelling of her ankles.     In Feb 2017, \"couldnt catch my breath\" and getting worse this past several month. Uses a walker. If any movements, will get have difficulty catching her breath. No cough, fever, chills, CP or chest heaviness. Seen pulmonary 9-2017. low-grade smoker, smoking less than a pack per day for 25-30 years from age 20 to approximately age 50.  She has not smoked for 25-30 years. She has never had pneumonia, wheezing, blood clots or another episode of pleurisy. CT scan showing some ground glass changes. Plan was to see Dr. Muñoz and If possible obtain sputum for mycobacterial studies.  Bronch not essential at present. Consider with possible TBBx in future.--not done recently. desat 89% with walking     No recurrent of the PG. Seeing derm hx Pyoderma gangrenosum. s/p minocycyline, mupirocin, kenalog injection. Off minocyline at this time    January 22, 2018  Taking methotrexate 20 mg once Q Sat liquid (requests syringes), folic acid 1600 mcg once a day. Tolerating --no diarrhea, upset stomach, hairloss and oral sores. Not needing the omeprazole or the tylenol.     Seen derm  Pyroderma G improved,   Seen Dr. 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 Marks. She is doing much better and has her rescue inhaler which she uses rarely. Not able to afford dulera " inhaler. No cough, fever, chills, cough, SOB or MILES. Able to walk with her walker and doing well. No infections. No EAS. No joint pain or swelling. RA mild flare resolved on its own. Does walk to stay strong. Happy she feels better with the liquid MTX and able to tolerate this     PMH:  Medical-DEXA 2-2017 T-3.7, shingles, pyroderma gangrenosum w/minocycline hyperpigmentation. Improved with minocycline and monthly kenalog injections, Dr. 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 +YEYO      Wrist fracture     Right, s/p ORIF     Patient Active Problem List    Diagnosis Date Noted     ILD (interstitial lung disease) (H)      Priority: Medium     Primary open angle glaucoma of both eyes, severe stage 03/23/2017     Priority: Medium     Pseudophakia of both eyes 03/23/2017     Priority: Medium     Osteoporosis 02/16/2017     Priority: Medium     Hypothyroidism 02/02/2017     Priority: Medium     Pyoderma gangrenosum 10/06/2016     Priority: Medium     Encounter for long-term current use of medication 08/18/2011     Priority: Medium     Problem list name updated by automated process. Provider to review       Chronic pain syndrome 08/01/2011     Priority: Medium     Essential hypertension 08/01/2011     Priority: Medium     Problem list name updated by automated process. Provider to review       Rheumatoid arthritis (H) 08/01/2011     Priority: Medium      Sero-positive, deforming RA with erosions on hand x-ray films 2009  RF 1990 IU; anti-CCP 38 (6/2009). Mantoux neg 2009.   Monotherapy mtx 6/2009 (alalimumab contemplated 2010 but never started)  Prior patient Dr. Shukla.   Problem list name updated by automated process. Provider to review         Surgical-  Past Surgical History:   Procedure Laterality Date     BIOPSY ARTERY TEMPORAL       EXTRACAPSULAR CATARACT EXTRATION WITH INTRAOCULAR LENS IMPLANT  8/2011    Left 2 years ago as well     OPEN REDUCTION INTERNAL FIXATION WRIST BILATERAL         No blood transfusions      FH:  No autoimmune disorders, psoriasis, UC, crohn's, SLE, RA, PsA, gout.  No MS or cancer in family  Mother-RA, MI  Father-colon CA   Siblings-2 sisters and brother (RA- humira and MTX) unsure of medical hx   Children-  Aunt-TB    SH:  No Alcohol. Quit Smoking. No IVDU. 1 Children-healthy. Right or left handed.      PMSH personally reviewed and updated by me.    ROS:  Negative raynaud s phenomena, hairloss, sun sensitivities, keratoconjunctivitis sicca, pleurisy, significant rashes like malar, oral/nasal or ulcerations, inflammatory eye disease, inflammatory bowel disease, dactylitis, tenosynovitis, or enthespathy. Negative for miscarriages over 2 months into pregnancy, blood clots, gout, psoriasis, UC, crohn's. No temporal headache, no jaw claudication, no scalp tenderness, vision changes, carotidynia, cough  CONSTITUTIONAL: No fevers, night sweats or unintentional weight change. No acute distress, swollen glands  EYES: No vision change, diplopia, pain in eyes or red eyes   EARS, NOSE, MOUTH, THROAT: No tinnitus or hearing change, no epistaxis or nasal discharge, no oral lesions, throat clear. Normal saliva pool.  No drymouth. No thyroid enlargement  CARDIOVASCULAR: No chest pain, palpitations, or pain with walking, no orthopnea or PND   RESPIRATORY: See above   GI: No nausea, vomiting, diarrhea or constipation, no abdominal pain, or  blood in stools.   : No change in urine, no dysuria or hematuria   MUSCKL: see above    INTEGUMENTARY: No concerning lesions or moles   NEURO: No loss of strength or sensation, no numbness or tingling, no tremor, no dizziness, no headache. No falls   ENDO: No polyuria or polydipsia, no temperature intolerance   HEME/LYMPH:No concerning bumps, bleeding problems, or swollen lymph nodes. No recent infections, hospitalizations or new illnesses.   ALLERGY: No environmental allergies   PSYCH:No depression or anxiety, no sleep problems.  Otherwise 14 point ROS obtained, reviewed and found negative.     Physical exam:  Vitals: Blood pressure 137/78, pulse 65, temperature 98  F (36.7  C), temperature source Oral, weight 85.8 kg (189 lb 3.2 oz), SpO2 98 %, not currently breastfeeding.  Wt Readings from Last 4 Encounters:   01/22/18 85.8 kg (189 lb 3.2 oz)   11/30/17 86 kg (189 lb 11.2 oz)   10/27/17 85.1 kg (187 lb 11.2 oz)   10/18/17 85.8 kg (189 lb 1.6 oz)     Constitutional: WD-WN-WG cooperative  Eyes: nl EOM, PERRLA, vision, conjunctiva, sclera  ENT: nl external ears, nose, hearing, lips, teeth, gums, throat  Neck: no mass or thyroid enlargement  Resp: lungs clear to auscultation, nl to palpation, nl effort  CV: RRR, no murmurs, rubs or gallops, no edema  GI: no ABD mass or tenderness, no HSM  : not tested  Lymph: no cervical or epitrochlear nodes  MS:  R 4th and 5th PIPs, L 2nd MCP shows angulation. L 4th PIP hyperflexion, DIP hyperextension. L 5th DIP hyperflexion.  Decreased fist closure bilaterally. Normal  strength. All TMJ, neck, shoulder, elbow, wrist, hand, spine, hip, knee, ankle, and foot joints were examined and otherwise found normal. Normal  strength. No active synovitis or deformity. Full ROM. Normal prayer sign. Negative Negative Lhermitte's sign. No periuncle erythema  Skin: no nail pitting, alopecia, rash. Dark, well circumscribed, healing lesions L medial leg near ankle. Gray coloration  likely due to minocycline treatment. .   Neuro: nl cranial nerves, strength, sensation, DTRs.   Psych: nl judgement, orientation, memory, affect.      Labs/Imaging:  Results for orders placed or performed in visit on 01/11/18   OVF 24-2 Dynamic OU    Narrative    Performed by: jamie   . Patient cooperation: Reliable   .   Good fix. Dilated.     Right Eye   Findings: Depressed mean deviation, Arcuate scotoma, Defect within 5   degrees of fixation, Defect within both hemifields   . Interpretation: Abnormal, Glaucoma   . Plan: Monitor   . Interval: Same   .     Left Eye   Findings: Arcuate scotoma, Defect within 5 degrees of fixation   . Interpretation: Abnormal, Glaucoma   . Plan: Monitor   . Interval: Same   .        Impression/Plan:  RA: seropositive, erosive:   Overall improved small joint predominant arthralgia since restarting methotrexate in early 2017. Exam no active synovitis, some deformities loss ROM of the hands.  Labs Aug/Sept NL CBC, liver and kidney tests (mild reduced GFR 58) and high MCV, no anemia). Labs due now. RA activity low. GI symptoms improved/resolved with liquid MTX x4 doses. Strong seropositivity and the presence of established erosions/deformities portends high risk for chronic destructive disease and extra-articular manifestations. I discussed with her this and risks of extra-articular manisfestations and lung disease, further how DMARD Rx can halt and prevent joint-damaging disease. No further PG, or other infections. I recommend she continue this plan    High risk medications, labs due then every 12 weeks     The plan is as follows:  1) LFT, CRP, and CRP  2) Continue methotrexate (20 mg/week) liquid. 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 Was given dulera 1000 mcg (not taking as cant afford) and albuterol rescue takes prn, and the next step is if not improved start azithromycin  per pulmonary. No systemic immunosuppression at this time.      High YEYO. No s/sx SLE and negative CAROL ANN panel    Osteoporosis, under care PCP. Continue vitamin D. Not eager to see endocrine and not started her bisphosphonate     F/U dermatology D Patriciaagen for pyroderma gangrenosum w/minocycline hyperpigmentation. Improved with minocycline and monthly kenalog injections. F/u prn last seen 11/30/2017      The patient understood the rational for the diagnosis and treatment plan. Patient shared in the decision making. All questions were answered to best of my ability and the patient's satisfaction  Alessio Lofton APRN, CNP, MSN  [facility]  Department of Rheumatology & Autoimmune Disorders

## 2018-01-22 NOTE — NURSING NOTE
"Chief Complaint   Patient presents with     RECHECK     rheumatiod arthritis follow up       Initial /78  Pulse 65  Temp 98  F (36.7  C) (Oral)  Wt 85.8 kg (189 lb 3.2 oz)  SpO2 98%  BMI 30.31 kg/m2 Estimated body mass index is 30.31 kg/(m^2) as calculated from the following:    Height as of 10/27/17: 1.683 m (5' 6.25\").    Weight as of this encounter: 85.8 kg (189 lb 3.2 oz).  Medication Reconciliation: complete   LATONYA GUTIERREZ CMA      "

## 2018-01-22 NOTE — PROGRESS NOTES
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 +RF 1990 +CC >250. +YEYO >13 (8-2016) -CAROL ANN panel -vasculitis -MTB QG 8-2016. Monotherapy MTX since 6/2009, never started humira. Osteoporosis --seeing PCP and referred her to endocrine. X-rays hands 3-2009 advanced erosive changes. Pyoderma gangrenosum infection LLE-seen derm last  improved. Labs 8-2017 macrocyotis NL HGB and other labs are NL. 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 RX dulera 1000 mcg (not able to afford) and albuterol rescue, and then if not improved start azithromycin didn't think systemic immunosuppression at this time.      Copy forward 6-2017 with Dr. Mccurdy:   She notes aching pain in both shoulders, ankles, and feet. She reports reduced mobility due to hip and low back discomfort.  There is minimal visible swelling in previously affected finger and hand joints. She has EAS at least 2 hours. She restarted methotrexate in 1-17. She \"doesn't feel right\" for a couple of days after the dose. Not nauseated, just blah.  She is not taking prescribed fosomax due to concerns about GI AE.    She has been working with Derm to treat pyoderma gangrenosum of the L ankle for the last 11 months. She has continued using a walker, which has greatly improved her ability to get around. Her primary concern is that she does not want her symptoms to get worse and is hoping that she can restart treatment so that she can regain some independence.She reports that the lesion has been slow to heal and that she continues to take Minocyclin, which she just discontinued (total of 11 months' treatment).    Copy forward October 18, 2017  Taking methotrexate 20 mg once Q Sat (takes over 12 hours), folic acid 1600 mcg once a day. Tolerating --no diarrhea, upset stomach, hairloss and oral " "sores. Recently started omeprazole, now no longer having upset stomach only taken recently \"this is the only 1st good day\" . Tylenol taking once a day 2 tablet 500 mg then may 500 mg later in the day    Ankles, hips, shoulders less in her hands \"knuckles\". No RA flaring nor any swelling or redness. EAS for 2 hours. No pain in wrists. No hand pain in mornings. Neck mild pain only with turn, no pain, no arm or leg neurological changes. No changes in BB. No headaches or vision issues. General swelling of her ankles.     In Feb 2017, \"couldnt catch my breath\" and getting worse this past several month. Uses a walker. If any movements, will get have difficulty catching her breath. No cough, fever, chills, CP or chest heaviness. Seen pulmonary 9-2017. low-grade smoker, smoking less than a pack per day for 25-30 years from age 20 to approximately age 50.  She has not smoked for 25-30 years. She has never had pneumonia, wheezing, blood clots or another episode of pleurisy. CT scan showing some ground glass changes. Plan was to see Dr. Muñoz and If possible obtain sputum for mycobacterial studies.  Bronch not essential at present. Consider with possible TBBx in future.--not done recently. desat 89% with walking     No recurrent of the PG. Seeing derm hx Pyoderma gangrenosum. s/p minocycyline, mupirocin, kenalog injection. Off minocyline at this time    January 22, 2018  Taking methotrexate 20 mg once Q Sat liquid (requests syringes), folic acid 1600 mcg once a day. Tolerating --no diarrhea, upset stomach, hairloss and oral sores. Not needing the omeprazole or the tylenol.     Seen derm  Pyroderma G improved,   Seen Dr. 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 Marks. She is doing much better and has her rescue inhaler which she uses rarely. Not able to afford dulera inhaler. No cough, fever, chills, cough, SOB or MILES. Able to walk with her walker and " doing well. No infections. No EAS. No joint pain or swelling. RA mild flare resolved on its own. Does walk to stay strong. Happy she feels better with the liquid MTX and able to tolerate this     PMH:  Medical-DEXA 2-2017 T-3.7, shingles, pyroderma gangrenosum w/minocycline hyperpigmentation. Improved with minocycline and monthly kenalog injections, Dr. 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 +YEYO      Wrist fracture     Right, s/p ORIF     Patient Active Problem List    Diagnosis Date Noted     ILD (interstitial lung disease) (H)      Priority: Medium     Primary open angle glaucoma of both eyes, severe stage 03/23/2017     Priority: Medium     Pseudophakia of both eyes 03/23/2017     Priority: Medium     Osteoporosis 02/16/2017     Priority: Medium     Hypothyroidism 02/02/2017     Priority: Medium     Pyoderma gangrenosum 10/06/2016     Priority: Medium     Encounter for long-term current use of medication 08/18/2011     Priority: Medium     Problem list name updated by automated process. Provider to review       Chronic pain syndrome 08/01/2011     Priority: Medium     Essential hypertension 08/01/2011     Priority: Medium     Problem list name updated by automated process. Provider to review       Rheumatoid arthritis (H) 08/01/2011     Priority: Medium     Sero-positive, deforming RA with erosions on hand x-ray films 2009  RF 1990 IU; anti-CCP  38 (6/2009). Mantoux neg 2009.   Monotherapy mtx 6/2009 (alalimumab contemplated 2010 but never started)  Prior patient Dr. Shukla.   Problem list name updated by automated process. Provider to review         Surgical-  Past Surgical History:   Procedure Laterality Date     BIOPSY ARTERY TEMPORAL       EXTRACAPSULAR CATARACT EXTRATION WITH INTRAOCULAR LENS IMPLANT  8/2011    Left 2 years ago as well     OPEN REDUCTION INTERNAL FIXATION WRIST BILATERAL         No blood transfusions      FH:  No autoimmune disorders, psoriasis, UC, crohn's, SLE, RA, PsA, gout.  No MS or cancer in family  Mother-RA, MI  Father-colon CA   Siblings-2 sisters and brother (RA- humira and MTX) unsure of medical hx   Children-  Aunt-TB    SH:  No Alcohol. Quit Smoking. No IVDU. 1 Children-healthy. Right or left handed.      PMSH personally reviewed and updated by me.    ROS:  Negative raynaud s phenomena, hairloss, sun sensitivities, keratoconjunctivitis sicca, pleurisy, significant rashes like malar, oral/nasal or ulcerations, inflammatory eye disease, inflammatory bowel disease, dactylitis, tenosynovitis, or enthespathy. Negative for miscarriages over 2 months into pregnancy, blood clots, gout, psoriasis, UC, crohn's. No temporal headache, no jaw claudication, no scalp tenderness, vision changes, carotidynia, cough  CONSTITUTIONAL: No fevers, night sweats or unintentional weight change. No acute distress, swollen glands  EYES: No vision change, diplopia, pain in eyes or red eyes   EARS, NOSE, MOUTH, THROAT: No tinnitus or hearing change, no epistaxis or nasal discharge, no oral lesions, throat clear. Normal saliva pool.  No drymouth. No thyroid enlargement  CARDIOVASCULAR: No chest pain, palpitations, or pain with walking, no orthopnea or PND   RESPIRATORY: See above   GI: No nausea, vomiting, diarrhea or constipation, no abdominal pain, or blood in stools.   : No change in urine, no dysuria or hematuria   MUSCKL: see above     INTEGUMENTARY: No concerning lesions or moles   NEURO: No loss of strength or sensation, no numbness or tingling, no tremor, no dizziness, no headache. No falls   ENDO: No polyuria or polydipsia, no temperature intolerance   HEME/LYMPH:No concerning bumps, bleeding problems, or swollen lymph nodes. No recent infections, hospitalizations or new illnesses.   ALLERGY: No environmental allergies   PSYCH:No depression or anxiety, no sleep problems.  Otherwise 14 point ROS obtained, reviewed and found negative.     Physical exam:  Vitals: Blood pressure 137/78, pulse 65, temperature 98  F (36.7  C), temperature source Oral, weight 85.8 kg (189 lb 3.2 oz), SpO2 98 %, not currently breastfeeding.  Wt Readings from Last 4 Encounters:   01/22/18 85.8 kg (189 lb 3.2 oz)   11/30/17 86 kg (189 lb 11.2 oz)   10/27/17 85.1 kg (187 lb 11.2 oz)   10/18/17 85.8 kg (189 lb 1.6 oz)     Constitutional: WD-WN-WG cooperative  Eyes: nl EOM, PERRLA, vision, conjunctiva, sclera  ENT: nl external ears, nose, hearing, lips, teeth, gums, throat  Neck: no mass or thyroid enlargement  Resp: lungs clear to auscultation, nl to palpation, nl effort  CV: RRR, no murmurs, rubs or gallops, no edema  GI: no ABD mass or tenderness, no HSM  : not tested  Lymph: no cervical or epitrochlear nodes  MS:  R 4th and 5th PIPs, L 2nd MCP shows angulation. L 4th PIP hyperflexion, DIP hyperextension. L 5th DIP hyperflexion.  Decreased fist closure bilaterally. Normal  strength. All TMJ, neck, shoulder, elbow, wrist, hand, spine, hip, knee, ankle, and foot joints were examined and otherwise found normal. Normal  strength. No active synovitis or deformity. Full ROM. Normal prayer sign. Negative Negative Lhermitte's sign. No periuncle erythema  Skin: no nail pitting, alopecia, rash. Dark, well circumscribed, healing lesions L medial leg near ankle. Gray coloration likely due to minocycline treatment. .   Neuro: nl cranial nerves, strength, sensation, DTRs.    Psych: nl judgement, orientation, memory, affect.      Labs/Imaging:  Results for orders placed or performed in visit on 01/11/18   OVF 24-2 Dynamic OU    Narrative    Performed by: jamie   . Patient cooperation: Reliable   .   Good fix. Dilated.     Right Eye   Findings: Depressed mean deviation, Arcuate scotoma, Defect within 5   degrees of fixation, Defect within both hemifields   . Interpretation: Abnormal, Glaucoma   . Plan: Monitor   . Interval: Same   .     Left Eye   Findings: Arcuate scotoma, Defect within 5 degrees of fixation   . Interpretation: Abnormal, Glaucoma   . Plan: Monitor   . Interval: Same   .        Impression/Plan:  RA: seropositive, erosive:   Overall improved small joint predominant arthralgia since restarting methotrexate in early 2017. Exam no active synovitis, some deformities loss ROM of the hands.  Labs Aug/Sept NL CBC, liver and kidney tests (mild reduced GFR 58) and high MCV, no anemia). Labs due now. RA activity low. GI symptoms improved/resolved with liquid MTX x4 doses. Strong seropositivity and the presence of established erosions/deformities portends high risk for chronic destructive disease and extra-articular manifestations. I discussed with her this and risks of extra-articular manisfestations and lung disease, further how DMARD Rx can halt and prevent joint-damaging disease. No further PG, or other infections. I recommend she continue this plan    High risk medications, labs due then every 12 weeks     The plan is as follows:  1) LFT, CRP, and CRP  2) Continue methotrexate (20 mg/week) liquid. 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 Was given dulera 1000 mcg (not taking as cant afford) and albuterol rescue takes prn, and the next step is if not improved start azithromycin per pulmonary. No systemic immunosuppression at this time.      High YEYO. No s/sx SLE and  negative CAROL ANN panel    Osteoporosis, under care PCP. Continue vitamin D. Not eager to see endocrine and not started her bisphosphonate     F/U dermatology D [place] for pyroderma gangrenosum w/minocycline hyperpigmentation. Improved with minocycline and monthly kenalog injections. F/u prn last seen 11/30/2017      The patient understood the rational for the diagnosis and treatment plan. Patient shared in the decision making. All questions were answered to best of my ability and the patient's satisfaction  Alessio MOTA, CNP, MSN  [facility]  Department of Rheumatology & Autoimmune Disorders

## 2018-01-22 NOTE — MR AVS SNAPSHOT
After Visit Summary   1/22/2018    Abida Hahn    MRN: [id number]           Patient Information     Date Of Birth          1940        Visit Information        Provider Department      1/22/2018 10:30 AM Alessio Lofton APRN [facility] Rheumatology        Today's Diagnoses     Rheumatoid arthritis involving multiple sites with positive rheumatoid factor (H)    -  1    ILD (interstitial lung disease) (H)        Encounter for long-term current use of medication           Follow-ups after your visit        Follow-up notes from your care team     Return in about 3 months (around 4/22/2018) for Labs Today, Labs every 8-12 weeks.      Your next 10 appointments already scheduled     Feb 09, 2018 10:55 AM CST   (Arrive by 10:40 AM)   Return Visit with Amarilys Iyer MD   [facility] ([facility])    [address]  [address] [facility] [address] [phone]            Mar 02, 2018 10:30 AM CST   FULL PULMONARY FUNCTION with  PFL [facility] Pulmonary Function Testing ([facility])    [address]  3rd Floor  [facility] [address]   [phone]            Mar 02, 2018 11:30 AM CST   (Arrive by 11:15 AM)   Return Interstitial Lung with Nirmala Muñoz MD   [facility] ([facility])    [address]  [address] [place] [address] [phone]            Apr 17, 2018 12:45 PM CDT   RETURN GLAUCOMA with Corazon Addison MD   [facility] ([facility])    [address]  [address]  [address] [address]  [facility] [address]   [phone]            Apr 25, 2018 10:30 AM CDT   Lab with  LAB   [facility] ([facility])    [address] [place] [address]  [address] [facility] [address] [phone]            Apr 25, 2018 11:00 AM CDT   (Arrive by 10:45 AM)   Return Visit with Alessio Brink  "Kirsty, APRN CNP   [facility] ([facility])    [address]  [address]  [facility] [address] [phone]              Who to contact     If you have questions or need follow up information about today's clinic visit or your schedule please contact [facility] RHEUMATOLOGY directly at [phone].  Normal or non-critical lab and imaging results will be communicated to you by MyChart, letter or phone within 4 business days after the clinic has received the results. If you do not hear from us within 7 days, please contact the clinic through MyChart or phone. If you have a critical or abnormal lab result, we will notify you by phone as soon as possible.  Submit refill requests through [employer] or call your pharmacy and they will forward the refill request to us. Please allow 3 business days for your refill to be completed.          Additional Information About Your Visit        [employer] Information     [employer] lets you send messages to your doctor, view your test results, renew your prescriptions, schedule appointments and more. To sign up, go to [email or web address] . Click on \"Log in\" on the left side of the screen, which will take you to the Welcome page. Then click on \"Sign up Now\" on the right side of the page.     You will be asked to enter the access code listed below, as well as some personal information. Please follow the directions to create your username and password.     Your access code is: [email or web address]  Expires: 2018 11:42 AM     Your access code will  in 90 days. If you need help or a new code, please call your [place] clinic or [phone].        Care EveryWhere ID     This is your Care EveryWhere ID. This could be used by other organizations to access your [place] medical records  [id number]        Your Vitals Were     Pulse Temperature Pulse Oximetry BMI (Body Mass Index)          65 98  F (36.7  C) (Oral) 98% 30.31 kg/m2         Blood " "Pressure from Last 3 Encounters:   01/22/18 137/78   11/30/17 123/78   10/27/17 115/79    Weight from Last 3 Encounters:   01/22/18 85.8 kg (189 lb 3.2 oz)   11/30/17 86 kg (189 lb 11.2 oz)   10/27/17 85.1 kg (187 lb 11.2 oz)              Today, you had the following     No orders found for display         Today's Medication Changes          These changes are accurate as of: 1/22/18 11:32 AM.  If you have any questions, ask your nurse or doctor.               Start taking these medicines.        Dose/Directions    Needle (Disp) 27G X 5/8\" Misc   Commonly known as:  BD SAFETYGLIDE NEEDLE   Used for:  Rheumatoid arthritis involving multiple sites with positive rheumatoid factor (H)   Started by:  Alessio Lofton APRN CNP        For methotrexate use EVERY 7 DAY   Quantity:  12 each   Refills:  1            Where to get your medicines      These medications were sent to [facility] [address] [place], [place] [address] [address] AT [facility] [address]  [address], [facility] [address]    Hours:  24-hours Phone:  [phone]     methotrexate 50 MG/2ML injection CHEMO    Needle (Disp) 27G X 5/8\" [facility]                Primary Care Provider Office Phone # Fax #    Amarilys Iyer [phone]       [address] [place] [address] [address] [place] [id number]        Equal Access to Services     DADA ALMARAZ AH: Karoline lymano Sochandler, waaxda luqadaha, qaybta kaalmada dakotayamamie, neo otto. So [facility] [phone].    ATENCIÓN: Si habla yueañol, tiene a be disposición servicios gratuitos de asistencia lingüística. Llame al [phone].    We comply with applicable federal civil rights laws and [place] laws. We do not discriminate on the basis of race, color, national origin, age, disability, sex, sexual orientation, or gender identity.            Thank you!     Thank you for choosing [facility] RHEUMATOLOGY  for your care. Our goal is always to provide you " with excellent care. Hearing back from our patients is one way we can continue to improve our services. Please take a few minutes to complete the written survey that you may receive in the mail after your visit with us. Thank you!             Your Updated Medication List - Protect others around you: Learn how to safely use, store and throw away your medicines at www.disposemymeds.org.          This list is accurate as of: 1/22/18 11:32 AM.  Always use your most recent med list.                   Brand Name Dispense Instructions for use Diagnosis    acetaminophen 500 MG tablet    TYLENOL     Take 500-1,000 mg by mouth every 6 hours as needed        albuterol 108 (90 BASE) MCG/ACT Inhaler    PROAIR HFA/PROVENTIL HFA/VENTOLIN HFA    1 Inhaler    Inhale 2 puffs into the lungs every 6 hours as needed for shortness of breath / dyspnea or wheezing    ILD (interstitial lung disease) (H)       amLODIPine 5 MG tablet    NORVASC    90 tablet    Take 1 tablet (5 mg) by mouth daily    Essential hypertension       ARTIFICIAL TEAR SOLUTION OP      Apply  to eye as needed.        aspirin 81 MG tablet      Take 81 mg by mouth daily        CALCIUM + D PO      Take 1 tablet by mouth 2 times daily        DAILY MULTIVITAMIN PO      Take 1 tablet by mouth daily And minerals per pt        folic acid 800 MCG Tabs      Take by mouth 2 times daily        GLUCOSAMINE SULFATE PO      Take 1,000 mg by mouth daily        * latanoprost 0.005 % ophthalmic solution    XALATAN    1 Bottle    Place 1 drop into both eyes At Bedtime    Open-angle glaucoma of both eyes, severe stage, unspecified open-angle glaucoma type       * latanoprost 0.005 % ophthalmic solution    XALATAN    1 Bottle    Place 1 drop into both eyes At Bedtime    Primary open angle glaucoma of both eyes, severe stage       levothyroxine 88 MCG tablet    SYNTHROID/LEVOTHROID     Take 88 mcg by mouth daily        lisinopril 40 MG tablet    PRINIVIL/ZESTRIL    90 tablet    Take 1  "tablet (40 mg) by mouth daily    Essential hypertension       methotrexate 50 MG/2ML injection CHEMO     4 mL    Mix 0.8 mL with juice and drink once weekly*LABS DUE EVERY 8-12 WKS*    Rheumatoid arthritis involving multiple sites with positive rheumatoid factor (H)       Needle (Disp) 27G X 5/8\" Misc    BD SAFETYGLIDE NEEDLE    12 each    For methotrexate use EVERY 7 DAY    Rheumatoid arthritis involving multiple sites with positive rheumatoid factor (H)       Vitamin D-3 1000 UNITS Caps      Take by mouth 2 times daily        * Notice:  This list has 2 medication(s) that are the same as other medications prescribed for you. Read the directions carefully, and ask your doctor or other care provider to review them with you.      "

## 2018-02-09 ENCOUNTER — OFFICE VISIT (OUTPATIENT)
Dept: INTERNAL MEDICINE | Facility: CLINIC | Age: 78
End: 2018-02-09
Payer: MEDICARE

## 2018-02-09 VITALS
SYSTOLIC BLOOD PRESSURE: 132 MMHG | WEIGHT: 189.1 LBS | RESPIRATION RATE: 16 BRPM | HEART RATE: 61 BPM | DIASTOLIC BLOOD PRESSURE: 71 MMHG | BODY MASS INDEX: 30.29 KG/M2

## 2018-02-09 DIAGNOSIS — M81.0 AGE-RELATED OSTEOPOROSIS WITHOUT CURRENT PATHOLOGICAL FRACTURE: ICD-10-CM

## 2018-02-09 DIAGNOSIS — M05.79 RHEUMATOID ARTHRITIS INVOLVING MULTIPLE SITES WITH POSITIVE RHEUMATOID FACTOR (H): ICD-10-CM

## 2018-02-09 DIAGNOSIS — Z23 NEED FOR TETANUS BOOSTER: Primary | ICD-10-CM

## 2018-02-09 DIAGNOSIS — L88 PYODERMA GANGRENOSUM (H): ICD-10-CM

## 2018-02-09 DIAGNOSIS — I10 ESSENTIAL HYPERTENSION: ICD-10-CM

## 2018-02-09 ASSESSMENT — PAIN SCALES - GENERAL: PAINLEVEL: NO PAIN (0)

## 2018-02-09 NOTE — NURSING NOTE
Administered TD vaccine (see Immunizations in Chart Review). Patient tolerated well.  Brian Marroquin CMA at 12:10 PM on 2/9/2018

## 2018-02-09 NOTE — PROGRESS NOTES
"[facility]  Primary Care Center   Established Patient Encounter   Amarilys Iyer MD  02/09/2018      Chief Complaint:   Recheck Medication (Here to follow up on medication; amlodipine)       History of Present Illness:   Abida Hahn is a 77 year old female with a history of rheumatoid arthritis on methotrexate, osteoporosis, pyoderma gangrenosum, hypothyroidism and possible ILD who returns for a medication recheck. She was last evaluated by me on 11/30/17 at which time we discussed new abdominal pain and bloating with food intake, unassociated with nausea, diarrhea, or constipation. After discussion with Rheumatology, she was switched to a liquid methotrexate form. On evaluation today, she notes this helped immensely and is much more comfortable. She has no recurrent abdominal pain or symptoms. She is quite pleased about this.    Her hypertension is well controlled on current regimen of lisinopril and amlodipine which was increased to 5 mg in September. She reports stable pressures at home. Of note, renal U/S 9/7 did not show evidence for ORIANA.     We reviewed her osteoporosis and bone density. She had previously been on Fosamax, but did not tolerate the GI side effects well. She does not wish to revisit these types of medications at this time, particularly d/t the risk of atypical femur fracture. She has been taking Ca/D and increasing walking.    We reviewed the health maintenance topics including immunizations, cancer screenings, routine bloodwork, DEXA screening, and lifestyle factors.         Review of Systems:   A full 10-pt Review of Systems was performed, verified and is negative except as documented in the HPI.  All health questionnaires were reviewed, verified and relevant information documented above.      Active Medications:     Current Outpatient Prescriptions:      Needle, Disp, (BD SAFETYGLIDE NEEDLE) 27G X 5/8\" MISC, For methotrexate use EVERY 7 DAY, Disp: 12 each, Rfl: 1     methotrexate 50 " MG/2ML injection CHEMO, Mix 0.8 mL with juice and drink once weekly*LABS DUE EVERY 8-12 WKS*, Disp: 4 mL, Rfl: 0     latanoprost (XALATAN) 0.005 % ophthalmic solution, Place 1 drop into both eyes At Bedtime, Disp: 1 Bottle, Rfl: 11     amLODIPine (NORVASC) 5 MG tablet, Take 1 tablet (5 mg) by mouth daily, Disp: 90 tablet, Rfl: 3     albuterol (PROAIR HFA/PROVENTIL HFA/VENTOLIN HFA) 108 (90 BASE) MCG/ACT Inhaler, Inhale 2 puffs into the lungs every 6 hours as needed for shortness of breath / dyspnea or wheezing, Disp: 1 Inhaler, Rfl: 1     aspirin 81 MG tablet, Take 81 mg by mouth daily, Disp: , Rfl:      lisinopril (PRINIVIL/ZESTRIL) 40 MG tablet, Take 1 tablet (40 mg) by mouth daily, Disp: 90 tablet, Rfl: 1     latanoprost (XALATAN) 0.005 % ophthalmic solution, Place 1 drop into both eyes At Bedtime, Disp: 1 Bottle, Rfl: 11     GLUCOSAMINE SULFATE PO, Take 1,000 mg by mouth daily, Disp: , Rfl:      folic acid 800 MCG TABS, Take by mouth 2 times daily, Disp: , Rfl:      Cholecalciferol (VITAMIN D-3) 1000 UNITS CAPS, Take by mouth 2 times daily, Disp: , Rfl:      acetaminophen (TYLENOL) 500 MG tablet, Take 500-1,000 mg by mouth every 6 hours as needed, Disp: , Rfl:      levothyroxine (SYNTHROID, LEVOTHROID) 88 MCG tablet, Take 88 mcg by mouth daily, Disp: , Rfl:      ARTIFICIAL TEAR SOLUTION OP, Apply  to eye as needed., Disp: , Rfl:      Calcium-Vitamin D (CALCIUM + D PO), Take 1 tablet by mouth 2 times daily, Disp: , Rfl:      Multiple Vitamin (DAILY MULTIVITAMIN PO), Take 1 tablet by mouth daily And minerals per pt, Disp: , Rfl:       Allergies:   Ibuprofen sodium; Penicillins; Triple antibiotic [neomycin-polymyxin-dexameth]; Aspirin; and Codeine      Past Medical History:  Glaucoma   Hypertension  Hypothyroidism   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   No alcohol use.      Physical Exam:   /71 (BP Location: Right arm, Patient Position: Chair, Cuff Size: Adult Regular)  Pulse 61  Resp 16  Wt 85.8 kg (189 lb 1.6 oz)  Breastfeeding? No  BMI 30.29 kg/m2   Constitutional: Alert, oriented, pleasant, no acute distress  Head: Normocephalic, atraumatic  Eyes: Extra-ocular movements intact, no scleral icterus  ENT: Oropharynx clear, moist mucus membranes, good dentition  Neck: Supple, no lymphadenopathy, no thyromegaly   Cardiovascular: Regular rate and rhythm, no murmurs, rubs or gallops, peripheral pulses full/symmetric  Respiratory: Good air movement bilaterally, lungs clear, no wheezes/rales/rhonchi  GI: Abdomen soft, bowel sounds present, nondistended, nontender, no organomegaly or masses, no rebound/guarding  Musculoskeletal: No edema, normal muscle tone, ambulates with walker  Neurologic: Alert and oriented, cranial nerves 2-12 intact.  Skin: Previous site of pyoderma well healed  Psychiatric: normal mentation, affect and mood     Assessment and Plan:    Rheumatoid arthritis involving multiple sites with positive rheumatoid factor (H)  Continue with liquid methotrexate and follow up in Rheumatology.     Age-related osteoporosis without current pathological fracture  We discussed risk factors for fracture given her history of osteoporosis. She has previously tried Fosamax and does not wish to restart this or similar medications. Will revisit in the future.    Essential hypertension  Stable. Continue with Lisinopril and Amlodipine.     Pyoderma gangrenosum  Well managed with kenalog injections and curettage by dermatology with patient follow up as  necessary.    Routine Health Maintenance  - TD PRSERV FREE >=7 YRS ADS IM    Immunizations (zoster, pneumovax, flu, Tdap, Hep A/B):   Most Recent Immunizations   Administered Date(s) Administered     Influenza (High Dose) 3 valent vaccine 10/12/2017     Influenza (IIV3) PF 01/01/2016     Pneumo Conj 13-V (2010&after) 10/05/2015     Pneumococcal 23 valent 11/21/2006     TD (ADULT, 7+) 02/09/2018     TDAP Vaccine (Boostrix) 11/01/2007     Zoster vaccine, live 10/17/2011     Lipids:   Recent Labs   Lab Test  09/01/17   1103  10/08/10   0947   CHOL  191  199   HDL  49*  48*   LDL  103*  121   TRIG  194*  155*   CHOLHDLRATIO   --   4.2     Lung Ca Screening (>30 pk age 55-79 or >20 py age 50-79 + RF): does not qualify, had CT already  Colonoscopy (50-75 yrs): no longer applicable  Dexa (>65W or 70M yrs): 2/17 Conclusions:  The most negative and valid T-score of -3.7  at the level of the wrist, corresponds with osteoporosis   .  Mammogram (40-75 yrs): 3/17  Pap (21-65 yrs):         Lab Results   Component Value Date     PAP NIL 09/24/2010      Depression: screen neg  Advanced Directive: deferred         Follow-up: Return in about 4 months (around 6/9/2018) for Routine Visit.         Scribe Disclosure:   I, Lucy Carmen, am serving as a scribe to document services personally performed by Amarilys Iyer MD at this visit, based upon the provider's statements to me. All documentation has been reviewed by the aforementioned provider prior to being entered into the official medical record.     Portions of this medical record were completed by a scribe. UPON MY REVIEW AND AUTHENTICATION BY ELECTRONIC SIGNATURE, this confirms (a) I performed the applicable clinical services, and (b) the record is accurate.    Amarilys Iyer MD  Internal Medicine    25 min spent face to face, of which >50% time spent on counseling/coordinating care exclusive of any procedure time

## 2018-02-09 NOTE — MR AVS SNAPSHOT
After Visit Summary   2/9/2018    Abida Hahn    MRN: [id number]           Patient Information     Date Of Birth          1940        Visit Information        Provider Department      2/9/2018 10:55 AM Amarilys Iyer MD [facility]        Today's Diagnoses     Need for tetanus booster    -  1    Rheumatoid arthritis involving multiple sites with positive rheumatoid factor (H)        Age-related osteoporosis without current pathological fracture        Essential hypertension        Pyoderma gangrenosum          Care Instructions    Primary Care Center Medication Refill Request Information:  * Please contact your pharmacy regarding ANY request for medication refills.  ** [facility] Prescription Fax = [phone]  * Please allow 3 business days for routine medication refills.  * Please allow 5 business days for controlled substance medication refills.     Primary Care Center Test Result notification information:  *You will be notified with in 7-10 days of your appointment day regarding the results of your test.  If you are on MyChart you will be notified as soon as the provider has reviewed the results and signed off on them.    [facility] [phone]             Follow-ups after your visit        Follow-up notes from your care team     Return in about 4 months (around 6/9/2018) for Routine Visit.      Your next 10 appointments already scheduled     Mar 02, 2018 10:30 AM CST   FULL PULMONARY FUNCTION with  PFL C   [facility] Pulmonary Function Testing ([place] [facility])    [address] [place] [id number]   [phone]            Mar 02, 2018 11:30 AM CST   (Arrive by 11:15 AM)   Return Interstitial Lung with Nirmala Muñoz MD   [facility] ([place] [facility])    [address] [place] [address]  [address] [place] [address]   [phone]            Apr 17, 2018 12:45 PM CDT    RETURN GLAUCOMA with Corazon Addison MD   Eye Clinic ([facility])    [facility]  [address] [place] [address]  9th Fl Clin 9a  [facility] [address]   [phone]            2018 10:30 AM CDT   Lab with UC LAB   [facility] ([facility])    [address]  1st Floor  [facility] [id number] [phone]            2018 11:00 AM CDT   (Arrive by 10:45 AM)   [facility] ([facility])    [address]  [address]  [facility] [id number] [phone]            Avni 15, 2018 10:55 AM CDT   (Arrive by 10:40 AM)   Return Visit with Amarilys Iyer MD   [facility] ([facility])    [address]  4th Floor  [facility] [id number] [phone]              Who to contact     Please call your clinic at [phone] to:    Ask questions about your health    Make or cancel appointments    Discuss your medicines    Learn about your test results    Speak to your doctor            Additional Information About Your Visit        proteonomixhart Information     [employer]elli[employer] is an electronic gateway that provides easy, online access to your medical records. With [employer]Power, you can request a clinic appointment, read your test results, renew a prescription or communicate with your care team.     To sign up for [employer] visit the website at [email or web address]   You will be asked to enter the access code listed below, as well as some personal information. Please follow the directions to create your username and password.     Your access code is: [email or web address]  Expires: 2018 11:42 AM     Your access code will  in 90 days. If you need help or a new code, please contact your [facility] or call [phone] for assistance.        Care EveryWhere ID     This is your Care EveryWhere ID.  This could be used by other organizations to access your [place] medical records  [id number]        Your Vitals Were     Pulse Respirations Breastfeeding? BMI (Body Mass Index)          61 16 No 30.29 kg/m2         Blood Pressure from Last 3 Encounters:   02/09/18 132/71   01/22/18 137/78   11/30/17 123/78    Weight from Last 3 Encounters:   02/09/18 85.8 kg (189 lb 1.6 oz)   01/22/18 85.8 kg (189 lb 3.2 oz)   11/30/17 86 kg (189 lb 11.2 oz)              We Performed the Following     TD PRSERV FREE >=7 YRS ADS IM        Primary Care Provider Office Phone # Fax #    Amarilys Iyer [phone]       [address] [address]        Equal Access to Services     DADA ALMARAZ : Hadii anu lymano Sochandler, waaxda luqadaha, qaybta kaalmada adereji, neo quinn . So [facility] [place] [phone].    ATENCIÓN: Si habla español, tiene a be disposición servicios gratuitos de asistencia lingüística. Raheem al [phone].    We comply with applicable federal civil rights laws and [place] laws. We do not discriminate on the basis of race, color, national origin, age, disability, sex, sexual orientation, or gender identity.            Thank you!     Thank you for choosing [facility]  for your care. Our goal is always to provide you with excellent care. Hearing back from our patients is one way we can continue to improve our services. Please take a few minutes to complete the written survey that you may receive in the mail after your visit with us. Thank you!             Your Updated Medication List - Protect others around you: Learn how to safely use, store and throw away your medicines at www.disposemymeds.org.          This list is accurate as of 2/9/18 11:49 AM.  Always use your most recent med list.                   Brand Name Dispense Instructions for use Diagnosis    acetaminophen 500 MG tablet    TYLENOL     Take 500-1,000 mg by mouth  "every 6 hours as needed        albuterol 108 (90 BASE) MCG/ACT Inhaler    PROAIR HFA/PROVENTIL HFA/VENTOLIN HFA    1 Inhaler    Inhale 2 puffs into the lungs every 6 hours as needed for shortness of breath / dyspnea or wheezing    ILD (interstitial lung disease) (H)       amLODIPine 5 MG tablet    NORVASC    90 tablet    Take 1 tablet (5 mg) by mouth daily    Essential hypertension       ARTIFICIAL TEAR SOLUTION OP      Apply  to eye as needed.        aspirin 81 MG tablet      Take 81 mg by mouth daily        CALCIUM + D PO      Take 1 tablet by mouth 2 times daily        DAILY MULTIVITAMIN PO      Take 1 tablet by mouth daily And minerals per pt        folic acid 800 MCG Tabs      Take by mouth 2 times daily        GLUCOSAMINE SULFATE PO      Take 1,000 mg by mouth daily        * latanoprost 0.005 % ophthalmic solution    XALATAN    1 Bottle    Place 1 drop into both eyes At Bedtime    Open-angle glaucoma of both eyes, severe stage, unspecified open-angle glaucoma type       * latanoprost 0.005 % ophthalmic solution    XALATAN    1 Bottle    Place 1 drop into both eyes At Bedtime    Primary open angle glaucoma of both eyes, severe stage       levothyroxine 88 MCG tablet    SYNTHROID/LEVOTHROID     Take 88 mcg by mouth daily        lisinopril 40 MG tablet    PRINIVIL/ZESTRIL    90 tablet    Take 1 tablet (40 mg) by mouth daily    Essential hypertension       methotrexate 50 MG/2ML injection CHEMO     4 mL    Mix 0.8 mL with juice and drink once weekly*LABS DUE EVERY 8-12 WKS*    Rheumatoid arthritis involving multiple sites with positive rheumatoid factor (H)       Needle (Disp) 27G X 5/8\" Misc    BD SAFETYGLIDE NEEDLE    12 each    For methotrexate use EVERY 7 DAY    Rheumatoid arthritis involving multiple sites with positive rheumatoid factor (H)       Vitamin D-3 1000 UNITS Caps      Take by mouth 2 times daily        * Notice:  This list has 2 medication(s) that are the same as other medications prescribed for " you. Read the directions carefully, and ask your doctor or other care provider to review them with you.

## 2018-02-09 NOTE — PATIENT INSTRUCTIONS
[facility] Medication Refill Request Information:  * Please contact your pharmacy regarding ANY request for medication refills.  ** [facility] Prescription Fax = [phone]  * Please allow 3 business days for routine medication refills.  * Please allow 5 business days for controlled substance medication refills.     [facility] Test Result notification information:  *You will be notified with in 7-10 days of your appointment day regarding the results of your test.  If you are on MyChart you will be notified as soon as the provider has reviewed the results and signed off on them.    [facility] [phone]

## 2018-02-09 NOTE — NURSING NOTE
Chief Complaint   Patient presents with     Recheck Medication     Here to follow up on medication; amlodipine     Brian Marroquin CMA (AAMA) at 11:14 AM on 2/9/2018

## 2018-02-20 DIAGNOSIS — E03.9 HYPOTHYROIDISM: Primary | ICD-10-CM

## 2018-02-20 NOTE — TELEPHONE ENCOUNTER
levothyroxine  Last Written Prescription Date:  Pt reported    Last Office Visit : 2/9/18  Future Office visit:  6/15/18    Routing refill request to clinic nurse for review/approval because:  Medication is reported/historical

## 2018-02-22 RX ORDER — LEVOTHYROXINE SODIUM 88 UG/1
TABLET ORAL
Qty: 90 TABLET | Refills: 3 | Status: SHIPPED | OUTPATIENT
Start: 2018-02-22 | End: 2019-03-01

## 2018-03-02 ENCOUNTER — OFFICE VISIT (OUTPATIENT)
Dept: PULMONOLOGY | Facility: CLINIC | Age: 78
End: 2018-03-02
Attending: INTERNAL MEDICINE
Payer: MEDICARE

## 2018-03-02 VITALS
RESPIRATION RATE: 17 BRPM | BODY MASS INDEX: 29.89 KG/M2 | OXYGEN SATURATION: 94 % | WEIGHT: 186 LBS | HEIGHT: 66 IN | SYSTOLIC BLOOD PRESSURE: 119 MMHG | HEART RATE: 67 BPM | DIASTOLIC BLOOD PRESSURE: 79 MMHG

## 2018-03-02 DIAGNOSIS — J84.9 ILD (INTERSTITIAL LUNG DISEASE) (H): ICD-10-CM

## 2018-03-02 DIAGNOSIS — J84.9 ILD (INTERSTITIAL LUNG DISEASE) (H): Primary | ICD-10-CM

## 2018-03-02 PROCEDURE — G0463 HOSPITAL OUTPT CLINIC VISIT: HCPCS | Mod: ZF

## 2018-03-02 ASSESSMENT — PAIN SCALES - GENERAL: PAINLEVEL: NO PAIN (0)

## 2018-03-02 NOTE — PROGRESS NOTES
"[facility] Interstitial Lung Disease Clinic    Reason for Visit  Abida Hahn is a 77 year old year old female who is being seen for RECHECK (Interstitial Lung)    HPI    Mrs. Hahn is a 78 yo who is for follow up of RA-ILD.  She has had RA for ~ 20 years and has taken methotrexate for ~10 years.  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  I had recommended Dulera at her last visit, but she did not start it due to expense.  However, she uses albuterol prn and reports that it helps her breathing.  She reports some shortness of breath when she bends over but not when she walks on a level surface.  She reports no change or worsening in her breathing.  She denies cough or chest pain.  She is content with her breathing status and not interested in starting another medication.      Current Outpatient Prescriptions   Medication     levothyroxine (SYNTHROID/LEVOTHROID) 88 MCG tablet     Needle, Disp, (BD SAFETYGLIDE NEEDLE) 27G X 5/8\" MISC     methotrexate 50 MG/2ML injection CHEMO     latanoprost (XALATAN) 0.005 % ophthalmic solution     amLODIPine (NORVASC) 5 MG tablet     albuterol (PROAIR HFA/PROVENTIL HFA/VENTOLIN HFA) 108 (90 BASE) MCG/ACT Inhaler     aspirin 81 MG tablet     lisinopril (PRINIVIL/ZESTRIL) 40 MG tablet     latanoprost (XALATAN) 0.005 % ophthalmic solution     GLUCOSAMINE SULFATE PO     folic acid 800 MCG TABS     Cholecalciferol (VITAMIN D-3) 1000 UNITS CAPS     acetaminophen (TYLENOL) 500 MG tablet     ARTIFICIAL TEAR SOLUTION OP     Calcium-Vitamin D (CALCIUM + D PO)     Multiple Vitamin (DAILY MULTIVITAMIN PO)     No current facility-administered medications for this visit.      Allergies   Allergen Reactions     Ibuprofen Sodium      Penicillins      Triple Antibiotic [Neomycin-Polymyxin-Dexameth]      Aspirin Rash     Codeine Rash     Past Medical History:   Diagnosis Date     Abscess, toe 2009    Right great toe     Cataract 8/2011    Right s/p " "surgery 8/2011; left s/p surgery     Glaucoma      Headache(784.0)     Relieved with gabapentin. 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 +YEYO      Wrist fracture     Right, s/p ORIF       Past Surgical History:   Procedure Laterality Date     BIOPSY ARTERY TEMPORAL       EXTRACAPSULAR CATARACT EXTRATION WITH INTRAOCULAR LENS IMPLANT  8/2011    Left 2 years ago as well     OPEN REDUCTION INTERNAL FIXATION WRIST BILATERAL         Social History     Social History     Marital status: Single     Spouse name: N/A     Number of children: N/A     Years of education: N/A     Occupational History     Not on file.     Social History Main Topics     Smoking status: Former Smoker     Packs/day: 0.50     Quit date: 7/6/1992     Smokeless tobacco: Never Used      Comment: Quit in 20yrs      Alcohol use No     Drug use: No     Sexual activity: Not on file     Other Topics Concern     Not on file     Social History Narrative    Lives in senior building.  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 Enjoys reading, friends, children and shopping.  Denies exposure to asbestos, silica, pet birds, hot tubs, feather pillows, mold.       Family History   Problem Relation Age of Onset     CANCER Father      colon     Emphysema Father      Arthritis Mother      RA at 33 y/o, glaucome     Glaucoma Mother      Arthritis Brother      RA on Mtx and humira     Melanoma No family hx of      Skin Cancer No family hx of              ROS Pulmonary  A complete ROS was otherwise negative except as noted in the HPI.    Vitals: /79  Pulse 67  Resp 17  Ht 1.683 m (5' 6.25\")  Wt 84.4 kg (186 lb)  SpO2 94%  BMI 29.8 kg/m2    Exam:   GENERAL APPEARANCE: Well developed, well " nourished, alert, and in no apparent distress.  RESP: good air flow throughout.  No crackles. No rhonchi. No wheezes.  CV: Normal S1, S2, regular rhythm, normal rate. No murmur.  No LE edema.   MS: extremities normal. No clubbing. No cyanosis.  SKIN: no rash on limited exam.  NEURO: Mentation intact, speech normal, normal gait and stance.  PSYCH: mentation appears normal. and affect normal/bright.    Results:  Recent Results (from the past 168 hour(s))   General PFT Lab (Please always keep checked)    Collection Time: 03/02/18 10:38 AM   Result Value Ref Range    FVC-Pred 2.82 L    FVC-Pre 1.36 L    FVC-%Pred-Pre 48 %    FEV1-Pre 0.96 L    FEV1-%Pred-Pre 44 %    FEV1FVC-Pred 74 %    FEV1FVC-Pre 71 %    FEFMax-Pred 5.36 L/sec    FEFMax-Pre 3.08 L/sec    FEFMax-%Pred-Pre 57 %    FEF2575-Pred 1.70 L/sec    FEF2575-Pre 0.60 L/sec    AJL1444-%Pred-Pre 35 %    ExpTime-Pre 5.47 sec    FIFMax-Pre 2.95 L/sec    VC-Pred 3.18 L    VC-Pre 1.56 L    VC-%Pred-Pre 49 %    IC-Pred 2.70 L    IC-Pre 1.49 L    IC-%Pred-Pre 55 %    ERV-Pred 0.48 L    ERV-Pre 0.07 L    ERV-%Pred-Pre 15 %    FEV1FEV6-Pred 78 %    FEV1FEV6-Pre 71 %    FRCPleth-Pred 2.85 L    FRCPleth-Pre 2.73 L    FRCPleth-%Pred-Pre 95 %    RVPleth-Pred 2.29 L    RVPleth-Pre 2.66 L    RVPleth-%Pred-Pre 115 %    TLCPleth-Pred 5.32 L    TLCPleth-Pre 4.22 L    TLCPleth-%Pred-Pre 79 %    DLCOunc-Pred 20.57 ml/min/mmHg    DLCOunc-Pre 16.15 ml/min/mmHg    DLCOunc-%Pred-Pre 78 %    VA-Pre 2.70 L    VA-%Pred-Pre 49 %    FEV1SVC-Pred 67 %    FEV1SVC-Pre 62 %       I also reviewed PFT from today: nonspecific spirometry pattern suggestive of airflow obstruction with normal TLC and diffusion.  Lung function is stable.     I reviewed results with the patient.      Assessment and plan:   Mrs. Hahn is a 78 yo who is here for follow up of RA-ILD.  1.  ILD.  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  PFT is stable.  She is content with her quality of life and  does not want to start a new medication.  She will continue albuterol prn.  If her symptoms worsen, then could consider daily azithromycin or an ICS/BD combo inhaler.  RTC in 4 mo with PFT.

## 2018-03-02 NOTE — MR AVS SNAPSHOT
"              After Visit Summary   3/2/2018    Abida Hahn    MRN: [id number]           Patient Information     Date Of Birth          1940        Visit Information        Provider Department      3/2/2018 11:30 AM Nirmala Muñoz MD [facility]        Today's Diagnoses     ILD (interstitial lung disease) (H)    -  1       Follow-ups after your visit        Follow-up notes from your care team     Return in about 4 months (around 7/2/2018).      Your next 10 appointments already scheduled     Mar 02, 2018 12:15 PM CST   (Arrive by 12:00 PM)   MA SCREENING DIGITAL BILATERAL with [email or web address]   [facility] ([facility] [place])    [address]  2nd [address]  [facility] [address] [phone]           Do not use any powder, lotion or deodorant under your arms or on your breast. If you do, we will ask you to remove it before your exam.  Wear comfortable, two-piece clothing.  If you have any allergies, tell your care team.  Bring any previous mammograms from other facilities or have them mailed to the breast center. Three-dimensional (3D) mammograms are available at [facility] locations in [facility], [place], [place], [facility], [place], [place], and [place]. [facility] locations include [place] and [facility] in [place]. Benefits of 3D mammograms include: - Improved rate of cancer detection - Decreases your chance of having to go back for more tests, which means fewer: - \"False-positive\" results (This means that there is an abnormal area but it isn't cancer.) - Invasive testing procedures, such as a biopsy or surgery - Can provide clearer images of the breast if you have dense breast tissue. 3D mammography is an optional exam that anyone can have with a 2D mammogram. It doesn't replace or take the place of a 2D mammogram. 2D mammograms remain an effective screening test for all women.  Not all insurance " companies cover the cost of a 3D mammogram. Check with your insurance.            Apr 17, 2018 12:45 PM CDT   RETURN GLAUCOMA with Corazon Addison MD   [facility] ([facility])    [address]  [address] [address]  [facility] [address]   [phone]            Apr 25, 2018 10:30 AM CDT   Lab with  LAB   [facility] ([facility])    [address]  1st [address]  [facility] [address]   [phone]            Apr 25, 2018 11:00 AM CDT   (Arrive by 10:45 AM)   Return Visit with NAKUL Lew CNP   [facility] ([facility])    [address]  [address]  [facility] [address]   [phone]            Avni 15, 2018 10:55 AM CDT   (Arrive by 10:40 AM)   Return Visit with Amarilys Iyer MD   [facility] ([facility])    [address]  4th [address]  [facility] [address]   [phone]            Jul 12, 2018 10:30 AM CDT   FULL PULMONARY FUNCTION with  PFL B   [facility] Pulmonary Function Testing ([facility])    [address]  3rd Floor  [facility] [address]   [phone]            Jul 12, 2018 11:30 AM CDT   (Arrive by 11:15 AM)   Return Interstitial Lung with Nirmala Muñoz MD   [facility] ([facility])    [address] [place] [address]  [address]  [facility] [address]   [phone]              Future tests that were ordered for you today     Open Future Orders        Priority Expected Expires Ordered    General PFT Lab (Please always keep checked) Routine 7/2/2018 3/2/2019 3/2/2018    Pulmonary Function Test Routine 7/2/2018 3/2/2019 3/2/2018            Who to contact     If you have questions or need follow up information about today's clinic visit or your schedule please contact [facility] directly at  "[phone].  Normal or non-critical lab and imaging results will be communicated to you by MyChart, letter or phone within 4 business days after the clinic has received the results. If you do not hear from us within 7 days, please contact the clinic through [employer] or phone. If you have a critical or abnormal lab result, we will notify you by phone as soon as possible.  Submit refill requests through [facility] or call your pharmacy and they will forward the refill request to us. Please allow 3 business days for your refill to be completed.          Additional Information About Your Visit        [employer] Information     [facility] lets you send messages to your doctor, view your test results, renew your prescriptions, schedule appointments and more. To sign up, go to [email or web address] . Click on \"Log in\" on the left side of the screen, which will take you to the Welcome page. Then click on \"Sign up Now\" on the right side of the page.     You will be asked to enter the access code listed below, as well as some personal information. Please follow the directions to create your username and password.     Your access code is: [email or web address]  Expires: 2018 12:11 PM     Your access code will  in 90 days. If you need help or a new code, please call your [place] clinic or [phone].        Care EveryWhere ID     This is your Care EveryWhere ID. This could be used by other organizations to access your [place] medical records  [id number]        Your Vitals Were     Pulse Respirations Height Pulse Oximetry BMI (Body Mass Index)       67 17 1.683 m (5' 6.25\") 94% 29.8 kg/m2        Blood Pressure from Last 3 Encounters:   18 119/79   18 132/71   18 137/78    Weight from Last 3 Encounters:   18 84.4 kg (186 lb)   18 85.8 kg (189 lb 1.6 oz)   18 85.8 kg (189 lb 3.2 oz)               Primary Care Provider Office Phone # Fax #    Amarilys Iyer [phone]    "    [address] [address]        Equal Access to Services     MARCIANOANIBAL RUFINA : Hadii anu jones esmergabbi Keeshachandler, wairmada solomon, qaalfredta luis ejamesmamie ram, waxay idiin haykulwantnathaniel jacksonsamaralynda otto. So [facility] [phone].    ATENCIÓN: Si habla español, tiene a be disposición servicios gratuitos de asistencia lingüística. LlSelect [facility] [phone].    We comply with applicable federal civil rights laws and [place] laws. We do not discriminate on the basis of race, color, national origin, age, disability, sex, sexual orientation, or gender identity.            Thank you!     Thank you for choosing [facility]  for your care. Our goal is always to provide you with excellent care. Hearing back from our patients is one way we can continue to improve our services. Please take a few minutes to complete the written survey that you may receive in the mail after your visit with us. Thank you!             Your Updated Medication List - Protect others around you: Learn how to safely use, store and throw away your medicines at www.disposemymeds.org.          This list is accurate as of 3/2/18 12:11 PM.  Always use your most recent med list.                   Brand Name Dispense Instructions for use Diagnosis    acetaminophen 500 MG tablet    TYLENOL     Take 500-1,000 mg by mouth every 6 hours as needed        albuterol 108 (90 BASE) MCG/ACT Inhaler    PROAIR HFA/PROVENTIL HFA/VENTOLIN HFA    1 Inhaler    Inhale 2 puffs into the lungs every 6 hours as needed for shortness of breath / dyspnea or wheezing    ILD (interstitial lung disease) (H)       amLODIPine 5 MG tablet    NORVASC    90 tablet    Take 1 tablet (5 mg) by mouth daily    Essential hypertension       ARTIFICIAL TEAR SOLUTION OP      Apply  to eye as needed.        aspirin 81 MG tablet      Take 81 mg by mouth daily        CALCIUM + D PO      Take 1 tablet by mouth 2 times daily        DAILY MULTIVITAMIN PO      Take 1  "tablet by mouth daily And minerals per pt        folic acid 800 MCG Tabs      Take by mouth 2 times daily        GLUCOSAMINE SULFATE PO      Take 1,000 mg by mouth daily        * latanoprost 0.005 % ophthalmic solution    XALATAN    1 Bottle    Place 1 drop into both eyes At Bedtime    Open-angle glaucoma of both eyes, severe stage, unspecified open-angle glaucoma type       * latanoprost 0.005 % ophthalmic solution    XALATAN    1 Bottle    Place 1 drop into both eyes At Bedtime    Primary open angle glaucoma of both eyes, severe stage       levothyroxine 88 MCG tablet    SYNTHROID/LEVOTHROID    90 tablet    TAKE 1 TABLET BY MOUTH EVERY DAY.    Hypothyroidism       lisinopril 40 MG tablet    PRINIVIL/ZESTRIL    90 tablet    Take 1 tablet (40 mg) by mouth daily    Essential hypertension       methotrexate 50 MG/2ML injection CHEMO     4 mL    Mix 0.8 mL with juice and drink once weekly*LABS DUE EVERY 8-12 WKS*    Rheumatoid arthritis involving multiple sites with positive rheumatoid factor (H)       Needle (Disp) 27G X 5/8\" Misc    BD SAFETYGLIDE NEEDLE    12 each    For methotrexate use EVERY 7 DAY    Rheumatoid arthritis involving multiple sites with positive rheumatoid factor (H)       Vitamin D-3 1000 UNITS Caps      Take by mouth 2 times daily        * Notice:  This list has 2 medication(s) that are the same as other medications prescribed for you. Read the directions carefully, and ask your doctor or other care provider to review them with you.      "

## 2018-03-02 NOTE — LETTER
"3/2/2018      RE: Abida Hahn  [address]  [facility] [address]       [facility]    Reason for Visit  Abida Hahn is a 77 year old year old female who is being seen for RECHECK (Interstitial Lung)    HPI    Mrs. Hahn is a 78 yo who is for follow up of RA-ILD.  She has had RA for ~ 20 years and has taken methotrexate for ~10 years.  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  I had recommended Dulera at her last visit, but she did not start it due to expense.  However, she uses albuterol prn and reports that it helps her breathing.  She reports some shortness of breath when she bends over but not when she walks on a level surface.  She reports no change or worsening in her breathing.  She denies cough or chest pain.  She is content with her breathing status and not interested in starting another medication.      Current Outpatient Prescriptions   Medication     levothyroxine (SYNTHROID/LEVOTHROID) 88 MCG tablet     Needle, Disp, (BD SAFETYGLIDE NEEDLE) 27G X 5/8\" MISC     methotrexate 50 MG/2ML injection CHEMO     latanoprost (XALATAN) 0.005 % ophthalmic solution     amLODIPine (NORVASC) 5 MG tablet     albuterol (PROAIR HFA/PROVENTIL HFA/VENTOLIN HFA) 108 (90 BASE) MCG/ACT Inhaler     aspirin 81 MG tablet     lisinopril (PRINIVIL/ZESTRIL) 40 MG tablet     latanoprost (XALATAN) 0.005 % ophthalmic solution     GLUCOSAMINE SULFATE PO     folic acid 800 MCG TABS     Cholecalciferol (VITAMIN D-3) 1000 UNITS CAPS     acetaminophen (TYLENOL) 500 MG tablet     ARTIFICIAL TEAR SOLUTION OP     Calcium-Vitamin D (CALCIUM + D PO)     Multiple Vitamin (DAILY MULTIVITAMIN PO)     No current facility-administered medications for this visit.      Allergies   Allergen Reactions     Ibuprofen Sodium      Penicillins      Triple Antibiotic [Neomycin-Polymyxin-Dexameth]      Aspirin Rash     Codeine Rash     Past Medical History: " "  Diagnosis Date     Abscess, toe 2009    Right great toe     Cataract 8/2011    Right s/p surgery 8/2011; left s/p surgery     Glaucoma      Headache(784.0)     Relieved with gabapentin. 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 +YEYO      Wrist fracture     Right, s/p ORIF       Past Surgical History:   Procedure Laterality Date     BIOPSY ARTERY TEMPORAL       EXTRACAPSULAR CATARACT EXTRATION WITH INTRAOCULAR LENS IMPLANT  8/2011    Left 2 years ago as well     OPEN REDUCTION INTERNAL FIXATION WRIST BILATERAL         Social History     Social History     Marital status: Single     Spouse name: N/A     Number of children: N/A     Years of education: N/A     Occupational History     Not on file.     Social History Main Topics     Smoking status: Former Smoker     Packs/day: 0.50     Quit date: 7/6/1992     Smokeless tobacco: Never Used      Comment: Quit in 20yrs      Alcohol use No     Drug use: No     Sexual activity: Not on file     Other Topics Concern     Not on file     Social History Narrative    Lives in senior building.  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 Enjoys reading, friends, children and shopping.  Denies exposure to asbestos, silica, pet birds, hot tubs, feather pillows, mold.       Family History   Problem Relation Age of Onset     CANCER Father      colon     Emphysema Father      Arthritis Mother      RA at 31 y/o, glaucome     Glaucoma Mother      Arthritis Brother      RA on Mtx and humira     Melanoma No family hx of      Skin Cancer No family hx of              ROS Pulmonary  A complete ROS was otherwise negative except as noted in the HPI.    Vitals: /79  Pulse 67  Resp 17  Ht 1.683 m (5' 6.25\")  Wt 84.4 kg (186 " lb)  SpO2 94%  BMI 29.8 kg/m2    Exam:   GENERAL APPEARANCE: Well developed, well nourished, alert, and in no apparent distress.  RESP: good air flow throughout.  No crackles. No rhonchi. No wheezes.  CV: Normal S1, S2, regular rhythm, normal rate. No murmur.  No LE edema.   MS: extremities normal. No clubbing. No cyanosis.  SKIN: no rash on limited exam.  NEURO: Mentation intact, speech normal, normal gait and stance.  PSYCH: mentation appears normal. and affect normal/bright.    Results:  Recent Results (from the past 168 hour(s))   General PFT Lab (Please always keep checked)    Collection Time: 03/02/18 10:38 AM   Result Value Ref Range    FVC-Pred 2.82 L    FVC-Pre 1.36 L    FVC-%Pred-Pre 48 %    FEV1-Pre 0.96 L    FEV1-%Pred-Pre 44 %    FEV1FVC-Pred 74 %    FEV1FVC-Pre 71 %    FEFMax-Pred 5.36 L/sec    FEFMax-Pre 3.08 L/sec    FEFMax-%Pred-Pre 57 %    FEF2575-Pred 1.70 L/sec    FEF2575-Pre 0.60 L/sec    CGP3850-%Pred-Pre 35 %    ExpTime-Pre 5.47 sec    FIFMax-Pre 2.95 L/sec    VC-Pred 3.18 L    VC-Pre 1.56 L    VC-%Pred-Pre 49 %    IC-Pred 2.70 L    IC-Pre 1.49 L    IC-%Pred-Pre 55 %    ERV-Pred 0.48 L    ERV-Pre 0.07 L    ERV-%Pred-Pre 15 %    FEV1FEV6-Pred 78 %    FEV1FEV6-Pre 71 %    FRCPleth-Pred 2.85 L    FRCPleth-Pre 2.73 L    FRCPleth-%Pred-Pre 95 %    RVPleth-Pred 2.29 L    RVPleth-Pre 2.66 L    RVPleth-%Pred-Pre 115 %    TLCPleth-Pred 5.32 L    TLCPleth-Pre 4.22 L    TLCPleth-%Pred-Pre 79 %    DLCOunc-Pred 20.57 ml/min/mmHg    DLCOunc-Pre 16.15 ml/min/mmHg    DLCOunc-%Pred-Pre 78 %    VA-Pre 2.70 L    VA-%Pred-Pre 49 %    FEV1SVC-Pred 67 %    FEV1SVC-Pre 62 %       I also reviewed PFT from today: nonspecific spirometry pattern suggestive of airflow obstruction with normal TLC and diffusion.  Lung function is stable.     I reviewed results with the patient.      Assessment and plan:   Mrs. Hahn is a 78 yo who is here for follow up of RA-ILD.  1.  ILD.  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  PFT is stable.  She is content with her quality of life and does not want to start a new medication.  She will continue albuterol prn.  If her symptoms worsen, then could consider daily azithromycin or an ICS/BD combo inhaler.  RTC in 4 mo with PFT.              Nirmala Muñoz MD

## 2018-03-08 LAB
DLCOUNC-%PRED-PRE: 78 %
DLCOUNC-PRE: 16.15 ML/MIN/MMHG
DLCOUNC-PRED: 20.57 ML/MIN/MMHG
ERV-%PRED-PRE: 15 %
ERV-PRE: 0.07 L
ERV-PRED: 0.48 L
EXPTIME-PRE: 5.47 SEC
FEF2575-%PRED-PRE: 35 %
FEF2575-PRE: 0.6 L/SEC
FEF2575-PRED: 1.7 L/SEC
FEFMAX-%PRED-PRE: 57 %
FEFMAX-PRE: 3.08 L/SEC
FEFMAX-PRED: 5.36 L/SEC
FEV1-%PRED-PRE: 44 %
FEV1-PRE: 0.96 L
FEV1FEV6-PRE: 71 %
FEV1FEV6-PRED: 78 %
FEV1FVC-PRE: 71 %
FEV1FVC-PRED: 74 %
FEV1SVC-PRE: 62 %
FEV1SVC-PRED: 67 %
FIFMAX-PRE: 2.95 L/SEC
FRCPLETH-%PRED-PRE: 95 %
FRCPLETH-PRE: 2.73 L
FRCPLETH-PRED: 2.85 L
FVC-%PRED-PRE: 48 %
FVC-PRE: 1.36 L
FVC-PRED: 2.82 L
IC-%PRED-PRE: 55 %
IC-PRE: 1.49 L
IC-PRED: 2.7 L
RVPLETH-%PRED-PRE: 115 %
RVPLETH-PRE: 2.66 L
RVPLETH-PRED: 2.29 L
TLCPLETH-%PRED-PRE: 79 %
TLCPLETH-PRE: 4.22 L
TLCPLETH-PRED: 5.32 L
VA-%PRED-PRE: 49 %
VA-PRE: 2.7 L
VC-%PRED-PRE: 49 %
VC-PRE: 1.56 L
VC-PRED: 3.18 L

## 2018-03-14 DIAGNOSIS — M05.79 RHEUMATOID ARTHRITIS INVOLVING MULTIPLE SITES WITH POSITIVE RHEUMATOID FACTOR (H): ICD-10-CM

## 2018-03-15 ENCOUNTER — TELEPHONE (OUTPATIENT)
Dept: PHARMACY | Facility: CLINIC | Age: 78
End: 2018-03-15

## 2018-03-15 RX ORDER — METHOTREXATE 25 MG/ML
INJECTION, SOLUTION INTRA-ARTERIAL; INTRAMUSCULAR; INTRAVENOUS
Qty: 4 ML | Refills: 0 | Status: SHIPPED | OUTPATIENT
Start: 2018-03-15 | End: 2018-04-25

## 2018-03-15 NOTE — PATIENT INSTRUCTIONS
Patient will return to clinic in 1-4 weeks with refraction for new glasses, visual field test, OCT with RNFL analysis and IOP check.  Pending results further treatment and management decisions will be made.     NONE

## 2018-03-15 NOTE — TELEPHONE ENCOUNTER
methotrexate 50 MG/2ML injection CHEMO  Last Written Prescription Date:  1/22/18  Last Fill Quantity: 4ml,   # refills: 0  Last Office Visit :2/9/18  Future Office visit:  4/25/18        CBC RESULTS:   Recent Labs   Lab Test  01/22/18   1142   WBC  5.0   RBC  4.39   HGB  14.1   HCT  43.9   MCV  100   MCH  32.1   MCHC  32.1   RDW  13.1   PLT  183       Creatinine   Date Value Ref Range Status   01/22/2018 0.94 0.52 - 1.04 mg/dL Final   ]    Liver Function Studies -   Recent Labs   Lab Test  01/22/18   1142   04/06/17   1442   PROTTOTAL   --    --   7.5   ALBUMIN   --    --   3.9   BILITOTAL   --    --   0.4   ALKPHOS   --    --   133   AST  20   < >  17   ALT  20   < >  25    < > = values in this interval not displayed.     Lab Results   Component Value Date    ALBUMIN 3.9 04/06/2017       Routing refill request to provider for review/approval because: protocol

## 2018-03-15 NOTE — TELEPHONE ENCOUNTER
We have been unable to reach this patient for MTM follow-up after several attempts. We will stop reaching out to the patient at this time. Please let us know if we can assist in this patient's care in the future.    Routing to PCP as ALBINA Dempsey, Pharm.D., [facility]  Medication Therapy Management Pharmacist  Page/VM:  [phone]

## 2018-04-17 ENCOUNTER — OFFICE VISIT (OUTPATIENT)
Dept: OPHTHALMOLOGY | Facility: CLINIC | Age: 78
End: 2018-04-17
Attending: OPHTHALMOLOGY
Payer: MEDICARE

## 2018-04-17 DIAGNOSIS — Z96.1 PSEUDOPHAKIA OF BOTH EYES: ICD-10-CM

## 2018-04-17 DIAGNOSIS — H40.1133 PRIMARY OPEN ANGLE GLAUCOMA OF BOTH EYES, SEVERE STAGE: Primary | ICD-10-CM

## 2018-04-17 PROCEDURE — 92083 EXTENDED VISUAL FIELD XM: CPT | Mod: ZF | Performed by: OPHTHALMOLOGY

## 2018-04-17 PROCEDURE — 92133 CPTRZD OPH DX IMG PST SGM ON: CPT | Mod: ZF | Performed by: OPHTHALMOLOGY

## 2018-04-17 PROCEDURE — G0463 HOSPITAL OUTPT CLINIC VISIT: HCPCS | Mod: ZF

## 2018-04-17 ASSESSMENT — CONF VISUAL FIELD
OD_INFERIOR_NASAL_RESTRICTION: 3
METHOD: COUNTING FINGERS
OD_INFERIOR_TEMPORAL_RESTRICTION: 3
OS_SUPERIOR_NASAL_RESTRICTION: 3
OS_SUPERIOR_TEMPORAL_RESTRICTION: 3
OD_SUPERIOR_TEMPORAL_RESTRICTION: 3
OD_SUPERIOR_NASAL_RESTRICTION: 3

## 2018-04-17 ASSESSMENT — REFRACTION_WEARINGRX
OS_SPHERE: -0.75
OD_SPHERE: -1.50
OS_CYLINDER: +1.50
OS_AXIS: 109
OD_ADD: +3.00
SPECS_TYPE: PAL
OD_CYLINDER: +0.50
OS_ADD: +3.00
OD_AXIS: 148

## 2018-04-17 ASSESSMENT — VISUAL ACUITY
OD_CC: 20/40
METHOD: SNELLEN - LINEAR
CORRECTION_TYPE: GLASSES
OS_CC: 20/30
OS_CC+: +2

## 2018-04-17 ASSESSMENT — TONOMETRY
OD_IOP_MMHG: 16
OS_IOP_MMHG: 16
IOP_METHOD: APPLANATION

## 2018-04-17 ASSESSMENT — SLIT LAMP EXAM - LIDS
COMMENTS: NORMAL
COMMENTS: NORMAL

## 2018-04-17 ASSESSMENT — EXTERNAL EXAM - LEFT EYE: OS_EXAM: NORMAL

## 2018-04-17 ASSESSMENT — EXTERNAL EXAM - RIGHT EYE: OD_EXAM: NORMAL

## 2018-04-17 NOTE — MR AVS SNAPSHOT
After Visit Summary   4/17/2018    Abida Hahn    MRN: [id number]           Patient Information     Date Of Birth          1940        Visit Information        Provider Department      4/17/2018 12:45 PM Corazon Addison [facility]        Today's Diagnoses     Primary open angle glaucoma of both eyes, severe stage    -  1    Pseudophakia of both eyes          Care Instructions    Patient will continue Latanoprost which is a teal top drop at bedtime in both eyes.  Patient will return to clinic in 3-4 months with repeat IOP check, dilated eye exam and visual field test.  A long discussion of the risks, benefits, and alternatives including potential treatment and management options were had with patient and a decision was made to observe at this time and not add additional eye drops unless evidence of progression or elevated IOPs.         This drop may cause lash growth and some darkening of the skin around the eye.  It is also possible in a patient with a freckled iris or michell eyes, that the iris could darken to brown with time, something that is not common but not reversible.          Follow-ups after your visit        Follow-up notes from your care team     Return 3-4 months with repeat IOP check, dilated eye exam and visual field test. .      Your next 10 appointments already scheduled     Apr 25, 2018 10:30 AM CDT   Lab with  LAB   [facility] ([facility])    [address] [place] [address] Floor  [facility] [id number] [phone]            Apr 25, 2018 11:00 AM CDT   (Arrive by 10:45 AM)   Return Visit with NAKUL Lew [facility] ([facility])    [address] [place] [address]  [address]  [facility] [id number] [phone]            Avni 15, 2018 10:55 AM CDT   (Arrive by 10:40 AM)   Return Visit with Amarilys Iyer MD   [facility] ([facility])    [address] [place] [address]  Street [address]  4th Floor  [facility] [address]   [phone]            2018 10:30 AM CDT   FULL PULMONARY FUNCTION with  PFL B   [facility] Pulmonary Function Testing ([facility])    [address]  3rd Floor  [facility] [address]   [phone]            2018 11:30 AM CDT   (Arrive by 11:15 AM)   Return Interstitial Lung with Nirmala Muñoz MD   [facility] ([facility])    [address]  [address]  [facility] [address]   [phone]            2018 12:45 PM CDT   RETURN GLAUCOMA with Corazon Addison MD   [facility] ([facility])    [address]  [address] [address]  [facility] [address]   [phone]              Who to contact     Please call your clinic at [phone] to:    Ask questions about your health    Make or cancel appointments    Discuss your medicines    Learn about your test results    Speak to your doctor            Additional Information About Your Visit        [employer] Information     [employer] is an electronic gateway that provides easy, online access to your medical records. With [employer], you can request a clinic appointment, read your test results, renew a prescription or communicate with your care team.     To sign up for [employer] visit the website at [email or web address]   You will be asked to enter the access code listed below, as well as some personal information. Please follow the directions to create your username and password.     Your access code is: [email or web address]  Expires: 2018  1:11 PM     Your access code will  in 90 days. If you need help or a new code, please contact your [facility] or call [phone] for assistance.        Care EveryWhere ID     This is your Care EveryWhere ID. This could be used by other organizations to access your [facility]  records  [id number]         Blood Pressure from Last 3 Encounters:   03/02/18 119/79   02/09/18 132/71   01/22/18 137/78    Weight from Last 3 Encounters:   03/02/18 84.4 kg (186 lb)   02/09/18 85.8 kg (189 lb 1.6 oz)   01/22/18 85.8 kg (189 lb 3.2 oz)              We Performed the Following     OCT Optic Nerve RNFL Spectralis OU (both eyes)     OVF 24-2 Dynamic OU        Primary Care Provider Office Phone # Fax #    Amarilys Iyer [phone]       [address] [address]        Equal Access to Services     DADA ALMARAZ : Hadshayne Black, wasophia carbajal, qaybta kaalmada yo, neo otto. So [facility] [phone].    ATENCIÓN: Si habla español, tiene a be disposición servicios gratuitos de asistencia lingüística. Llame al [phone].    We comply with applicable federal civil rights laws and [place] laws. We do not discriminate on the basis of race, color, national origin, age, disability, sex, sexual orientation, or gender identity.            Thank you!     Thank you for choosing [facility]  for your care. Our goal is always to provide you with excellent care. Hearing back from our patients is one way we can continue to improve our services. Please take a few minutes to complete the written survey that you may receive in the mail after your visit with us. Thank you!             Your Updated Medication List - Protect others around you: Learn how to safely use, store and throw away your medicines at www.disposemymeds.org.          This list is accurate as of 4/17/18  2:32 PM.  Always use your most recent med list.                   Brand Name Dispense Instructions for use Diagnosis    acetaminophen 500 MG tablet    TYLENOL     Take 500-1,000 mg by mouth every 6 hours as needed        albuterol 108 (90 Base) MCG/ACT Inhaler    PROAIR HFA/PROVENTIL HFA/VENTOLIN HFA    1 Inhaler    Inhale 2 puffs into the lungs every 6  "hours as needed for shortness of breath / dyspnea or wheezing    ILD (interstitial lung disease) (H)       amLODIPine 5 MG tablet    NORVASC    90 tablet    Take 1 tablet (5 mg) by mouth daily    Essential hypertension       ARTIFICIAL TEAR SOLUTION OP      Apply  to eye as needed.        aspirin 81 MG tablet      Take 81 mg by mouth daily        CALCIUM + D PO      Take 1 tablet by mouth 2 times daily        DAILY MULTIVITAMIN PO      Take 1 tablet by mouth daily And minerals per pt        folic acid 800 MCG Tabs      Take by mouth 2 times daily        GLUCOSAMINE SULFATE PO      Take 1,000 mg by mouth daily        * latanoprost 0.005 % ophthalmic solution    XALATAN    1 Bottle    Place 1 drop into both eyes At Bedtime    Open-angle glaucoma of both eyes, severe stage, unspecified open-angle glaucoma type       * latanoprost 0.005 % ophthalmic solution    XALATAN    1 Bottle    Place 1 drop into both eyes At Bedtime    Primary open angle glaucoma of both eyes, severe stage       levothyroxine 88 MCG tablet    SYNTHROID/LEVOTHROID    90 tablet    TAKE 1 TABLET BY MOUTH EVERY DAY.    Hypothyroidism       lisinopril 40 MG tablet    PRINIVIL/ZESTRIL    90 tablet    Take 1 tablet (40 mg) by mouth daily    Essential hypertension       methotrexate 50 MG/2ML injection CHEMO     4 mL    MIX 0.8 ML WITH JUICE AND DRINK ONCE WEEKLY.    Rheumatoid arthritis involving multiple sites with positive rheumatoid factor (H)       Needle (Disp) 27G X 5/8\" Misc    BD SAFETYGLIDE NEEDLE    12 each    For methotrexate use EVERY 7 DAY    Rheumatoid arthritis involving multiple sites with positive rheumatoid factor (H)       Vitamin D-3 1000 units Caps      Take by mouth 2 times daily        * Notice:  This list has 2 medication(s) that are the same as other medications prescribed for you. Read the directions carefully, and ask your doctor or other care provider to review them with you.      "

## 2018-04-17 NOTE — PROGRESS NOTES
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, s/p TRAB OU (OD:96 and OS:95 with revision OS in 1997), H/O Noncompliance with visits (lost to f/u from 7353-4144) with marked progression OS -- K pachy: 571/540   Tmax:     HVF:OD:[place] and OS:[place] (marked progresion from 6296-8592 and HVF  OD:[place] and OS:[place] in 2009    CDR: 0.9/0.9    HRT/OCT: OD:Severe RNFl thinning and OS:Mod RNFL thinning      FHX of Glc: Mother -- gtts, Sister -- possibly     Gonio:  Open with few PAS     Intolerant to:      Asthma/COPD: No  Steroid Use: No    Kidney Stones:  No   Sulfa Allergy:  No    IOP targets:L-M teens (?Lteens) -- IOP borderline  2)PCIOL OU  3)H/O HA -- s/p Negative TAB with Dr. Bellamy   4)H/O RA on MTX -- following with Rheum  5)XIN    Patient will continue Latanoprost which is a teal top drop at bedtime in both eyes.  Patient will return to clinic in 3-4 months with repeat IOP check, dilated eye exam and visual field test.  A long discussion of the risks, benefits, and alternatives including potential treatment and management options were had with patient and a decision was made to observe at this time and not add additional eye drops unless evidence of progression or elevated IOPs.         This drop may cause lash growth and some darkening of the skin around the eye.  It is also possible in a patient with a freckled iris or michell eyes, that the iris could darken to brown with time, something that is not common but not reversible.      Attending Physician Attestation:  Complete documentation of historical and exam elements from today's encounter can be found in the full encounter summary report (not reduplicated in this progress note). I personally obtained the chief complaint(s) and history of present illness.  I confirmed and edited as necessary the review of systems, past medical/surgical history, family history, social history, and examination findings as documented by others; and I  examined the patient myself. I personally reviewed the relevant tests, images, and reports as documented above. I formulated and edited as necessary the assessment and plan and discussed the findings and management plan with the patient and family.  - Corazon Addison MD

## 2018-04-17 NOTE — NURSING NOTE
Chief Complaints and History of Present Illnesses   Patient presents with     Follow Up For     Endstage POAG      HPI    Affected eye(s):  Both   Symptoms:     No blurred vision   No decreased vision         Do you have eye pain now?:  No      Comments:  Gavi ROMERO 1:29 PM April 17, 2018

## 2018-04-25 ENCOUNTER — OFFICE VISIT (OUTPATIENT)
Dept: RHEUMATOLOGY | Facility: CLINIC | Age: 78
End: 2018-04-25
Attending: INTERNAL MEDICINE
Payer: MEDICARE

## 2018-04-25 VITALS
BODY MASS INDEX: 30.5 KG/M2 | WEIGHT: 189.8 LBS | DIASTOLIC BLOOD PRESSURE: 66 MMHG | SYSTOLIC BLOOD PRESSURE: 101 MMHG | RESPIRATION RATE: 16 BRPM | TEMPERATURE: 97.8 F | OXYGEN SATURATION: 94 % | HEART RATE: 68 BPM | HEIGHT: 66 IN

## 2018-04-25 DIAGNOSIS — Z79.899 ENCOUNTER FOR LONG-TERM CURRENT USE OF MEDICATION: ICD-10-CM

## 2018-04-25 DIAGNOSIS — M05.79 RHEUMATOID ARTHRITIS INVOLVING MULTIPLE SITES WITH POSITIVE RHEUMATOID FACTOR (H): Primary | ICD-10-CM

## 2018-04-25 DIAGNOSIS — Z79.899 HIGH RISK MEDICATION USE: ICD-10-CM

## 2018-04-25 DIAGNOSIS — M05.79 RHEUMATOID ARTHRITIS INVOLVING MULTIPLE SITES WITH POSITIVE RHEUMATOID FACTOR (H): ICD-10-CM

## 2018-04-25 LAB
ALT SERPL W P-5'-P-CCNC: 20 U/L (ref 0–50)
AST SERPL W P-5'-P-CCNC: 16 U/L (ref 0–45)
CREAT SERPL-MCNC: 0.94 MG/DL (ref 0.52–1.04)
ERYTHROCYTE [DISTWIDTH] IN BLOOD BY AUTOMATED COUNT: 13.4 % (ref 10–15)
GFR SERPL CREATININE-BSD FRML MDRD: 57 ML/MIN/1.7M2
HCT VFR BLD AUTO: 43.3 % (ref 35–47)
HGB BLD-MCNC: 14.2 G/DL (ref 11.7–15.7)
MCH RBC QN AUTO: 33.6 PG (ref 26.5–33)
MCHC RBC AUTO-ENTMCNC: 32.8 G/DL (ref 31.5–36.5)
MCV RBC AUTO: 102 FL (ref 78–100)
PLATELET # BLD AUTO: 167 10E9/L (ref 150–450)
RBC # BLD AUTO: 4.23 10E12/L (ref 3.8–5.2)
WBC # BLD AUTO: 4.8 10E9/L (ref 4–11)

## 2018-04-25 PROCEDURE — 84450 TRANSFERASE (AST) (SGOT): CPT | Performed by: INTERNAL MEDICINE

## 2018-04-25 PROCEDURE — 82565 ASSAY OF CREATININE: CPT | Performed by: INTERNAL MEDICINE

## 2018-04-25 PROCEDURE — 85027 COMPLETE CBC AUTOMATED: CPT | Performed by: INTERNAL MEDICINE

## 2018-04-25 PROCEDURE — 36415 COLL VENOUS BLD VENIPUNCTURE: CPT | Performed by: INTERNAL MEDICINE

## 2018-04-25 PROCEDURE — G0463 HOSPITAL OUTPT CLINIC VISIT: HCPCS | Mod: ZF

## 2018-04-25 PROCEDURE — 84460 ALANINE AMINO (ALT) (SGPT): CPT | Performed by: INTERNAL MEDICINE

## 2018-04-25 RX ORDER — METHOTREXATE 25 MG/ML
INJECTION, SOLUTION INTRA-ARTERIAL; INTRAMUSCULAR; INTRAVENOUS
Qty: 4 ML | Refills: 0 | Status: SHIPPED | OUTPATIENT
Start: 2018-04-25 | End: 2018-05-22

## 2018-04-25 ASSESSMENT — PAIN SCALES - GENERAL: PAINLEVEL: NO PAIN (0)

## 2018-04-25 NOTE — LETTER
Date: August 10, 2018    Abida Hahn  [address]  [facility] [address]      MRN: [id number]  : 1940    Dx:    ICD-10-CM    1. Rheumatoid arthritis involving multiple sites with positive rheumatoid factor (H) M05.79 AST     ALT     Albumin level     Creatinine     CBC with platelets     DISCONTINUED: methotrexate 50 MG/2ML injection CHEMO   2. Encounter for long-term current use of medication Z79.899 AST     ALT     Albumin level     Creatinine     CBC with platelets       Orders Placed This Encounter     AST     Standing Status:   Standing     Number of Occurrences:   5     Standing Expiration Date:   2019     ALT     Last Lab Result: ALT (U/L)       Date                     Value                 2018               20               ----------     Standing Status:   Standing     Number of Occurrences:   5     Standing Expiration Date:   2019     Albumin level     Standing Status:   Standing     Number of Occurrences:   5     Standing Expiration Date:   2019     Creatinine     Last Lab Result: Creatinine (mg/dL)       Date                     Value                 2018               0.94             ----------     Standing Status:   Standing     Number of Occurrences:   5     Standing Expiration Date:   2019     CBC with platelets     Last Lab Result: Hemoglobin (g/dL)       Date                     Value                 2018               14.2             ----------     Standing Status:   Standing     Number of Occurrences:   5     Standing Expiration Date:   2019       Fax results to [phone]    Sincerely,    Alessio Lofton,APRN, CNP, MSN  Division of Rheumatic and Autoimmune Diseases  [address] [place] [address] side effects [id number]  [place] [address]

## 2018-04-25 NOTE — LETTER
Patient:  Abida Hahn  :   1940  MRN:     [id number]        Ms.Marcia Hahn  [address]  [facility] [address]        2018    Dear ,    We are writing to inform you of your test results.      Resulted Orders   ALT   Result Value Ref Range    ALT 20 0 - 50 U/L   AST   Result Value Ref Range    AST 16 0 - 45 U/L   CBC with platelets   Result Value Ref Range    WBC 4.8 4.0 - 11.0 10e9/L    RBC Count 4.23 3.8 - 5.2 10e12/L    Hemoglobin 14.2 11.7 - 15.7 g/dL    Hematocrit 43.3 35.0 - 47.0 %     (H) 78 - 100 fl    MCH 33.6 (H) 26.5 - 33.0 pg    MCHC 32.8 31.5 - 36.5 g/dL    RDW 13.4 10.0 - 15.0 %    Platelet Count 167 150 - 450 10e9/L   Creatinine   Result Value Ref Range    Creatinine 0.94 0.52 - 1.04 mg/dL    GFR Estimate 57 (L) >60 mL/min/1.7m2      Comment:      Non  GFR Calc    GFR Estimate If Black 70 >60 mL/min/1.7m2      Comment:       GFR Calc       Lab

## 2018-04-25 NOTE — PROGRESS NOTES
"[facility] - Rheumatology Clinic Visit        Abida Hahn  is a 77 year old here for follow-up sero-positive erosive RA-ILD  (RA dx over 15 yr ago. +RF 1990 +CC >250. +YEYO >13 (8-2016) -CAROL ANN panel -vasculitis -MTB QG 8-2016. Monotherapy MTX since 6/2009, never started humira. Osteoporosis --seeing PCP and referred her to endocrine. X-rays hands 3-2009 advanced erosive changes. Pyoderma gangrenosum infection LLE-seen derm last  improved. 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 RX dulera 1000 mcg (not able to afford) and albuterol rescue, and then if not improved start azithromycin didn't think systemic immunosuppression at this time.      Copy forward 6-2017 with Dr. Mccurdy:   She notes aching pain in both shoulders, ankles, and feet. She reports reduced mobility due to hip and low back discomfort.  There is minimal visible swelling in previously affected finger and hand joints. She has EAS at least 2 hours. She restarted methotrexate in 1-17. She \"doesn't feel right\" for a couple of days after the dose. Not nauseated, just blah.  She is not taking prescribed fosomax due to concerns about GI AE.    She has been working with Derm to treat pyoderma gangrenosum of the L ankle for the last 11 months. She has continued using a walker, which has greatly improved her ability to get around. Her primary concern is that she does not want her symptoms to get worse and is hoping that she can restart treatment so that she can regain some independence.She reports that the lesion has been slow to heal and that she continues to take Minocyclin, which she just discontinued (total of 11 months' treatment).    Copy forward October 18, 2017  Taking methotrexate 20 mg once Q Sat (takes over 12 hours), folic acid 1600 mcg once a day. Tolerating --no diarrhea, upset stomach, hairloss and oral sores. Recently started omeprazole, now no longer having upset stomach only taken recently " "\"this is the only 1st good day\" . Tylenol taking once a day 2 tablet 500 mg then may 500 mg later in the day    Ankles, hips, shoulders less in her hands \"knuckles\". No RA flaring nor any swelling or redness. EAS for 2 hours. No pain in wrists. No hand pain in mornings. Neck mild pain only with turn, no pain, no arm or leg neurological changes. No changes in BB. No headaches or vision issues. General swelling of her ankles.     In Feb 2017, \"couldnt catch my breath\" and getting worse this past several month. Uses a walker. If any movements, will get have difficulty catching her breath. No cough, fever, chills, CP or chest heaviness. Seen pulmonary 9-2017. low-grade smoker, smoking less than a pack per day for 25-30 years from age 20 to approximately age 50.  She has not smoked for 25-30 years. She has never had pneumonia, wheezing, blood clots or another episode of pleurisy. CT scan showing some ground glass changes. Plan was to see Dr. Muñoz and If possible obtain sputum for mycobacterial studies.  Bronch not essential at present. Consider with possible TBBx in future.--not done recently. desat 89% with walking     No recurrent of the PG. Seeing derm hx Pyoderma gangrenosum. s/p minocycyline, mupirocin, kenalog injection. Off minocyline at this time    April 25, 2018  Taking methotrexate 20 mg once Q Sat liquid (requests syringes), folic acid 1600 mcg once a day. Tolerating --no diarrhea, upset stomach, hairloss and oral sores.     Seen Dr. 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 Lung function was stable. She is doing much better and has her rescue inhaler prn which she uses rarely. Not able to afford dulera inhaler so is not taking this.      PMH:  Medical-DEXA 2-2017 T-3.7, shingles, pyroderma gangrenosum w/minocycline hyperpigmentation. Improved with minocycline and monthly kenalog injections, Dr. Muñoz 10- for ILD (echo 9-2017 NL EF) HRCT consistent " with LIP and follicular bronchiolitis, osteoporosis, open angle glaucoma   Past Medical History:   Diagnosis Date     Abscess, toe 2009    Right great toe     Cataract 8/2011    Right s/p surgery 8/2011; left s/p surgery     Glaucoma      Headache(784.0)     Relieved with gabapentin. 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 +YEYO      Wrist fracture     Right, s/p ORIF     Patient Active Problem List    Diagnosis Date Noted     ILD (interstitial lung disease) (H)      Priority: Medium     Primary open angle glaucoma of both eyes, severe stage 03/23/2017     Priority: Medium     Pseudophakia of both eyes 03/23/2017     Priority: Medium     Osteoporosis 02/16/2017     Priority: Medium     Hypothyroidism 02/02/2017     Priority: Medium     Pyoderma gangrenosum 10/06/2016     Priority: Medium     Encounter for long-term current use of medication 08/18/2011     Priority: Medium     Problem list name updated by automated process. Provider to review       Chronic pain syndrome 08/01/2011     Priority: Medium     Essential hypertension 08/01/2011     Priority: Medium     Problem list name updated by automated process. Provider to review       Rheumatoid arthritis (H) 08/01/2011     Priority: Medium     Sero-positive, deforming RA with erosions on hand x-ray films 2009  RF 1990 IU; anti-CCP 38 (6/2009). Mantoux neg 2009.   Monotherapy mtx 6/2009 (alalimumab contemplated 2010 but never started)  Prior patient Dr. Shukla.   Problem list name updated by automated process. Provider to review         Surgical-  Past Surgical History:   Procedure Laterality Date     BIOPSY ARTERY TEMPORAL       EXTRACAPSULAR CATARACT EXTRATION WITH INTRAOCULAR LENS IMPLANT  8/2011    Left 2 years ago as well     OPEN  REDUCTION INTERNAL FIXATION WRIST BILATERAL         No blood transfusions      FH:  No autoimmune disorders, psoriasis, UC, crohn's, SLE, RA, PsA, gout.  No MS or cancer in family  Mother-RA, MI  Father-colon CA   Siblings-2 sisters and brother (RA- humira and MTX) unsure of medical hx   Children-  Aunt-TB    SH:  No Alcohol. Quit Smoking. No IVDU. 1 Children-healthy. Right or left handed.      [facility] personally reviewed and updated by me.    ROS:  Negative raynaud s phenomena, hairloss, sun sensitivities, keratoconjunctivitis sicca, pleurisy, significant rashes like malar, oral/nasal or ulcerations, inflammatory eye disease, inflammatory bowel disease, dactylitis, tenosynovitis, or enthespathy. Negative for miscarriages over 2 months into pregnancy, blood clots, gout, psoriasis, UC, crohn's. No temporal headache, no jaw claudication, no scalp tenderness, vision changes, carotidynia, cough  CONSTITUTIONAL: No fevers, night sweats or unintentional weight change. No acute distress, swollen glands  EYES: No vision change, diplopia, pain in eyes or red eyes   EARS, NOSE, MOUTH, THROAT: No tinnitus or hearing change, no epistaxis or nasal discharge, no oral lesions, throat clear. Normal saliva pool.  No drymouth. No thyroid enlargement  CARDIOVASCULAR: No chest pain, palpitations, or pain with walking, no orthopnea or PND   RESPIRATORY: See above   GI: No nausea, vomiting, diarrhea or constipation, no abdominal pain, or blood in stools.   : No change in urine, no dysuria or hematuria   MUSCKL: see above    INTEGUMENTARY: No concerning lesions or moles   NEURO: No loss of strength or sensation, no numbness or tingling, no tremor, no dizziness, no headache. No falls   ENDO: No polyuria or polydipsia, no temperature intolerance   HEME/LYMPH:No concerning bumps, bleeding problems, or swollen lymph nodes. No recent infections, hospitalizations or new illnesses.   ALLERGY: No environmental allergies   PSYCH:No  "depression or anxiety, no sleep problems.  Otherwise 14 point ROS obtained, reviewed and found negative.     Physical exam:  Vitals: Blood pressure 101/66, pulse 68, temperature 97.8  F (36.6  C), temperature source Oral, resp. rate 16, height 1.683 m (5' 6.25\"), weight 86.1 kg (189 lb 12.8 oz), SpO2 94 %, not currently breastfeeding.  Wt Readings from Last 4 Encounters:   04/25/18 86.1 kg (189 lb 12.8 oz)   03/02/18 84.4 kg (186 lb)   02/09/18 85.8 kg (189 lb 1.6 oz)   01/22/18 85.8 kg (189 lb 3.2 oz)     Constitutional: WD-WN-WG cooperative  Eyes: nl EOM, PERRLA, vision, conjunctiva, sclera  ENT: nl external ears, nose, hearing, lips, teeth, gums, throat  Neck: no mass or thyroid enlargement  Resp: lungs clear to auscultation, nl to palpation, nl effort  CV: RRR, no murmurs, rubs or gallops, no edema  GI: no ABD mass or tenderness, no HSM  : not tested  Lymph: no cervical or epitrochlear nodes  MS:  R 4th and 5th PIPs, L 2nd MCP shows angulation. L 4th PIP hyperflexion, DIP hyperextension. L 5th DIP hyperflexion.  Decreased fist closure bilaterally. Normal  strength. All TMJ, neck, shoulder, elbow, wrist, hand, spine, hip, knee, ankle, and foot joints were examined and otherwise found normal. Normal  strength. No active synovitis or deformity. Full ROM. Normal prayer sign. Negative Negative Lhermitte's sign. No periuncle erythema  Skin: no nail pitting, alopecia, rash. Dark, well circumscribed, healing lesions L medial leg near ankle. Gray coloration likely due to minocycline treatment. .   Neuro: nl cranial nerves, strength, sensation, DTRs.   Psych: nl judgement, orientation, memory, affect.      Labs/Imaging:  Results for orders placed or performed in visit on 04/25/18   ALT   Result Value Ref Range    ALT 20 0 - 50 U/L   AST   Result Value Ref Range    AST 16 0 - 45 U/L   CBC with platelets   Result Value Ref Range    WBC 4.8 4.0 - 11.0 10e9/L    RBC Count 4.23 3.8 - 5.2 10e12/L    Hemoglobin 14.2 " 11.7 - 15.7 g/dL    Hematocrit 43.3 35.0 - 47.0 %     (H) 78 - 100 fl    MCH 33.6 (H) 26.5 - 33.0 pg    MCHC 32.8 31.5 - 36.5 g/dL    RDW 13.4 10.0 - 15.0 %    Platelet Count 167 150 - 450 10e9/L   Creatinine   Result Value Ref Range    Creatinine 0.94 0.52 - 1.04 mg/dL    GFR Estimate 57 (L) >60 mL/min/1.7m2    GFR Estimate If Black 70 >60 mL/min/1.7m2       Impression/Plan:  RA: seropositive, erosive:   Overall improved small joint predominant arthralgia since restarting methotrexate in early 2017. Exam no active synovitis, some deformities loss ROM of the hands.  Labs Aug/Sept NL CBC, liver and kidney tests (mild reduced GFR 58) and high MCV, no anemia). Labs due now. RA activity low. GI symptoms improved/resolved with liquid MTX x4 doses. Strong seropositivity and the presence of established erosions/deformities portends high risk for chronic destructive disease and extra-articular manifestations. I discussed with her this and risks of extra-articular manisfestations and lung disease, further how DMARD Rx can halt and prevent joint-damaging disease. No further PG, or other infections. I recommend she continue this plan    High risk medications, labs due then every 12 weeks     The plan is as follows:  1) LFT, CRP, and CRP  2) Continue methotrexate (20 mg/week) liquid. 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 Was given dulera 1000 mcg (not taking as cant afford) and albuterol rescue takes prn, and the next step is if not improved start azithromycin per pulmonary. No systemic immunosuppression at this time.      High YEYO. No s/sx SLE and negative CAROL ANN panel    Osteoporosis, under care PCP. Continue vitamin D. Not eager to see endocrine and not started her bisphosphonate     [facility] w/minocycline hyperpigmentation. Improved with minocycline and monthly  kenalog injections. F/u prn last seen 11/30/2017      The patient understood the rational for the diagnosis and treatment plan. Patient shared in the decision making. All questions were answered to best of my ability and the patient's satisfaction  Alessio MOTA, CNP, MSN  [facility]  Department of Rheumatology & Autoimmune Disorders

## 2018-04-25 NOTE — LETTER
"4/25/2018       RE: Abida Hahn  [address]  [facility] [address]     Dear Colleague,    Thank you for referring your patient, Abida Hahn, to the [facility] RHEUMATOLOGY at [facility]. Please see a copy of my visit note below.    [facility] - Rheumatology Clinic Visit        Abida Hahn  is a 77 year old here for follow-up sero-positive erosive RA-ILD  (RA dx over 15 yr ago. +RF 1990 +CC >250. +YEYO >13 (8-2016) -CAROL ANN panel -vasculitis -MTB QG 8-2016. Monotherapy MTX since 6/2009, never started humira. Osteoporosis --seeing PCP and referred her to endocrine. X-rays hands 3-2009 advanced erosive changes. Pyoderma gangrenosum infection LLE-seen derm last  improved. 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 RX dulera 1000 mcg (not able to afford) and albuterol rescue, and then if not improved start azithromycin didn't think systemic immunosuppression at this time.      Copy forward 6-2017 with Dr. Mccurdy:   She notes aching pain in both shoulders, ankles, and feet. She reports reduced mobility due to hip and low back discomfort.  There is minimal visible swelling in previously affected finger and hand joints. She has EAS at least 2 hours. She restarted methotrexate in 1-17. She \"doesn't feel right\" for a couple of days after the dose. Not nauseated, just blah.  She is not taking prescribed fosomax due to concerns about GI AE.    She has been working with Derm to treat pyoderma gangrenosum of the L ankle for the last 11 months. She has continued using a walker, which has greatly improved her ability to get around. Her primary concern is that she does not want her symptoms to get worse and is hoping that she can restart treatment so that she can regain some independence.She reports that the lesion has been slow to heal and that she continues to take Minocyclin, which she just discontinued (total of 11 months' " "treatment).    Copy forward October 18, 2017  Taking methotrexate 20 mg once Q Sat (takes over 12 hours), folic acid 1600 mcg once a day. Tolerating --no diarrhea, upset stomach, hairloss and oral sores. Recently started omeprazole, now no longer having upset stomach only taken recently \"this is the only 1st good day\" . Tylenol taking once a day 2 tablet 500 mg then may 500 mg later in the day    Ankles, hips, shoulders less in her hands \"knuckles\". No RA flaring nor any swelling or redness. EAS for 2 hours. No pain in wrists. No hand pain in mornings. Neck mild pain only with turn, no pain, no arm or leg neurological changes. No changes in BB. No headaches or vision issues. General swelling of her ankles.     In Feb 2017, \"couldnt catch my breath\" and getting worse this past several month. Uses a walker. If any movements, will get have difficulty catching her breath. No cough, fever, chills, CP or chest heaviness. Seen pulmonary 9-2017. low-grade smoker, smoking less than a pack per day for 25-30 years from age 20 to approximately age 50.  She has not smoked for 25-30 years. She has never had pneumonia, wheezing, blood clots or another episode of pleurisy. CT scan showing some ground glass changes. Plan was to see Dr. Muñoz and If possible obtain sputum for mycobacterial studies.  Bronch not essential at present. Consider with possible TBBx in future.--not done recently. desat 89% with walking     No recurrent of the PG. Seeing derm hx Pyoderma gangrenosum. s/p minocycyline, mupirocin, kenalog injection. Off minocyline at this time    April 25, 2018  Taking methotrexate 20 mg once Q Sat liquid (requests syringes), folic acid 1600 mcg once a day. Tolerating --no diarrhea, upset stomach, hairloss and oral sores.     Seen Dr. 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 Lung function was stable. She is doing much better and has her rescue inhaler prn which she uses " rarely. Not able to afford dulera inhaler so is not taking this.      PMH:  Medical-DEXA 2-2017 T-3.7, shingles, pyroderma gangrenosum w/minocycline hyperpigmentation. Improved with minocycline and monthly kenalog injections, Dr. 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 +YEYO      Wrist fracture     Right, s/p ORIF     Patient Active Problem List    Diagnosis Date Noted     ILD (interstitial lung disease) (H)      Priority: Medium     Primary open angle glaucoma of both eyes, severe stage 03/23/2017     Priority: Medium     Pseudophakia of both eyes 03/23/2017     Priority: Medium     Osteoporosis 02/16/2017     Priority: Medium     Hypothyroidism 02/02/2017     Priority: Medium     Pyoderma gangrenosum 10/06/2016     Priority: Medium     Encounter for long-term current use of medication 08/18/2011     Priority: Medium     Problem list name updated by automated process. Provider to review       Chronic pain syndrome 08/01/2011     Priority: Medium     Essential hypertension 08/01/2011     Priority: Medium     Problem list name updated by automated process. Provider to review       Rheumatoid arthritis (H) 08/01/2011     Priority: Medium     Sero-positive, deforming RA with erosions on hand x-ray films 2009  RF 1990 IU; anti-CCP 38 (6/2009). Mantoux neg 2009.   Monotherapy mtx 6/2009 (alalimumab contemplated 2010 but never started)  Prior patient   Vazquez.   Problem list name updated by automated process. Provider to review         Surgical-  Past Surgical History:   Procedure Laterality Date     BIOPSY ARTERY TEMPORAL       EXTRACAPSULAR CATARACT EXTRATION WITH INTRAOCULAR LENS IMPLANT  8/2011    Left 2 years ago as well     OPEN REDUCTION INTERNAL FIXATION WRIST BILATERAL         No blood transfusions      FH:  No autoimmune disorders, psoriasis, UC, crohn's, SLE, RA, PsA, gout.  No MS or cancer in family  Mother-RA, MI  Father-colon CA   Siblings-2 sisters and brother (RA- humira and MTX) unsure of medical hx   Children-  Aunt-TB    SH:  No Alcohol. Quit Smoking. No IVDU. 1 Children-healthy. Right or left handed.      PMSH personally reviewed and updated by me.    ROS:  Negative raynaud s phenomena, hairloss, sun sensitivities, keratoconjunctivitis sicca, pleurisy, significant rashes like malar, oral/nasal or ulcerations, inflammatory eye disease, inflammatory bowel disease, dactylitis, tenosynovitis, or enthespathy. Negative for miscarriages over 2 months into pregnancy, blood clots, gout, psoriasis, UC, crohn's. No temporal headache, no jaw claudication, no scalp tenderness, vision changes, carotidynia, cough  CONSTITUTIONAL: No fevers, night sweats or unintentional weight change. No acute distress, swollen glands  EYES: No vision change, diplopia, pain in eyes or red eyes   EARS, NOSE, MOUTH, THROAT: No tinnitus or hearing change, no epistaxis or nasal discharge, no oral lesions, throat clear. Normal saliva pool.  No drymouth. No thyroid enlargement  CARDIOVASCULAR: No chest pain, palpitations, or pain with walking, no orthopnea or PND   RESPIRATORY: See above   GI: No nausea, vomiting, diarrhea or constipation, no abdominal pain, or blood in stools.   : No change in urine, no dysuria or hematuria   MUSCKL: see above    INTEGUMENTARY: No concerning lesions or moles   NEURO: No loss of strength or sensation, no numbness or tingling, no tremor,  "no dizziness, no headache. No falls   ENDO: No polyuria or polydipsia, no temperature intolerance   HEME/LYMPH:No concerning bumps, bleeding problems, or swollen lymph nodes. No recent infections, hospitalizations or new illnesses.   ALLERGY: No environmental allergies   PSYCH:No depression or anxiety, no sleep problems.  Otherwise 14 point ROS obtained, reviewed and found negative.     Physical exam:  Vitals: Blood pressure 101/66, pulse 68, temperature 97.8  F (36.6  C), temperature source Oral, resp. rate 16, height 1.683 m (5' 6.25\"), weight 86.1 kg (189 lb 12.8 oz), SpO2 94 %, not currently breastfeeding.  Wt Readings from Last 4 Encounters:   04/25/18 86.1 kg (189 lb 12.8 oz)   03/02/18 84.4 kg (186 lb)   02/09/18 85.8 kg (189 lb 1.6 oz)   01/22/18 85.8 kg (189 lb 3.2 oz)     Constitutional: WD-WN-WG cooperative  Eyes: nl EOM, PERRLA, vision, conjunctiva, sclera  ENT: nl external ears, nose, hearing, lips, teeth, gums, throat  Neck: no mass or thyroid enlargement  Resp: lungs clear to auscultation, nl to palpation, nl effort  CV: RRR, no murmurs, rubs or gallops, no edema  GI: no ABD mass or tenderness, no HSM  : not tested  Lymph: no cervical or epitrochlear nodes  MS:  R 4th and 5th PIPs, L 2nd MCP shows angulation. L 4th PIP hyperflexion, DIP hyperextension. L 5th DIP hyperflexion.  Decreased fist closure bilaterally. Normal  strength. All TMJ, neck, shoulder, elbow, wrist, hand, spine, hip, knee, ankle, and foot joints were examined and otherwise found normal. Normal  strength. No active synovitis or deformity. Full ROM. Normal prayer sign. Negative Negative Lhermitte's sign. No periuncle erythema  Skin: no nail pitting, alopecia, rash. Dark, well circumscribed, healing lesions L medial leg near ankle. Gray coloration likely due to minocycline treatment. .   Neuro: nl cranial nerves, strength, sensation, DTRs.   Psych: nl judgement, orientation, memory, affect.      Labs/Imaging:  Results for " orders placed or performed in visit on 04/25/18   ALT   Result Value Ref Range    ALT 20 0 - 50 U/L   AST   Result Value Ref Range    AST 16 0 - 45 U/L   CBC with platelets   Result Value Ref Range    WBC 4.8 4.0 - 11.0 10e9/L    RBC Count 4.23 3.8 - 5.2 10e12/L    Hemoglobin 14.2 11.7 - 15.7 g/dL    Hematocrit 43.3 35.0 - 47.0 %     (H) 78 - 100 fl    MCH 33.6 (H) 26.5 - 33.0 pg    MCHC 32.8 31.5 - 36.5 g/dL    RDW 13.4 10.0 - 15.0 %    Platelet Count 167 150 - 450 10e9/L   Creatinine   Result Value Ref Range    Creatinine 0.94 0.52 - 1.04 mg/dL    GFR Estimate 57 (L) >60 mL/min/1.7m2    GFR Estimate If Black 70 >60 mL/min/1.7m2       Impression/Plan:  RA: seropositive, erosive:   Overall improved small joint predominant arthralgia since restarting methotrexate in early 2017. Exam no active synovitis, some deformities loss ROM of the hands.  Labs Aug/Sept NL CBC, liver and kidney tests (mild reduced GFR 58) and high MCV, no anemia). Labs due now. RA activity low. GI symptoms improved/resolved with liquid MTX x4 doses. Strong seropositivity and the presence of established erosions/deformities portends high risk for chronic destructive disease and extra-articular manifestations. I discussed with her this and risks of extra-articular manisfestations and lung disease, further how DMARD Rx can halt and prevent joint-damaging disease. No further PG, or other infections. I recommend she continue this plan    High risk medications, labs due then every 12 weeks     The plan is as follows:  1) LFT, CRP, and CRP  2) Continue methotrexate (20 mg/week) liquid. 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 Was given dulera 1000 mcg (not taking as cant afford) and albuterol rescue takes prn, and the next step is if not improved start azithromycin per pulmonary. No systemic immunosuppression at this time.      High YEYO.  No s/sx SLE and negative CAROL ANN panel    Osteoporosis, under care PCP. Continue vitamin D. Not eager to see endocrine and not started her bisphosphonate     F/U dermatology D Calixto for pyroderma gangrenosum w/minocycline hyperpigmentation. Improved with minocycline and monthly kenalog injections. F/u prn last seen 11/30/2017      The patient understood the rational for the diagnosis and treatment plan. Patient shared in the decision making. All questions were answered to best of my ability and the patient's satisfaction  Alessio MOTA, CNP, MSN  [facility]  Department of Rheumatology & Autoimmune Disorders

## 2018-04-25 NOTE — NURSING NOTE
"Chief Complaint   Patient presents with     RECHECK     RA       Initial /66 (BP Location: Right arm, Patient Position: Sitting, Cuff Size: Adult Regular)  Pulse 68  Temp 97.8  F (36.6  C) (Oral)  Resp 16  Ht 1.683 m (5' 6.25\")  Wt 86.1 kg (189 lb 12.8 oz)  SpO2 94%  BMI 30.4 kg/m2 Estimated body mass index is 30.4 kg/(m^2) as calculated from the following:    Height as of this encounter: 1.683 m (5' 6.25\").    Weight as of this encounter: 86.1 kg (189 lb 12.8 oz).  Medication Reconciliation: complete     Peyton Flores [facility]  4/25/2018 11:05 AM        "

## 2018-04-25 NOTE — LETTER
"4/25/2018      RE: Abida Hahn  [address]  [facility] [address]       [facility] - Rheumatology Clinic Visit        Abida Hahn  is a 77 year old here for follow-up sero-positive erosive RA-ILD  (RA dx over 15 yr ago. +RF 1990 +CC >250. +YEYO >13 (8-2016) -CAROL ANN panel -vasculitis -MTB QG 8-2016. Monotherapy MTX since 6/2009, never started humira. Osteoporosis --seeing PCP and referred her to endocrine. X-rays hands 3-2009 advanced erosive changes. Pyoderma gangrenosum infection LLE-seen derm last  improved. 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 RX dulera 1000 mcg (not able to afford) and albuterol rescue, and then if not improved start azithromycin didn't think systemic immunosuppression at this time.      Copy forward 6-2017 with Dr. Mccudry:   She notes aching pain in both shoulders, ankles, and feet. She reports reduced mobility due to hip and low back discomfort.  There is minimal visible swelling in previously affected finger and hand joints. She has EAS at least 2 hours. She restarted methotrexate in 1-17. She \"doesn't feel right\" for a couple of days after the dose. Not nauseated, just blah.  She is not taking prescribed fosomax due to concerns about GI AE.    She has been working with Derm to treat pyoderma gangrenosum of the L ankle for the last 11 months. She has continued using a walker, which has greatly improved her ability to get around. Her primary concern is that she does not want her symptoms to get worse and is hoping that she can restart treatment so that she can regain some independence.She reports that the lesion has been slow to heal and that she continues to take Minocyclin, which she just discontinued (total of 11 months' treatment).    Copy forward October 18, 2017  Taking methotrexate 20 mg once Q Sat (takes over 12 hours), folic acid 1600 mcg once a day. Tolerating --no diarrhea, upset stomach, hairloss and oral sores. " "Recently started omeprazole, now no longer having upset stomach only taken recently \"this is the only 1st good day\" . Tylenol taking once a day 2 tablet 500 mg then may 500 mg later in the day    Ankles, hips, shoulders less in her hands \"knuckles\". No RA flaring nor any swelling or redness. EAS for 2 hours. No pain in wrists. No hand pain in mornings. Neck mild pain only with turn, no pain, no arm or leg neurological changes. No changes in BB. No headaches or vision issues. General swelling of her ankles.     In Feb 2017, \"couldnt catch my breath\" and getting worse this past several month. Uses a walker. If any movements, will get have difficulty catching her breath. No cough, fever, chills, CP or chest heaviness. Seen pulmonary 9-2017. low-grade smoker, smoking less than a pack per day for 25-30 years from age 20 to approximately age 50.  She has not smoked for 25-30 years. She has never had pneumonia, wheezing, blood clots or another episode of pleurisy. CT scan showing some ground glass changes. Plan was to see Dr. Muñoz and If possible obtain sputum for mycobacterial studies.  Bronch not essential at present. Consider with possible TBBx in future.--not done recently. desat 89% with walking     No recurrent of the PG. Seeing derm hx Pyoderma gangrenosum. s/p minocycyline, mupirocin, kenalog injection. Off minocyline at this time    April 25, 2018  Taking methotrexate 20 mg once Q Sat liquid (requests syringes), folic acid 1600 mcg once a day. Tolerating --no diarrhea, upset stomach, hairloss and oral sores.     Seen Dr. 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 Lung function was stable. She is doing much better and has her rescue inhaler prn which she uses rarely. Not able to afford dulera inhaler so is not taking this.      PMH:  Medical-DEXA 2-2017 T-3.7, shingles, pyroderma gangrenosum w/minocycline hyperpigmentation. Improved with minocycline and monthly " kenalog injections, Dr. 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 +YEYO      Wrist fracture     Right, s/p ORIF     Patient Active Problem List    Diagnosis Date Noted     ILD (interstitial lung disease) (H)      Priority: Medium     Primary open angle glaucoma of both eyes, severe stage 03/23/2017     Priority: Medium     Pseudophakia of both eyes 03/23/2017     Priority: Medium     Osteoporosis 02/16/2017     Priority: Medium     Hypothyroidism 02/02/2017     Priority: Medium     Pyoderma gangrenosum 10/06/2016     Priority: Medium     Encounter for long-term current use of medication 08/18/2011     Priority: Medium     Problem list name updated by automated process. Provider to review       Chronic pain syndrome 08/01/2011     Priority: Medium     Essential hypertension 08/01/2011     Priority: Medium     Problem list name updated by automated process. Provider to review       Rheumatoid arthritis (H) 08/01/2011     Priority: Medium     Sero-positive, deforming RA with erosions on hand x-ray films 2009  RF 1990 IU; anti-CCP 38 (6/2009). Mantoux neg 2009.   Monotherapy mtx 6/2009 (alalimumab contemplated 2010 but never started)  Prior patient Dr. Shukla.   Problem list name updated by automated process. Provider to review         Surgical-  Past Surgical History:   Procedure Laterality Date     BIOPSY ARTERY TEMPORAL       EXTRACAPSULAR CATARACT  EXTRATION WITH INTRAOCULAR LENS IMPLANT  8/2011    Left 2 years ago as well     OPEN REDUCTION INTERNAL FIXATION WRIST BILATERAL         No blood transfusions      FH:  No autoimmune disorders, psoriasis, UC, crohn's, SLE, RA, PsA, gout.  No MS or cancer in family  Mother-RA, MI  Father-colon CA   Siblings-2 sisters and brother (RA- humira and MTX) unsure of medical hx   Children-  Aunt-TB    SH:  No Alcohol. Quit Smoking. No IVDU. 1 Children-healthy. Right or left handed.      [facility] personally reviewed and updated by me.    ROS:  Negative raynaud s phenomena, hairloss, sun sensitivities, keratoconjunctivitis sicca, pleurisy, significant rashes like malar, oral/nasal or ulcerations, inflammatory eye disease, inflammatory bowel disease, dactylitis, tenosynovitis, or enthespathy. Negative for miscarriages over 2 months into pregnancy, blood clots, gout, psoriasis, UC, crohn's. No temporal headache, no jaw claudication, no scalp tenderness, vision changes, carotidynia, cough  CONSTITUTIONAL: No fevers, night sweats or unintentional weight change. No acute distress, swollen glands  EYES: No vision change, diplopia, pain in eyes or red eyes   EARS, NOSE, MOUTH, THROAT: No tinnitus or hearing change, no epistaxis or nasal discharge, no oral lesions, throat clear. Normal saliva pool.  No drymouth. No thyroid enlargement  CARDIOVASCULAR: No chest pain, palpitations, or pain with walking, no orthopnea or PND   RESPIRATORY: See above   GI: No nausea, vomiting, diarrhea or constipation, no abdominal pain, or blood in stools.   : No change in urine, no dysuria or hematuria   MUSCKL: see above    INTEGUMENTARY: No concerning lesions or moles   NEURO: No loss of strength or sensation, no numbness or tingling, no tremor, no dizziness, no headache. No falls   ENDO: No polyuria or polydipsia, no temperature intolerance   HEME/LYMPH:No concerning bumps, bleeding problems, or swollen lymph nodes. No recent infections,  "hospitalizations or new illnesses.   ALLERGY: No environmental allergies   PSYCH:No depression or anxiety, no sleep problems.  Otherwise 14 point ROS obtained, reviewed and found negative.     Physical exam:  Vitals: Blood pressure 101/66, pulse 68, temperature 97.8  F (36.6  C), temperature source Oral, resp. rate 16, height 1.683 m (5' 6.25\"), weight 86.1 kg (189 lb 12.8 oz), SpO2 94 %, not currently breastfeeding.  Wt Readings from Last 4 Encounters:   04/25/18 86.1 kg (189 lb 12.8 oz)   03/02/18 84.4 kg (186 lb)   02/09/18 85.8 kg (189 lb 1.6 oz)   01/22/18 85.8 kg (189 lb 3.2 oz)     Constitutional: WD-WN-WG cooperative  Eyes: nl EOM, PERRLA, vision, conjunctiva, sclera  ENT: nl external ears, nose, hearing, lips, teeth, gums, throat  Neck: no mass or thyroid enlargement  Resp: lungs clear to auscultation, nl to palpation, nl effort  CV: RRR, no murmurs, rubs or gallops, no edema  GI: no ABD mass or tenderness, no HSM  : not tested  Lymph: no cervical or epitrochlear nodes  MS:  R 4th and 5th PIPs, L 2nd MCP shows angulation. L 4th PIP hyperflexion, DIP hyperextension. L 5th DIP hyperflexion.  Decreased fist closure bilaterally. Normal  strength. All TMJ, neck, shoulder, elbow, wrist, hand, spine, hip, knee, ankle, and foot joints were examined and otherwise found normal. Normal  strength. No active synovitis or deformity. Full ROM. Normal prayer sign. Negative Negative Lhermitte's sign. No periuncle erythema  Skin: no nail pitting, alopecia, rash. Dark, well circumscribed, healing lesions L medial leg near ankle. Gray coloration likely due to minocycline treatment. .   Neuro: nl cranial nerves, strength, sensation, DTRs.   Psych: nl judgement, orientation, memory, affect.      Labs/Imaging:  Results for orders placed or performed in visit on 04/25/18   ALT   Result Value Ref Range    ALT 20 0 - 50 U/L   AST   Result Value Ref Range    AST 16 0 - 45 U/L   CBC with platelets   Result Value Ref Range    " WBC 4.8 4.0 - 11.0 10e9/L    RBC Count 4.23 3.8 - 5.2 10e12/L    Hemoglobin 14.2 11.7 - 15.7 g/dL    Hematocrit 43.3 35.0 - 47.0 %     (H) 78 - 100 fl    MCH 33.6 (H) 26.5 - 33.0 pg    MCHC 32.8 31.5 - 36.5 g/dL    RDW 13.4 10.0 - 15.0 %    Platelet Count 167 150 - 450 10e9/L   Creatinine   Result Value Ref Range    Creatinine 0.94 0.52 - 1.04 mg/dL    GFR Estimate 57 (L) >60 mL/min/1.7m2    GFR Estimate If Black 70 >60 mL/min/1.7m2       Impression/Plan:  RA: seropositive, erosive:   Overall improved small joint predominant arthralgia since restarting methotrexate in early 2017. Exam no active synovitis, some deformities loss ROM of the hands.  Labs Aug/Sept NL CBC, liver and kidney tests (mild reduced GFR 58) and high MCV, no anemia). Labs due now. RA activity low. GI symptoms improved/resolved with liquid MTX x4 doses. Strong seropositivity and the presence of established erosions/deformities portends high risk for chronic destructive disease and extra-articular manifestations. I discussed with her this and risks of extra-articular manisfestations and lung disease, further how DMARD Rx can halt and prevent joint-damaging disease. No further PG, or other infections. I recommend she continue this plan    High risk medications, labs due then every 12 weeks     The plan is as follows:  1) LFT, CRP, and CRP  2) Continue methotrexate (20 mg/week) liquid. 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 Was given dulera 1000 mcg (not taking as cant afford) and albuterol rescue takes prn, and the next step is if not improved start azithromycin per pulmonary. No systemic immunosuppression at this time.      High YEYO. No s/sx SLE and negative CAROL ANN panel    Osteoporosis, under care PCP. Continue vitamin D. Not eager to see endocrine and not started her bisphosphonate     F/U dermatology D Patriciaagen for pyroderma  gangrenosum w/minocycline hyperpigmentation. Improved with minocycline and monthly kenalog injections. F/u prn last seen 11/30/2017      The patient understood the rational for the diagnosis and treatment plan. Patient shared in the decision making. All questions were answered to best of my ability and the patient's satisfaction  Alessio MOTA, CNP, MSN  [facility]  Department of Rheumatology & Autoimmune Disorders

## 2018-04-25 NOTE — MR AVS SNAPSHOT
After Visit Summary   4/25/2018    Abida Hahn    MRN: [id number]           Patient Information     Date Of Birth          1940        Visit Information        Provider Department      4/25/2018 11:00 AM Alessio Lofton APRN [facility] Rheumatology        Today's Diagnoses     Rheumatoid arthritis involving multiple sites with positive rheumatoid factor (H)    -  1    Encounter for long-term current use of medication           Follow-ups after your visit        Follow-up notes from your care team     Return for Annual Physical Exam with Primary Care Provider, 3 mth Dr. Mccurdy, 6 mth me, Labs every 12 weeks.      Your next 10 appointments already scheduled     Avni 15, 2018 10:55 AM CDT   (Arrive by 10:40 AM)   Return Visit with Amarilys Iyer MD   [facility] ([facility])    [address]  [address] [facility] [address]   [phone]            Jul 12, 2018 10:30 AM CDT   FULL PULMONARY FUNCTION with  PFL B   [facility] Pulmonary Function Testing ([facility])    [address]  3rd Floor  [facility] [address]   [phone]            Jul 12, 2018 11:30 AM CDT   (Arrive by 11:15 AM)   Return Interstitial Lung with Nirmala Muñoz MD   [facility] ([facility])    [address] [place] [address]  [address] [place] [address]   [phone]            Jul 19, 2018 12:45 PM CDT   RETURN GLAUCOMA with Corazon Addison MD   [facility] ([facility])    [address]  [facility] [address]   [phone]              Future tests that were ordered for you today     Open Standing Orders        Priority Remaining Interval Expires Ordered    AST Routine 5/5 every 10-12 week 4/25/2019 4/25/2018    ALT Routine 5/5 every 10-12 week 4/25/2019 4/25/2018    Albumin level Routine 5/5 every 10-12  "week 2019    Creatinine Routine 5/5 every 10-12 week 2019    CBC with platelets Routine 5/5 every 10-12 week 2019            Who to contact     If you have questions or need follow up information about today's clinic visit or your schedule please contact [facility] RHEUMATOLOGY directly at [phone].  Normal or non-critical lab and imaging results will be communicated to you by YASSSUhart, letter or phone within 4 business days after the clinic has received the results. If you do not hear from us within 7 days, please contact the clinic through Zoomt or phone. If you have a critical or abnormal lab result, we will notify you by phone as soon as possible.  Submit refill requests through [address] or call your pharmacy and they will forward the refill request to us. Please allow 3 business days for your refill to be completed.          Additional Information About Your Visit        [facility] Information     [facility] lets you send messages to your doctor, view your test results, renew your prescriptions, schedule appointments and more. To sign up, go to [email or web address] . Click on \"Log in\" on the left side of the screen, which will take you to the Welcome page. Then click on \"Sign up Now\" on the right side of the page.     You will be asked to enter the access code listed below, as well as some personal information. Please follow the directions to create your username and password.     Your access code is: [email or web address]  Expires: 2018  1:11 PM     Your access code will  in 90 days. If you need help or a new code, please call your [place] clinic or [phone].        Care EveryWhere ID     This is your Care EveryWhere ID. This could be used by other organizations to access your [place] medical records  [id number]        Your Vitals Were     Pulse Temperature Respirations Height Pulse Oximetry BMI (Body Mass Index)    68 97.8  F (36.6  C) (Oral) 16 1.683 m " "(5' 6.25\") 94% 30.4 kg/m2       Blood Pressure from Last 3 Encounters:   04/25/18 101/66   03/02/18 119/79   02/09/18 132/71    Weight from Last 3 Encounters:   04/25/18 86.1 kg (189 lb 12.8 oz)   03/02/18 84.4 kg (186 lb)   02/09/18 85.8 kg (189 lb 1.6 oz)                 Today's Medication Changes          These changes are accurate as of 4/25/18  2:03 PM.  If you have any questions, ask your nurse or doctor.               These medicines have changed or have updated prescriptions.        Dose/Directions    methotrexate 50 MG/2ML injection CHEMO   This may have changed:  See the new instructions.   Used for:  Rheumatoid arthritis involving multiple sites with positive rheumatoid factor (H)   Changed by:  Alessio Lofton APRN CNP        MIX 0.8 ML (20 mg) WITH JUICE AND DRINK ONCE WEEKLY.   Quantity:  4 mL   Refills:  0            Where to get your medicines      These medications were sent to [facility] [address] [place], [place] [address] [address] [facility] [address]  [address] [facility] [address]     Phone:  [phone]     methotrexate 50 MG/2ML injection CHEMO                Primary Care Provider Office Phone # Fax #    Amarilys Iyer [phone]       [address] [address]        Equal Access to Services     [facility] AH: Hadii anu Black, waaxda luqadaha, qaybta kaalmada yo, neo otto. So [facility] [phone].    ATENCIÓN: Si habla español, tiene a be disposición servicios gratuitos de asistencia lingüística. Llame al [phone].    We comply with applicable federal civil rights laws and [place] laws. We do not discriminate on the basis of race, color, national origin, age, disability, sex, sexual orientation, or gender identity.            Thank you!     Thank you for choosing [facility] RHEUMATOLOGY  for your care. Our goal is always to provide you with excellent care. " Hearing back from our patients is one way we can continue to improve our services. Please take a few minutes to complete the written survey that you may receive in the mail after your visit with us. Thank you!             Your Updated Medication List - Protect others around you: Learn how to safely use, store and throw away your medicines at www.disposemymeds.org.          This list is accurate as of 4/25/18  2:03 PM.  Always use your most recent med list.                   Brand Name Dispense Instructions for use Diagnosis    acetaminophen 500 MG tablet    TYLENOL     Take 500-1,000 mg by mouth every 6 hours as needed        albuterol 108 (90 Base) MCG/ACT Inhaler    PROAIR HFA/PROVENTIL HFA/VENTOLIN HFA    1 Inhaler    Inhale 2 puffs into the lungs every 6 hours as needed for shortness of breath / dyspnea or wheezing    ILD (interstitial lung disease) (H)       amLODIPine 5 MG tablet    NORVASC    90 tablet    Take 1 tablet (5 mg) by mouth daily    Essential hypertension       ARTIFICIAL TEAR SOLUTION OP      Apply  to eye as needed.        aspirin 81 MG tablet      Take 81 mg by mouth daily        CALCIUM + D PO      Take 1 tablet by mouth 2 times daily        DAILY MULTIVITAMIN PO      Take 1 tablet by mouth daily And minerals per pt        folic acid 800 MCG Tabs      Take by mouth 2 times daily        GLUCOSAMINE SULFATE PO      Take 1,000 mg by mouth daily        latanoprost 0.005 % ophthalmic solution    XALATAN    1 Bottle    Place 1 drop into both eyes At Bedtime    Primary open angle glaucoma of both eyes, severe stage       levothyroxine 88 MCG tablet    SYNTHROID/LEVOTHROID    90 tablet    TAKE 1 TABLET BY MOUTH EVERY DAY.    Hypothyroidism       lisinopril 40 MG tablet    PRINIVIL/ZESTRIL    90 tablet    Take 1 tablet (40 mg) by mouth daily    Essential hypertension       methotrexate 50 MG/2ML injection CHEMO     4 mL    MIX 0.8 ML (20 mg) WITH JUICE AND DRINK ONCE WEEKLY.    Rheumatoid arthritis  "involving multiple sites with positive rheumatoid factor (H)       Needle (Disp) 27G X 5/8\" Misc    BD SAFETYGLIDE NEEDLE    12 each    For methotrexate use EVERY 7 DAY    Rheumatoid arthritis involving multiple sites with positive rheumatoid factor (H)       Vitamin D-3 1000 units Caps      Take by mouth 2 times daily          "

## 2018-04-25 NOTE — PROGRESS NOTES
All labs reviewed and discussed with patient at office visit. Instructed to call for any further questions.       Alessio MOTA CNP, MSN  4/25/2018  11:33 AM

## 2018-05-06 ENCOUNTER — HEALTH MAINTENANCE LETTER (OUTPATIENT)
Age: 78
End: 2018-05-06

## 2018-05-22 DIAGNOSIS — M05.79 RHEUMATOID ARTHRITIS INVOLVING MULTIPLE SITES WITH POSITIVE RHEUMATOID FACTOR (H): ICD-10-CM

## 2018-05-23 NOTE — TELEPHONE ENCOUNTER
methotrexate 50 MG/2ML injection CHEMO    Last Written Prescription Date:  4/25/18  Last Fill Quantity: 4ml,   # refills: 0  Last Office Visit: 4/25/18  Future Office visit: none    CBC RESULTS:   Recent Labs   Lab Test  04/25/18   1048   WBC  4.8   RBC  4.23   HGB  14.2   HCT  43.3   MCV  102*   MCH  33.6*   MCHC  32.8   RDW  13.4   PLT  167       Creatinine   Date Value Ref Range Status   04/25/2018 0.94 0.52 - 1.04 mg/dL Final   ]    Liver Function Studies -   Recent Labs   Lab Test  04/25/18   1048   04/06/17   1442   PROTTOTAL   --    --   7.5   ALBUMIN   --    --   3.9   BILITOTAL   --    --   0.4   ALKPHOS   --    --   133   AST  16   < >  17   ALT  20   < >  25    < > = values in this interval not displayed.     Lab Results   Component Value Date    ALBUMIN 3.9 04/06/2017       Routing refill request to provider for review/approval because: protocol.  Albumin past due.  Drink dose??

## 2018-05-24 RX ORDER — METHOTREXATE 25 MG/ML
INJECTION, SOLUTION INTRA-ARTERIAL; INTRAMUSCULAR; INTRAVENOUS
Qty: 4 ML | Refills: 0 | Status: SHIPPED | OUTPATIENT
Start: 2018-05-24 | End: 2018-06-23

## 2018-06-15 ENCOUNTER — OFFICE VISIT (OUTPATIENT)
Dept: INTERNAL MEDICINE | Facility: CLINIC | Age: 78
End: 2018-06-15
Payer: MEDICARE

## 2018-06-15 VITALS — WEIGHT: 191 LBS | BODY MASS INDEX: 30.6 KG/M2

## 2018-06-15 DIAGNOSIS — J84.9 ILD (INTERSTITIAL LUNG DISEASE) (H): ICD-10-CM

## 2018-06-15 DIAGNOSIS — I10 ESSENTIAL HYPERTENSION: ICD-10-CM

## 2018-06-15 DIAGNOSIS — E03.9 HYPOTHYROIDISM, UNSPECIFIED TYPE: Primary | ICD-10-CM

## 2018-06-15 DIAGNOSIS — M05.79 RHEUMATOID ARTHRITIS INVOLVING MULTIPLE SITES WITH POSITIVE RHEUMATOID FACTOR (H): ICD-10-CM

## 2018-06-15 ASSESSMENT — PAIN SCALES - GENERAL: PAINLEVEL: NO PAIN (0)

## 2018-06-15 NOTE — MR AVS SNAPSHOT
After Visit Summary   6/15/2018    Abida Hahn    MRN: [id number]           Patient Information     Date Of Birth          1940        Visit Information        Provider Department      6/15/2018 10:55 AM Amarilys Iyer MD [facility]        Today's Diagnoses     Hypothyroidism, unspecified type    -  1    Essential hypertension        ILD (interstitial lung disease) (H)        Rheumatoid arthritis involving multiple sites with positive rheumatoid factor (H)          Care Instructions    Primary Care Center Phone Number [phone]  Primary Care Center Medication Refill Request Information:  * Please contact your pharmacy regarding ANY request for medication refills.  ** [facility] Prescription Fax = [phone]  * Please allow 3 business days for routine medication refills.  * Please allow 5 business days for controlled substance medication refills.     Primary Care Center Test Result notification information:  *You will be notified with in 7-10 days of your appointment day regarding the results of your test.  If you are on MyChart you will be notified as soon as the provider has reviewed the results and signed off on them.                   [facility]         Internal Medicine Resident                   Continuity Clinic    Who We Are    Resident Continuity Clinic is a part of the [facility].  Resident physicians see patients independently and establish a relationship with them over the course of their three-year residency program.  As with the Primary Care Clinic, our Resident Continuity Clinic models a group practice.  If your doctor is not available, you will be able to see another resident physician.  At the end of a resident s training, patients will be transitioned to a new resident physician for ongoing care.     We treat patients with a wide array of medical needs from routine physicals, to acute illnesses, to diabetes and blood pressure  management, to complex medical illness.  What is a Resident Physician?    Resident physicians hold medical degrees and are doctors. They are training to become specialists in Internal Medicine. They work under the supervision of board-certified faculty physicians.  Expectations for Your Care    We strive to provide accessible, quality care at all times.    In order to provide this care, it is best to see your primary care resident doctor consistently rather switching between providers.  In the event you do see another physician, you should schedule a follow-up visit with your usual primary care doctor.    If you are transitioning your care from another clinic, it is helpful to have your records available for your doctor to review.    We do not prescribe controlled substances, such as ADD medications or narcotic pain medications, on your first visit.  We will review your health records and concerns prior to devising a treatment plan with you in order to provide the best care.      Clinic Services     Extended clinic hours; patient  to help navigate your visit;  parking; laboratory and imaging services with evening and weekend hours    Multiple medical and surgical specialties in one building    Complementary services, including Nutrition, Integrative Medicine, Pharmacy consultations, Mental and Behavioral Health, Sports Medicine and Physical Therapy    Thank You    We would like to thank you for choosing the [facility] Resident Continuity Clinic for your primary care. You are making a priceless contribution to the training of the next generation of health care practitioners.     Contact us at [phone] for appointments or questions.    Resident Clinic Hours are Tuesdays and Thursdays, 7:30am-5:00pm    Residents  Doris Kidd MD   (Female )   Uma Damian MD   (Female)   Luis Alfredo Pedro MD  (Male)   Natalio Gerber MD  (Male)   Krista Jara MD   (Female)   Luther  MD Salvador  (Male)    Guy Bolanos MD  (Male)   Fredrick Palencia MD  (Male)   Natalio Salas MD (Male)   Christian Robbins MD  (Male)   Ivory Giordano MD (Female)    Sophia Prince MD (Female)   Iesha Aburto MD  (Male)   Fide Ordoñez MD(Female)   Rhina Mcnally MD  (Female)    Supervising Physicians   MD Lou Hollingsworth, MD Bahman Reed, MD Kenn Dobbs, MD Amarilys Iyer, MD Kaitlin Christopher, MD Sky Vo, MD Vibha Lee, MD Dea Kay MD                    Follow-ups after your visit        Follow-up notes from your care team     Return in about 5 months (around 11/15/2018) for Routine Visit.      Your next 10 appointments already scheduled     Jul 12, 2018 10:30 AM CDT   FULL PULMONARY FUNCTION with  PFL B   [facility] ([place] [facility])    [address] [place] [address]  3rd [facility] [address]   [phone]            Jul 12, 2018 11:30 AM CDT   (Arrive by 11:15 AM)   Return Interstitial Lung with Nirmala Muñoz MD   [facility] ([place] [facility])    [address] [place] [address]  [address] [place] [address]   [phone]            Jul 19, 2018 12:45 PM CDT   RETURN GLAUCOMA with Corazon Addison MD   [facility] ([facility])    [address]  [facility]  [facility] [address]   [phone]            Nov 19, 2018  1:40 PM CST   (Arrive by 1:25 PM)   Return Visit with Amarilys Iyer MD   [facility] ([place] [facility])    [address] [place] [id number] [phone]              Future tests that were ordered for you today     Open Future Orders        Priority Expected Expires Ordered    TSH with free T4 reflex **(3 MO) Routine 9/13/2018 6/15/2019 6/15/2018            Who to contact     Please call your clinic at  [phone] to:    Ask questions about your health    Make or cancel appointments    Discuss your medicines    Learn about your test results    Speak to your doctor            Additional Information About Your Visit        MyChart Information     Dreampod is an electronic gateway that provides easy, online access to your medical records. With Dreampod, you can request a clinic appointment, read your test results, renew a prescription or communicate with your care team.     To sign up for Dreampod visit the website at [email or web address]   You will be asked to enter the access code listed below, as well as some personal information. Please follow the directions to create your username and password.     Your access code is: [email or web address]  Expires: 2018  6:30 AM     Your access code will  in 90 days. If you need help or a new code, please contact your [facility] or call [phone] for assistance.        Care EveryWhere ID     This is your Care EveryWhere ID. This could be used by other organizations to access your [facility] medical records  [id number]        Your Vitals Were     BMI (Body Mass Index)                   30.6 kg/m2            Blood Pressure from Last 3 Encounters:   06/15/18 (P) 118/74   18 101/66   18 119/79    Weight from Last 3 Encounters:   06/15/18 86.6 kg (191 lb)   18 86.1 kg (189 lb 12.8 oz)   18 84.4 kg (186 lb)               Primary Care Provider Office Phone # Fax #    Amarilys Iyer [phone]       3 [address] [address]        Equal Access to Services     [facility] : Hadii anu Balck, waaxda lukadeadaha, qaybta kaalneo khalil. So [facility] [phone].    ATENCIÓN: Si habla español, tiene a be disposición servicios gratuitos de asistencia lingüística. Llame al [phone].    We comply with applicable federal civil  rights laws and [place] laws. We do not discriminate on the basis of race, color, national origin, age, disability, sex, sexual orientation, or gender identity.            Thank you!     Thank you for choosing [facility]  for your care. Our goal is always to provide you with excellent care. Hearing back from our patients is one way we can continue to improve our services. Please take a few minutes to complete the written survey that you may receive in the mail after your visit with us. Thank you!             Your Updated Medication List - Protect others around you: Learn how to safely use, store and throw away your medicines at www.disposemymeds.org.          This list is accurate as of 6/15/18 11:40 AM.  Always use your most recent med list.                   Brand Name Dispense Instructions for use Diagnosis    acetaminophen 500 MG tablet    TYLENOL     Take 500-1,000 mg by mouth every 6 hours as needed        albuterol 108 (90 Base) MCG/ACT Inhaler    PROAIR HFA/PROVENTIL HFA/VENTOLIN HFA    1 Inhaler    Inhale 2 puffs into the lungs every 6 hours as needed for shortness of breath / dyspnea or wheezing    ILD (interstitial lung disease) (H)       amLODIPine 5 MG tablet    NORVASC    90 tablet    Take 1 tablet (5 mg) by mouth daily    Essential hypertension       ARTIFICIAL TEAR SOLUTION OP      Apply  to eye as needed.        aspirin 81 MG tablet      Take 81 mg by mouth daily        CALCIUM + D PO      Take 1 tablet by mouth 2 times daily        DAILY MULTIVITAMIN PO      Take 1 tablet by mouth daily And minerals per pt        folic acid 800 MCG Tabs      Take by mouth 2 times daily        GLUCOSAMINE SULFATE PO      Take 1,000 mg by mouth daily        latanoprost 0.005 % ophthalmic solution    XALATAN    1 Bottle    Place 1 drop into both eyes At Bedtime    Primary open angle glaucoma of both eyes, severe stage       levothyroxine 88 MCG tablet    SYNTHROID/LEVOTHROID    90 tablet    TAKE 1  "TABLET BY MOUTH EVERY DAY.    Hypothyroidism       lisinopril 40 MG tablet    PRINIVIL/ZESTRIL    90 tablet    Take 1 tablet (40 mg) by mouth daily    Essential hypertension       methotrexate 50 MG/2ML injection CHEMO     4 mL    MIX 0.8 ML (20 mg) WITH JUICE AND DRINK ONCE WEEKLY.    Rheumatoid arthritis involving multiple sites with positive rheumatoid factor (H)       Needle (Disp) 27G X 5/8\" Misc    BD SAFETYGLIDE NEEDLE    12 each    For methotrexate use EVERY 7 DAY    Rheumatoid arthritis involving multiple sites with positive rheumatoid factor (H)       Vitamin D-3 1000 units Caps      Take by mouth 2 times daily          "

## 2018-06-15 NOTE — PATIENT INSTRUCTIONS
Primary Care Center Phone Number [phone]  Primary Care Center Medication Refill Request Information:  * Please contact your pharmacy regarding ANY request for medication refills.  ** [facility] Prescription Fax = [phone]  * Please allow 3 business days for routine medication refills.  * Please allow 5 business days for controlled substance medication refills.     Primary Care Center Test Result notification information:  *You will be notified with in 7-10 days of your appointment day regarding the results of your test.  If you are on MyChart you will be notified as soon as the provider has reviewed the results and signed off on them.                   [facility]         Internal Medicine Resident                   Continuity Clinic    Who We Are    Resident Continuity Clinic is a part of the [facility].  Resident physicians see patients independently and establish a relationship with them over the course of their three-year residency program.  As with the Primary Care Clinic, our Resident Continuity Clinic models a group practice.  If your doctor is not available, you will be able to see another resident physician.  At the end of a resident s training, patients will be transitioned to a new resident physician for ongoing care.     We treat patients with a wide array of medical needs from routine physicals, to acute illnesses, to diabetes and blood pressure management, to complex medical illness.  What is a Resident Physician?    Resident physicians hold medical degrees and are doctors. They are training to become specialists in Internal Medicine. They work under the supervision of board-certified faculty physicians.  Expectations for Your Care    We strive to provide accessible, quality care at all times.    In order to provide this care, it is best to see your primary care resident doctor consistently rather switching between providers.  In the event you do see another physician,  you should schedule a follow-up visit with your usual primary care doctor.    If you are transitioning your care from another clinic, it is helpful to have your records available for your doctor to review.    We do not prescribe controlled substances, such as ADD medications or narcotic pain medications, on your first visit.  We will review your health records and concerns prior to devising a treatment plan with you in order to provide the best care.      Clinic Services     Extended clinic hours; patient  to help navigate your visit;  parking; laboratory and imaging services with evening and weekend hours    Multiple medical and surgical specialties in one building    Complementary services, including Nutrition, Integrative Medicine, Pharmacy consultations, Mental and Behavioral Health, Sports Medicine and Physical Therapy    Thank You    We would like to thank you for choosing the [facility] Internal Medicine Resident Continuity Clinic for your primary care. You are making a priceless contribution to the training of the next generation of health care practitioners.     Contact us at [phone] for appointments or questions.    Resident Clinic Hours are Tuesdays and Thursdays, 7:30am-5:00pm    Residents  Doris Kidd MD   (Female )   Uma Damian MD   (Female)   Luis Alfredo Pedro MD  (Male)   Natalio Gerber MD  (Male)   Krista Jara MD   (Female)   Luther Franco MD  (Male)    Guy Bolanos MD  (Male)   Fredrick Palencia MD  (Male)   Natalio Salas MD (Male)   Christian Robbins MD  (Male)   Ivory Giordano MD (Female)    Sophia Prince MD (Female)   Iesha Aburto MD  (Male)   Fide Ordoñez MD(Female)   Rhina Mcnally MD  (Female)    Supervising Physicians   MD Lou Hollingsworth MD Briar Duffy, MD James Langland, MD Mary Logeais, MD Tanya Melnik, MD Charles Moldow, MD Heather Thompson Buum, MD Kathleen Watson, MD

## 2018-06-15 NOTE — NURSING NOTE
Chief Complaint   Patient presents with     Hypertension     Abida is here for general follow up and BP check.       Yumiko Byers LPN at 10:59 AM on 6/15/2018

## 2018-06-15 NOTE — PROGRESS NOTES
"[facility]  Primary Care Center   Amarilys Iyer MD  06/15/2018      Chief Complaint:   Hypertension    History of Present Illness:   Abida Hahn is a 78 year old female with a history of hypertension, rheumatoid arthritis with ILD and osteoporosis who presents for evaluation of a follow up.    Hypertension:  During my previous visit with Abida on 02/09/18, she noted that her hypertension was well controlled with her lisinopril and 5 mg amlodipine. In a renal U/S 9/7, Abida should no evidence for ORIANA. Today Abida blood pressure was \"really good\". She continues to have no issues with her blood pressure.    Osteoporosis:  Abida previously noted that she did not want to continue taking her Fosamax. She instead had been taking Ca/D and had increased her walking. Abida notes that her lower back hurts but attributes it to her rheumatoid arthritis.  She is not interested in any medications at this time, including injectable agents.    Fatigue:  Abida notes that's she has been physically tired recently. She believes that she is getting enough sleep. The patient only gets up once during the night to go to the bathroom. Normally, she feels ready to begin her day when she initially wakes up. She does not take any naps during the day. Abida tries to go on walks 2-3 times a day for 25 minutes. She notes that she feels good after the walks but still does get tired. She wonders if she can attribute this symptom to her getting older. The patient notes that she has had no problems with bowel movements or stools.    Other concerns discussed:  -Abida reports that she will eventually try her inhaler because she feels she could be breathing better. However, she feels it is not that bad at this time.     Review of Systems:   Pertinent items are noted in HPI, remainder of complete ROS is negative.       Active Medications:      acetaminophen (TYLENOL) 500 MG tablet, Take 500-1,000 mg by mouth every 6 hours as needed, Disp: , " Rfl:      albuterol (PROAIR HFA/PROVENTIL HFA/VENTOLIN HFA) 108 (90 BASE) MCG/ACT Inhaler, Inhale 2 puffs into the lungs every 6 hours as needed for shortness of breath / dyspnea or wheezing, Disp: 1 Inhaler, Rfl: 1     amLODIPine (NORVASC) 5 MG tablet, Take 1 tablet (5 mg) by mouth daily, Disp: 90 tablet, Rfl: 3     ARTIFICIAL TEAR SOLUTION OP, Apply  to eye as needed., Disp: , Rfl:      aspirin 81 MG tablet, Take 81 mg by mouth daily, Disp: , Rfl:      Calcium-Vitamin D (CALCIUM + D PO), Take 1 tablet by mouth 2 times daily, Disp: , Rfl:      Cholecalciferol (VITAMIN D-3) 1000 UNITS CAPS, Take by mouth 2 times daily, Disp: , Rfl:      folic acid 800 MCG TABS, Take by mouth 2 times daily, Disp: , Rfl:      GLUCOSAMINE SULFATE PO, Take 1,000 mg by mouth daily, Disp: , Rfl:      latanoprost (XALATAN) 0.005 % ophthalmic solution, Place 1 drop into both eyes At Bedtime, Disp: 1 Bottle, Rfl: 11     levothyroxine (SYNTHROID/LEVOTHROID) 88 MCG tablet, TAKE 1 TABLET BY MOUTH EVERY DAY., Disp: 90 tablet, Rfl: 3     lisinopril (PRINIVIL/ZESTRIL) 40 MG tablet, Take 1 tablet (40 mg) by mouth daily, Disp: 90 tablet, Rfl: 1     methotrexate 50 MG/2ML injection CHEMO, MIX 0.8 ML (20 mg) WITH JUICE AND DRINK ONCE WEEKLY., Disp: 4 mL, Rfl: 0     Multiple Vitamin (DAILY MULTIVITAMIN PO), Take 1 tablet by mouth daily And minerals per pt, Disp: , Rfl:      Allergies:   Ibuprofen sodium; Penicillins; Triple antibiotic [neomycin-polymyxin-dexameth]; Aspirin; and Codeine      Past Medical History:  Abscess, toe  Cataract  Glaucoma  Headache  Hypertension  Hypothyroidism  Interstitial lung disease  Pyoderma gangrenosum  Rheumatoid arthritis  Wrist fracture, right  Chronic Pain Syndrome  Encounter for long-term current use of medication  Osteoporosis  Pseudophakia of both eyes     Past Surgical History:  Biopsy Artery Temporal  Extracapsular cataract extration with intraocular lens implant  Open reduction internal fixation wrist  bilateral    Family History:   Cancer- Father (colon)  Emphysema- Father  Arthritis- Mother, brother  Glaucoma- Mother      Social History:   The patient was alone.  Smoking Status: Former Smoker, 0.50 PPD, Quit 7/6/1992   Smokeless Tobacco: Never Used   Alcohol Use: No      Physical Exam:   BP (P) 118/74  Pulse (P) 62  Wt 86.6 kg (191 lb)  SpO2 (P) 95%  BMI 30.6 kg/m2   Constitutional: Alert, oriented, pleasant, no acute distress  Head: Normocephalic, atraumatic  Eyes: Extra-ocular movements intact, no scleral icterus  Cardiovascular: Regular rate and rhythm, no murmurs, rubs or gallops, peripheral pulses full/symmetric  Respiratory: Good air movement bilaterally, lungs clear, no wheezes/rales/rhonchi  Musculoskeletal: No edema, normal muscle tone, normal gait  Neurologic: Alert and oriented, cranial nerves 2-12 intact.  Skin: No rashes/lesions  Psychiatric: normal mentation, affect and mood     Assessment and Plan:  Hypothyroidism, unspecified type  - TSH with free T4 reflex **(3 MO)    Essential hypertension  Well controlled on current medications.  - Lipid panel reflex to direct LDL Fasting    Rheumatoid arthritis involving multiple sites with positive rheumatoid factor (H), ILD (interstitial lung disease) (H)  These conditions appear to be stable as well.  - Comprehensive metabolic panel **(3 MO)    Fatigue  She reports fatigue that does not seem to be limiting her lifestyle. She reports good sleep and adequate energy to take daily walks and socialize. Her recent CBC, thyroid levels and vitamins are within normal limits. I don't suspect a serious underlying etiology for fatigue and advised her to continue monitoring this.     Follow-up: Return in about 5 months (around 11/15/2018) for Routine Visit.         Scribe Disclosure:   I, Jair Valentine, am serving as a scribe to document services personally performed by Amarilys Iyer MD at this visit, based upon the provider's statements to me. All  documentation has been reviewed by the aforementioned provider prior to being entered into the official medical record.     Portions of this medical record were completed by a scribe. UPON MY REVIEW AND AUTHENTICATION BY ELECTRONIC SIGNATURE, this confirms (a) I performed the applicable clinical services, and (b) the record is accurate.   Amarilys Iyer MD  Internal Medicine

## 2018-06-23 DIAGNOSIS — M05.79 RHEUMATOID ARTHRITIS INVOLVING MULTIPLE SITES WITH POSITIVE RHEUMATOID FACTOR (H): ICD-10-CM

## 2018-06-23 NOTE — TELEPHONE ENCOUNTER
methotrexate 50 MG/2ML INJ  Last Written Prescription Date:  5/24/18  Last Fill Quantity: 4ML,   # refills: 0  Last Office Visit: 4/25/18  Future Office visit:  NONE    CBC RESULTS:   Recent Labs   Lab Test  04/25/18   1048   WBC  4.8   RBC  4.23   HGB  14.2   HCT  43.3   MCV  102*   MCH  33.6*   MCHC  32.8   RDW  13.4   PLT  167       Creatinine   Date Value Ref Range Status   04/25/2018 0.94 0.52 - 1.04 mg/dL Final   ]    Liver Function Studies -   Recent Labs   Lab Test  04/25/18   1048   04/06/17   1442   PROTTOTAL   --    --   7.5   ALBUMIN   --    --   3.9   BILITOTAL   --    --   0.4   ALKPHOS   --    --   133   AST  16   < >  17   ALT  20   < >  25    < > = values in this interval not displayed.       Routing refill request to provider for review/approval because:  LABS 4/25/18    REPEAT 30 DAY RF  MIX 0.8 ML (20 mg) WITH JUICE AND DRINK ONCE WEEKLY.  Disp: 4 mL Refills: 0    Class: E-Prescribe Start: 6/23/2018   For: Rheumatoid arthritis involving multiple sites with

## 2018-06-25 RX ORDER — METHOTREXATE 25 MG/ML
INJECTION, SOLUTION INTRA-ARTERIAL; INTRAMUSCULAR; INTRAVENOUS
Qty: 4 ML | Refills: 0 | Status: SHIPPED | OUTPATIENT
Start: 2018-06-25 | End: 2018-08-10

## 2018-07-05 DIAGNOSIS — I10 ESSENTIAL HYPERTENSION: ICD-10-CM

## 2018-07-06 RX ORDER — LISINOPRIL 40 MG/1
40 TABLET ORAL DAILY
Qty: 90 TABLET | Refills: 3 | Status: SHIPPED | OUTPATIENT
Start: 2018-07-06 | End: 2019-06-28

## 2018-07-12 ENCOUNTER — OFFICE VISIT (OUTPATIENT)
Dept: PULMONOLOGY | Facility: CLINIC | Age: 78
End: 2018-07-12
Attending: INTERNAL MEDICINE
Payer: MEDICARE

## 2018-07-12 VITALS
SYSTOLIC BLOOD PRESSURE: 132 MMHG | RESPIRATION RATE: 16 BRPM | HEART RATE: 60 BPM | OXYGEN SATURATION: 96 % | DIASTOLIC BLOOD PRESSURE: 79 MMHG

## 2018-07-12 DIAGNOSIS — J84.9 ILD (INTERSTITIAL LUNG DISEASE) (H): Primary | ICD-10-CM

## 2018-07-12 ASSESSMENT — ENCOUNTER SYMPTOMS
POSTURAL DYSPNEA: 0
FEVER: 0
SLEEP DISTURBANCES DUE TO BREATHING: 0
WEIGHT LOSS: 0
POLYDIPSIA: 0
HOARSE VOICE: 0
TASTE DISTURBANCE: 0
COUGH: 0
INCREASED ENERGY: 0
SNORES LOUDLY: 0
LIGHT-HEADEDNESS: 0
WHEEZING: 0
HYPERTENSION: 1
FATIGUE: 1
PALPITATIONS: 0
SPUTUM PRODUCTION: 1
WEIGHT GAIN: 1
HYPOTENSION: 0
LEG PAIN: 0
NECK MASS: 0
SMELL DISTURBANCE: 0
CHILLS: 0
ORTHOPNEA: 0
DYSPNEA ON EXERTION: 1
DECREASED APPETITE: 0
SINUS PAIN: 0
SHORTNESS OF BREATH: 1
EXERCISE INTOLERANCE: 0
POLYPHAGIA: 0
COUGH DISTURBING SLEEP: 0
ALTERED TEMPERATURE REGULATION: 0
HEMOPTYSIS: 0
NIGHT SWEATS: 0
SORE THROAT: 0
SINUS CONGESTION: 1
HALLUCINATIONS: 0
SYNCOPE: 0
TROUBLE SWALLOWING: 0

## 2018-07-12 ASSESSMENT — PAIN SCALES - GENERAL: PAINLEVEL: NO PAIN (0)

## 2018-07-12 NOTE — LETTER
"7/12/2018      RE: Abida Hahn  [address]  [facility] [address]       [facility] Interstitial Lung Disease Clinic    Reason for Visit  Abida Hahn is a 78 year old year old female who is being seen for Interstitial Lung Disease (ILD) (Patient is being seen for ILD follow up)    HPI    Mrs. Hahn is a 79 yo who is here for follow up of RA-ILD.  She has had RA for ~ 20 years and has taken methotrexate for ~10 years.  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   Last year, we discussed starting Dulera, but she did not start it because it was too expensive and she did not want to take another medication.  She reports that her breathing feels worse due to the heat and humidity.  She feels better when she stays indoors with air conditioning.  But she reports \"I do not feel bad enough to need more meds.\"  She has not used albuterol, although in the past it helped her breathing.   She is able to go for walks for ~30 minutes.  She denies cough or wheezing.   She did not do PFT today because she forgot.        Current Outpatient Prescriptions   Medication     albuterol (PROAIR HFA/PROVENTIL HFA/VENTOLIN HFA) 108 (90 BASE) MCG/ACT Inhaler     amLODIPine (NORVASC) 5 MG tablet     ARTIFICIAL TEAR SOLUTION OP     aspirin 81 MG tablet     Calcium-Vitamin D (CALCIUM + D PO)     Cholecalciferol (VITAMIN D-3) 1000 UNITS CAPS     folic acid 800 MCG TABS     GLUCOSAMINE SULFATE PO     latanoprost (XALATAN) 0.005 % ophthalmic solution     levothyroxine (SYNTHROID/LEVOTHROID) 88 MCG tablet     lisinopril (PRINIVIL/ZESTRIL) 40 MG tablet     methotrexate 50 MG/2ML injection CHEMO     Multiple Vitamin (DAILY MULTIVITAMIN PO)     Needle, Disp, (BD SAFETYGLIDE NEEDLE) 27G X 5/8\" MISC     No current facility-administered medications for this visit.      Allergies   Allergen Reactions     Ibuprofen Sodium      Penicillins      Triple Antibiotic [Neomycin-Polymyxin-Dexameth]      " Aspirin Rash     Codeine Rash     Past Medical History:   Diagnosis Date     Abscess, toe 2009    Right great toe     Cataract 8/2011    Right s/p surgery 8/2011; left s/p surgery     Glaucoma      Headache(784.0)     Relieved with gabapentin. 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 +YEYO      Wrist fracture     Right, s/p ORIF       Past Surgical History:   Procedure Laterality Date     BIOPSY ARTERY TEMPORAL       EXTRACAPSULAR CATARACT EXTRATION WITH INTRAOCULAR LENS IMPLANT  8/2011    Left 2 years ago as well     OPEN REDUCTION INTERNAL FIXATION WRIST BILATERAL         Social History     Social History     Marital status: Single     Spouse name: N/A     Number of children: N/A     Years of education: N/A     Occupational History     Not on file.     Social History Main Topics     Smoking status: Former Smoker     Packs/day: 0.50     Quit date: 7/6/1992     Smokeless tobacco: Never Used      Comment: Quit in 20yrs      Alcohol use No     Drug use: No     Sexual activity: Not on file     Other Topics Concern     Not on file     Social History Narrative    Lives in senior building.  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 Enjoys reading, friends, children and shopping.  Denies exposure to asbestos, silica, pet birds, hot tubs, feather pillows, mold.       Family History   Problem Relation Age of Onset     Cancer Father      colon     Emphysema Father      Arthritis Mother      RA at 31 y/o, glaucome     Glaucoma Mother      Arthritis Brother      RA on Mtx and humira     Melanoma No family hx of      Skin Cancer No family hx of              ROS Pulmonary  A complete ROS was otherwise negative except as noted in the HPI.    Vitals: /79 (BP  Location: Right arm, Patient Position: Chair, Cuff Size: Adult Large)  Pulse 60  Resp 16  SpO2 96%    Exam:   GENERAL APPEARANCE: Well developed, well nourished, alert, and in no apparent distress.  RESP: good air flow throughout.  No crackles. No rhonchi. No wheezes.  One inspiratory squeak.  CV: Normal S1, S2, regular rhythm, normal rate. No murmur.  No LE edema.   MS: extremities normal. No clubbing. No cyanosis.  SKIN: no rash on limited exam.  NEURO: Mentation intact, speech normal, normal gait and stance.  PSYCH: mentation appears normal. and affect normal/bright.    Results:  Patient forgot that she was supposed to have PFT today.    I reviewed results with the patient.      Assessment and plan:   Mrs. Hahn is a 77 yo who is here for follow up of RA-ILD.  1.  ILD.  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  She did not do PFT today because she forgot.  She feels more short of breath in the hot humid weather.  She does not want to start a new medication.   I recommended that she restart albuterol prn.  If her symptoms worsen, then could consider daily azithromycin or an ICS/LABA combo inhaler.  Continue going for walks when heat and humidity dissipate.  RTC in 4 mo with PFT.        Nirmala Muñoz MD

## 2018-07-12 NOTE — PROGRESS NOTES
"[facility] Interstitial Lung Disease Clinic    Reason for Visit  Abida Hahn is a 78 year old year old female who is being seen for Interstitial Lung Disease (ILD) (Patient is being seen for ILD follow up)    HPI    Mrs. Hahn is a 77 yo who is here for follow up of RA-ILD.  She has had RA for ~ 20 years and has taken methotrexate for ~10 years.  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   Last year, we discussed starting Dulera, but she did not start it because it was too expensive and she did not want to take another medication.  She reports that her breathing feels worse due to the heat and humidity.  She feels better when she stays indoors with air conditioning.  But she reports \"I do not feel bad enough to need more meds.\"  She has not used albuterol, although in the past it helped her breathing.  She is able to go for walks for ~30 minutes.  She denies cough or wheezing.  She did not do PFT today because she forgot.        Current Outpatient Prescriptions   Medication     albuterol (PROAIR HFA/PROVENTIL HFA/VENTOLIN HFA) 108 (90 BASE) MCG/ACT Inhaler     amLODIPine (NORVASC) 5 MG tablet     ARTIFICIAL TEAR SOLUTION OP     aspirin 81 MG tablet     Calcium-Vitamin D (CALCIUM + D PO)     Cholecalciferol (VITAMIN D-3) 1000 UNITS CAPS     folic acid 800 MCG TABS     GLUCOSAMINE SULFATE PO     latanoprost (XALATAN) 0.005 % ophthalmic solution     levothyroxine (SYNTHROID/LEVOTHROID) 88 MCG tablet     lisinopril (PRINIVIL/ZESTRIL) 40 MG tablet     methotrexate 50 MG/2ML injection CHEMO     Multiple Vitamin (DAILY MULTIVITAMIN PO)     Needle, Disp, (BD SAFETYGLIDE NEEDLE) 27G X 5/8\" MISC     No current facility-administered medications for this visit.      Allergies   Allergen Reactions     Ibuprofen Sodium      Penicillins      Triple Antibiotic [Neomycin-Polymyxin-Dexameth]      Aspirin Rash     Codeine Rash     Past Medical History:   Diagnosis Date     Abscess, toe " 2009    Right great toe     Cataract 8/2011    Right s/p surgery 8/2011; left s/p surgery     Glaucoma      Headache(784.0)     Relieved with gabapentin. 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 +YEYO      Wrist fracture     Right, s/p ORIF       Past Surgical History:   Procedure Laterality Date     BIOPSY ARTERY TEMPORAL       EXTRACAPSULAR CATARACT EXTRATION WITH INTRAOCULAR LENS IMPLANT  8/2011    Left 2 years ago as well     OPEN REDUCTION INTERNAL FIXATION WRIST BILATERAL         Social History     Social History     Marital status: Single     Spouse name: N/A     Number of children: N/A     Years of education: N/A     Occupational History     Not on file.     Social History Main Topics     Smoking status: Former Smoker     Packs/day: 0.50     Quit date: 7/6/1992     Smokeless tobacco: Never Used      Comment: Quit in 20yrs      Alcohol use No     Drug use: No     Sexual activity: Not on file     Other Topics Concern     Not on file     Social History Narrative    Lives in senior building.  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 Enjoys reading, friends, children and shopping.  Denies exposure to asbestos, silica, pet birds, hot tubs, feather pillows, mold.       Family History   Problem Relation Age of Onset     Cancer Father      colon     Emphysema Father      Arthritis Mother      RA at 33 y/o, glaucome     Glaucoma Mother      Arthritis Brother      RA on Mtx and humira     Melanoma No family hx of      Skin Cancer No family hx of              ROS Pulmonary  A complete ROS was otherwise negative except as noted in the HPI.    Vitals: /79 (BP Location: Right arm, Patient Position: Chair, Cuff Size: Adult Large)  Pulse 60  Resp 16   SpO2 96%    Exam:   GENERAL APPEARANCE: Well developed, well nourished, alert, and in no apparent distress.  RESP: good air flow throughout.  No crackles. No rhonchi. No wheezes.  One inspiratory squeak.  CV: Normal S1, S2, regular rhythm, normal rate. No murmur.  No LE edema.   MS: extremities normal. No clubbing. No cyanosis.  SKIN: no rash on limited exam.  NEURO: Mentation intact, speech normal, normal gait and stance.  PSYCH: mentation appears normal. and affect normal/bright.    Results:  Patient forgot that she was supposed to have PFT today.    I reviewed results with the patient.      Assessment and plan:   Mrs. Hahn is a 77 yo who is here for follow up of RA-ILD.  1.  ILD.  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  She did not do PFT today because she forgot.  She feels more short of breath in the hot humid weather.  She does not want to start a new medication.  I recommended that she restart albuterol prn.  If her symptoms worsen, then could consider daily azithromycin or an ICS/LABA combo inhaler.  Continue going for walks when heat and humidity dissipate.  RTC in 4 mo with PFT.

## 2018-07-12 NOTE — NURSING NOTE
Chief Complaint   Patient presents with     Interstitial Lung Disease (ILD)     Patient is being seen for ILD follow up      Mary Pugh CMA at 11:30 AM on 7/12/2018

## 2018-07-12 NOTE — MR AVS SNAPSHOT
After Visit Summary   7/12/2018    Abida Hahn    MRN: [id number]           Patient Information     Date Of Birth          1940        Visit Information        Provider Department      7/12/2018 11:30 AM Nirmala Muñoz MD [facility] Lung Science and [facility]        Today's Diagnoses     ILD (interstitial lung disease) (H)    -  1       Follow-ups after your visit        Follow-up notes from your care team     Return in about 4 months (around 11/12/2018).      Your next 10 appointments already scheduled     Jul 19, 2018 12:45 PM CDT   RETURN GLAUCOMA with Corazon Addison MD   Eye Clinic ([facility])    [facility]  [address] [place] [address]  [address] [address]  [facility] [address]   [phone]            Nov 19, 2018  1:40 PM CST   (Arrive by 1:25 PM)   Return Visit with Amarilys Iyer MD   [facility] ([facility])    [address]  4th Floor  [facility] [address]   [phone]            Nov 29, 2018 10:30 AM CST   FULL PULMONARY FUNCTION with UC PFL B   [facility] Pulmonary Function Testing ([facility] [place])    [address]  3rd Floor  [facility] [address]   [phone]            Nov 29, 2018 11:30 AM CST   (Arrive by 11:15 AM)   Return Interstitial Lung with Nirmala Muñoz MD   [facility] for Lung Science and Health ([facility] [place])    [address]  [address]  [facility] [address]   [phone]              Future tests that were ordered for you today     Open Future Orders        Priority Expected Expires Ordered    General PFT Lab (Please always keep checked) Routine 11/12/2018 7/12/2019 7/12/2018    Pulmonary Function Test Routine 11/12/2018 7/12/2019 7/12/2018            Who to contact     If you have questions or need follow up information about today's clinic visit or your schedule please contact [facility] FOR LUNG  "[email or web address] directly at [phone].  Normal or non-critical lab and imaging results will be communicated to you by MyChart, letter or phone within 4 business days after the clinic has received the results. If you do not hear from us within 7 days, please contact the clinic through [employer] or phone. If you have a critical or abnormal lab result, we will notify you by phone as soon as possible.  Submit refill requests through [employer] or call your pharmacy and they will forward the refill request to us. Please allow 3 business days for your refill to be completed.          Additional Information About Your Visit        Skimblhar[employer] Hit Information     Exinda lets you send messages to your doctor, view your test results, renew your prescriptions, schedule appointments and more. To sign up, go to [email or web address] . Click on \"Log in\" on the left side of the screen, which will take you to the Welcome page. Then click on \"Sign up Now\" on the right side of the page.     You will be asked to enter the access code listed below, as well as some personal information. Please follow the directions to create your username and password.     Your access code is: [email or web address]  Expires: 2018  6:30 AM     Your access code will  in 90 days. If you need help or a new code, please call your [place] clinic or [phone].        Care EveryWhere ID     This is your Care EveryWhere ID. This could be used by other organizations to access your [place] medical records  [id number]        Your Vitals Were     Pulse Respirations Pulse Oximetry             60 16 96%          Blood Pressure from Last 3 Encounters:   18 132/79   06/15/18 (P) 118/74   18 101/66    Weight from Last 3 Encounters:   06/15/18 86.6 kg (191 lb)   18 86.1 kg (189 lb 12.8 oz)   18 84.4 kg (186 lb)               Primary Care Provider Office Phone # Fax #    Amarilys Iyer [phone]       [address] [place] [address]" [address] [place] [address]        Equal Access to Services     DADA ALMARAZ : Hadii aad ku hadpachecogabbi Black, wairmada solomon, qaalfredneo jones. So [facility] [phone].    ATENCIÓN: Si habla español, tiene a be disposición servicios gratuitos de asistencia lingüística. [place] al [phone].    We comply with applicable federal civil rights laws and [place] laws. We do not discriminate on the basis of race, color, national origin, age, disability, sex, sexual orientation, or gender identity.            Thank you!     Thank you for choosing [facility]ENCE [facility]  for your care. Our goal is always to provide you with excellent care. Hearing back from our patients is one way we can continue to improve our services. Please take a few minutes to complete the written survey that you may receive in the mail after your visit with us. Thank you!             Your Updated Medication List - Protect others around you: Learn how to safely use, store and throw away your medicines at www.disposemymeds.org.          This list is accurate as of 7/12/18  1:13 PM.  Always use your most recent med list.                   Brand Name Dispense Instructions for use Diagnosis    albuterol 108 (90 Base) MCG/ACT Inhaler    PROAIR HFA/PROVENTIL HFA/VENTOLIN HFA    1 Inhaler    Inhale 2 puffs into the lungs every 6 hours as needed for shortness of breath / dyspnea or wheezing    ILD (interstitial lung disease) (H)       amLODIPine 5 MG tablet    NORVASC    90 tablet    Take 1 tablet (5 mg) by mouth daily    Essential hypertension       ARTIFICIAL TEAR SOLUTION OP      Apply  to eye as needed.        aspirin 81 MG tablet      Take 81 mg by mouth daily        CALCIUM + D PO      Take 1 tablet by mouth 2 times daily        DAILY MULTIVITAMIN PO      Take 1 tablet by mouth daily And minerals per pt        folic acid 800 MCG Tabs      Take by mouth 2 times daily         "GLUCOSAMINE SULFATE PO      Take 1,000 mg by mouth daily        latanoprost 0.005 % ophthalmic solution    XALATAN    1 Bottle    Place 1 drop into both eyes At Bedtime    Primary open angle glaucoma of both eyes, severe stage       levothyroxine 88 MCG tablet    SYNTHROID/LEVOTHROID    90 tablet    TAKE 1 TABLET BY MOUTH EVERY DAY.    Hypothyroidism       lisinopril 40 MG tablet    PRINIVIL/ZESTRIL    90 tablet    Take 1 tablet (40 mg) by mouth daily    Essential hypertension       methotrexate 50 MG/2ML injection CHEMO     4 mL    MIX 0.8 ML (20 mg) WITH JUICE AND DRINK ONCE WEEKLY.*Call clinic to schedule  APPT/ LABS    Rheumatoid arthritis involving multiple sites with positive rheumatoid factor (H)       Needle (Disp) 27G X 5/8\" Misc    BD SAFETYGLIDE NEEDLE    12 each    For methotrexate use EVERY 7 DAY    Rheumatoid arthritis involving multiple sites with positive rheumatoid factor (H)       Vitamin D-3 1000 units Caps      Take by mouth 2 times daily          "

## 2018-07-19 ENCOUNTER — OFFICE VISIT (OUTPATIENT)
Dept: OPHTHALMOLOGY | Facility: CLINIC | Age: 78
End: 2018-07-19
Attending: OPHTHALMOLOGY
Payer: MEDICARE

## 2018-07-19 DIAGNOSIS — H40.1133 PRIMARY OPEN ANGLE GLAUCOMA OF BOTH EYES, SEVERE STAGE: Primary | ICD-10-CM

## 2018-07-19 PROCEDURE — G0463 HOSPITAL OUTPT CLINIC VISIT: HCPCS | Mod: ZF

## 2018-07-19 ASSESSMENT — VISUAL ACUITY
OS_CC: 20/25
OD_CC+: +2
CORRECTION_TYPE: GLASSES
METHOD: SNELLEN - LINEAR
OD_PH_CC: 20/40
OD_CC: 20/50

## 2018-07-19 ASSESSMENT — REFRACTION_WEARINGRX
OS_SPHERE: -0.75
OD_CYLINDER: +0.50
OD_SPHERE: -1.50
OS_ADD: +3.00
OS_CYLINDER: +1.50
SPECS_TYPE: PAL
OD_AXIS: 148
OS_AXIS: 109
OD_ADD: +3.00

## 2018-07-19 ASSESSMENT — CONF VISUAL FIELD
OD_SUPERIOR_NASAL_RESTRICTION: 3
OD_INFERIOR_TEMPORAL_RESTRICTION: 3
OD_SUPERIOR_TEMPORAL_RESTRICTION: 3

## 2018-07-19 ASSESSMENT — TONOMETRY
OS_IOP_MMHG: 18
OD_IOP_MMHG: 18
IOP_METHOD: APPLANATION

## 2018-07-19 NOTE — MR AVS SNAPSHOT
After Visit Summary   7/19/2018    Abida Hahn    MRN: [id number]           Patient Information     Date Of Birth          1940        Visit Information        Provider Department      7/19/2018 12:45 PM Corazon Addison MD Eye Clinic        Today's Diagnoses     Primary open angle glaucoma of both eyes, severe stage    -  1       Follow-ups after your visit        Your next 10 appointments already scheduled     Jul 24, 2018  1:15 PM CDT   RETURN GLAUCOMA with Corazon Addison MD   Eye Clinic ([facility])    [address]  [address] [address]  [facility] [address]   [phone]            Nov 19, 2018  1:40 PM CST   (Arrive by 1:25 PM)   Return Visit with Amarilys Iyer MD   [facility] ([facility])    [address]  4th Floor  [facility] [id number] [phone]            Nov 29, 2018 10:30 AM CST   FULL PULMONARY FUNCTION with  PFL B   [facility] Pulmonary Function Testing ([facility])    [address]  3rd Floor  [facility] [address] [phone]            Nov 29, 2018 11:30 AM CST   (Arrive by 11:15 AM)   Return Interstitial Lung with Nirmala Muñoz MD   [facility] ([facility])    [address]  [address]  [facility] [id number] [phone]              Who to contact     Please call your clinic at [phone] to:    Ask questions about your health    Make or cancel appointments    Discuss your medicines    Learn about your test results    Speak to your doctor            Additional Information About Your Visit        Care EveryWhere ID     This is your Care EveryWhere ID. This could be used by other organizations to access your [place] medical records  [id number]         Blood Pressure from Last 3 Encounters:   07/12/18 132/79   06/15/18 (P) 118/74   04/25/18 101/66     Weight from Last 3 Encounters:   06/15/18 86.6 kg (191 lb)   04/25/18 86.1 kg (189 lb 12.8 oz)   03/02/18 84.4 kg (186 lb)              Today, you had the following     No orders found for display       Primary Care Provider Office Phone # Fax #    Amarilys Iyer [phone] [address] [address]        Equal Access to Services     DADA ALMARAZ : Hadii aad ku hadasho Soomaali, waaxda luqadaha, qaybta kaalmada adeegyada, waxay idiin hayaan adeeg kharash la'aan . So [facility] [phone].    ATENCIÓN: Si habla español, tiene a be disposición servicios gratuitos de asistencia lingüística. [facility] [phone].    We comply with applicable federal civil rights laws and [place] laws. We do not discriminate on the basis of race, color, national origin, age, disability, sex, sexual orientation, or gender identity.            Thank you!     Thank you for choosing EYE CLINIC  for your care. Our goal is always to provide you with excellent care. Hearing back from our patients is one way we can continue to improve our services. Please take a few minutes to complete the written survey that you may receive in the mail after your visit with us. Thank you!             Your Updated Medication List - Protect others around you: Learn how to safely use, store and throw away your medicines at www.disposemymeds.org.          This list is accurate as of 7/19/18 11:59 PM.  Always use your most recent med list.                   Brand Name Dispense Instructions for use Diagnosis    albuterol 108 (90 Base) MCG/ACT Inhaler    PROAIR HFA/PROVENTIL HFA/VENTOLIN HFA    1 Inhaler    Inhale 2 puffs into the lungs every 6 hours as needed for shortness of breath / dyspnea or wheezing    ILD (interstitial lung disease) (H)       amLODIPine 5 MG tablet    NORVASC    90 tablet    Take 1 tablet (5 mg) by mouth daily    Essential hypertension       ARTIFICIAL TEAR SOLUTION OP      Apply  to eye as  "needed.        aspirin 81 MG tablet      Take 81 mg by mouth daily        CALCIUM + D PO      Take 1 tablet by mouth 2 times daily        DAILY MULTIVITAMIN PO      Take 1 tablet by mouth daily And minerals per pt        folic acid 800 MCG Tabs      Take by mouth 2 times daily        GLUCOSAMINE SULFATE PO      Take 1,000 mg by mouth daily        latanoprost 0.005 % ophthalmic solution    XALATAN    1 Bottle    Place 1 drop into both eyes At Bedtime    Primary open angle glaucoma of both eyes, severe stage       levothyroxine 88 MCG tablet    SYNTHROID/LEVOTHROID    90 tablet    TAKE 1 TABLET BY MOUTH EVERY DAY.    Hypothyroidism       lisinopril 40 MG tablet    PRINIVIL/ZESTRIL    90 tablet    Take 1 tablet (40 mg) by mouth daily    Essential hypertension       methotrexate 50 MG/2ML injection CHEMO     4 mL    MIX 0.8 ML (20 mg) WITH JUICE AND DRINK ONCE WEEKLY.*Call clinic to schedule  APPT/ LABS    Rheumatoid arthritis involving multiple sites with positive rheumatoid factor (H)       Needle (Disp) 27G X 5/8\" Misc    BD SAFETYGLIDE NEEDLE    12 each    For methotrexate use EVERY 7 DAY    Rheumatoid arthritis involving multiple sites with positive rheumatoid factor (H)       Vitamin D-3 1000 units Caps      Take by mouth 2 times daily          "

## 2018-07-19 NOTE — NURSING NOTE
Chief Complaints and History of Present Illnesses   Patient presents with     Follow Up For     3 month follow up POAG BE     HPI    Affected eye(s):  Both   Symptoms:     No floaters   No flashes   No glare   No halos         Do you have eye pain now?:  No      Comments:  3 month follow up POAG BE  Pt reports no changes  Latanoprost every day BE  Kasey Hairston COA 1:37 PM July 19, 2018

## 2018-07-19 NOTE — Clinical Note
Visual field was stable and unchanged.  Patient will return to clinic in 3-4 months with visual field test, dilated eye exam and IOP check.  Please call patient to inform of results and to reschedule appointment. Corazon Addison

## 2018-07-20 NOTE — PROGRESS NOTES
Patient left without being seen.  Visual field reviewed which appears stable.  Patient will return to clinic in 3-4 months with visual field test, dilated eye exam and IOP check.  Corazon Addison MD

## 2018-08-10 DIAGNOSIS — M05.79 RHEUMATOID ARTHRITIS INVOLVING MULTIPLE SITES WITH POSITIVE RHEUMATOID FACTOR (H): ICD-10-CM

## 2018-08-10 RX ORDER — METHOTREXATE 25 MG/ML
INJECTION, SOLUTION INTRA-ARTERIAL; INTRAMUSCULAR; INTRAVENOUS
Qty: 4 ML | Refills: 0 | Status: SHIPPED | OUTPATIENT
Start: 2018-08-10 | End: 2018-10-01

## 2018-08-10 NOTE — TELEPHONE ENCOUNTER
methotrexate 50 MG/2ML injection CHEMO  Last Written Prescription Date:  6/25/2018  Last Fill Quantity: 4 ml,   # refills: 0  Last Office Visit:  4/25/2018  Future Office visit: None    CBC RESULTS:   Recent Labs   Lab Test  04/25/18   1048   WBC  4.8   RBC  4.23   HGB  14.2   HCT  43.3   MCV  102*   MCH  33.6*   MCHC  32.8   RDW  13.4   PLT  167       Creatinine   Date Value Ref Range Status   04/25/2018 0.94 0.52 - 1.04 mg/dL Final   ]    Liver Function Studies -   Recent Labs   Lab Test  04/25/18   1048   04/06/17   1442   PROTTOTAL   --    --   7.5   ALBUMIN   --    --   3.9   BILITOTAL   --    --   0.4   ALKPHOS   --    --   133   AST  16   < >  17   ALT  20   < >  25    < > = values in this interval not displayed.           Routing for prescription and lab appt.

## 2018-08-10 NOTE — TELEPHONE ENCOUNTER
I spoke to pt and advised her that refill was sent to pharm and she is past due on labs,. Labs need to be drawn every 2-3 months. Pt asked to have labs faxed tp [facility] in [place].    Nida Pederson LPN

## 2018-08-27 ENCOUNTER — OFFICE VISIT (OUTPATIENT)
Dept: ORTHOPEDICS | Facility: CLINIC | Age: 78
End: 2018-08-27
Payer: MEDICARE

## 2018-08-27 ENCOUNTER — RADIANT APPOINTMENT (OUTPATIENT)
Dept: GENERAL RADIOLOGY | Facility: CLINIC | Age: 78
End: 2018-08-27
Payer: MEDICARE

## 2018-08-27 VITALS — SYSTOLIC BLOOD PRESSURE: 124 MMHG | DIASTOLIC BLOOD PRESSURE: 75 MMHG | RESPIRATION RATE: 16 BRPM | HEART RATE: 67 BPM

## 2018-08-27 DIAGNOSIS — M79.672 LEFT FOOT PAIN: ICD-10-CM

## 2018-08-27 DIAGNOSIS — E03.9 HYPOTHYROIDISM, UNSPECIFIED TYPE: ICD-10-CM

## 2018-08-27 DIAGNOSIS — M05.79 RHEUMATOID ARTHRITIS INVOLVING MULTIPLE SITES WITH POSITIVE RHEUMATOID FACTOR (H): ICD-10-CM

## 2018-08-27 DIAGNOSIS — J84.9 ILD (INTERSTITIAL LUNG DISEASE) (H): ICD-10-CM

## 2018-08-27 DIAGNOSIS — M05.79 RHEUMATOID ARTHRITIS INVOLVING MULTIPLE SITES WITH POSITIVE RHEUMATOID FACTOR (H): Primary | ICD-10-CM

## 2018-08-27 DIAGNOSIS — M79.672 FOOT PAIN, LEFT: ICD-10-CM

## 2018-08-27 DIAGNOSIS — M79.672 LEFT FOOT PAIN: Primary | ICD-10-CM

## 2018-08-27 DIAGNOSIS — Z79.899 ENCOUNTER FOR LONG-TERM CURRENT USE OF MEDICATION: ICD-10-CM

## 2018-08-27 DIAGNOSIS — I10 ESSENTIAL HYPERTENSION: ICD-10-CM

## 2018-08-27 LAB
ALBUMIN SERPL-MCNC: 3.6 G/DL (ref 3.4–5)
ALP SERPL-CCNC: 94 U/L (ref 40–150)
ALT SERPL W P-5'-P-CCNC: 24 U/L (ref 0–50)
ANION GAP SERPL CALCULATED.3IONS-SCNC: 6 MMOL/L (ref 3–14)
AST SERPL W P-5'-P-CCNC: 18 U/L (ref 0–45)
BILIRUB SERPL-MCNC: 0.6 MG/DL (ref 0.2–1.3)
BUN SERPL-MCNC: 21 MG/DL (ref 7–30)
CALCIUM SERPL-MCNC: 9.8 MG/DL (ref 8.5–10.1)
CHLORIDE SERPL-SCNC: 103 MMOL/L (ref 94–109)
CHOLEST SERPL-MCNC: 189 MG/DL
CO2 SERPL-SCNC: 28 MMOL/L (ref 20–32)
CREAT SERPL-MCNC: 0.99 MG/DL (ref 0.52–1.04)
ERYTHROCYTE [DISTWIDTH] IN BLOOD BY AUTOMATED COUNT: 12.5 % (ref 10–15)
GFR SERPL CREATININE-BSD FRML MDRD: 54 ML/MIN/1.7M2
GLUCOSE SERPL-MCNC: 102 MG/DL (ref 70–99)
HCT VFR BLD AUTO: 45.2 % (ref 35–47)
HDLC SERPL-MCNC: 47 MG/DL
HGB BLD-MCNC: 15 G/DL (ref 11.7–15.7)
LDLC SERPL CALC-MCNC: 105 MG/DL
MCH RBC QN AUTO: 33.4 PG (ref 26.5–33)
MCHC RBC AUTO-ENTMCNC: 33.2 G/DL (ref 31.5–36.5)
MCV RBC AUTO: 101 FL (ref 78–100)
NONHDLC SERPL-MCNC: 142 MG/DL
PLATELET # BLD AUTO: 177 10E9/L (ref 150–450)
POTASSIUM SERPL-SCNC: 4.4 MMOL/L (ref 3.4–5.3)
PROT SERPL-MCNC: 7.5 G/DL (ref 6.8–8.8)
RBC # BLD AUTO: 4.49 10E12/L (ref 3.8–5.2)
SODIUM SERPL-SCNC: 137 MMOL/L (ref 133–144)
TRIGL SERPL-MCNC: 183 MG/DL
TSH SERPL DL<=0.005 MIU/L-ACNC: 1.49 MU/L (ref 0.4–4)
WBC # BLD AUTO: 6.1 10E9/L (ref 4–11)

## 2018-08-27 NOTE — MR AVS SNAPSHOT
After Visit Summary   8/27/2018    Abida Hahn    MRN: [id number]           Patient Information     Date Of Birth          1940        Visit Information        Provider Department      8/27/2018 11:45 AM Paramjit Sanford MD [facility] Medicine        Today's Diagnoses     Rheumatoid arthritis involving multiple sites with positive rheumatoid factor (H)    -  1    Foot pain, left           Follow-ups after your visit        Your next 10 appointments already scheduled     Nov 19, 2018  1:40 PM CST   (Arrive by 1:25 PM)   Return Visit with Amarilys Iyer MD   [facility] ([facility])    [address]  4th [address]  [facility] [address] [phone]            Nov 29, 2018 10:30 AM CST   FULL PULMONARY FUNCTION with  PFL B   [facility] Pulmonary Function Testing ([facility])    [address]  3rd [address]  [facility] [id number] [phone]            Nov 29, 2018 11:30 AM CST   (Arrive by 11:15 AM)   Return Interstitial Lung with Nirmala Muñoz MD   [facility] ([facility])    [address] [place] [address]  [address] [place] [id number] [phone]              Who to contact     Please call your clinic at [phone] to:    Ask questions about your health    Make or cancel appointments    Discuss your medicines    Learn about your test results    Speak to your doctor            Additional Information About Your Visit        Care EveryWhere ID     This is your Care EveryWhere ID. This could be used by other organizations to access your [place] medical records  [id number]        Your Vitals Were     Pulse Respirations                67 16           Blood Pressure from Last 3 Encounters:   08/27/18 124/75   07/12/18 132/79   06/15/18 (P) 118/74    Weight from Last 3 Encounters:   06/15/18 86.6 kg (191 lb)   04/25/18 86.1 kg (189 lb 12.8  oz)   03/02/18 84.4 kg (186 lb)              Today, you had the following     No orders found for display       Primary Care Provider Office Phone # Fax #    Amariyls Iyer [phone] [address] [address]        Equal Access to Services     DADA ALMARAZ : Hadii aad ku hadasho Soomaali, waaxda luqadaha, qaybta kaalmada adeegyada, waxay idiin hayaan adeeg kirtilynda lajoseph . So [facility] [phone].    ATENCIÓN: Si habla español, tiene a be disposición servicios gratuitos de asistencia lingüística. Llame al [phone].    We comply with applicable federal civil rights laws and [place] laws. We do not discriminate on the basis of race, color, national origin, age, disability, sex, sexual orientation, or gender identity.            Thank you!     Thank you for choosing [facility]  for your care. Our goal is always to provide you with excellent care. Hearing back from our patients is one way we can continue to improve our services. Please take a few minutes to complete the written survey that you may receive in the mail after your visit with us. Thank you!             Your Updated Medication List - Protect others around you: Learn how to safely use, store and throw away your medicines at www.disposemymeds.org.          This list is accurate as of 8/27/18 12:21 PM.  Always use your most recent med list.                   Brand Name Dispense Instructions for use Diagnosis    albuterol 108 (90 Base) MCG/ACT inhaler    PROAIR HFA/PROVENTIL HFA/VENTOLIN HFA    1 Inhaler    Inhale 2 puffs into the lungs every 6 hours as needed for shortness of breath / dyspnea or wheezing    ILD (interstitial lung disease) (H)       amLODIPine 5 MG tablet    NORVASC    90 tablet    Take 1 tablet (5 mg) by mouth daily    Essential hypertension       ARTIFICIAL TEAR SOLUTION OP      Apply  to eye as needed.        aspirin 81 MG tablet      Take 81 mg by mouth daily         "CALCIUM + D PO      Take 1 tablet by mouth 2 times daily        DAILY MULTIVITAMIN PO      Take 1 tablet by mouth daily And minerals per pt        folic acid 800 MCG Tabs      Take by mouth 2 times daily        GLUCOSAMINE SULFATE PO      Take 1,000 mg by mouth daily        latanoprost 0.005 % ophthalmic solution    XALATAN    1 Bottle    Place 1 drop into both eyes At Bedtime    Primary open angle glaucoma of both eyes, severe stage       levothyroxine 88 MCG tablet    SYNTHROID/LEVOTHROID    90 tablet    TAKE 1 TABLET BY MOUTH EVERY DAY.    Hypothyroidism       lisinopril 40 MG tablet    PRINIVIL/ZESTRIL    90 tablet    Take 1 tablet (40 mg) by mouth daily    Essential hypertension       methotrexate 50 MG/2ML injection CHEMO     4 mL    MIX 0.8 ML (20 mg) WITH JUICE AND DRINK ONCE WEEKLY.*Call clinic to schedule  APPT/ LABS    Rheumatoid arthritis involving multiple sites with positive rheumatoid factor (H)       Needle (Disp) 27G X 5/8\" Misc    BD SAFETYGLIDE NEEDLE    12 each    For methotrexate use EVERY 7 DAY    Rheumatoid arthritis involving multiple sites with positive rheumatoid factor (H)       Vitamin D-3 1000 units Caps      Take by mouth 2 times daily          "

## 2018-08-27 NOTE — PROGRESS NOTES
Sports Medicine Clinic Visit    PCP: Amarilys Iyer    Abida Hahn is a 78 year old female who is seen  as self referral presenting with left foot pain. The patient reports stubbing her toe into the oven.     Injury: 3 weeks ago    Location of Pain: left 3rd toe  Duration of Pain: 3 week(s)  Rating of Pain: 5/10  Pain is better with: Nothing  Pain is worse with: Walking, WB   Additional Features: Swelling  Treatment so far consists of: Elevation  Prior History of related problems: Arthritis in foot    /75 (BP Location: Right arm, Patient Position: Sitting, Cuff Size: Adult Large)  Pulse 67  Resp 16         PMH:  Past Medical History:   Diagnosis Date     Abscess, toe 2009    Right great toe     Cataract 8/2011    Right s/p surgery 8/2011; left s/p surgery     Glaucoma      Headache(784.0)     Relieved with gabapentin. 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 +YEYO      Wrist fracture     Right, s/p ORIF       Active problem list:  Patient Active Problem List   Diagnosis     Chronic pain syndrome     Essential hypertension     Rheumatoid arthritis (H)     Encounter for long-term current use of medication     Pyoderma gangrenosum     Hypothyroidism     Osteoporosis     Primary open angle glaucoma of both eyes, severe stage     Pseudophakia of both eyes     ILD (interstitial lung disease) (H)       FH:  Family History   Problem Relation Age of Onset     Cancer Father      colon     Emphysema Father      Arthritis Mother      RA at 33 y/o, glaucome     Glaucoma Mother      Arthritis Brother      RA on Mtx and humira     Melanoma No family hx of      Skin Cancer No family hx of        SH:  Social History     Social History     Marital status: Single     Spouse name: N/A      Number of children: N/A     Years of education: N/A     Occupational History     Not on file.     Social History Main Topics     Smoking status: Former Smoker     Packs/day: 0.50     Quit date: 7/6/1992     Smokeless tobacco: Never Used      Comment: Quit in 20yrs      Alcohol use No     Drug use: No     Sexual activity: Not on file     Other Topics Concern     Not on file     Social History Narrative    Lives in senior building.  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 Enjoys reading, friends, children and shopping.  Denies exposure to asbestos, silica, pet birds, hot tubs, feather pillows, mold.       MEDS:  See EMR, reviewed  ALL:  See EMR, reviewed    REVIEW OF SYSTEMS:  CONSTITUTIONAL:NEGATIVE for fever, chills, change in weight  INTEGUMENTARY/SKIN: NEGATIVE for worrisome rashes, moles or lesions  EYES: NEGATIVE for vision changes or irritation  ENT/MOUTH: NEGATIVE for ear, mouth and throat problems  RESP:NEGATIVE for significant cough or SOB  BREAST: NEGATIVE for masses, tenderness or discharge  CV: NEGATIVE for chest pain, palpitations or peripheral edema  GI: NEGATIVE for nausea, abdominal pain, heartburn, or change in bowel habits  :NEGATIVE for frequency, dysuria, or hematuria  :NEGATIVE for frequency, dysuria, or hematuria  NEURO: NEGATIVE for weakness, dizziness or paresthesias  ENDOCRINE: NEGATIVE for temperature intolerance, skin/hair changes  HEME/ALLERGY/IMMUNE: NEGATIVE for bleeding problems  PSYCHIATRIC: NEGATIVE for changes in mood or affect            Subjective: This 70-year-old female with a history of rheumatoid arthritis has had acute discomfort involving the ball of her left foot, specifically along the volar base of her second third and fourth metatarsals over the last 3 weeks.  It occurred acutely after she forcibly stubbed her foot against a solid object.  She saw her podiatrist but he did not address the problem according to the patient.  She  did not have her foot examined or have an x-ray done.  She has been wearing a pair of athletic shoes since that time.  She likes to walk for exercise and the focal discomfort in this area has been worrisome to her.    Objective she has signs of rheumatoid arthritis involving the foot with ulnar-sided drift involving the toes.  She points to the mid to distal metatarsals of the second third and fourth digits as the area of discomfort although I cannot get any specific tenderness in this area on exam involving the shafts of the second third and fourth metatarsals or the heads.  Squeeze test is negative.  She seems nontender along the distal digits of the second third and fourth toes.  I can passively flex and extend them through the limited range of motion allowed by her rheumatoid arthritis changes.  She tolerates this motion without acute pain.  There is no skin breakdown noted.  No signs of swelling or cellulitis.  No discoloration.    An x-ray of the foot shows no signs of acute bony abnormality in the area of discomfort.  There is rheumatoid arthritis changes involving the toes and but I cannot see any acute fracture    Assessment soft tissue injury left foot.  Rheumatoid arthritis.    Plan: We discussed an insert with a metatarsal pad but the patient thinks she is unlikely to tolerate it.  She does have a firm soled shoe that will not flex through the forefoot available at home.  I encouraged her to wear it over the next 2-3 weeks until the discomfort improves and she can transition back to her athletic shoe.  She was reassured that there was no fracture and she will follow-up as needed.

## 2018-08-27 NOTE — LETTER
8/27/2018      RE: Abida Hahn  [address]  [facility] [address]       Sports Medicine Clinic Visit    PCP: Amarilys Iyer    Abida Hahn is a 78 year old female who is seen  as self referral presenting with left foot pain. The patient reports stubbing her toe into the oven.     Injury: 3 weeks ago    Location of Pain: left 3rd toe  Duration of Pain: 3 week(s)  Rating of Pain: 5/10  Pain is better with: Nothing  Pain is worse with: Walking, WB   Additional Features: Swelling  Treatment so far consists of: Elevation  Prior History of related problems: Arthritis in foot    /75 (BP Location: Right arm, Patient Position: Sitting, Cuff Size: Adult Large)  Pulse 67  Resp 16         PMH:  Past Medical History:   Diagnosis Date     Abscess, toe 2009    Right great toe     Cataract 8/2011    Right s/p surgery 8/2011; left s/p surgery     Glaucoma      Headache(784.0)     Relieved with gabapentin. 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 +YEYO      Wrist fracture     Right, s/p ORIF       Active problem list:  Patient Active Problem List   Diagnosis     Chronic pain syndrome     Essential hypertension     Rheumatoid arthritis (H)     Encounter for long-term current use of medication     Pyoderma gangrenosum     Hypothyroidism     Osteoporosis     Primary open angle glaucoma of both eyes, severe stage     Pseudophakia of both eyes     ILD (interstitial lung disease) (H)       FH:  Family History   Problem Relation Age of Onset     Cancer Father      colon     Emphysema Father      Arthritis Mother      RA at 31 y/o, glaucome     Glaucoma Mother      Arthritis Brother      RA on Mtx and humira     Melanoma No family hx of      Skin Cancer No family hx of         SH:  Social History     Social History     Marital status: Single     Spouse name: N/A     Number of children: N/A     Years of education: N/A     Occupational History     Not on file.     Social History Main Topics     Smoking status: Former Smoker     Packs/day: 0.50     Quit date: 7/6/1992     Smokeless tobacco: Never Used      Comment: Quit in 20yrs      Alcohol use No     Drug use: No     Sexual activity: Not on file     Other Topics Concern     Not on file     Social History Narrative    Lives in senior building.  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 Enjoys reading, friends, children and shopping.  Denies exposure to asbestos, silica, pet birds, hot tubs, feather pillows, mold.       MEDS:  See EMR, reviewed  ALL:  See EMR, reviewed    REVIEW OF SYSTEMS:  CONSTITUTIONAL:NEGATIVE for fever, chills, change in weight  INTEGUMENTARY/SKIN: NEGATIVE for worrisome rashes, moles or lesions  EYES: NEGATIVE for vision changes or irritation  ENT/MOUTH: NEGATIVE for ear, mouth and throat problems  RESP:NEGATIVE for significant cough or SOB  BREAST: NEGATIVE for masses, tenderness or discharge  CV: NEGATIVE for chest pain, palpitations or peripheral edema  GI: NEGATIVE for nausea, abdominal pain, heartburn, or change in bowel habits  :NEGATIVE for frequency, dysuria, or hematuria  :NEGATIVE for frequency, dysuria, or hematuria  NEURO: NEGATIVE for weakness, dizziness or paresthesias  ENDOCRINE: NEGATIVE for temperature intolerance, skin/hair changes  HEME/ALLERGY/IMMUNE: NEGATIVE for bleeding problems  PSYCHIATRIC: NEGATIVE for changes in mood or affect            Subjective: This 70-year-old female with a history of rheumatoid arthritis has had acute discomfort involving the ball of her left foot, specifically along the volar base of her second third and fourth metatarsals over the last 3 weeks.  It occurred acutely after she forcibly stubbed her foot against a solid  object.  She saw her podiatrist but he did not address the problem according to the patient.  She did not have her foot examined or have an x-ray done.  She has been wearing a pair of athletic shoes since that time.  She likes to walk for exercise and the focal discomfort in this area has been worrisome to her.    Objective she has signs of rheumatoid arthritis involving the foot with ulnar-sided drift involving the toes.  She points to the mid to distal metatarsals of the second third and fourth digits as the area of discomfort although I cannot get any specific tenderness in this area on exam involving the shafts of the second third and fourth metatarsals or the heads.  Squeeze test is negative.  She seems nontender along the distal digits of the second third and fourth toes.  I can passively flex and extend them through the limited range of motion allowed by her rheumatoid arthritis changes.  She tolerates this motion without acute pain.  There is no skin breakdown noted.  No signs of swelling or cellulitis.  No discoloration.    An x-ray of the foot shows no signs of acute bony abnormality in the area of discomfort.  There is rheumatoid arthritis changes involving the toes and but I cannot see any acute fracture    Assessment soft tissue injury left foot.  Rheumatoid arthritis.    Plan: We discussed an insert with a metatarsal pad but the patient thinks she is unlikely to tolerate it.  She does have a firm soled shoe that will not flex through the forefoot available at home.  I encouraged her to wear it over the next 2-3 weeks until the discomfort improves and she can transition back to her athletic shoe.  She was reassured that there was no fracture and she will follow-up as needed.    Paramjit Sanford MD

## 2018-10-01 DIAGNOSIS — M05.79 RHEUMATOID ARTHRITIS INVOLVING MULTIPLE SITES WITH POSITIVE RHEUMATOID FACTOR (H): ICD-10-CM

## 2018-10-01 RX ORDER — METHOTREXATE 25 MG/ML
INJECTION, SOLUTION INTRA-ARTERIAL; INTRAMUSCULAR; INTRAVENOUS
Qty: 4 ML | Refills: 0 | Status: SHIPPED | OUTPATIENT
Start: 2018-10-01 | End: 2018-11-15

## 2018-10-01 NOTE — TELEPHONE ENCOUNTER
methotrexate 50 MG/2ML injection CHEMO      Last Written Prescription Date:  8/10/18  Last Fill Quantity: 4 ml,   # refills: 0  Last Office Visit: 4/25/18  Future Office visit:  none    CBC RESULTS:   Recent Labs   Lab Test  08/27/18   1043   WBC  6.1   RBC  4.49   HGB  15.0   HCT  45.2   MCV  101*   MCH  33.4*   MCHC  33.2   RDW  12.5   PLT  177       Creatinine   Date Value Ref Range Status   08/27/2018 0.99 0.52 - 1.04 mg/dL Final   ]    Liver Function Studies -   Recent Labs   Lab Test  08/27/18   1043   PROTTOTAL  7.5   ALBUMIN  3.6   BILITOTAL  0.6   ALKPHOS  94   AST  18   ALT  24       Routing refill request to provider for review/approval because:  DMARD medication

## 2018-11-15 DIAGNOSIS — M05.79 RHEUMATOID ARTHRITIS INVOLVING MULTIPLE SITES WITH POSITIVE RHEUMATOID FACTOR (H): ICD-10-CM

## 2018-11-16 DIAGNOSIS — H40.1133 PRIMARY OPEN ANGLE GLAUCOMA OF BOTH EYES, SEVERE STAGE: ICD-10-CM

## 2018-11-18 RX ORDER — METHOTREXATE 25 MG/ML
INJECTION, SOLUTION INTRA-ARTERIAL; INTRAMUSCULAR; INTRAVENOUS
Qty: 4 ML | Refills: 0 | Status: SHIPPED | OUTPATIENT
Start: 2018-11-18 | End: 2018-12-27

## 2018-11-18 NOTE — TELEPHONE ENCOUNTER
methotrexate 50 MG/2ML injection CHEMO      Last Written Prescription Date:  10/1/18  Last Fill Quantity: 4 ml,   # refills: 0  Last Office Visit: 4/25/18  Future Office visit:  none    CBC RESULTS:   Recent Labs   Lab Test  08/27/18   1043   WBC  6.1   RBC  4.49   HGB  15.0   HCT  45.2   MCV  101*   MCH  33.4*   MCHC  33.2   RDW  12.5   PLT  177       Creatinine   Date Value Ref Range Status   08/27/2018 0.99 0.52 - 1.04 mg/dL Final   ]    Liver Function Studies -   Recent Labs   Lab Test  08/27/18   1043   PROTTOTAL  7.5   ALBUMIN  3.6   BILITOTAL  0.6   ALKPHOS  94   AST  18   ALT  24       Routing refill request to provider for review/approval because:  DMARD medication.  > 6 months since last seen.  *message sent to clinic coordinator for scheduling.

## 2018-11-20 RX ORDER — LATANOPROST 50 UG/ML
1 SOLUTION/ DROPS OPHTHALMIC AT BEDTIME
Qty: 2.5 ML | Refills: 11 | Status: SHIPPED | OUTPATIENT
Start: 2018-11-20 | End: 2019-09-22

## 2018-11-29 ENCOUNTER — OFFICE VISIT (OUTPATIENT)
Dept: INTERNAL MEDICINE | Facility: CLINIC | Age: 78
End: 2018-11-29
Payer: MEDICARE

## 2018-11-29 VITALS
SYSTOLIC BLOOD PRESSURE: 134 MMHG | OXYGEN SATURATION: 95 % | WEIGHT: 197.7 LBS | DIASTOLIC BLOOD PRESSURE: 78 MMHG | BODY MASS INDEX: 31.67 KG/M2 | RESPIRATION RATE: 20 BRPM | HEART RATE: 61 BPM

## 2018-11-29 DIAGNOSIS — M81.0 OSTEOPOROSIS WITHOUT CURRENT PATHOLOGICAL FRACTURE, UNSPECIFIED OSTEOPOROSIS TYPE: ICD-10-CM

## 2018-11-29 DIAGNOSIS — Z23 NEED FOR PROPHYLACTIC VACCINATION AND INOCULATION AGAINST INFLUENZA: Primary | ICD-10-CM

## 2018-11-29 DIAGNOSIS — J84.9 ILD (INTERSTITIAL LUNG DISEASE) (H): ICD-10-CM

## 2018-11-29 DIAGNOSIS — I10 BENIGN ESSENTIAL HYPERTENSION: ICD-10-CM

## 2018-11-29 DIAGNOSIS — M54.50 CHRONIC MIDLINE LOW BACK PAIN WITHOUT SCIATICA: ICD-10-CM

## 2018-11-29 DIAGNOSIS — M05.79 RHEUMATOID ARTHRITIS INVOLVING MULTIPLE SITES WITH POSITIVE RHEUMATOID FACTOR (H): ICD-10-CM

## 2018-11-29 DIAGNOSIS — E03.9 HYPOTHYROIDISM, UNSPECIFIED TYPE: ICD-10-CM

## 2018-11-29 DIAGNOSIS — G89.29 CHRONIC MIDLINE LOW BACK PAIN WITHOUT SCIATICA: ICD-10-CM

## 2018-11-29 ASSESSMENT — PATIENT HEALTH QUESTIONNAIRE - PHQ9: SUM OF ALL RESPONSES TO PHQ QUESTIONS 1-9: 2

## 2018-11-29 ASSESSMENT — PAIN SCALES - GENERAL: PAINLEVEL: MODERATE PAIN (5)

## 2018-11-29 NOTE — MR AVS SNAPSHOT
After Visit Summary   11/29/2018    Abida Hahn    MRN: [id number]           Patient Information     Date Of Birth          1940        Visit Information        Provider Department      11/29/2018 1:25 PM Amarilys Iyer MD [facility]        Today's Diagnoses     Need for prophylactic vaccination and inoculation against influenza    -  1    Chronic midline low back pain without sciatica        Benign essential hypertension        Rheumatoid arthritis involving multiple sites with positive rheumatoid factor (H)        Hypothyroidism, unspecified type        ILD (interstitial lung disease) (H)        Osteoporosis without current pathological fracture, unspecified osteoporosis type          Care Instructions    Primary Care Center Medication Refill Request Information:  * Please contact your pharmacy regarding ANY request for medication refills.  ** [facility] Prescription Fax = [phone]  * Please allow 3 business days for routine medication refills.  * Please allow 5 business days for controlled substance medication refills.     Primary Care Center Test Result notification information:  *You will be notified with in 7-10 days of your appointment day regarding the results of your test.  If you are on MyChart you will be notified as soon as the provider has reviewed the results and signed off on them.    Primary [facility]: [phone]           Follow-ups after your visit        Follow-up notes from your care team     Return in about 6 months (around 5/29/2019).      Your next 10 appointments already scheduled     Jan 08, 2019  2:00 PM CST   RETURN GLAUCOMA with Corazon Addiosn MD   Eye Clinic ([facility])    [address] [facility] [address] [place] [id number]   [phone]            Jan 18, 2019 12:30 PM CST   (Arrive by 12:15 PM)   Return Visit with Bebeto Mccurdy MD   [facility] ([facility])    [address]  [address]  [facility] [address] [phone]            Mar 15, 2019 10:00 AM CDT   FULL PULMONARY FUNCTION with UC PFL C   [facility] Pulmonary Function Testing ([facility])    [address]  3rd Floor  [facility] [id number] [phone]            Mar 15, 2019 11:00 AM CDT   (Arrive by 10:45 AM)   Return Interstitial Lung with Nirmala Muñoz MD   [facility] ([facility])    [address]  [address]  [facility] [id number] [phone]              Future tests that were ordered for you today     Open Future Orders        Priority Expected Expires Ordered    Dexa hip/pelvis/spine* Routine  2019            Who to contact     Please call your clinic at [phone] to:    Ask questions about your health    Make or cancel appointments    Discuss your medicines    Learn about your test results    Speak to your doctor            Additional Information About Your Visit        [employer] Information     [employer] is an electronic gateway that provides easy, online access to your medical records. With [employer], you can request a clinic appointment, read your test results, renew a prescription or communicate with your care team.     To sign up for [employer] visit the website at [email or web address]   You will be asked to enter the access code listed below, as well as some personal information. Please follow the directions to create your username and password.     Your access code is: [email or web address]  Expires: 2019  6:30 AM     Your access code will  in 90 days. If you need help or a new code, please contact your [facility] or call [phone] for assistance.        Care EveryWhere ID     This is your Care EveryWhere ID. This could be used by other organizations to access your [place] medical records  [id number]        Your  Vitals Were     Pulse Respirations Pulse Oximetry BMI (Body Mass Index)          61 20 95% 31.67 kg/m2         Blood Pressure from Last 3 Encounters:   11/29/18 134/78   08/27/18 124/75   07/12/18 132/79    Weight from Last 3 Encounters:   11/29/18 89.7 kg (197 lb 11.2 oz)   06/15/18 86.6 kg (191 lb)   04/25/18 86.1 kg (189 lb 12.8 oz)              We Performed the Following     FLU VACCINE, INCREASED ANTIGEN, PRESV FREE, AGE 65+ [78716]        Primary Care Provider Office Phone # Fax #    Amarilys Iyer [phone]       [address] [address]        Equal Access to Services     DADA ALMARAZ : Hadii anu lymano Sochandler, waaxda luqadaha, qaybta kaalmada adeegyada, neo quinn . So [facility] [phone].    ATENCIÓN: Si habla español, tiene a be disposición servicios gratuitos de asistencia lingüística. Llame al [phone].    We comply with applicable federal civil rights laws and [place] laws. We do not discriminate on the basis of race, color, national origin, age, disability, sex, sexual orientation, or gender identity.            Thank you!     Thank you for choosing [facility]  for your care. Our goal is always to provide you with excellent care. Hearing back from our patients is one way we can continue to improve our services. Please take a few minutes to complete the written survey that you may receive in the mail after your visit with us. Thank you!             Your Updated Medication List - Protect others around you: Learn how to safely use, store and throw away your medicines at www.disposemymeds.org.          This list is accurate as of 11/29/18  2:19 PM.  Always use your most recent med list.                   Brand Name Dispense Instructions for use Diagnosis    albuterol 108 (90 Base) MCG/ACT inhaler    PROAIR HFA/PROVENTIL HFA/VENTOLIN HFA    1 Inhaler    Inhale 2 puffs into the lungs every 6 hours as  "needed for shortness of breath / dyspnea or wheezing    ILD (interstitial lung disease) (H)       amLODIPine 5 MG tablet    NORVASC    90 tablet    Take 1 tablet (5 mg) by mouth daily    Essential hypertension       ARTIFICIAL TEAR SOLUTION OP      Apply  to eye as needed.        aspirin 81 MG tablet    ASA     Take 81 mg by mouth daily        CALCIUM + D PO      Take 1 tablet by mouth 2 times daily        DAILY MULTIVITAMIN PO      Take 1 tablet by mouth daily And minerals per pt        folic acid 800 MCG tablet      Take by mouth 2 times daily        GLUCOSAMINE SULFATE PO      Take 1,000 mg by mouth daily        latanoprost 0.005 % ophthalmic solution    XALATAN    2.5 mL    Place 1 drop into both eyes At Bedtime    Primary open angle glaucoma of both eyes, severe stage       levothyroxine 88 MCG tablet    SYNTHROID/LEVOTHROID    90 tablet    TAKE 1 TABLET BY MOUTH EVERY DAY.    Hypothyroidism       lisinopril 40 MG tablet    PRINIVIL/ZESTRIL    90 tablet    Take 1 tablet (40 mg) by mouth daily    Essential hypertension       methotrexate 50 MG/2ML injection CHEMO     4 mL    MIX 0.8 ML (20 mg) WITH JUICE AND DRINK EVERY 7 DAYS  *APPOINTMENT DUE. LABS EVERY 8-12 WK    Rheumatoid arthritis involving multiple sites with positive rheumatoid factor (H)       Needle (Disp) 27G X 5/8\" Misc    BD SAFETYGLIDE NEEDLE    12 each    For methotrexate use EVERY 7 DAY    Rheumatoid arthritis involving multiple sites with positive rheumatoid factor (H)       Vitamin D-3 1000 units Caps      Take by mouth 2 times daily          "

## 2018-11-29 NOTE — NURSING NOTE
Chief Complaint   Patient presents with     Hypertension      5 month follow up for diabetes     Pain     Pain in lower back    Emmanuelle Rice LPN 1:43 PM on 11/29/2018    Rooming Note  Health Maintenance   Health Maintenance Due   Topic Date Due     GREGORIO QUESTIONNAIRE 1 YEAR  06/05/1958     ADVANCE DIRECTIVE PLANNING Q5 YRS  06/05/1995     INFLUENZA VACCINE (1) 09/01/2018    All health maintenance items discussed and pended.  Gregorio-7 questionnaire given to patient to fill out.Emmanuelle Rice LPN 1:47 PM on 11/29/2018  Emmanuelle Rice LPN 1:47 PM on 11/29/2018  Health Directive information given to patient.Emmanuelle Rice LPN 1:47 PM on 11/29/2018

## 2018-11-29 NOTE — NURSING NOTE
Abida Hahn      1.  Has the patient received the information for the influenza vaccine? YES    2.  Does the patient have any of the following contraindications?     Allergy to eggs?  No     Allergic reaction to previous influenza vaccines?  No     Any other problems to previous influenza vaccines?  No     Paralyzed by Guillain-Killingworth syndrome?  No     Currently pregnant? NO     Current moderate or severe illness?  No     Allergy to contact lens solution?  No    3.  The vaccine has been administered in the usual fashion and the patient was instructed to wait 20 minutes before leaving the building in the event of an allergic reaction: YES    Vaccination given by .Emmanuelle Rice LPN 2:28 PM on 11/29/2018.  Recorded by Emmanuelle Rice  High dose flu shot given without problems, patient tolerated the procedure well.Emmanuelle Rice LPN 2:28 PM on 11/29/2018

## 2018-11-29 NOTE — PROGRESS NOTES
[facility]  Primary Care Center   Amarilys Iyer MD  11/29/2018      Chief Complaint:   Hypertension and Pain     History of Present Illness:   Abida Hahn is a 78 year old female with a history of hypertension, rheumatoid arthritis with ILD and osteoporosis who presents for evaluation of hypertension and pain. She is happy with her community as she feels that she is able to talk to her neighbors at lunch. She states that she does not feel down or depressed recently.     Chronic back pain:  She does struggle with chronic low back pain. She feels she is able to function alright, and is not too concerned about the pain. She feels that as long as she can walk around she is happy. It is more of a constant dull pain, and there is no particular time of day where is it better/worse. She takes methotrexate and one tablet of Naproxen if it is very bad. She will also use NSAISs and heat to help. She is not very concerned about falling. She denies any pain, weakness, or giving out in her legs. She also notes that her knees feel wonderful. She states that she walks every day for about 15-20 minutes. She states that yesterday she felt wonderful walking, and the pain in her back did was not flaring up. She states that she can usually not stand very long, however when walking with her walker she is able to push down on the walker and it helps to relieve some of her back pain. There is no radiation of pain and no focal spinal pain. The pain does not correlate with any trauma.  No fevers, chills or unintentional weight loss.    Hypertension: Her blood pressure has been much better, and she is taking her medication regularly.     Spot on back: She states that in the middle of her back there is a large scabby, dry spot that she would like examined. She states that she had it looked at 10 years ago by a dermatologist who states that it was not cancerous.     Low bone density: She is still not interested in taking any medications  "for her low bone density. She states that she is due for a Dexa scan in January, and would reevaluate taking a medication after the scan.     Other concerns discussed:  - She is wondering if she should have the new Shingle vaccine.      Review of Systems:   Pertinent items are noted in HPI, remainder of complete ROS is negative.      Active Medications:      albuterol (PROAIR HFA/PROVENTIL HFA/VENTOLIN HFA) 108 (90 BASE) MCG/ACT Inhaler, Inhale 2 puffs into the lungs every 6 hours as needed for shortness of breath / dyspnea or wheezing, Disp: 1 Inhaler, Rfl: 1     amLODIPine (NORVASC) 5 MG tablet, Take 1 tablet (5 mg) by mouth daily, Disp: 90 tablet, Rfl: 3     ARTIFICIAL TEAR SOLUTION OP, Apply  to eye as needed., Disp: , Rfl:      aspirin 81 MG tablet, Take 81 mg by mouth daily, Disp: , Rfl:      Calcium-Vitamin D (CALCIUM + D PO), Take 1 tablet by mouth 2 times daily, Disp: , Rfl:      Cholecalciferol (VITAMIN D-3) 1000 UNITS CAPS, Take by mouth 2 times daily, Disp: , Rfl:      folic acid 800 MCG TABS, Take by mouth 2 times daily, Disp: , Rfl:      GLUCOSAMINE SULFATE PO, Take 1,000 mg by mouth daily, Disp: , Rfl:      latanoprost (XALATAN) 0.005 % ophthalmic solution, Place 1 drop into both eyes At Bedtime, Disp: 2.5 mL, Rfl: 11     levothyroxine (SYNTHROID/LEVOTHROID) 88 MCG tablet, TAKE 1 TABLET BY MOUTH EVERY DAY., Disp: 90 tablet, Rfl: 3     lisinopril (PRINIVIL/ZESTRIL) 40 MG tablet, Take 1 tablet (40 mg) by mouth daily, Disp: 90 tablet, Rfl: 3     methotrexate 50 MG/2ML injection CHEMO, MIX 0.8 ML (20 mg) WITH JUICE AND DRINK EVERY 7 DAYS  *APPOINTMENT DUE. LABS EVERY 8-12 WK, Disp: 4 mL, Rfl: 0     Multiple Vitamin (DAILY MULTIVITAMIN PO), Take 1 tablet by mouth daily And minerals per pt, Disp: , Rfl:      Needle, Disp, (BD SAFETYGLIDE NEEDLE) 27G X 5/8\" MISC, For methotrexate use EVERY 7 DAY, Disp: 12 each, Rfl: 1      Allergies:   Ibuprofen sodium; Penicillins; Triple antibiotic " [neomycin-polymyxin-dexameth]; Aspirin; and Codeine      Past Medical History:  Abscess toe, right  Cataract  Glaucoma  Hypertension   Hypothyroidism  Interstitial lung disease  Pyoderma gangrenosum  Rheumatoid arthritis  Wrist fracture  Chronic pain syndrome  Osteoporosis   Pseudophakia lung disease     Past Surgical History:  Biopsy artery temporal  Extracapsular cataract extraction with intraocular lens implant  Open reduction internal fixation bilateral    Family History:   Colon cancer - father  Emphysema - father  Arthritis - mother, brother  Glaucoma - mother      Social History:   This patient presented alone.   Smoking status: former smoker of 0.5 packs per day, 7/6/1992  Smokeless tobacco: no  Alcohol use: no     Physical Exam:   /78 (BP Location: Right arm, Patient Position: Sitting, Cuff Size: Adult Regular)  Pulse 61  Resp 20  Wt 89.7 kg (197 lb 11.2 oz)  SpO2 95%  BMI 31.67 kg/m2   Constitutional: Alert, oriented, pleasant, no acute distress  Head: Normocephalic, atraumatic  Eyes: Extra-ocular movements intact, no scleral icterus  ENT: Oropharynx clear, moist mucus membranes, fair dentition  Neck: Supple, no lymphadenopathy, no thyromegaly   Musculoskeletal: No edema, normal muscle tone, ambulates with a walker, bilateral hands with numerous deformities, tender along bilateral para lumbar spine   Neurologic: Alert and oriented, cranial nerves 2-12 intact.  Skin: Brown waxy macules scattered on back  Psychiatric: normal mentation, affect and mood      Assessment and Plan:  Need for prophylactic vaccination and inoculation against influenza  - FLU VACCINE, INCREASED ANTIGEN, PRESV FREE, AGE 65+ [42718]    Chronic midline low back pain without sciatica  I suspect this is normal age related arthritis rather than a compression fracture or malignancy. No red flag symptoms. She does not wish to pursue any further diagnostics at this time. Heat and NSAIDs help.     Benign essential  hypertension  Well controlled.     Rheumatoid arthritis involving multiple sites with positive rheumatoid factor (H)  Stable, follows with rheumatology.     Hypothyroidism, unspecified type  Stable.    ILD (interstitial lung disease) (H)  Recently saw pulmonary who recommended use of inhalers for ILD.     Osteoporosis without current pathological fracture, unspecified osteoporosis type  Due for a dexa next year. She is not sure if she would consider taking a medication.   - Dexa hip/pelvis/spine*     Routine Health Maintenance  - TD PRSERV FREE >=7 YRS ADS IM     Immunizations (zoster, pneumovax, flu, Tdap, Hep A/B):        Most Recent Immunizations   Administered Date(s) Administered     Influenza (High Dose) 3 valent vaccine 10/12/2017     Influenza (IIV3) PF 01/01/2016     Pneumo Conj 13-V (2010&after) 10/05/2015     Pneumococcal 23 valent 11/21/2006     TD (ADULT, 7+) 02/09/2018     TDAP Vaccine (Boostrix) 11/01/2007     Zoster vaccine, live 10/17/2011      Lipids:        Recent Labs   Lab Test  09/01/17   1103  10/08/10   0947   CHOL  191  199   HDL  49*  48*   LDL  103*  121   TRIG  194*  155*   CHOLHDLRATIO   --   4.2      Lung Ca Screening (>30 pk age 55-79 or >20 py age 50-79 + RF): does not qualify, had CT already  Colonoscopy (50-75 yrs): no longer applicable  Dexa (>65W or 70M yrs): 2/17 Conclusions:  The most negative and valid T-score of -3.7  at the level of the wrist, corresponds with osteoporosis   .  Mammogram (40-75 yrs): 3/17  Pap (21-65 yrs):             Lab Results   Component Value Date      PAP NIL 09/24/2010       Depression: screen neg  Advanced Directive: deferred      Follow-up: Return in about 6 months (around 5/29/2019).         Scribe Disclosure:   I, Belia Lomeli, am serving as a scribe to document services personally performed by Amarilys Iyer MD at this visit, based upon the provider's statements to me. All documentation has been reviewed by the aforementioned provider  prior to being entered into the official medical record.     Portions of this medical record were completed by a scribe. UPON MY REVIEW AND AUTHENTICATION BY ELECTRONIC SIGNATURE, this confirms (a) I performed the applicable clinical services, and (b) the record is accurate.   Amarilys Iyer MD  Internal Medicine    25 min spent face to face, of which >50% time spent on counseling/coordinating care exclusive of any procedure time

## 2018-12-10 DIAGNOSIS — I10 ESSENTIAL HYPERTENSION: ICD-10-CM

## 2018-12-12 RX ORDER — AMLODIPINE BESYLATE 5 MG/1
5 TABLET ORAL DAILY
Qty: 90 TABLET | Refills: 3 | Status: SHIPPED | OUTPATIENT
Start: 2018-12-12 | End: 2019-12-15

## 2018-12-26 DIAGNOSIS — M05.79 RHEUMATOID ARTHRITIS INVOLVING MULTIPLE SITES WITH POSITIVE RHEUMATOID FACTOR (H): ICD-10-CM

## 2018-12-27 RX ORDER — METHOTREXATE 25 MG/ML
INJECTION, SOLUTION INTRA-ARTERIAL; INTRAMUSCULAR; INTRAVENOUS
Qty: 4 ML | Refills: 0 | Status: SHIPPED | OUTPATIENT
Start: 2018-12-27 | End: 2019-01-18

## 2018-12-27 NOTE — TELEPHONE ENCOUNTER
methotrexate 50 MG/2ML injection CHEMO      Last Written Prescription Date:  11-18-18  Last Fill Quantity: 4 ml,   # refills: 0  Last Office Visit: 4-25-18  Future Office visit:  1-18-19    CBC RESULTS:   Recent Labs   Lab Test 08/27/18  1043   WBC 6.1   RBC 4.49   HGB 15.0   HCT 45.2   *   MCH 33.4*   MCHC 33.2   RDW 12.5          Creatinine   Date Value Ref Range Status   08/27/2018 0.99 0.52 - 1.04 mg/dL Final   ]    Liver Function Studies -   Recent Labs   Lab Test 08/27/18  1043   PROTTOTAL 7.5   ALBUMIN 3.6   BILITOTAL 0.6   ALKPHOS 94   AST 18   ALT 24       Routing refill request to provider for review/approval because:  Per protocol    FYI to Clinic RN last labs: 8-27-18

## 2018-12-27 NOTE — TELEPHONE ENCOUNTER
[facility] Call Center    Phone Message    May a detailed message be left on voicemail: yes    Reason for Call: Other: Pt is calling, to follow on the refill of the mexotraxate.     Action Taken: Message routed to:  [facility] ([facility] Rheum

## 2019-01-08 ENCOUNTER — OFFICE VISIT (OUTPATIENT)
Dept: OPHTHALMOLOGY | Facility: CLINIC | Age: 79
End: 2019-01-08
Payer: COMMERCIAL

## 2019-01-08 DIAGNOSIS — Z96.1 PSEUDOPHAKIA OF BOTH EYES: ICD-10-CM

## 2019-01-08 DIAGNOSIS — H40.1133 PRIMARY OPEN ANGLE GLAUCOMA OF BOTH EYES, SEVERE STAGE: Primary | ICD-10-CM

## 2019-01-08 PROCEDURE — G0463 HOSPITAL OUTPT CLINIC VISIT: HCPCS | Mod: ZF

## 2019-01-08 PROCEDURE — 92083 EXTENDED VISUAL FIELD XM: CPT | Mod: ZF | Performed by: OPHTHALMOLOGY

## 2019-01-08 ASSESSMENT — CUP TO DISC RATIO
OD_RATIO: 0.9
OS_RATIO: 0.9

## 2019-01-08 ASSESSMENT — TONOMETRY
IOP_METHOD: APPLANATION
OD_IOP_MMHG: 12
OS_IOP_MMHG: 15

## 2019-01-08 ASSESSMENT — VISUAL ACUITY
CORRECTION_TYPE: GLASSES
OD_PH_CC: 20/30
METHOD: SNELLEN - LINEAR
OD_PH_CC+: -3
OS_CC: 20/40
OS_PH_CC: 20/25
OD_CC: 20/40
OS_PH_CC+: -2

## 2019-01-08 ASSESSMENT — CONF VISUAL FIELD
OD_INFERIOR_TEMPORAL_RESTRICTION: 3
OD_SUPERIOR_NASAL_RESTRICTION: 3
OD_SUPERIOR_TEMPORAL_RESTRICTION: 3

## 2019-01-08 ASSESSMENT — REFRACTION_WEARINGRX
OD_ADD: +3.00
OS_SPHERE: -0.75
OS_CYLINDER: +1.50
OS_ADD: +3.00
OS_AXIS: 109
OD_CYLINDER: +0.50
OD_SPHERE: -1.50
OD_AXIS: 148

## 2019-01-08 ASSESSMENT — SLIT LAMP EXAM - LIDS
COMMENTS: NORMAL
COMMENTS: NORMAL

## 2019-01-08 ASSESSMENT — EXTERNAL EXAM - RIGHT EYE: OD_EXAM: NORMAL

## 2019-01-08 ASSESSMENT — EXTERNAL EXAM - LEFT EYE: OS_EXAM: NORMAL

## 2019-01-08 NOTE — PROGRESS NOTES
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, s/p TRAB OU (OD:96 and OS:95 with revision OS in 1997), H/O Noncompliance with visits (lost to f/u from 6711-7351) with marked progression OS -- K pachy: 571/540   Tmax:     HVF:OD:[place] and OS:Superior [place]if[place] (marked progresion from 2003-6943 and HVF  OD:[place] and OS:FLuct in 2009    CDR: 0.9/0.9    HRT/OCT: OD:Severe RNFl thinning and OS:Mod RNFL thinning      FHX of Glc: Mother -- gtts, Sister -- possibly     Gonio:  Open with few PAS     Intolerant to:      Asthma/COPD: No  Steroid Use: No    Kidney Stones:  No   Sulfa Allergy:  No    IOP targets:L-M teens (?Lteens) -- IOP borderline  2)PCIOL OU  3)H/O HA -- s/p Negative TAB with Dr. Bellamy   4)H/O RA on MTX -- following with Rheum  5)XIN    Patient will continue Latanoprost which is a teal top drop at bedtime in both eyes.  Patient will return to clinic in 3-4 months with repeat IOP check, OCT with RNFL analysis and visual field test.  A long discussion of the risks, benefits, and alternatives including potential treatment and management options were had with patient and a decision was made to observe at this time and not add additional eye drops unless evidence of progression or elevated IOPs.         This drop may cause lash growth and some darkening of the skin around the eye.  It is also possible in a patient with a freckled iris or michell eyes, that the iris could darken to brown with time, something that is not common but not reversible.      Attending Physician Attestation:  Complete documentation of historical and exam elements from today's encounter can be found in the full encounter summary report (not reduplicated in this progress note). I personally obtained the chief complaint(s) and history of present illness.  I confirmed and edited as necessary the review of systems, past medical/surgical history, family history, social history, and examination findings as documented by others; and I  examined the patient myself. I personally reviewed the relevant tests, images, and reports as documented above. I formulated and edited as necessary the assessment and plan and discussed the findings and management plan with the patient and family.  - Corazon Addison MD

## 2019-01-08 NOTE — NURSING NOTE
Chief Complaints and History of Present Illnesses   Patient presents with     Glaucoma Follow-Up     Chief Complaint(s) and History of Present Illness(es)     Glaucoma Follow-Up     Laterality: both eyes              Comments     Pt returns for POAG follow up. Vision is stable. No problems using drops and last drop used last night. BE feel good and comfortable.Notes that her Mom and brother with Hx of Glaucoma in the past.  JORDIN ALAS, COA 2:41 PM 01/08/2019

## 2019-01-18 ENCOUNTER — OFFICE VISIT (OUTPATIENT)
Dept: RHEUMATOLOGY | Facility: CLINIC | Age: 79
End: 2019-01-18
Attending: INTERNAL MEDICINE
Payer: MEDICARE

## 2019-01-18 VITALS
WEIGHT: 193.8 LBS | DIASTOLIC BLOOD PRESSURE: 78 MMHG | HEART RATE: 72 BPM | SYSTOLIC BLOOD PRESSURE: 115 MMHG | OXYGEN SATURATION: 94 % | TEMPERATURE: 99.3 F | BODY MASS INDEX: 31.04 KG/M2

## 2019-01-18 DIAGNOSIS — M05.79 RHEUMATOID ARTHRITIS INVOLVING MULTIPLE SITES WITH POSITIVE RHEUMATOID FACTOR (H): ICD-10-CM

## 2019-01-18 DIAGNOSIS — Z79.899 ENCOUNTER FOR LONG-TERM CURRENT USE OF MEDICATION: ICD-10-CM

## 2019-01-18 LAB
ALBUMIN SERPL-MCNC: 3.5 G/DL (ref 3.4–5)
ALT SERPL W P-5'-P-CCNC: 17 U/L (ref 0–50)
AST SERPL W P-5'-P-CCNC: 12 U/L (ref 0–45)
CREAT SERPL-MCNC: 1.13 MG/DL (ref 0.52–1.04)
ERYTHROCYTE [DISTWIDTH] IN BLOOD BY AUTOMATED COUNT: 13.1 % (ref 10–15)
GFR SERPL CREATININE-BSD FRML MDRD: 46 ML/MIN/{1.73_M2}
HCT VFR BLD AUTO: 46 % (ref 35–47)
HGB BLD-MCNC: 14.3 G/DL (ref 11.7–15.7)
MCH RBC QN AUTO: 31.8 PG (ref 26.5–33)
MCHC RBC AUTO-ENTMCNC: 31.1 G/DL (ref 31.5–36.5)
MCV RBC AUTO: 102 FL (ref 78–100)
PLATELET # BLD AUTO: 169 10E9/L (ref 150–450)
RBC # BLD AUTO: 4.5 10E12/L (ref 3.8–5.2)
WBC # BLD AUTO: 5.7 10E9/L (ref 4–11)

## 2019-01-18 PROCEDURE — 82565 ASSAY OF CREATININE: CPT | Performed by: NURSE PRACTITIONER

## 2019-01-18 PROCEDURE — 82040 ASSAY OF SERUM ALBUMIN: CPT | Performed by: NURSE PRACTITIONER

## 2019-01-18 PROCEDURE — 85027 COMPLETE CBC AUTOMATED: CPT | Performed by: NURSE PRACTITIONER

## 2019-01-18 PROCEDURE — 84450 TRANSFERASE (AST) (SGOT): CPT | Performed by: NURSE PRACTITIONER

## 2019-01-18 PROCEDURE — 36415 COLL VENOUS BLD VENIPUNCTURE: CPT | Performed by: NURSE PRACTITIONER

## 2019-01-18 PROCEDURE — G0463 HOSPITAL OUTPT CLINIC VISIT: HCPCS | Mod: ZF

## 2019-01-18 PROCEDURE — 84460 ALANINE AMINO (ALT) (SGPT): CPT | Performed by: NURSE PRACTITIONER

## 2019-01-18 RX ORDER — METHOTREXATE 25 MG/ML
INJECTION, SOLUTION INTRA-ARTERIAL; INTRAMUSCULAR; INTRAVENOUS
Qty: 4 ML | Refills: 5 | Status: SHIPPED | OUTPATIENT
Start: 2019-01-18 | End: 2019-09-05

## 2019-01-18 ASSESSMENT — PAIN SCALES - GENERAL: PAINLEVEL: NO PAIN (0)
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
## 2019-01-18 NOTE — PATIENT INSTRUCTIONS
Dx:   Rheumatoid arthritis, well controlled on methotrexate  Plan continue 0.8 ml methotrexate every week  Bloodwork again in 3 months.
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
## 2019-03-01 DIAGNOSIS — E03.9 HYPOTHYROIDISM: ICD-10-CM

## 2019-03-01 RX ORDER — LEVOTHYROXINE SODIUM 88 UG/1
88 TABLET ORAL DAILY
Qty: 90 TABLET | Refills: 2 | Status: SHIPPED | OUTPATIENT
Start: 2019-03-01 | End: 2019-12-03

## 2019-04-16 ENCOUNTER — OFFICE VISIT (OUTPATIENT)
Dept: OPHTHALMOLOGY | Facility: CLINIC | Age: 79
End: 2019-04-16
Attending: OPHTHALMOLOGY
Payer: MEDICARE

## 2019-04-16 DIAGNOSIS — M05.79 RHEUMATOID ARTHRITIS INVOLVING MULTIPLE SITES WITH POSITIVE RHEUMATOID FACTOR (H): ICD-10-CM

## 2019-04-16 DIAGNOSIS — Z96.1 PSEUDOPHAKIA OF BOTH EYES: ICD-10-CM

## 2019-04-16 DIAGNOSIS — Z79.899 ENCOUNTER FOR LONG-TERM CURRENT USE OF MEDICATION: ICD-10-CM

## 2019-04-16 DIAGNOSIS — H40.1133 PRIMARY OPEN ANGLE GLAUCOMA OF BOTH EYES, SEVERE STAGE: Primary | ICD-10-CM

## 2019-04-16 LAB
ALBUMIN SERPL-MCNC: 3.5 G/DL (ref 3.4–5)
ALT SERPL W P-5'-P-CCNC: 23 U/L (ref 0–50)
AST SERPL W P-5'-P-CCNC: 14 U/L (ref 0–45)
CREAT SERPL-MCNC: 0.96 MG/DL (ref 0.52–1.04)
ERYTHROCYTE [DISTWIDTH] IN BLOOD BY AUTOMATED COUNT: 13.6 % (ref 10–15)
GFR SERPL CREATININE-BSD FRML MDRD: 57 ML/MIN/{1.73_M2}
HCT VFR BLD AUTO: 44.7 % (ref 35–47)
HGB BLD-MCNC: 14.2 G/DL (ref 11.7–15.7)
MCH RBC QN AUTO: 33.2 PG (ref 26.5–33)
MCHC RBC AUTO-ENTMCNC: 31.8 G/DL (ref 31.5–36.5)
MCV RBC AUTO: 104 FL (ref 78–100)
PLATELET # BLD AUTO: 160 10E9/L (ref 150–450)
RBC # BLD AUTO: 4.28 10E12/L (ref 3.8–5.2)
WBC # BLD AUTO: 3.9 10E9/L (ref 4–11)

## 2019-04-16 PROCEDURE — G0463 HOSPITAL OUTPT CLINIC VISIT: HCPCS | Mod: ZF

## 2019-04-16 PROCEDURE — 82565 ASSAY OF CREATININE: CPT | Performed by: NURSE PRACTITIONER

## 2019-04-16 PROCEDURE — 84460 ALANINE AMINO (ALT) (SGPT): CPT | Performed by: NURSE PRACTITIONER

## 2019-04-16 PROCEDURE — 36415 COLL VENOUS BLD VENIPUNCTURE: CPT | Performed by: NURSE PRACTITIONER

## 2019-04-16 PROCEDURE — 92083 EXTENDED VISUAL FIELD XM: CPT | Mod: ZF | Performed by: OPHTHALMOLOGY

## 2019-04-16 PROCEDURE — 92133 CPTRZD OPH DX IMG PST SGM ON: CPT | Mod: ZF | Performed by: OPHTHALMOLOGY

## 2019-04-16 PROCEDURE — 84450 TRANSFERASE (AST) (SGOT): CPT | Performed by: NURSE PRACTITIONER

## 2019-04-16 PROCEDURE — 82040 ASSAY OF SERUM ALBUMIN: CPT | Performed by: NURSE PRACTITIONER

## 2019-04-16 PROCEDURE — 85027 COMPLETE CBC AUTOMATED: CPT | Performed by: NURSE PRACTITIONER

## 2019-04-16 ASSESSMENT — CONF VISUAL FIELD
OD_INFERIOR_NASAL_RESTRICTION: 3
OD_SUPERIOR_TEMPORAL_RESTRICTION: 3
METHOD: COUNTING FINGERS
OD_SUPERIOR_NASAL_RESTRICTION: 3
OS_NORMAL: 1

## 2019-04-16 ASSESSMENT — VISUAL ACUITY
OD_CC: 20/40
OS_PH_CC: 20/30
METHOD: SNELLEN - LINEAR
OD_PH_CC: 20/30
CORRECTION_TYPE: GLASSES
OS_CC: 20/50
OS_CC+: +2

## 2019-04-16 ASSESSMENT — SLIT LAMP EXAM - LIDS
COMMENTS: NORMAL
COMMENTS: NORMAL

## 2019-04-16 ASSESSMENT — EXTERNAL EXAM - LEFT EYE: OS_EXAM: NORMAL

## 2019-04-16 ASSESSMENT — TONOMETRY
OD_IOP_MMHG: 18
OS_IOP_MMHG: 17
OS_IOP_MMHG: 16
IOP_METHOD: APPLANATION
IOP_METHOD: APPLANATION
OD_IOP_MMHG: 17

## 2019-04-16 ASSESSMENT — EXTERNAL EXAM - RIGHT EYE: OD_EXAM: NORMAL

## 2019-04-16 NOTE — NURSING NOTE
Chief Complaints and History of Present Illnesses   Patient presents with     Glaucoma Follow-Up     Chief Complaint(s) and History of Present Illness(es)     Glaucoma Follow-Up     Laterality: both eyes    Quality: States va is the same since last visit      Treatment side effects: none    Compliance with Treatment: always    Pain scale: 0/10              Comments     States va is the same since last visit

## 2019-04-16 NOTE — PATIENT INSTRUCTIONS
Patient will continue Latanoprost which is a teal top drop at bedtime in both eyes.  Patient will return to clinic in 3-4 months with repeat IOP check, dilated eye exam and visual field test (OU:LVC).  A long discussion of the risks, benefits, and alternatives including potential treatment and management options were had with patient and a decision was made to observe at this time and not add additional eye drops unless evidence of progression or elevated IOPs.         This drop may cause lash growth and some darkening of the skin around the eye.  It is also possible in a patient with a freckled iris or michell eyes, that the iris could darken to brown with time, something that is not common but not reversible.

## 2019-04-16 NOTE — PROGRESS NOTES
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, s/p TRAB OU (OD:96 and OS:95 with revision OS in 1997), H/O Noncompliance with visits (lost to f/u from 6767-5403) with marked progression OS -- K pachy: 571/540   Tmax:     HVF:OD:[place] and OS:Superior [place] (marked progresion from 3541-7610 and HVF  OD:[place] and OS:FLuct in 2009    CDR: 0.9/0.9    HRT/OCT: OD:Severe RNFl thinning and OS:Mod RNFL thinning      FHX of Glc: Mother -- gtts, Sister -- possibly     Gonio:  Open with few PAS     Intolerant to:      Asthma/COPD: Yes, on inhalers but pt is not uisng them -- follows with pulmonology  Steroid Use: No    Kidney Stones:  No   Sulfa Allergy:  No    IOP targets:L-M teens (?Lteens) -- IOP borderline  2)PCIOL OU  3)H/O HA -- s/p Negative TAB with Dr. Bellamy   4)H/O RA on MTX -- following with Rheum  5)XIN    Patient will continue Latanoprost which is a teal top drop at bedtime in both eyes.  Patient will return to clinic in 3-4 months with repeat IOP check, dilated eye exam and visual field test (OU:LVC).  A long discussion of the risks, benefits, and alternatives including potential treatment and management options were had with patient and a decision was made to observe at this time and not add additional eye drops unless evidence of progression or elevated IOPs.       This drop may cause lash growth and some darkening of the skin around the eye.  It is also possible in a patient with a freckled iris or michell eyes, that the iris could darken to brown with time, something that is not common but not reversible.      Attending Physician Attestation:  Complete documentation of historical and exam elements from today's encounter can be found in the full encounter summary report (not reduplicated in this progress note). I personally obtained the chief complaint(s) and history of present illness.  I confirmed and edited as necessary the review of systems, past medical/surgical history, family history,  social history, and examination findings as documented by others; and I examined the patient myself. I personally reviewed the relevant tests, images, and reports as documented above. I formulated and edited as necessary the assessment and plan and discussed the findings and management plan with the patient and family.  - Corazon Addison MD

## 2019-04-16 NOTE — LETTER
Patient:  Abida Hahn  :   1940  MRN:     [id number]    Ms.Marcia Hahn  [address]  [place] [address]    May 8, 2019    Dear ,  We are writing to inform you of your test results.    Results for orders placed or performed in visit on 19   CBC with platelets   Result Value Ref Range    WBC 3.9 (L) 4.0 - 11.0 10e9/L    RBC Count 4.28 3.8 - 5.2 10e12/L    Hemoglobin 14.2 11.7 - 15.7 g/dL    Hematocrit 44.7 35.0 - 47.0 %     (H) 78 - 100 fl    MCH 33.2 (H) 26.5 - 33.0 pg    MCHC 31.8 31.5 - 36.5 g/dL    RDW 13.6 10.0 - 15.0 %    Platelet Count 160 150 - 450 10e9/L   Creatinine   Result Value Ref Range    Creatinine 0.96 0.52 - 1.04 mg/dL    GFR Estimate 57 (L) >60 mL/min/[1.73_m2]    GFR Estimate If Black 66 >60 mL/min/[1.73_m2]   Albumin level   Result Value Ref Range    Albumin 3.5 3.4 - 5.0 g/dL   ALT   Result Value Ref Range    ALT 23 0 - 50 U/L   AST   Result Value Ref Range    AST 14 0 - 45 U/L     Sincerely,  Bebeto Mccurdy MD/la  Division of Rheumatic and Autoimmune Diseases  [facility] [address] [place] [id number]

## 2019-04-18 NOTE — RESULT ENCOUNTER NOTE
The patient appears to want to follow with you now. The labs orders have  and so you will need to reorder them. She has an appt with you May 14th. Please send her a lab letter after you review and the plan   Alessio MOTA, KRISTINE, MSN

## 2019-05-13 ENCOUNTER — TELEPHONE (OUTPATIENT)
Dept: RHEUMATOLOGY | Facility: CLINIC | Age: 79
End: 2019-05-13

## 2019-05-13 DIAGNOSIS — M05.79 RHEUMATOID ARTHRITIS INVOLVING MULTIPLE SITES WITH POSITIVE RHEUMATOID FACTOR (H): ICD-10-CM

## 2019-05-13 DIAGNOSIS — J84.9 ILD (INTERSTITIAL LUNG DISEASE) (H): Primary | ICD-10-CM

## 2019-05-13 DIAGNOSIS — Z79.899 ENCOUNTER FOR LONG-TERM CURRENT USE OF MEDICATION: ICD-10-CM

## 2019-05-14 ENCOUNTER — OFFICE VISIT (OUTPATIENT)
Dept: RHEUMATOLOGY | Facility: CLINIC | Age: 79
End: 2019-05-14
Attending: INTERNAL MEDICINE
Payer: MEDICARE

## 2019-05-14 VITALS
HEIGHT: 66 IN | DIASTOLIC BLOOD PRESSURE: 69 MMHG | OXYGEN SATURATION: 94 % | RESPIRATION RATE: 16 BRPM | TEMPERATURE: 97.6 F | HEART RATE: 64 BPM | SYSTOLIC BLOOD PRESSURE: 122 MMHG | BODY MASS INDEX: 30.98 KG/M2 | WEIGHT: 192.8 LBS

## 2019-05-14 DIAGNOSIS — Z79.899 ENCOUNTER FOR LONG-TERM CURRENT USE OF MEDICATION: ICD-10-CM

## 2019-05-14 DIAGNOSIS — M05.79 RHEUMATOID ARTHRITIS INVOLVING MULTIPLE SITES WITH POSITIVE RHEUMATOID FACTOR (H): Primary | ICD-10-CM

## 2019-05-14 PROCEDURE — G0463 HOSPITAL OUTPT CLINIC VISIT: HCPCS | Mod: ZF

## 2019-05-14 ASSESSMENT — PAIN SCALES - GENERAL: PAINLEVEL: NO PAIN (0)

## 2019-05-14 ASSESSMENT — MIFFLIN-ST. JEOR: SCORE: 1371.29

## 2019-05-14 NOTE — NURSING NOTE
"Chief Complaint   Patient presents with     RECHECK     RA       Vital signs:  Temp: 97.6  F (36.4  C) Temp src: Oral BP: 122/69 Pulse: 64   Resp: 16 SpO2: 94 %     Height: 167.6 cm (5' 6\") Weight: 87.5 kg (192 lb 12.8 oz)  Estimated body mass index is 31.12 kg/m  as calculated from the following:    Height as of this encounter: 1.676 m (5' 6\").    Weight as of this encounter: 87.5 kg (192 lb 12.8 oz).        Peyton Flores [facility]  5/14/2019 11:49 AM      "

## 2019-05-14 NOTE — PATIENT INSTRUCTIONS
Diagnosis:  1. Rheumatoid arthritis, quiescent  2. High-positive YEYO  3. Osteoporosis  4. Lumbar spine degenerative arthritis    Plan:  Continue methotrexate 20 mg weekly. Bloodwork every 3 months (standing orders are active).  Trial of aspercreme or zostrix salve for sore shoulders  Heating pad for lumbar spine.  Continue weight-bearing exercise as much as possible.

## 2019-05-14 NOTE — PROGRESS NOTES
[facility]  Rheumatology Clinic  Bebeto Mccurdy MD  2019     Name: Abida Hahn  MRN: [id number]  Age: 78 year old  : 1940  Referring provider: Bebeto Mccurdy     Assessment and Plan:  # Rheumatoid arthritis involving multiple sites with positive rheumatoid factor (high RF, ACPA):  Patient relates transient L > R hip stiffness after prolonged walking, and approximately one hour of generalized morning stiffness. She relates minimal pain and joint stiffness in the hands, wrists, or feet. Exam shows unchanged chronic deformities with ulnar deviations at the MCPs with no active synovitis. On 19, creatinine, LFTs, and CBC were all negative or normal with the exception of a minimally decreased white blood count of 3.9. Left foot films in 2018 showed severe degenerative changes in the first MTP and IP joints. No erosions noted.     Seropositive rheumatoid arthritis remains under excellent control using methotrexate monotherapy. Chronic mid-line low back pain is degenerative in origin, and not associated with inflammatory arthritis. However, strong seropositivity and the presence of established erosions/deformities portend high risk for chronic destructive disease and extra-articular manifestations. I recommend continuing methotrexate 20mg weekly. While on the drug, she should undergo every three month liver function testing, CBC, and creatinine. Continue folic acid 2.4mg twice daily for mouth sore prevention. Continue twice daily use of a heating pad to lumbar spine, and perform weight bearing exercises daily as much as can be tolerated by the low back and knees.    Patient requests to follow with me; I will recommend appointment in four months.      # High-positive YEYO:  No s/sx of SLE or CTD    # Osteoporosis: Remains under care of PCP. Has not started a bisphosphonate. Continue vitamin D 1200meq daily.     Follow-up: Return in about 4 months (around 2019).     HPI:   Abida Hahn  is a 78  year old here for follow-up sero-positive erosive RA-ILD  (RA dx at age ~ 60. +RF 1990 +CC >250. +YEYO >13 (8-2016) -CAROL ANN panel -vasculitis -MTB QG 8-2016. I last saw her on 1/18/19 at which time she reported transient hip stiffness after prolonged walking, and approximately one hour of generalized morning stiffness. I recommended continuing methotrexate 20mg weekly.     Patient saw Dr. Addison in ophthalmology on 4/16/19 for glaucoma. 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 No change in treatment was recommended.     Today, she reports that she is doing well with minimal small joint pain. Endorses morning stiffness lasting about 30-60 minutes. Aside from her small joints, she reports unchanged mid-line low back pain and bilateral shoulder weakness. She can only walk less than one block without experiencing low back pain. Currently ambulates with a walker. Alleviates her symptoms with a heating pad which she feels has helped. She is continued on liquid methotrexate which she is tolerating well. Denies mouth or nose sores and is continued on folic acid daily. Takes naproxen about twice a month. She has tried acetaminophen, but did not tolerate this well. Has no other concerns or constitutional symptoms.      Review of Systems:   Pertinent items are noted in HPI or as below, remainder of complete ROS is negative.      No recent problems with hearing or vision. No swallowing problems.   No breathing difficulty, shortness of breath, coughing, or wheezing  No chest pain or palpitations  No heart burn, indigestion, abdominal pain, nausea, vomiting, diarrhea  No urination problems, no bloody, cloudy urine, no dysuria  No numbing, tingling, weakness  No headaches or confusion  No rashes. No easy bleeding or bruising.     Active Medications:     Current Outpatient Medications:      albuterol (PROAIR HFA/PROVENTIL HFA/VENTOLIN HFA) 108 (90 BASE) MCG/ACT Inhaler, Inhale 2 puffs into the lungs every 6 hours as needed for shortness of breath  "/ dyspnea or wheezing, Disp: 1 Inhaler, Rfl: 1     amLODIPine (NORVASC) 5 MG tablet, Take 1 tablet (5 mg) by mouth daily, Disp: 90 tablet, Rfl: 3     ARTIFICIAL TEAR SOLUTION OP, Apply  to eye as needed., Disp: , Rfl:      aspirin 81 MG tablet, Take 81 mg by mouth daily, Disp: , Rfl:      Calcium-Vitamin D (CALCIUM + D PO), Take 1 tablet by mouth 2 times daily, Disp: , Rfl:      Cholecalciferol (VITAMIN D-3) 1000 UNITS CAPS, Take by mouth 2 times daily, Disp: , Rfl:      folic acid 800 MCG TABS, Take by mouth 2 times daily, Disp: , Rfl:      GLUCOSAMINE SULFATE PO, Take 1,000 mg by mouth daily, Disp: , Rfl:      latanoprost (XALATAN) 0.005 % ophthalmic solution, Place 1 drop into both eyes At Bedtime, Disp: 2.5 mL, Rfl: 11     levothyroxine (SYNTHROID/LEVOTHROID) 88 MCG tablet, Take 1 tablet (88 mcg) by mouth daily, Disp: 90 tablet, Rfl: 2     lisinopril (PRINIVIL/ZESTRIL) 40 MG tablet, Take 1 tablet (40 mg) by mouth daily, Disp: 90 tablet, Rfl: 3     methotrexate 50 MG/2ML injection CHEMO, MIX 0.8 ML (20 mg) WITH JUICE AND DRINK EVERY 7 DAYS  *APPOINTMENT DUE. 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 +YEYO)  Right wrist fracture  Chronic pain syndrome  Osteoporosis   Primary open angle glaucoma of both eyes, severe stage  Pseudophakia of both eyes  Cataracts     Past Surgical History:  Biopsy Artery Temporal " "  Extracapsular Cataract extraction with intraocular lens implant (left two years ago) - 08/2011)  Open reduction internal fixation wrist bilateral    Family History:   No autoimmune disorders, psoriasis, UC, crohn's, SLE, RA, PsA, gout.  No MS or cancer in family  Mother-RA, MI  Father-colon CA   Siblings-2 sisters and brother (RA- humira and MTX) unsure of medical hx   Children-  Aunt-TB     Social History:   Former smoker (0.5 packs/day)   No alcohol use     Physical Exam:   /69 (BP Location: Left arm, Patient Position: Sitting, Cuff Size: Adult Large)   Pulse 64   Temp 97.6  F (36.4  C) (Oral)   Resp 16   Ht 1.676 m (5' 6\")   Wt 87.5 kg (192 lb 12.8 oz)   SpO2 94%   BMI 31.12 kg/m     Wt Readings from Last 4 Encounters:   05/14/19 87.5 kg (192 lb 12.8 oz)   01/18/19 87.9 kg (193 lb 12.8 oz)   11/29/18 89.7 kg (197 lb 11.2 oz)   06/15/18 86.6 kg (191 lb)     Constitutional: Well-developed, appearing stated age; cooperative  Eyes: Normal EOM, PERRLA, vision, conjunctiva, sclera  ENT: Normal external ears, nose, hearing, lips, teeth, gums, throat. No mucous membrane lesions, normal saliva pool  Neck: No mass or thyroid enlargement  Resp: Lungs clear to auscultation, nl to palpation  CV: RRR, no murmurs, rubs or gallops, no edema  GI: No ABD mass or tenderness, no HSM  : Not tested  Lymph: No cervical, supraclavicular, inguinal or epitrochlear nodes  MS: Ulnar variation at 4th and 5th PIPs on the right. Mild ulnar changes at the bilateral 2nd and 3rd MCPs. Boutonniere change in the left 4th digit. Left 4th DIP does not bend. Good  strength. Wrist ROM is excellent. Squaring changes at bilateral CMCs. Good shoulder ROM. Full knee ROM bilaterally. Tenderness at the lumbar mid-line, but not the sacroiliac joints.   Skin: No nail pitting, alopecia, rash, nodules or lesions  Neuro: Normal cranial nerves, strength, sensation, DTRs.   Psych: Normal judgement, orientation, memory, affect.     Laboratory: "   RHEUM RESULTS Latest Ref Rng & Units 8/27/2018 1/18/2019 4/16/2019   SED RATE 0 - 30 mm/h - - -   CRP, INFLAMMATION 0.0 - 8.0 mg/L - - -   CYCLIC CIT PEPT IGG <5 U/mL - - -   RHEUMATOID FACTOR 0 - 14 IU/mL - - -   YEYO SCREEN BY EIA <1.0 - - -   AST 0 - 45 U/L 18 12 14   ALT 0 - 50 U/L 24 17 23   ALBUMIN 3.4 - 5.0 g/dL 3.6 3.5 3.5   WBC 4.0 - 11.0 10e9/L 6.1 5.7 3.9(L)   RBC 3.8 - 5.2 10e12/L 4.49 4.50 4.28   HGB 11.7 - 15.7 g/dL 15.0 14.3 14.2   HCT 35.0 - 47.0 % 45.2 46.0 44.7   MCV 78 - 100 fl 101(H) 102(H) 104(H)   MCHC 31.5 - 36.5 g/dL 33.2 31.1(L) 31.8   RDW 10.0 - 15.0 % 12.5 13.1 13.6    - 450 10e9/L 177 169 160   CREATININE 0.52 - 1.04 mg/dL 0.99 1.13(H) 0.96   GFR ESTIMATE, IF BLACK >60 mL/min/[1.73:m2] 66 54(L) 66   GFR ESTIMATE >60 mL/min/[1.73:m2] 54(L) 46(L) 57(L)       Rheumatoid Factor   Date Value Ref Range Status   03/11/2009 1990 (H) 0 - 14 IU/mL Final   ,  ,   Cyclic Cit Pept IgG/IgA   Date Value Ref Range Status   01/26/2012 >250  Strongly Positive (H) <20 UNITS Final   ,   Cyclic Citrullinated Peptide IgG   Date Value Ref Range Status   03/11/2009 38 (H) <5 U/mL Final     Comment:     Interpretation:  Positive   ,   Scleroderma Antibody Scl-70 CAROL ANN IgG   Date Value Ref Range Status   08/18/2016 0.5 0.0 - 0.9 AI Final     Comment:     Negative   Antibody index (AI) values reflect qualitative changes in antibody   concentration that cannot be directly associated with clinical condition or   disease state.       SSA (Ro) (CAROL ANN) Antibody, IgG   Date Value Ref Range Status   08/18/2016  0.0 - 0.9 AI Final    <0.2  Negative   Antibody index (AI) values reflect qualitative changes in antibody   concentration that cannot be directly associated with clinical condition or   disease state.       SSB (La) (CAROL ANN) Antibody, IgG   Date Value Ref Range Status   08/18/2016  0.0 - 0.9 AI Final    <0.2  Negative   Antibody index (AI) values reflect qualitative changes in antibody   concentration that cannot  be directly associated with clinical condition or   disease state.       ,  ,  ,   YEYO Screen by EIA   Date Value Ref Range Status   08/18/2016 13.0 (H) <1.0 Final     Comment:     Interpretation:  Positive   ,  ,  ,  ,  ,  ,  ,  ,  ,  ,  ,  ,  ,  ,  ,  ,  ,  ,  ,  ,   Proteinase 3 Antibody IgG   Date Value Ref Range Status   08/18/2016  0.0 - 0.9 AI Final    <0.2  Negative   Antibody index (AI) values reflect qualitative changes in antibody   concentration that cannot be directly associated with clinical condition or   disease state.       ,   Myeloperoxidase Antibody IgG   Date Value Ref Range Status   08/18/2016  0.0 - 0.9 AI Final    <0.2  Negative   Antibody index (AI) values reflect qualitative changes in antibody   concentration that cannot be directly associated with clinical condition or   disease state.       ,  ,  ,  ,  ,  ,  ,  ,  ,  ,  ,  ,  ,   Smith CAROL ANN Antibody IgG   Date Value Ref Range Status   08/18/2016  0.0 - 0.9 AI Final    <0.2  Negative   Antibody index (AI) values reflect qualitative changes in antibody   concentration that cannot be directly associated with clinical condition or   disease state.       ,   Scleroderma Antibody Scl-70 CAROL ANN IgG   Date Value Ref Range Status   08/18/2016 0.5 0.0 - 0.9 AI Final     Comment:     Negative   Antibody index (AI) values reflect qualitative changes in antibody   concentration that cannot be directly associated with clinical condition or   disease state.       ,  ,              Scribe Disclosure:  I, Darrian Miller, am serving as a scribe to document services personally performed by Bebeto Mccurdy MD at this visit, based upon the provider's statements to me. All documentation has been reviewed by the aforementioned provider prior to being entered into the official medical record.

## 2019-05-14 NOTE — LETTER
2019       RE: Abida Hahn  [address]  [facility] [address]     Dear Colleague,    Thank you for referring your patient, Abida Hahn, to the [facility] RHEUMATOLOGY at [facility]. Please see a copy of my visit note below.    [facility]  Rheumatology Clinic  Bebeto Mccurdy MD  2019     Name: Abida Hahn  MRN: [id number]  Age: 78 year old  : 1940  Referring provider: Bebeto Mccurdy     Assessment and Plan:  # Rheumatoid arthritis involving multiple sites with positive rheumatoid factor (high RF, ACPA):  Patient relates transient  L > R hip stiffness after prolonged walking, and approximately one hour of generalized morning stiffness. She relates minimal pain and joint stiffness in the hands, wrists, or feet. Exam shows unchanged chronic deformities with ulnar deviations at the MCPs with no active synovitis. On 19, creatinine, LFTs, and CBC were all negative or normal with the exception of a minimally decreased white blood count of 3.9. Left foot films in 2018 showed severe degenerative changes in the first MTP and IP joints. No erosions noted.     Seropositive rheumatoid arthritis remains under excellent control using methotrexate monotherapy. Chronic mid-line low back pain is degenerative in origin, and not associated with inflammatory arthritis. However, strong seropositivity and the presence of established erosions/deformities portend high risk for chronic destructive disease and extra-articular manifestations. I recommend continuing methotrexate 20mg weekly. While on the drug, she should undergo every three month liver function testing, CBC, and creatinine. Continue folic acid 2.4mg twice daily for mouth sore prevention. Continue twice daily use of a heating pad to lumbar spine, and perform weight bearing exercises daily as much as can be tolerated by the low back and knees.    Patient requests to follow with me; I will recommend  appointment in four months.      # High-positive YEYO:  No s/sx of SLE or CTD    # Osteoporosis:  Remains under care of PCP.  Has not started a bisphosphonate. Continue vitamin D 1200meq daily.     Follow-up: Return in about 4 months (around 9/14/2019).     HPI:   Abida Hahn  is a 78 year old here for follow-up sero-positive erosive RA-ILD  (RA dx at age ~ 60. +RF 1990 +CC >250. +YEYO >13 (8-2016) -CAROL ANN panel -vasculitis -MTB QG 8-2016.  I last saw her on 1/18/19 at which time she reported transient hip stiffness after prolonged walking, and approximately one hour of generalized morning stiffness. I recommended continuing methotrexate 20mg weekly.     Patient saw Dr. Addison in ophthalmology on 4/16/19 for glaucoma. 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 No change in treatment was recommended.     Today, she reports that she is doing well with minimal small joint pain. Endorses morning stiffness lasting about 30-60 minutes. Aside from her small joints, she reports unchanged mid-line low back pain and bilateral shoulder weakness. She can only walk less than one block without experiencing low back pain. Currently ambulates with a walker. Alleviates her symptoms with a heating pad which she feels has helped. She is continued on liquid methotrexate which she is tolerating well. Denies mouth or nose sores and is continued on folic acid daily. Takes naproxen about twice a month. She has tried acetaminophen, but did not tolerate this well. Has no other concerns or constitutional symptoms.      Review of Systems:   Pertinent items are noted in HPI or as below, remainder of complete ROS is negative.      No recent problems with hearing or vision. No swallowing problems.   No breathing difficulty, shortness of breath, coughing, or wheezing  No chest pain or palpitations  No heart burn, indigestion, abdominal pain, nausea, vomiting, diarrhea  No urination problems, no bloody, cloudy urine, no dysuria  No numbing, tingling, weakness  No  "headaches or confusion  No rashes. No easy bleeding or bruising.     Active Medications:     Current Outpatient Medications:      albuterol (PROAIR HFA/PROVENTIL HFA/VENTOLIN HFA) 108 (90 BASE) MCG/ACT Inhaler, Inhale 2 puffs into the lungs every 6 hours as needed for shortness of breath / dyspnea or wheezing, Disp: 1 Inhaler, Rfl: 1     amLODIPine (NORVASC) 5 MG tablet, Take 1 tablet (5 mg) by mouth daily, Disp: 90 tablet, Rfl: 3     ARTIFICIAL TEAR SOLUTION OP, Apply  to eye as needed., Disp: , Rfl:      aspirin 81 MG tablet, Take 81 mg by mouth daily, Disp: , Rfl:      Calcium-Vitamin D (CALCIUM + D PO), Take 1 tablet by mouth 2 times daily, Disp: , Rfl:      Cholecalciferol (VITAMIN D-3) 1000 UNITS CAPS, Take by mouth 2 times daily, Disp: , Rfl:      folic acid 800 MCG TABS, Take by mouth 2 times daily, Disp: , Rfl:      GLUCOSAMINE SULFATE PO, Take 1,000 mg by mouth daily, Disp: , Rfl:      latanoprost (XALATAN) 0.005 % ophthalmic solution, Place 1 drop into both eyes At Bedtime, Disp: 2.5 mL, Rfl: 11     levothyroxine (SYNTHROID/LEVOTHROID) 88 MCG tablet, Take 1 tablet (88 mcg) by mouth daily, Disp: 90 tablet, Rfl: 2     lisinopril (PRINIVIL/ZESTRIL) 40 MG tablet, Take 1 tablet (40 mg) by mouth daily, Disp: 90 tablet, Rfl: 3     methotrexate 50 MG/2ML injection CHEMO, MIX 0.8 ML (20 mg) WITH JUICE AND DRINK EVERY 7 DAYS  *APPOINTMENT DUE. 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 +YEYO)  Right wrist fracture  Chronic pain syndrome  Osteoporosis   Primary open angle glaucoma of both eyes, severe stage  Pseudophakia of both eyes  Cataracts     Past Surgical History:  Biopsy Artery Temporal   Extracapsular Cataract extraction with intraocular lens implant (left two years ago) - 08/2011)  Open reduction internal fixation wrist bilateral    Family History:   No autoimmune disorders, psoriasis, UC, crohn's, SLE, RA, PsA, gout.  No MS or cancer in family  Mother-RA, MI  Father-colon CA   Siblings-2 sisters and brother (RA- humira and MTX) unsure of medical hx   Children-  Aunt-TB     Social History:   Former smoker (0.5 packs/day)   No alcohol use     Physical Exam:   /69 (BP Location: Left arm, Patient Position: Sitting, Cuff Size: Adult Large)   Pulse 64   Temp 97.6  F (36.4  C) (Oral)   Resp 16   Ht 1.676 m (5' 6\")   Wt 87.5 kg (192 lb 12.8 oz)   SpO2 94%   BMI 31.12 kg/m      Wt Readings from Last 4 Encounters:   05/14/19 87.5 kg (192 lb 12.8 oz)   01/18/19 87.9 kg (193 lb 12.8 oz)   11/29/18 89.7 kg (197 lb 11.2 oz)   06/15/18 86.6 kg (191 lb)     Constitutional: Well-developed, appearing stated age; cooperative  Eyes: Normal EOM, PERRLA, vision, conjunctiva, sclera  ENT: Normal external ears, nose, hearing, lips, teeth, gums, throat. No mucous membrane lesions, normal saliva pool  Neck: No mass or thyroid enlargement  Resp: Lungs clear to auscultation, nl to palpation  CV: RRR, no murmurs, rubs or gallops, no edema  GI: No ABD mass or tenderness, no HSM  : Not tested  Lymph: No cervical, supraclavicular, inguinal or epitrochlear nodes  MS: Ulnar variation at 4th and 5th PIPs on the right. Mild ulnar changes at the bilateral 2nd and 3rd MCPs. Boutonniere change in the left 4th digit. Left 4th DIP does not bend. Good  strength. Wrist ROM is excellent. Squaring changes at bilateral CMCs. Good shoulder " ROM. Full knee ROM bilaterally. Tenderness at the lumbar mid-line, but not the sacroiliac joints.   Skin: No nail pitting, alopecia, rash, nodules or lesions  Neuro: Normal cranial nerves, strength, sensation, DTRs.   Psych: Normal judgement, orientation, memory, affect.     Laboratory:   RHEUM RESULTS Latest Ref Rng & Units 8/27/2018 1/18/2019 4/16/2019   SED RATE 0 - 30 mm/h - - -   CRP, INFLAMMATION 0.0 - 8.0 mg/L - - -   CYCLIC CIT PEPT IGG <5 U/mL - - -   RHEUMATOID FACTOR 0 - 14 IU/mL - - -   YEYO SCREEN BY EIA <1.0 - - -   AST 0 - 45 U/L 18 12 14   ALT 0 - 50 U/L 24 17 23   ALBUMIN 3.4 - 5.0 g/dL 3.6 3.5 3.5   WBC 4.0 - 11.0 10e9/L 6.1 5.7 3.9(L)   RBC 3.8 - 5.2 10e12/L 4.49 4.50 4.28   HGB 11.7 - 15.7 g/dL 15.0 14.3 14.2   HCT 35.0 - 47.0 % 45.2 46.0 44.7   MCV 78 - 100 fl 101(H) 102(H) 104(H)   MCHC 31.5 - 36.5 g/dL 33.2 31.1(L) 31.8   RDW 10.0 - 15.0 % 12.5 13.1 13.6    - 450 10e9/L 177 169 160   CREATININE 0.52 - 1.04 mg/dL 0.99 1.13(H) 0.96   GFR ESTIMATE, IF BLACK >60 mL/min/[1.73:m2] 66 54(L) 66   GFR ESTIMATE >60 mL/min/[1.73:m2] 54(L) 46(L) 57(L)       Rheumatoid Factor   Date Value Ref Range Status   03/11/2009 1990 (H) 0 - 14 IU/mL Final   ,  ,   Cyclic Cit Pept IgG/IgA   Date Value Ref Range Status   01/26/2012 >250  Strongly Positive (H) <20 UNITS Final   ,   Cyclic Citrullinated Peptide IgG   Date Value Ref Range Status   03/11/2009 38 (H) <5 U/mL Final     Comment:     Interpretation:  Positive   ,   Scleroderma Antibody Scl-70 CAROL ANN IgG   Date Value Ref Range Status   08/18/2016 0.5 0.0 - 0.9 AI Final     Comment:     Negative   Antibody index (AI) values reflect qualitative changes in antibody   concentration that cannot be directly associated with clinical condition or   disease state.       SSA (Ro) (CAROL ANN) Antibody, IgG   Date Value Ref Range Status   08/18/2016  0.0 - 0.9 AI Final    <0.2  Negative   Antibody index (AI) values reflect qualitative changes in antibody   concentration that  cannot be directly associated with clinical condition or   disease state.       SSB (La) (CAROL ANN) Antibody, IgG   Date Value Ref Range Status   08/18/2016  0.0 - 0.9 AI Final    <0.2  Negative   Antibody index (AI) values reflect qualitative changes in antibody   concentration that cannot be directly associated with clinical condition or   disease state.       ,  ,  ,   YEYO Screen by EIA   Date Value Ref Range Status   08/18/2016 13.0 (H) <1.0 Final     Comment:     Interpretation:  Positive   ,  ,  ,  ,  ,  ,  ,  ,  ,  ,  ,  ,  ,  ,  ,  ,  ,  ,  ,  ,   Proteinase 3 Antibody IgG   Date Value Ref Range Status   08/18/2016  0.0 - 0.9 AI Final    <0.2  Negative   Antibody index (AI) values reflect qualitative changes in antibody   concentration that cannot be directly associated with clinical condition or   disease state.       ,   Myeloperoxidase Antibody IgG   Date Value Ref Range Status   08/18/2016  0.0 - 0.9 AI Final    <0.2  Negative   Antibody index (AI) values reflect qualitative changes in antibody   concentration that cannot be directly associated with clinical condition or   disease state.       ,  ,  ,  ,  ,  ,  ,  ,  ,  ,  ,  ,  ,   Smith CAROL ANN Antibody IgG   Date Value Ref Range Status   08/18/2016  0.0 - 0.9 AI Final    <0.2  Negative   Antibody index (AI) values reflect qualitative changes in antibody   concentration that cannot be directly associated with clinical condition or   disease state.       ,   Scleroderma Antibody Scl-70 CAROL ANN IgG   Date Value Ref Range Status   08/18/2016 0.5 0.0 - 0.9 AI Final     Comment:     Negative   Antibody index (AI) values reflect qualitative changes in antibody   concentration that cannot be directly associated with clinical condition or   disease state.       ,  ,              Scribe Disclosure:  I, Darrian Miller, am serving as a scribe to document services personally performed by Bebeto Mccurdy MD at this visit, based upon the provider's statements to me. All  documentation has been reviewed by the aforementioned provider prior to being entered into the official medical record.       Again, thank you for allowing me to participate in the care of your patient.      Sincerely,    Bebeto Mccurdy MD

## 2019-05-14 NOTE — LETTER
2019      RE: Abida Hahn  [address]  [facility] [address]       [facility]  Rheumatology Clinic  Bebeto Mccurdy MD  2019     Name: Abida Hahn  MRN: [id number]  Age: 78 year old  : 1940  Referring provider: Bebeto Mccurdy     Assessment and Plan:  # Rheumatoid arthritis involving multiple sites with positive rheumatoid factor (high RF, ACPA):  Patient relates transient  L > R hip stiffness after prolonged walking, and approximately one hour of generalized morning stiffness. She relates minimal pain and joint stiffness in the hands, wrists, or feet. Exam shows unchanged chronic deformities with ulnar deviations at the MCPs with no active synovitis. On 19, creatinine, LFTs, and CBC were all negative or normal with the exception of a minimally decreased white blood count of 3.9. Left foot films in 2018 showed severe degenerative changes in the first MTP and IP joints. No erosions noted.     Seropositive rheumatoid arthritis remains under excellent control using methotrexate monotherapy. Chronic mid-line low back pain is degenerative in origin, and not associated with inflammatory arthritis. However, strong seropositivity and the presence of established erosions/deformities portend high risk for chronic destructive disease and extra-articular manifestations. I recommend continuing methotrexate 20mg weekly. While on the drug, she should undergo every three month liver function testing, CBC, and creatinine. Continue folic acid 2.4mg twice daily for mouth sore prevention. Continue twice daily use of a heating pad to lumbar spine, and perform weight bearing exercises daily as much as can be tolerated by the low back and knees.    Patient requests to follow with me; I will recommend appointment in four months.      # High-positive YEYO:  No s/sx of SLE or CTD    # Osteoporosis:  Remains under care of PCP.  Has not started a bisphosphonate. Continue vitamin D 1200meq daily.      Follow-up: Return in about 4 months (around 9/14/2019).     HPI:   Abida Hahn  is a 78 year old here for follow-up sero-positive erosive RA-ILD  (RA dx at age ~ 60. +RF 1990 +CC >250. +YEYO >13 (8-2016) -CAROL ANN panel -vasculitis -MTB QG 8-2016.  I last saw her on 1/18/19 at which time she reported transient hip stiffness after prolonged walking, and approximately one hour of generalized morning stiffness. I recommended continuing methotrexate 20mg weekly.     Patient saw Dr. Addison in ophthalmology on 4/16/19 for glaucoma. 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 No change in treatment was recommended.     Today, she reports that she is doing well with minimal small joint pain. Endorses morning stiffness lasting about 30-60 minutes. Aside from her small joints, she reports unchanged mid-line low back pain and bilateral shoulder weakness. She can only walk less than one block without experiencing low back pain. Currently ambulates with a walker. Alleviates her symptoms with a heating pad which she feels has helped. She is continued on liquid methotrexate which she is tolerating well. Denies mouth or nose sores and is continued on folic acid daily. Takes naproxen about twice a month. She has tried acetaminophen, but did not tolerate this well. Has no other concerns or constitutional symptoms.      Review of Systems:   Pertinent items are noted in HPI or as below, remainder of complete ROS is negative.      No recent problems with hearing or vision. No swallowing problems.   No breathing difficulty, shortness of breath, coughing, or wheezing  No chest pain or palpitations  No heart burn, indigestion, abdominal pain, nausea, vomiting, diarrhea  No urination problems, no bloody, cloudy urine, no dysuria  No numbing, tingling, weakness  No headaches or confusion  No rashes. No easy bleeding or bruising.     Active Medications:     Current Outpatient Medications:      albuterol (PROAIR HFA/PROVENTIL HFA/VENTOLIN HFA) 108 (90 BASE)  "MCG/ACT Inhaler, Inhale 2 puffs into the lungs every 6 hours as needed for shortness of breath / dyspnea or wheezing, Disp: 1 Inhaler, Rfl: 1     amLODIPine (NORVASC) 5 MG tablet, Take 1 tablet (5 mg) by mouth daily, Disp: 90 tablet, Rfl: 3     ARTIFICIAL TEAR SOLUTION OP, Apply  to eye as needed., Disp: , Rfl:      aspirin 81 MG tablet, Take 81 mg by mouth daily, Disp: , Rfl:      Calcium-Vitamin D (CALCIUM + D PO), Take 1 tablet by mouth 2 times daily, Disp: , Rfl:      Cholecalciferol (VITAMIN D-3) 1000 UNITS CAPS, Take by mouth 2 times daily, Disp: , Rfl:      folic acid 800 MCG TABS, Take by mouth 2 times daily, Disp: , Rfl:      GLUCOSAMINE SULFATE PO, Take 1,000 mg by mouth daily, Disp: , Rfl:      latanoprost (XALATAN) 0.005 % ophthalmic solution, Place 1 drop into both eyes At Bedtime, Disp: 2.5 mL, Rfl: 11     levothyroxine (SYNTHROID/LEVOTHROID) 88 MCG tablet, Take 1 tablet (88 mcg) by mouth daily, Disp: 90 tablet, Rfl: 2     lisinopril (PRINIVIL/ZESTRIL) 40 MG tablet, Take 1 tablet (40 mg) by mouth daily, Disp: 90 tablet, Rfl: 3     methotrexate 50 MG/2ML injection CHEMO, MIX 0.8 ML (20 mg) WITH JUICE AND DRINK EVERY 7 DAYS  *APPOINTMENT DUE. 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 +YEYO)  Right wrist fracture  Chronic pain syndrome  Osteoporosis   Primary open angle glaucoma of both eyes, severe " "stage  Pseudophakia of both eyes  Cataracts     Past Surgical History:  Biopsy Artery Temporal   Extracapsular Cataract extraction with intraocular lens implant (left two years ago) - 08/2011)  Open reduction internal fixation wrist bilateral    Family History:   No autoimmune disorders, psoriasis, UC, crohn's, SLE, RA, PsA, gout.  No MS or cancer in family  Mother-RA, MI  Father-colon CA   Siblings-2 sisters and brother (RA- humira and MTX) unsure of medical hx   Children-  Aunt-TB     Social History:   Former smoker (0.5 packs/day)   No alcohol use     Physical Exam:   /69 (BP Location: Left arm, Patient Position: Sitting, Cuff Size: Adult Large)   Pulse 64   Temp 97.6  F (36.4  C) (Oral)   Resp 16   Ht 1.676 m (5' 6\")   Wt 87.5 kg (192 lb 12.8 oz)   SpO2 94%   BMI 31.12 kg/m      Wt Readings from Last 4 Encounters:   05/14/19 87.5 kg (192 lb 12.8 oz)   01/18/19 87.9 kg (193 lb 12.8 oz)   11/29/18 89.7 kg (197 lb 11.2 oz)   06/15/18 86.6 kg (191 lb)     Constitutional: Well-developed, appearing stated age; cooperative  Eyes: Normal EOM, PERRLA, vision, conjunctiva, sclera  ENT: Normal external ears, nose, hearing, lips, teeth, gums, throat. No mucous membrane lesions, normal saliva pool  Neck: No mass or thyroid enlargement  Resp: Lungs clear to auscultation, nl to palpation  CV: RRR, no murmurs, rubs or gallops, no edema  GI: No ABD mass or tenderness, no HSM  : Not tested  Lymph: No cervical, supraclavicular, inguinal or epitrochlear nodes  MS: Ulnar variation at 4th and 5th PIPs on the right. Mild ulnar changes at the bilateral 2nd and 3rd MCPs. Boutonniere change in the left 4th digit. Left 4th DIP does not bend. Good  strength. Wrist ROM is excellent. Squaring changes at bilateral CMCs. Good shoulder ROM. Full knee ROM bilaterally. Tenderness at the lumbar mid-line, but not the sacroiliac joints.   Skin: No nail pitting, alopecia, rash, nodules or lesions  Neuro: Normal cranial nerves, " strength, sensation, DTRs.   Psych: Normal judgement, orientation, memory, affect.     Laboratory:   RHEUM RESULTS Latest Ref Rng & Units 8/27/2018 1/18/2019 4/16/2019   SED RATE 0 - 30 mm/h - - -   CRP, INFLAMMATION 0.0 - 8.0 mg/L - - -   CYCLIC CIT PEPT IGG <5 U/mL - - -   RHEUMATOID FACTOR 0 - 14 IU/mL - - -   YEYO SCREEN BY EIA <1.0 - - -   AST 0 - 45 U/L 18 12 14   ALT 0 - 50 U/L 24 17 23   ALBUMIN 3.4 - 5.0 g/dL 3.6 3.5 3.5   WBC 4.0 - 11.0 10e9/L 6.1 5.7 3.9(L)   RBC 3.8 - 5.2 10e12/L 4.49 4.50 4.28   HGB 11.7 - 15.7 g/dL 15.0 14.3 14.2   HCT 35.0 - 47.0 % 45.2 46.0 44.7   MCV 78 - 100 fl 101(H) 102(H) 104(H)   MCHC 31.5 - 36.5 g/dL 33.2 31.1(L) 31.8   RDW 10.0 - 15.0 % 12.5 13.1 13.6    - 450 10e9/L 177 169 160   CREATININE 0.52 - 1.04 mg/dL 0.99 1.13(H) 0.96   GFR ESTIMATE, IF BLACK >60 mL/min/[1.73:m2] 66 54(L) 66   GFR ESTIMATE >60 mL/min/[1.73:m2] 54(L) 46(L) 57(L)       Rheumatoid Factor   Date Value Ref Range Status   03/11/2009 1990 (H) 0 - 14 IU/mL Final   ,  ,   Cyclic Cit Pept IgG/IgA   Date Value Ref Range Status   01/26/2012 >250  Strongly Positive (H) <20 UNITS Final   ,   Cyclic Citrullinated Peptide IgG   Date Value Ref Range Status   03/11/2009 38 (H) <5 U/mL Final     Comment:     Interpretation:  Positive   ,   Scleroderma Antibody Scl-70 CAROL ANN IgG   Date Value Ref Range Status   08/18/2016 0.5 0.0 - 0.9 AI Final     Comment:     Negative   Antibody index (AI) values reflect qualitative changes in antibody   concentration that cannot be directly associated with clinical condition or   disease state.       SSA (Ro) (CAROL ANN) Antibody, IgG   Date Value Ref Range Status   08/18/2016  0.0 - 0.9 AI Final    <0.2  Negative   Antibody index (AI) values reflect qualitative changes in antibody   concentration that cannot be directly associated with clinical condition or   disease state.       SSB (La) (CAROL ANN) Antibody, IgG   Date Value Ref Range Status   08/18/2016  0.0 - 0.9 AI Final     <0.2  Negative   Antibody index (AI) values reflect qualitative changes in antibody   concentration that cannot be directly associated with clinical condition or   disease state.       ,  ,  ,   YEYO Screen by EIA   Date Value Ref Range Status   08/18/2016 13.0 (H) <1.0 Final     Comment:     Interpretation:  Positive   ,  ,  ,  ,  ,  ,  ,  ,  ,  ,  ,  ,  ,  ,  ,  ,  ,  ,  ,  ,   Proteinase 3 Antibody IgG   Date Value Ref Range Status   08/18/2016  0.0 - 0.9 AI Final    <0.2  Negative   Antibody index (AI) values reflect qualitative changes in antibody   concentration that cannot be directly associated with clinical condition or   disease state.       ,   Myeloperoxidase Antibody IgG   Date Value Ref Range Status   08/18/2016  0.0 - 0.9 AI Final    <0.2  Negative   Antibody index (AI) values reflect qualitative changes in antibody   concentration that cannot be directly associated with clinical condition or   disease state.       ,  ,  ,  ,  ,  ,  ,  ,  ,  ,  ,  ,  ,   Smith CAROL ANN Antibody IgG   Date Value Ref Range Status   08/18/2016  0.0 - 0.9 AI Final    <0.2  Negative   Antibody index (AI) values reflect qualitative changes in antibody   concentration that cannot be directly associated with clinical condition or   disease state.       ,   Scleroderma Antibody Scl-70 CAROL ANN IgG   Date Value Ref Range Status   08/18/2016 0.5 0.0 - 0.9 AI Final     Comment:     Negative   Antibody index (AI) values reflect qualitative changes in antibody   concentration that cannot be directly associated with clinical condition or   disease state.         Scribe Disclosure:  I, Darrian Miller, am serving as a scribe to document services personally performed by Bebeto Mccurdy MD at this visit, based upon the provider's statements to me. All documentation has been reviewed by the aforementioned provider prior to being entered into the official medical record.       Bebeto Mccurdy MD

## 2019-06-18 ENCOUNTER — DOCUMENTATION ONLY (OUTPATIENT)
Dept: CARE COORDINATION | Facility: CLINIC | Age: 79
End: 2019-06-18

## 2019-06-18 DIAGNOSIS — H40.1133 PRIMARY OPEN ANGLE GLAUCOMA OF BOTH EYES, SEVERE STAGE: ICD-10-CM

## 2019-06-18 RX ORDER — LATANOPROST 50 UG/ML
SOLUTION/ DROPS OPHTHALMIC
Qty: 10 ML | Refills: 4 | OUTPATIENT
Start: 2019-06-18

## 2019-06-28 DIAGNOSIS — I10 ESSENTIAL HYPERTENSION: Primary | ICD-10-CM

## 2019-07-03 RX ORDER — LISINOPRIL 40 MG/1
40 TABLET ORAL DAILY
Qty: 90 TABLET | Refills: 1 | Status: SHIPPED | OUTPATIENT
Start: 2019-07-03 | End: 2019-12-30

## 2019-07-18 ENCOUNTER — OFFICE VISIT (OUTPATIENT)
Dept: OPHTHALMOLOGY | Facility: CLINIC | Age: 79
End: 2019-07-18
Attending: OPHTHALMOLOGY
Payer: MEDICARE

## 2019-07-18 DIAGNOSIS — H40.1133 PRIMARY OPEN ANGLE GLAUCOMA OF BOTH EYES, SEVERE STAGE: Primary | ICD-10-CM

## 2019-07-18 DIAGNOSIS — Z96.1 PSEUDOPHAKIA OF BOTH EYES: ICD-10-CM

## 2019-07-18 PROCEDURE — G0463 HOSPITAL OUTPT CLINIC VISIT: HCPCS | Mod: ZF

## 2019-07-18 PROCEDURE — 92083 EXTENDED VISUAL FIELD XM: CPT | Mod: ZF | Performed by: OPHTHALMOLOGY

## 2019-07-18 ASSESSMENT — TONOMETRY
OS_IOP_MMHG: 13
OD_IOP_MMHG: 14
OS_IOP_MMHG: 18
OD_IOP_MMHG: 14
OS_IOP_MMHG: 14
IOP_METHOD: APPLANATION
IOP_METHOD: APPLANATION
OD_IOP_MMHG: 15
IOP_METHOD: APPLANATION

## 2019-07-18 ASSESSMENT — VISUAL ACUITY
OD_CC+: +1
METHOD: SNELLEN - LINEAR
OS_CC: 20/30
OS_CC+: -1
OD_CC: 20/50-2
CORRECTION_TYPE: GLASSES

## 2019-07-18 ASSESSMENT — REFRACTION_WEARINGRX
OS_SPHERE: -0.75
OD_CYLINDER: +0.50
OD_SPHERE: -1.50
OD_AXIS: 148
OS_CYLINDER: +1.50
OD_ADD: +3.00
OS_AXIS: 109
OS_ADD: +3.00

## 2019-07-18 ASSESSMENT — SLIT LAMP EXAM - LIDS
COMMENTS: NORMAL
COMMENTS: NORMAL

## 2019-07-18 ASSESSMENT — CUP TO DISC RATIO
OS_RATIO: 0.9
OD_RATIO: 0.9

## 2019-07-18 ASSESSMENT — EXTERNAL EXAM - RIGHT EYE: OD_EXAM: NORMAL

## 2019-07-18 ASSESSMENT — EXTERNAL EXAM - LEFT EYE: OS_EXAM: NORMAL

## 2019-07-18 NOTE — NURSING NOTE
Chief Complaints and History of Present Illnesses   Patient presents with     Glaucoma Follow-Up     Chief Complaint(s) and History of Present Illness(es)     Glaucoma Follow-Up     Laterality: both eyes    Treatment side effects: none    Compliance with Treatment: always    Pain scale: 0/10              Comments     3mo bilateral POAG recheck     Vision is stable since LV, no additional comments or concerns.     Ocular meds: Latanoprost qhs BE (LG@ 11pm)  Ashley Dale COT 1:21 PM July 18, 2019

## 2019-07-18 NOTE — PATIENT INSTRUCTIONS
Patient will continue Latanoprost which is a teal top drop at bedtime in both eyes.  Patient will return to clinic in 3-4 months with repeat IOP check and visual field test (OU:LVC).  A long discussion of the risks, benefits, and alternatives including potential treatment and management options were had with patient and a decision was made to observe at this time and not add additional eye drops unless evidence of progression or elevated IOPs.       This drop may cause lash growth and some darkening of the skin around the eye.  It is also possible in a patient with a freckled iris or michell eyes, that the iris could darken to brown with time, something that is not common but not reversible.    Patient will start warm compresses 2x/day, increase dietary flax seed/fish oil dietary supplementation, and start artificial tears 4-6x/day as needed for burning, tearing, mattering, foreign body sensation, blurred vision, etc.  Artifical tear drops are available over the counter. Below are a list of the some of the drops available:  Refresh   Systane  Theratears  Genteal  Blink  Optive      Please avoid Visine (unless Visine Puretears), Murine or Cleareyes or Puralube tear drops.    These have ingredients that take the red out and but actually increase dryness and redness of the eyes over time

## 2019-07-18 NOTE — PROGRESS NOTES
1)Endstage POAG -- Diagnosed with Glc [id number], s/p TRAB OU (OD:96 and OS:95 with revision OS in 1997), H/O Noncompliance with visits (lost to f/u from 7550-7998) with marked progression OS -- K pachy: 571/540   Tmax:     HVF:OD:[place] and OS:[place] (marked progresion from 1289-1579 and HVF  OD:[place] and OS:FLuct in 2009    CDR: 0.9/0.9    HRT/OCT: OD:Severe RNFl thinning and OS:Mod RNFL thinning      FHX of Glc: Mother -- gtts, Sister -- possibly     Gonio:  Open with few PAS     Intolerant to:      Asthma/COPD: Yes, on inhalers but pt is not uisng them -- follows with pulmonology  Steroid Use: No    Kidney Stones:  No   Sulfa Allergy:  No    IOP targets:L-M teens (?Lteens) -- IOP borderline  2)PCIOL OU  3)H/O HA -- s/p Negative TAB with Dr. Bellamy   4)H/O RA on MTX -- following with Rheum  5)XIN    Patient will continue Latanoprost which is a teal top drop at bedtime in both eyes.  Patient will return to clinic in 3-4 months with repeat IOP check and visual field test (OU:LVC).  A long discussion of the risks, benefits, and alternatives including potential treatment and management options were had with patient and a decision was made to observe at this time and not add additional eye drops unless evidence of progression or elevated IOPs.       This drop may cause lash growth and some darkening of the skin around the eye.  It is also possible in a patient with a freckled iris or michell eyes, that the iris could darken to brown with time, something that is not common but not reversible.    Resident Note   Here for 4month f/u IOP check, dilate, and LVC each eye   End-stage POAG, h/o trab each eye   States that her VA is slightly decreased in the past few days  Gtts: latanoprost at bedtime   LVC (7/18/19)   right eye: High FN, [id number] sup/inf arcuates compared to first LVC 4/2019   left eye: reliable, first LVC left eye, dense sup arcuate, rim inf     Plan:   - IOP is within range by  my measurements today  - LVC right eye stable, left eye is new baseline  - CPM, return in 4 months for IOP check      Yaron Ayala MD  Ophthalmology Resident  PGY-3       Attending Physician Attestation:  Complete documentation of historical and exam elements from today's encounter can be found in the full encounter summary report (not reduplicated in this progress note). I personally obtained the chief complaint(s) and history of present illness.  I confirmed and edited as necessary the review of systems, past medical/surgical history, family history, social history, and examination findings as documented by others; and I examined the patient myself. I personally reviewed the relevant tests, images, and reports as documented above. I formulated and edited as necessary the assessment and plan and discussed the findings and management plan with the patient and family.  - Corazon Addison MD

## 2019-07-26 ENCOUNTER — OFFICE VISIT (OUTPATIENT)
Dept: PULMONOLOGY | Facility: CLINIC | Age: 79
End: 2019-07-26
Attending: INTERNAL MEDICINE
Payer: MEDICARE

## 2019-07-26 VITALS
DIASTOLIC BLOOD PRESSURE: 75 MMHG | RESPIRATION RATE: 18 BRPM | WEIGHT: 192 LBS | OXYGEN SATURATION: 96 % | HEIGHT: 66 IN | BODY MASS INDEX: 30.86 KG/M2 | HEART RATE: 60 BPM | SYSTOLIC BLOOD PRESSURE: 117 MMHG

## 2019-07-26 DIAGNOSIS — M05.79 RHEUMATOID ARTHRITIS INVOLVING MULTIPLE SITES WITH POSITIVE RHEUMATOID FACTOR (H): ICD-10-CM

## 2019-07-26 DIAGNOSIS — J84.9 ILD (INTERSTITIAL LUNG DISEASE) (H): ICD-10-CM

## 2019-07-26 DIAGNOSIS — Z79.899 ENCOUNTER FOR LONG-TERM CURRENT USE OF MEDICATION: ICD-10-CM

## 2019-07-26 DIAGNOSIS — J84.9 ILD (INTERSTITIAL LUNG DISEASE) (H): Primary | ICD-10-CM

## 2019-07-26 LAB
ALBUMIN SERPL-MCNC: 3.7 G/DL (ref 3.4–5)
ALT SERPL W P-5'-P-CCNC: 20 U/L (ref 0–50)
AST SERPL W P-5'-P-CCNC: 15 U/L (ref 0–45)
CREAT SERPL-MCNC: 1.02 MG/DL (ref 0.52–1.04)
ERYTHROCYTE [DISTWIDTH] IN BLOOD BY AUTOMATED COUNT: 12.9 % (ref 10–15)
GFR SERPL CREATININE-BSD FRML MDRD: 52 ML/MIN/{1.73_M2}
HCT VFR BLD AUTO: 44.9 % (ref 35–47)
HGB BLD-MCNC: 14.3 G/DL (ref 11.7–15.7)
MCH RBC QN AUTO: 32.6 PG (ref 26.5–33)
MCHC RBC AUTO-ENTMCNC: 31.8 G/DL (ref 31.5–36.5)
MCV RBC AUTO: 103 FL (ref 78–100)
PLATELET # BLD AUTO: 193 10E9/L (ref 150–450)
RBC # BLD AUTO: 4.38 10E12/L (ref 3.8–5.2)
WBC # BLD AUTO: 6 10E9/L (ref 4–11)

## 2019-07-26 PROCEDURE — 82565 ASSAY OF CREATININE: CPT | Performed by: INTERNAL MEDICINE

## 2019-07-26 PROCEDURE — 36415 COLL VENOUS BLD VENIPUNCTURE: CPT | Performed by: INTERNAL MEDICINE

## 2019-07-26 PROCEDURE — 85027 COMPLETE CBC AUTOMATED: CPT | Performed by: INTERNAL MEDICINE

## 2019-07-26 PROCEDURE — 84450 TRANSFERASE (AST) (SGOT): CPT | Performed by: INTERNAL MEDICINE

## 2019-07-26 PROCEDURE — 84460 ALANINE AMINO (ALT) (SGPT): CPT | Performed by: INTERNAL MEDICINE

## 2019-07-26 PROCEDURE — 82040 ASSAY OF SERUM ALBUMIN: CPT | Performed by: INTERNAL MEDICINE

## 2019-07-26 PROCEDURE — G0463 HOSPITAL OUTPT CLINIC VISIT: HCPCS | Mod: 25,ZF

## 2019-07-26 ASSESSMENT — MIFFLIN-ST. JEOR: SCORE: 1362.41

## 2019-07-26 ASSESSMENT — PAIN SCALES - GENERAL: PAINLEVEL: NO PAIN (0)

## 2019-07-26 NOTE — NURSING NOTE
Chief Complaint   Patient presents with     Follow Up     RA/ILD/LIP     Vitals were taken and medications were reconciled.     KOSTAS Camarena

## 2019-07-26 NOTE — LETTER
"7/26/2019       RE: Abida Hahn  [address]  [facility] [address]     Dear Colleague,    Thank you for referring your patient, Abida Hahn, to the [facility] at [facility]. Please see a copy of my visit note below.    [facility]    Reason for Visit  Abida Hahn is a 79 year old year old female who is being seen for Follow Up (RA/ILD/LIP)    HPI    Ms. Hahn is a 79-year old with rheumatoid arthritis interstitial lung disease who is here for follow-up.  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  She has not needed specific treatment to date.  She is followed by Dr. Mccurdy in rheumatology for her rheumatoid arthritis and has been on methotrexate for over 10 years.  Today, Ms. Hahn reports that she is doing well.  She walks for at least 30 minutes/day and denies dyspnea on exertion.  She usually walks with a walker.  She does note increased shortness of breath when the humidity is high.  She denies paroxysmal nocturnal dyspnea, cough, wheezing, or fevers.  She reports that her joint pains are under \"pretty good\" control with methotrexate.  She does get annual flu vaccines.        Current Outpatient Medications   Medication     albuterol (PROAIR HFA/PROVENTIL HFA/VENTOLIN HFA) 108 (90 BASE) MCG/ACT Inhaler     amLODIPine (NORVASC) 5 MG tablet     ARTIFICIAL TEAR SOLUTION OP     Calcium-Vitamin D (CALCIUM + D PO)     Cholecalciferol (VITAMIN D-3) 1000 UNITS CAPS     folic acid 800 MCG TABS     GLUCOSAMINE SULFATE PO     latanoprost (XALATAN) 0.005 % ophthalmic solution     levothyroxine (SYNTHROID/LEVOTHROID) 88 MCG tablet     lisinopril (PRINIVIL/ZESTRIL) 40 MG tablet     methotrexate 50 MG/2ML injection CHEMO     Multiple Vitamin (DAILY MULTIVITAMIN PO)     Needle, Disp, (BD SAFETYGLIDE NEEDLE) 27G X 5/8\" MISC     No current " facility-administered medications for this visit.      Allergies   Allergen Reactions     Ibuprofen Sodium      Penicillins Other (See Comments)     Stiff neck     Triple Antibiotic [Neomycin-Polymyxin-Dexameth]      Aspirin Rash     Codeine Rash     Other reaction(s): Erythema     Ibuprofen Rash     Past Medical History:   Diagnosis Date     Abscess, toe 2009    Right great toe     Cataract 2011    Right s/p surgery 2011; left s/p surgery     Glaucoma      Headache(784.0)     Relieved with gabapentin. 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 +YEYO      Wrist fracture     Right, s/p ORIF       Past Surgical History:   Procedure Laterality Date     BIOPSY ARTERY TEMPORAL       EXTRACAPSULAR CATARACT EXTRATION WITH INTRAOCULAR LENS IMPLANT  2011    Left 2 years ago as well     OPEN REDUCTION INTERNAL FIXATION WRIST BILATERAL         Social History     Socioeconomic History     Marital status: Single     Spouse name: Not on file     Number of children: Not on file     Years of education: Not on file     Highest education level: Not on file   Occupational History     Not on file   Social Needs     Financial resource strain: Not on file     Food insecurity:     Worry: Not on file     Inability: Not on file     Transportation needs:     Medical: Not on file     Non-medical: Not on file   Tobacco Use     Smoking status: Former Smoker     Packs/day: 0.50     Last attempt to quit: 1992     Years since quittin.0     Smokeless tobacco: Never Used     Tobacco comment: Quit in 20yrs    Substance and Sexual Activity     Alcohol use: No     Drug use: No     Sexual activity: Not on file   Lifestyle     Physical activity:     Days per week: Not on file     Minutes per session: Not on file      "Stress: Not on file   Relationships     Social connections:     Talks on phone: Not on file     Gets together: Not on file     Attends [employer] service: Not on file     Active member of club or organization: Not on file     Attends meetings of clubs or organizations: Not on file     Relationship status: Not on file     Intimate partner violence:     Fear of current or ex partner: Not on file     Emotionally abused: Not on file     Physically abused: Not on file     Forced sexual activity: Not on file   Other Topics Concern     Parent/sibling w/ CABG, MI or angioplasty before 65F 55M? Not Asked   Social History Narrative    Lives in senior building.  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 Enjoys reading, friends, children and shopping.  Denies exposure to asbestos, silica, pet birds, hot tubs, feather pillows, mold.       Family History   Problem Relation Age of Onset     Cancer Father         colon     Emphysema Father      Arthritis Mother         RA at 33 y/o, glaucome     Glaucoma Mother      Arthritis Brother         RA on Mtx and humira     Glaucoma Brother      Melanoma No family hx of      Skin Cancer No family hx of      Retinal detachment No family hx of      Macular Degeneration No family hx of      Amblyopia No family hx of              ROS Pulmonary  A complete ROS was otherwise negative except as noted in the HPI.    Vitals: /75 (BP Location: Right arm, Patient Position: Chair, Cuff Size: Adult Regular)   Pulse 60   Resp 18   Ht 1.676 m (5' 5.98\")   Wt 87.1 kg (192 lb)   SpO2 96%   BMI 31.00 kg/m       Exam:   GENERAL APPEARANCE: Well developed, well nourished, alert, and in no apparent distress.  RESP: good air flow throughout.  No crackles. No rhonchi. No wheezes.  CV: Normal S1, S2, regular rhythm, normal rate. No murmur.  No LE edema.   MS: extremities normal. No clubbing. No cyanosis.  SKIN: no rash on limited exam.  NEURO: Mentation intact, " speech normal, normal gait and stance.  PSYCH: mentation appears normal. and affect normal/bright.    Results:  Recent Results (from the past 168 hour(s))   General PFT Lab (Please always keep checked)    Collection Time: 07/26/19 10:28 AM   Result Value Ref Range    FVC-Pred 2.78 L    FVC-Pre 1.46 L    FVC-%Pred-Pre 52 %    FEV1-Pre 1.02 L    FEV1-%Pred-Pre 48 %    FEV1FVC-Pred 77 %    FEV1FVC-Pre 70 %    FEFMax-Pred 5.24 L/sec    FEFMax-Pre 3.82 L/sec    FEFMax-%Pred-Pre 73 %    FEF2575-Pred 1.66 L/sec    FEF2575-Pre 0.60 L/sec    NMH5100-%Pred-Pre 36 %    ExpTime-Pre 8.40 sec    FIFMax-Pre 3.13 L/sec    VC-Pred 3.16 L    VC-Pre 1.68 L    VC-%Pred-Pre 53 %    IC-Pred 2.74 L    IC-Pre 1.57 L    IC-%Pred-Pre 57 %    ERV-Pred 0.42 L    ERV-Pre 0.10 L    ERV-%Pred-Pre 24 %    FEV1FEV6-Pred 78 %    FEV1FEV6-Pre 70 %    FRCPleth-Pred 2.85 L    FRCPleth-Pre 2.79 L    FRCPleth-%Pred-Pre 97 %    RVPleth-Pred 2.31 L    RVPleth-Pre 2.68 L    RVPleth-%Pred-Pre 116 %    TLCPleth-Pred 5.32 L    TLCPleth-Pre 4.36 L    TLCPleth-%Pred-Pre 81 %    DLCOunc-Pred 20.32 ml/min/mmHg    DLCOunc-Pre 17.83 ml/min/mmHg    DLCOunc-%Pred-Pre 87 %    VA-Pre 3.21 L    VA-%Pred-Pre 63 %    FEV1SVC-Pred 67 %    FEV1SVC-Pre 61 %       I reviewed pulmonary function test that was performed today.  This shows nonspecific spirometry pattern with normal ratio and normal lung volumes.  Diffusion is normal.  The nonspecific spirometry pattern suggests obstructive lung disease.    I reviewed results with the patient.      Assessment and plan:   Ms. Hahn is a 79-year-old with rheumatoid arthritis associated interstitial lung disease who is here for follow-up.  She has a chest CT scan that suggests LIP with follicular bronchiolitis.  She remains active and has not wanted to start any new medications.  Her PFT shows some improvement in spirometry over the past 2 years.  I encouraged her to continue walking daily as she is doing and to get annual flu  vaccines.  I would not add any new medications to her regimen.  If her pulmonary symptoms worsen, then possible options for treatment would include daily azithromycin as an anti-inflammatory, plus minus montelukast, plus minus LABA/ICS combination.  For now, she is doing well and does not want to start a new medication.  I will see her back in 1 year with full PFT.      Again, thank you for allowing me to participate in the care of your patient.      Sincerely,    Nirmala Muñoz MD

## 2019-07-26 NOTE — PROGRESS NOTES
"[facility]    Reason for Visit  Abida Hahn is a 79 year old year old female who is being seen for Follow Up (RA/ILD/LIP)    HPI    Ms. Hahn is a 79-year old with rheumatoid arthritis interstitial lung disease who is here for follow-up.  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  She has not needed specific treatment to date.  She is followed by Dr. Mccurdy in rheumatology for her rheumatoid arthritis and has been on methotrexate for over 10 years.  Today, Ms. Hahn reports that she is doing well.  She walks for at least 30 minutes/day and denies dyspnea on exertion.  She usually walks with a walker.  She does note increased shortness of breath when the humidity is high.  She denies paroxysmal nocturnal dyspnea, cough, wheezing, or fevers.  She reports that her joint pains are under \"pretty good\" control with methotrexate.  She does get annual flu vaccines.        Current Outpatient Medications   Medication     albuterol (PROAIR HFA/PROVENTIL HFA/VENTOLIN HFA) 108 (90 BASE) MCG/ACT Inhaler     amLODIPine (NORVASC) 5 MG tablet     ARTIFICIAL TEAR SOLUTION OP     Calcium-Vitamin D (CALCIUM + D PO)     Cholecalciferol (VITAMIN D-3) 1000 UNITS CAPS     folic acid 800 MCG TABS     GLUCOSAMINE SULFATE PO     latanoprost (XALATAN) 0.005 % ophthalmic solution     levothyroxine (SYNTHROID/LEVOTHROID) 88 MCG tablet     lisinopril (PRINIVIL/ZESTRIL) 40 MG tablet     methotrexate 50 MG/2ML injection CHEMO     Multiple Vitamin (DAILY MULTIVITAMIN PO)     Needle, Disp, (BD SAFETYGLIDE NEEDLE) 27G X 5/8\" MISC     No current facility-administered medications for this visit.      Allergies   Allergen Reactions     Ibuprofen Sodium      Penicillins Other (See Comments)     Stiff neck     Triple Antibiotic [Neomycin-Polymyxin-Dexameth]      Aspirin Rash     Codeine Rash     Other reaction(s): Erythema     Ibuprofen Rash     Past " Medical History:   Diagnosis Date     Abscess, toe     Right great toe     Cataract 2011    Right s/p surgery 2011; left s/p surgery     Glaucoma      Headache(784.0)     Relieved with gabapentin. 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 +YEYO      Wrist fracture     Right, s/p ORIF       Past Surgical History:   Procedure Laterality Date     BIOPSY ARTERY TEMPORAL       EXTRACAPSULAR CATARACT EXTRATION WITH INTRAOCULAR LENS IMPLANT  2011    Left 2 years ago as well     OPEN REDUCTION INTERNAL FIXATION WRIST BILATERAL         Social History     Socioeconomic History     Marital status: Single     Spouse name: Not on file     Number of children: Not on file     Years of education: Not on file     Highest education level: Not on file   Occupational History     Not on file   Social Needs     Financial resource strain: Not on file     Food insecurity:     Worry: Not on file     Inability: Not on file     Transportation needs:     Medical: Not on file     Non-medical: Not on file   Tobacco Use     Smoking status: Former Smoker     Packs/day: 0.50     Last attempt to quit: 1992     Years since quittin.0     Smokeless tobacco: Never Used     Tobacco comment: Quit in 20yrs    Substance and Sexual Activity     Alcohol use: No     Drug use: No     Sexual activity: Not on file   Lifestyle     Physical activity:     Days per week: Not on file     Minutes per session: Not on file     Stress: Not on file   Relationships     Social connections:     Talks on phone: Not on file     Gets together: Not on file     Attends Jew service: Not on file     Active member of club or organization: Not on file     Attends meetings of clubs or organizations: Not on file     Relationship status:  "Not on file     Intimate partner violence:     Fear of current or ex partner: Not on file     Emotionally abused: Not on file     Physically abused: Not on file     Forced sexual activity: Not on file   Other Topics Concern     Parent/sibling w/ CABG, MI or angioplasty before 65F 55M? Not Asked   Social History Narrative    Lives in senior building.  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 Enjoys reading, friends, children and shopping.  Denies exposure to asbestos, silica, pet birds, hot tubs, feather pillows, mold.       Family History   Problem Relation Age of Onset     Cancer Father         colon     Emphysema Father      Arthritis Mother         RA at 31 y/o, glaucome     Glaucoma Mother      Arthritis Brother         RA on Mtx and humira     Glaucoma Brother      Melanoma No family hx of      Skin Cancer No family hx of      Retinal detachment No family hx of      Macular Degeneration No family hx of      Amblyopia No family hx of              ROS Pulmonary  A complete ROS was otherwise negative except as noted in the HPI.    Vitals: /75 (BP Location: Right arm, Patient Position: Chair, Cuff Size: Adult Regular)   Pulse 60   Resp 18   Ht 1.676 m (5' 5.98\")   Wt 87.1 kg (192 lb)   SpO2 96%   BMI 31.00 kg/m      Exam:   GENERAL APPEARANCE: Well developed, well nourished, alert, and in no apparent distress.  RESP: good air flow throughout.  No crackles. No rhonchi. No wheezes.  CV: Normal S1, S2, regular rhythm, normal rate. No murmur.  No LE edema.   MS: extremities normal. No clubbing. No cyanosis.  SKIN: no rash on limited exam.  NEURO: Mentation intact, speech normal, normal gait and stance.  PSYCH: mentation appears normal. and affect normal/bright.    Results:  Recent Results (from the past 168 hour(s))   General PFT Lab (Please always keep checked)    Collection Time: 07/26/19 10:28 AM   Result Value Ref Range    FVC-Pred 2.78 L    FVC-Pre 1.46 L    " FVC-%Pred-Pre 52 %    FEV1-Pre 1.02 L    FEV1-%Pred-Pre 48 %    FEV1FVC-Pred 77 %    FEV1FVC-Pre 70 %    FEFMax-Pred 5.24 L/sec    FEFMax-Pre 3.82 L/sec    FEFMax-%Pred-Pre 73 %    FEF2575-Pred 1.66 L/sec    FEF2575-Pre 0.60 L/sec    XVO1557-%Pred-Pre 36 %    ExpTime-Pre 8.40 sec    FIFMax-Pre 3.13 L/sec    VC-Pred 3.16 L    VC-Pre 1.68 L    VC-%Pred-Pre 53 %    IC-Pred 2.74 L    IC-Pre 1.57 L    IC-%Pred-Pre 57 %    ERV-Pred 0.42 L    ERV-Pre 0.10 L    ERV-%Pred-Pre 24 %    FEV1FEV6-Pred 78 %    FEV1FEV6-Pre 70 %    FRCPleth-Pred 2.85 L    FRCPleth-Pre 2.79 L    FRCPleth-%Pred-Pre 97 %    RVPleth-Pred 2.31 L    RVPleth-Pre 2.68 L    RVPleth-%Pred-Pre 116 %    TLCPleth-Pred 5.32 L    TLCPleth-Pre 4.36 L    TLCPleth-%Pred-Pre 81 %    DLCOunc-Pred 20.32 ml/min/mmHg    DLCOunc-Pre 17.83 ml/min/mmHg    DLCOunc-%Pred-Pre 87 %    VA-Pre 3.21 L    VA-%Pred-Pre 63 %    FEV1SVC-Pred 67 %    FEV1SVC-Pre 61 %       I reviewed pulmonary function test that was performed today.  This shows nonspecific spirometry pattern with normal ratio and normal lung volumes.  Diffusion is normal.  The nonspecific spirometry pattern suggests obstructive lung disease.    I reviewed results with the patient.      Assessment and plan:   Ms. Hahn is a 79-year-old with rheumatoid arthritis associated interstitial lung disease who is here for follow-up.  She has a chest CT scan that suggests LIP with follicular bronchiolitis.  She remains active and has not wanted to start any new medications.  Her PFT shows some improvement in spirometry over the past 2 years.  I encouraged her to continue walking daily as she is doing and to get annual flu vaccines.  I would not add any new medications to her regimen.  If her pulmonary symptoms worsen, then possible options for treatment would include daily azithromycin as an anti-inflammatory, plus minus montelukast, plus minus LABA/ICS combination.  For now, she is doing well and does not want to start a new  medication.  I will see her back in 1 year with full PFT.

## 2019-07-29 LAB
DLCOUNC-%PRED-PRE: 87 %
DLCOUNC-PRE: 17.83 ML/MIN/MMHG
DLCOUNC-PRED: 20.32 ML/MIN/MMHG
ERV-%PRED-PRE: 24 %
ERV-PRE: 0.1 L
ERV-PRED: 0.42 L
EXPTIME-PRE: 8.4 SEC
FEF2575-%PRED-PRE: 36 %
FEF2575-PRE: 0.6 L/SEC
FEF2575-PRED: 1.66 L/SEC
FEFMAX-%PRED-PRE: 73 %
FEFMAX-PRE: 3.82 L/SEC
FEFMAX-PRED: 5.24 L/SEC
FEV1-%PRED-PRE: 48 %
FEV1-PRE: 1.02 L
FEV1FEV6-PRE: 70 %
FEV1FEV6-PRED: 78 %
FEV1FVC-PRE: 70 %
FEV1FVC-PRED: 77 %
FEV1SVC-PRE: 61 %
FEV1SVC-PRED: 67 %
FIFMAX-PRE: 3.13 L/SEC
FRCPLETH-%PRED-PRE: 97 %
FRCPLETH-PRE: 2.79 L
FRCPLETH-PRED: 2.85 L
FVC-%PRED-PRE: 52 %
FVC-PRE: 1.46 L
FVC-PRED: 2.78 L
IC-%PRED-PRE: 57 %
IC-PRE: 1.57 L
IC-PRED: 2.74 L
RVPLETH-%PRED-PRE: 116 %
RVPLETH-PRE: 2.68 L
RVPLETH-PRED: 2.31 L
TLCPLETH-%PRED-PRE: 81 %
TLCPLETH-PRE: 4.36 L
TLCPLETH-PRED: 5.32 L
VA-%PRED-PRE: 63 %
VA-PRE: 3.21 L
VC-%PRED-PRE: 53 %
VC-PRE: 1.68 L
VC-PRED: 3.16 L

## 2019-09-04 DIAGNOSIS — M05.79 RHEUMATOID ARTHRITIS INVOLVING MULTIPLE SITES WITH POSITIVE RHEUMATOID FACTOR (H): ICD-10-CM

## 2019-09-05 RX ORDER — METHOTREXATE 25 MG/ML
INJECTION, SOLUTION INTRA-ARTERIAL; INTRAMUSCULAR; INTRAVENOUS
Qty: 4 ML | Refills: 5 | Status: SHIPPED | OUTPATIENT
Start: 2019-09-05 | End: 2020-01-28

## 2019-09-05 NOTE — TELEPHONE ENCOUNTER
Methotrexate 50 mg/2 ml injection- take  0.8 ml (20 ml) with juice every 7 days    Last Written Prescription Date:  1/18/2019  Last Fill Quantity: 4 ml,   # refills: 5  Last Office Visit: 5/14/2019  Future Office visit: 9/17/2019    CBC RESULTS:   Recent Labs   Lab Test 07/26/19  1249   WBC 6.0   RBC 4.38   HGB 14.3   HCT 44.9   *   MCH 32.6   MCHC 31.8   RDW 12.9          Creatinine   Date Value Ref Range Status   07/26/2019 1.02 0.52 - 1.04 mg/dL Final   ]    Liver Function Studies -   Recent Labs   Lab Test 07/26/19  1249  08/27/18  1043   PROTTOTAL  --   --  7.5   ALBUMIN 3.7   < > 3.6   BILITOTAL  --   --  0.6   ALKPHOS  --   --  94   AST 15   < > 18   ALT 20   < > 24    < > = values in this interval not displayed.       Routing refill request to provider for review/approval because:  DMARD

## 2019-09-22 DIAGNOSIS — H40.1133 PRIMARY OPEN ANGLE GLAUCOMA OF BOTH EYES, SEVERE STAGE: ICD-10-CM

## 2019-09-23 RX ORDER — LATANOPROST 50 UG/ML
1 SOLUTION/ DROPS OPHTHALMIC AT BEDTIME
Qty: 3 BOTTLE | Refills: 3 | Status: SHIPPED | OUTPATIENT
Start: 2019-09-23 | End: 2020-06-05

## 2019-10-22 ENCOUNTER — OFFICE VISIT (OUTPATIENT)
Dept: OPHTHALMOLOGY | Facility: CLINIC | Age: 79
End: 2019-10-22
Attending: OPHTHALMOLOGY
Payer: MEDICARE

## 2019-10-22 DIAGNOSIS — Z96.1 PSEUDOPHAKIA OF BOTH EYES: ICD-10-CM

## 2019-10-22 DIAGNOSIS — H40.1133 PRIMARY OPEN ANGLE GLAUCOMA OF BOTH EYES, SEVERE STAGE: Primary | ICD-10-CM

## 2019-10-22 PROCEDURE — G0463 HOSPITAL OUTPT CLINIC VISIT: HCPCS | Mod: ZF

## 2019-10-22 PROCEDURE — 92083 EXTENDED VISUAL FIELD XM: CPT | Mod: ZF | Performed by: OPHTHALMOLOGY

## 2019-10-22 ASSESSMENT — CONF VISUAL FIELD
OS_SUPERIOR_TEMPORAL_RESTRICTION: 3
OD_INFERIOR_TEMPORAL_RESTRICTION: 3
OD_SUPERIOR_NASAL_RESTRICTION: 3
OD_INFERIOR_NASAL_RESTRICTION: 3
METHOD: COUNTING FINGERS
OS_SUPERIOR_NASAL_RESTRICTION: 3
OD_SUPERIOR_TEMPORAL_RESTRICTION: 3

## 2019-10-22 ASSESSMENT — REFRACTION_WEARINGRX
OS_AXIS: 109
OD_CYLINDER: +0.50
OS_ADD: +3.00
OD_ADD: +3.00
OS_CYLINDER: +1.50
OS_SPHERE: -0.75
OD_SPHERE: -1.50
OD_AXIS: 148

## 2019-10-22 ASSESSMENT — TONOMETRY
OD_IOP_MMHG: 16
OS_IOP_MMHG: 15
IOP_METHOD: APPLANATION

## 2019-10-22 ASSESSMENT — VISUAL ACUITY
OS_PH_CC+: +3
OS_CC: 20/40
OD_CC: 20/40
OS_PH_CC: 20/40
METHOD: SNELLEN - LINEAR
CORRECTION_TYPE: GLASSES
OS_CC+: -2
OD_CC+: -1+1

## 2019-10-22 ASSESSMENT — SLIT LAMP EXAM - LIDS
COMMENTS: NORMAL
COMMENTS: NORMAL

## 2019-10-22 ASSESSMENT — EXTERNAL EXAM - LEFT EYE: OS_EXAM: NORMAL

## 2019-10-22 ASSESSMENT — EXTERNAL EXAM - RIGHT EYE: OD_EXAM: NORMAL

## 2019-10-22 NOTE — PROGRESS NOTES
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, s/p TRAB OU (OD:96 and OS:95 with revision OS in 1997), H/O Noncompliance with visits (lost to f/u from [id number]) with marked progression OS -- K pachy: 571/540   Tmax:     HVF:OD:[place] and OS:[place] (marked progresion from 0823-4391 and HVF  OD:[place] and OS:[place] in 2009    CDR: 0.9/0.9    HRT/OCT: OD:Severe RNFl thinning and OS:Mod RNFL thinning      FHX of Glc: Mother -- gtts, Sister -- possibly     Gonio:  Open with few PAS     Intolerant to:      Asthma/COPD: Yes, on inhalers but pt is not uisng them -- follows with pulmonology  Steroid Use: No    Kidney Stones:  No   Sulfa Allergy:  No    IOP targets:L-M teens (?Lteens) -- IOP borderline  2)PCIOL OU  3)H/O HA -- s/p Negative TAB with Dr. Bellamy   4)H/O RA on MTX -- following with Rheum  5)XIN    Patient will continue Latanoprost which is a teal top drop at bedtime in both eyes.  Patient will return to clinic in 3-4 months with repeat IOP check and visual field test (OU:LVC), dilated eye exam and OCT with RNFL analysis.  A long discussion of the risks, benefits, and alternatives including potential treatment and management options were had with patient and a decision was made to observe at this time and not add additional eye drops unless evidence of progression or elevated IOPs.       This drop may cause lash growth and some darkening of the skin around the eye.  It is also possible in a patient with a freckled iris or michell eyes, that the iris could darken to brown with time, something that is not common but not reversible.      Attending Physician Attestation:  Complete documentation of historical and exam elements from today's encounter can be found in the full encounter summary report (not reduplicated in this progress note). I personally obtained the chief complaint(s) and history of present illness.  I confirmed and edited as necessary the review of systems, past medical/surgical  history, family history, social history, and examination findings as documented by others; and I examined the patient myself. I personally reviewed the relevant tests, images, and reports as documented above. I formulated and edited as necessary the assessment and plan and discussed the findings and management plan with the patient and family.  - Corazon Addison MD

## 2019-10-22 NOTE — NURSING NOTE
Chief Complaints and History of Present Illnesses   Patient presents with     Glaucoma Follow-Up     Chief Complaint(s) and History of Present Illness(es)     Glaucoma Follow-Up               Comments     Glaucoma follow up.  The patient uses Latanoprost at night in both eyes.  Heike Rodriguez COA, COA 10:31 AM 10/22/2019

## 2019-12-26 DIAGNOSIS — I10 ESSENTIAL HYPERTENSION: ICD-10-CM

## 2019-12-30 RX ORDER — LISINOPRIL 40 MG/1
40 TABLET ORAL DAILY
Qty: 90 TABLET | Refills: 0 | Status: SHIPPED | OUTPATIENT
Start: 2019-12-30 | End: 2020-03-24

## 2019-12-30 NOTE — TELEPHONE ENCOUNTER
Last Clinic Visit: 11/29/2018  [facility]    Appointment and Lab(s) past due-Potassium.  Belén refill 90 days and FYI to [facility] clinic.

## 2020-01-27 ENCOUNTER — TELEPHONE (OUTPATIENT)
Dept: RHEUMATOLOGY | Facility: CLINIC | Age: 80
End: 2020-01-27

## 2020-01-28 ENCOUNTER — OFFICE VISIT (OUTPATIENT)
Dept: RHEUMATOLOGY | Facility: CLINIC | Age: 80
End: 2020-01-28
Attending: INTERNAL MEDICINE
Payer: MEDICARE

## 2020-01-28 VITALS
WEIGHT: 194.7 LBS | HEIGHT: 66 IN | BODY MASS INDEX: 31.29 KG/M2 | OXYGEN SATURATION: 94 % | HEART RATE: 71 BPM | SYSTOLIC BLOOD PRESSURE: 112 MMHG | DIASTOLIC BLOOD PRESSURE: 71 MMHG | RESPIRATION RATE: 18 BRPM | TEMPERATURE: 97.9 F

## 2020-01-28 DIAGNOSIS — M05.79 RHEUMATOID ARTHRITIS INVOLVING MULTIPLE SITES WITH POSITIVE RHEUMATOID FACTOR (H): ICD-10-CM

## 2020-01-28 DIAGNOSIS — Z23 NEED FOR INFLUENZA VACCINATION: Primary | ICD-10-CM

## 2020-01-28 DIAGNOSIS — J84.9 ILD (INTERSTITIAL LUNG DISEASE) (H): ICD-10-CM

## 2020-01-28 DIAGNOSIS — Z79.899 ENCOUNTER FOR LONG-TERM CURRENT USE OF MEDICATION: ICD-10-CM

## 2020-01-28 LAB
ALBUMIN SERPL-MCNC: 3.4 G/DL (ref 3.4–5)
ALT SERPL W P-5'-P-CCNC: 18 U/L (ref 0–50)
AST SERPL W P-5'-P-CCNC: 13 U/L (ref 0–45)
CREAT SERPL-MCNC: 1.09 MG/DL (ref 0.52–1.04)
ERYTHROCYTE [DISTWIDTH] IN BLOOD BY AUTOMATED COUNT: 14.2 % (ref 10–15)
GFR SERPL CREATININE-BSD FRML MDRD: 48 ML/MIN/{1.73_M2}
HCT VFR BLD AUTO: 44.4 % (ref 35–47)
HGB BLD-MCNC: 13.9 G/DL (ref 11.7–15.7)
MCH RBC QN AUTO: 32.9 PG (ref 26.5–33)
MCHC RBC AUTO-ENTMCNC: 31.3 G/DL (ref 31.5–36.5)
MCV RBC AUTO: 105 FL (ref 78–100)
PLATELET # BLD AUTO: 205 10E9/L (ref 150–450)
RBC # BLD AUTO: 4.22 10E12/L (ref 3.8–5.2)
WBC # BLD AUTO: 7 10E9/L (ref 4–11)

## 2020-01-28 PROCEDURE — G0008 ADMIN INFLUENZA VIRUS VAC: HCPCS | Mod: ZF

## 2020-01-28 PROCEDURE — 85027 COMPLETE CBC AUTOMATED: CPT | Performed by: INTERNAL MEDICINE

## 2020-01-28 PROCEDURE — 90662 IIV NO PRSV INCREASED AG IM: CPT | Mod: ZF | Performed by: INTERNAL MEDICINE

## 2020-01-28 PROCEDURE — 25000128 H RX IP 250 OP 636: Mod: ZF | Performed by: INTERNAL MEDICINE

## 2020-01-28 PROCEDURE — G0463 HOSPITAL OUTPT CLINIC VISIT: HCPCS | Mod: ZF

## 2020-01-28 PROCEDURE — 36415 COLL VENOUS BLD VENIPUNCTURE: CPT | Performed by: INTERNAL MEDICINE

## 2020-01-28 PROCEDURE — 82565 ASSAY OF CREATININE: CPT | Performed by: INTERNAL MEDICINE

## 2020-01-28 PROCEDURE — G0463 HOSPITAL OUTPT CLINIC VISIT: HCPCS | Mod: 25,ZF

## 2020-01-28 PROCEDURE — 84450 TRANSFERASE (AST) (SGOT): CPT | Performed by: INTERNAL MEDICINE

## 2020-01-28 PROCEDURE — 84460 ALANINE AMINO (ALT) (SGPT): CPT | Performed by: INTERNAL MEDICINE

## 2020-01-28 PROCEDURE — 82040 ASSAY OF SERUM ALBUMIN: CPT | Performed by: INTERNAL MEDICINE

## 2020-01-28 RX ORDER — FOLIC ACID 0.8 MG
800 TABLET ORAL 2 TIMES DAILY
Qty: 120 TABLET | Refills: 3 | Status: SHIPPED | OUTPATIENT
Start: 2020-01-28

## 2020-01-28 RX ORDER — METHOTREXATE 25 MG/ML
INJECTION, SOLUTION INTRA-ARTERIAL; INTRAMUSCULAR; INTRAVENOUS
Qty: 4 ML | Refills: 5 | Status: SHIPPED | OUTPATIENT
Start: 2020-01-28 | End: 2020-05-22

## 2020-01-28 RX ADMIN — INFLUENZA A VIRUS A/MICHIGAN/45/2015 X-275 (H1N1) ANTIGEN (FORMALDEHYDE INACTIVATED), INFLUENZA A VIRUS A/SINGAPORE/INFIMH-16-0019/2016 IVR-186 (H3N2) ANTIGEN (FORMALDEHYDE INACTIVATED), AND INFLUENZA B VIRUS B/MARYLAND/15/2016 BX-69A (A B/COLORADO/6/2017-LIKE VIRUS) ANTIGEN (FORMALDEHYDE INACTIVATED) 0.5 ML: 60; 60; 60 INJECTION, SUSPENSION INTRAMUSCULAR at 11:38

## 2020-01-28 ASSESSMENT — PAIN SCALES - GENERAL: PAINLEVEL: MODERATE PAIN (5)

## 2020-01-28 ASSESSMENT — MIFFLIN-ST. JEOR: SCORE: 1374.9

## 2020-01-28 NOTE — LETTER
"2020     RE: Abida Hahn  [address]  [facility] [address]     Dear Colleague,    Thank you for referring your patient, Abida Hahn, to the [facility] RHEUMATOLOGY at [facility]. Please see a copy of my visit note below.    [facility]  Rheumatology Clinic  Bebeto Mccurdy MD  2020     Name: Abida Hahn  MRN: [id number]  Age: 79 year old  : 1940  Referring provider: Bebeto Mccurdy     Assessment and Plan:  # Rheumatoid arthritis involving multiple sites with positive rheumatoid factor (high RF, ACPA):   Patient relates stable low-grade morning stiffness, chronic low back pain, but no change in range of motion or swelling of small joint predominant arthritis. Exam shows chronic deformities associated with prior inflammatory arthritis with ulnar deviation in the right greater than left hands. Blood work from 2020 showed creatinine stable at 1.09. LFTs and CBC were normal.     Rheumatoid arthritis remains in remission with methotrexate monotherapy. Plan:  - Methotrexate 20 mg (0.8 mL) once weekly.   - Folic acid 2 mg daily.  - While on methotrexate, every 3 month LFTs, CBC, and creatinine.     Return to clinic in 6 months.     Orders:  - methotrexate 50 MG/2ML injection  Dispense: 4 mL; Refill: 5  - Needle, Disp, (BD SAFETYGLIDE NEEDLE) 27G X 5/8\" MISC  Dispense: 12 each; Refill: 1  - folic acid 800 MCG tablet  Dispense: 120 tablet; Refill: 3    Follow-up: Return in about 6 months (around 2020).    HPI:   Abida Hahn is a 79 year old female with a history including rheumatoid arthritis (+RF 1990s, +CCP >250. +YEYO) who presents for follow-up. I last saw the patient on 2019 for rheumatoid arthritis. Impression was of arthritis under excellent control. Methotrexate monotherapy was continued.     Today, the patient reports that she has constant lower back pain which is her main concern today. She states that her finger knuckles are " painful, not very often though. She explains that her fingers feel cold often, so she wears gloves frequently. She adds that hand pain/coldness does not interfere with her life. She denies any other joint issues.    She explains that her feet are not very painful now, possibly due to switching from higher-heeled shoes to flat shoes. She states that her methotrexate dose has remained unchanged; she takes liquid form 20 mg weekly by mouth mixed with water.     Review of Systems:   Pertinent items are noted in HPI or as below, remainder of complete ROS is negative.      No recent problems with hearing or vision. No swallowing problems.   No breathing difficulty, shortness of breath, coughing, or wheezing.  No chest pain or palpitations.  No heart burn, indigestion, abdominal pain, nausea, vomiting, diarrhea.  No urination problems, no bloody, cloudy urine, no dysuria.  No numbing, tingling, weakness.  No headaches or confusion.  No rashes. No easy bleeding or bruising.     Active Medications:   Current Outpatient Medications:      amLODIPine (NORVASC) 5 MG tablet, Take 1 tablet (5 mg) by mouth daily Please make appt for annual visit [phone], Disp: 90 tablet, Rfl: 0     ARTIFICIAL TEAR SOLUTION OP, Apply  to eye as needed., Disp: , Rfl:      Cholecalciferol (VITAMIN D-3) 1000 UNITS CAPS, Take by mouth 2 times daily, Disp: , Rfl:      folic acid 800 MCG TABS, Take by mouth 2 times daily, Disp: , Rfl:      GLUCOSAMINE SULFATE PO, Take 1,000 mg by mouth daily, Disp: , Rfl:      latanoprost (XALATAN) 0.005 % ophthalmic solution, Place 1 drop into both eyes At Bedtime, Disp: 3 Bottle, Rfl: 3     levothyroxine (SYNTHROID/LEVOTHROID) 88 MCG tablet, Take 1 tablet (88 mcg) by mouth daily, Disp: 90 tablet, Rfl: 0     lisinopril (PRINIVIL/ZESTRIL) 40 MG tablet, Take 1 tablet (40 mg) by mouth daily, Disp: 90 tablet, Rfl: 0     methotrexate 50 MG/2ML injection CHEMO, MIX 0.8 ML (20 mg) WITH JUICE AND DRINK EVERY 7 DAYS   "*APPOINTMENT DUE. LABS EVERY 8-12 WK, Disp: 4 mL, Rfl: 5     albuterol (PROAIR HFA/PROVENTIL HFA/VENTOLIN HFA) 108 (90 BASE) MCG/ACT Inhaler, Inhale 2 puffs into the lungs every 6 hours as needed for shortness of breath / dyspnea or wheezing (Patient not taking: Reported on 1/28/2020), Disp: 1 Inhaler, Rfl: 1     Calcium-Vitamin D (CALCIUM + D PO), Take 1 tablet by mouth 2 times daily, Disp: , Rfl:      Multiple Vitamin (DAILY MULTIVITAMIN PO), Take 1 tablet by mouth daily And minerals per pt, Disp: , Rfl:      Needle, Disp, (BD SAFETYGLIDE NEEDLE) 27G X 5/8\" MISC, For methotrexate use EVERY 7 DAY, Disp: 12 each, Rfl: 1    Current Facility-Administered Medications:      influenza Vac Split High-Dose (FLUZONE) injection 0.5 mL, 0.5 mL, Intramuscular, Once, Bebeto Mccurdy MD    Allergies:  Ibuprofen Sodium   Penicillins   Triple Antibiotic [Neomycin-Polymyxin-Dexameth]   Aspirin   Codeine   Ibuprofen    Past Medical History:  Abscess, toe  Right great toe  Cataract  Primary open angle glaucoma of both eyes, severe stage  Pseudophakia of both eyes  Headache   Hypertension   Hypothyroidism   Interstitial lung disease   Pyoderma gangrenosum   Rheumatoid arthritis (+RF 1990s, +CCP >250, +YEYO)  Wrist fracture   Chronic pain syndrome  Osteoporosis    Past Surgical History:  Biopsy artery temporal    Extracapsular cataract extraction with intraocular lens implant 8/2011  Open reduction internal fixation wrist bilateral      Family History:    The patient's family history includes Arthritis in her brother and mother; Cancer in her father; Emphysema in her father; Glaucoma in her brother and mother.    Social History:  The patient reports that she quit smoking about 27 years ago. She smoked 0.50 packs per day. She has never used smokeless tobacco. She reports that she does not drink alcohol or use drugs.   PCP: Amarilys Iyer  Marital Status: Single    Physical Exam:   /71 (BP Location: Right arm, Patient " "Position: Sitting, Cuff Size: Adult Large)   Pulse 71   Temp 97.9  F (36.6  C) (Oral)   Resp 18   Ht 1.676 m (5' 6\")   Wt 88.3 kg (194 lb 11.2 oz)   SpO2 94%   BMI 31.43 kg/m      Wt Readings from Last 4 Encounters:   01/28/20 88.3 kg (194 lb 11.2 oz)   07/26/19 87.1 kg (192 lb)   05/14/19 87.5 kg (192 lb 12.8 oz)   01/18/19 87.9 kg (193 lb 12.8 oz)     Constitutional: Well-developed, appearing stated age; cooperative.  Eyes: Normal EOM, PERRLA, vision, conjunctiva, sclera.  ENT: Normal external ears, nose, hearing, lips, teeth, gums, throat. No mucous membrane lesions, normal saliva pool.  Neck: No mass or thyroid enlargement.  Resp: Lungs clear to auscultation, nl to palpation.  CV: RRR, no murmurs, rubs or gallops, no edema.  GI: No ABD mass or tenderness, no HSM.  : Not tested.  Lymph: No cervical, supraclavicular, inguinal or epitrochlear nodes.  MS: The TMJ, neck, shoulder, elbow, spine, hip joints were examined and found normal. No dactylitis, tenosynovitis, enthesopathy. Previously noted changes at PIPs and MCPs with angulation, but there is no palpable synovial swelling at PIPs or MCPs. Good  strength. Reasonable range of motion in the wrists without any inflammatory changes. There is a flattened longitudinal arch in the left foot, but no inflammatory ankle or MTP changes. Knee is not swollen.  Skin: No nail pitting, alopecia, rash, nodules, or lesions.  Neuro: Normal cranial nerves, strength, sensation, DTRs.   Psych: Normal judgement, orientation, memory, affect.     Laboratory:   RHEUM RESULTS Latest Ref Rng & Units 4/16/2019 7/26/2019 1/28/2020   SED RATE 0 - 30 mm/h - - -   CRP, INFLAMMATION 0.0 - 8.0 mg/L - - -   CYCLIC CIT PEPT IGG <5 U/mL - - -   RHEUMATOID FACTOR 0 - 14 IU/mL - - -   YEYO SCREEN BY EIA <1.0 - - -   AST 0 - 45 U/L 14 15 13   ALT 0 - 50 U/L 23 20 18   ALBUMIN 3.4 - 5.0 g/dL 3.5 3.7 3.4   WBC 4.0 - 11.0 10e9/L 3.9(L) 6.0 7.0   RBC 3.8 - 5.2 10e12/L 4.28 4.38 4.22   HGB " 11.7 - 15.7 g/dL 14.2 14.3 13.9   HCT 35.0 - 47.0 % 44.7 44.9 44.4   MCV 78 - 100 fl 104(H) 103(H) 105(H)   MCHC 31.5 - 36.5 g/dL 31.8 31.8 31.3(L)   RDW 10.0 - 15.0 % 13.6 12.9 14.2    - 450 10e9/L 160 193 205   CREATININE 0.52 - 1.04 mg/dL 0.96 1.02 1.09(H)   GFR ESTIMATE, IF BLACK >60 mL/min/[1.73:m2] 66 61 56(L)   GFR ESTIMATE >60 mL/min/[1.73:m2] 57(L) 52(L) 48(L)     Rheumatoid Factor   Date Value Ref Range Status   03/11/2009 1990 (H) 0 - 14 IU/mL Final     Cyclic Cit Pept IgG/IgA   Date Value Ref Range Status   01/26/2012 >250  Strongly Positive (H) <20 UNITS Final     Cyclic Citrullinated Peptide IgG   Date Value Ref Range Status   03/11/2009 38 (H) <5 U/mL Final     Comment:     Interpretation:  Positive     Scleroderma Antibody Scl-70 CAROL ANN IgG   Date Value Ref Range Status   08/18/2016 0.5 0.0 - 0.9 AI Final     Comment:     Negative   Antibody index (AI) values reflect qualitative changes in antibody   concentration that cannot be directly associated with clinical condition or   disease state.       SSA (Ro) (CAROL ANN) Antibody, IgG   Date Value Ref Range Status   08/18/2016  0.0 - 0.9 AI Final    <0.2  Negative   Antibody index (AI) values reflect qualitative changes in antibody   concentration that cannot be directly associated with clinical condition or   disease state.       SSB (La) (CAROL ANN) Antibody, IgG   Date Value Ref Range Status   08/18/2016  0.0 - 0.9 AI Final    <0.2  Negative   Antibody index (AI) values reflect qualitative changes in antibody   concentration that cannot be directly associated with clinical condition or   disease state.       YEYO Screen by EIA   Date Value Ref Range Status   08/18/2016 13.0 (H) <1.0 Final     Comment:     Interpretation:  Positive     Proteinase 3 Antibody IgG   Date Value Ref Range Status   08/18/2016  0.0 - 0.9 AI Final    <0.2  Negative   Antibody index (AI) values reflect qualitative changes in antibody   concentration that cannot be directly associated  with clinical condition or   disease state.       Myeloperoxidase Antibody IgG   Date Value Ref Range Status   08/18/2016  0.0 - 0.9 AI Final    <0.2  Negative   Antibody index (AI) values reflect qualitative changes in antibody   concentration that cannot be directly associated with clinical condition or   disease state.       Smith CAROL ANN Antibody IgG   Date Value Ref Range Status   08/18/2016  0.0 - 0.9 AI Final    <0.2  Negative   Antibody index (AI) values reflect qualitative changes in antibody   concentration that cannot be directly associated with clinical condition or   disease state.       Scleroderma Antibody Scl-70 CAROL ANN IgG   Date Value Ref Range Status   08/18/2016 0.5 0.0 - 0.9 AI Final     Comment:     Negative   Antibody index (AI) values reflect qualitative changes in antibody   concentration that cannot be directly associated with clinical condition or   disease state.       Scribe Disclosure:  I, Buddy Perry, am serving as a scribe to document services personally performed by Bebeto Mccurdy MD at this visit, based upon the provider's statements to me. All documentation has been reviewed by the aforementioned provider prior to being entered into the official medical record.     Again, thank you for allowing me to participate in the care of your patient.      Sincerely,    Bebeto Mccurdy MD

## 2020-01-28 NOTE — PROGRESS NOTES
"[facility]  Rheumatology Clinic  Bebeto Mccurdy MD  2020     Name: Abida Hahn  MRN: [id number]  Age: 79 year old  : 1940  Referring provider: Bebeto Mccurdy     Assessment and Plan:  # Rheumatoid arthritis involving multiple sites with positive rheumatoid factor (high RF, ACPA):   Patient relates stable low-grade morning stiffness, chronic low back pain, but no change in range of motion or swelling of small joint predominant arthritis. Exam shows chronic deformities associated with prior inflammatory arthritis with ulnar deviation in the right greater than left hands. Blood work from 2020 showed creatinine stable at 1.09. LFTs and CBC were normal.     Rheumatoid arthritis remains in remission with methotrexate monotherapy. Plan:  - Methotrexate 20 mg (0.8 mL) once weekly.   - Folic acid 2 mg daily.  - While on methotrexate, every 3 month LFTs, CBC, and creatinine.     Return to clinic in 6 months.     Orders:  - methotrexate 50 MG/2ML injection  Dispense: 4 mL; Refill: 5  - Needle, Disp, (BD SAFETYGLIDE NEEDLE) 27G X 5/8\" MISC  Dispense: 12 each; Refill: 1  - folic acid 800 MCG tablet  Dispense: 120 tablet; Refill: 3    Follow-up: Return in about 6 months (around 2020).    HPI:   Abida Hahn is a 79 year old female with a history including rheumatoid arthritis (+RF 1990s, +CCP >250. +YEYO) who presents for follow-up. I last saw the patient on 2019 for rheumatoid arthritis. Impression was of arthritis under excellent control. Methotrexate monotherapy was continued.     Today, the patient reports that she has constant lower back pain which is her main concern today. She states that her finger knuckles are painful, not very often though. She explains that her fingers feel cold often, so she wears gloves frequently. She adds that hand pain/coldness does not interfere with her life. She denies any other joint issues.    She explains that her feet are not very painful now, possibly " due to switching from higher-heeled shoes to flat shoes. She states that her methotrexate dose has remained unchanged; she takes liquid form 20 mg weekly by mouth mixed with water.     Review of Systems:   Pertinent items are noted in HPI or as below, remainder of complete ROS is negative.      No recent problems with hearing or vision. No swallowing problems.   No breathing difficulty, shortness of breath, coughing, or wheezing.  No chest pain or palpitations.  No heart burn, indigestion, abdominal pain, nausea, vomiting, diarrhea.  No urination problems, no bloody, cloudy urine, no dysuria.  No numbing, tingling, weakness.  No headaches or confusion.  No rashes. No easy bleeding or bruising.     Active Medications:   Current Outpatient Medications:      amLODIPine (NORVASC) 5 MG tablet, Take 1 tablet (5 mg) by mouth daily Please make appt for annual visit [phone], Disp: 90 tablet, Rfl: 0     ARTIFICIAL TEAR SOLUTION OP, Apply  to eye as needed., Disp: , Rfl:      Cholecalciferol (VITAMIN D-3) 1000 UNITS CAPS, Take by mouth 2 times daily, Disp: , Rfl:      folic acid 800 MCG TABS, Take by mouth 2 times daily, Disp: , Rfl:      GLUCOSAMINE SULFATE PO, Take 1,000 mg by mouth daily, Disp: , Rfl:      latanoprost (XALATAN) 0.005 % ophthalmic solution, Place 1 drop into both eyes At Bedtime, Disp: 3 Bottle, Rfl: 3     levothyroxine (SYNTHROID/LEVOTHROID) 88 MCG tablet, Take 1 tablet (88 mcg) by mouth daily, Disp: 90 tablet, Rfl: 0     lisinopril (PRINIVIL/ZESTRIL) 40 MG tablet, Take 1 tablet (40 mg) by mouth daily, Disp: 90 tablet, Rfl: 0     methotrexate 50 MG/2ML injection CHEMO, MIX 0.8 ML (20 mg) WITH JUICE AND DRINK EVERY 7 DAYS  *APPOINTMENT DUE. LABS EVERY 8-12 WK, Disp: 4 mL, Rfl: 5     albuterol (PROAIR HFA/PROVENTIL HFA/VENTOLIN HFA) 108 (90 BASE) MCG/ACT Inhaler, Inhale 2 puffs into the lungs every 6 hours as needed for shortness of breath / dyspnea or wheezing (Patient not taking: Reported on  "1/28/2020), Disp: 1 Inhaler, Rfl: 1     Calcium-Vitamin D (CALCIUM + D PO), Take 1 tablet by mouth 2 times daily, Disp: , Rfl:      Multiple Vitamin (DAILY MULTIVITAMIN PO), Take 1 tablet by mouth daily And minerals per pt, Disp: , Rfl:      Needle, Disp, (BD SAFETYGLIDE NEEDLE) 27G X 5/8\" MISC, For methotrexate use EVERY 7 DAY, Disp: 12 each, Rfl: 1    Current Facility-Administered Medications:      influenza Vac Split High-Dose (FLUZONE) injection 0.5 mL, 0.5 mL, Intramuscular, Once, Bebeto Mccurdy MD    Allergies:  Ibuprofen Sodium   Penicillins   Triple Antibiotic [Neomycin-Polymyxin-Dexameth]   Aspirin   Codeine   Ibuprofen    Past Medical History:  Abscess, toe  Right great toe  Cataract  Primary open angle glaucoma of both eyes, severe stage  Pseudophakia of both eyes  Headache   Hypertension   Hypothyroidism   Interstitial lung disease   Pyoderma gangrenosum   Rheumatoid arthritis (+RF 1990s, +CCP >250, +YEYO)  Wrist fracture   Chronic pain syndrome  Osteoporosis    Past Surgical History:  Biopsy artery temporal    Extracapsular cataract extraction with intraocular lens implant 8/2011  Open reduction internal fixation wrist bilateral      Family History:    The patient's family history includes Arthritis in her brother and mother; Cancer in her father; Emphysema in her father; Glaucoma in her brother and mother.    Social History:  The patient reports that she quit smoking about 27 years ago. She smoked 0.50 packs per day. She has never used smokeless tobacco. She reports that she does not drink alcohol or use drugs.   PCP: Amarilys Iyer  Marital Status: Single    Physical Exam:   /71 (BP Location: Right arm, Patient Position: Sitting, Cuff Size: Adult Large)   Pulse 71   Temp 97.9  F (36.6  C) (Oral)   Resp 18   Ht 1.676 m (5' 6\")   Wt 88.3 kg (194 lb 11.2 oz)   SpO2 94%   BMI 31.43 kg/m     Wt Readings from Last 4 Encounters:   01/28/20 88.3 kg (194 lb 11.2 oz)   07/26/19 87.1 kg " (192 lb)   05/14/19 87.5 kg (192 lb 12.8 oz)   01/18/19 87.9 kg (193 lb 12.8 oz)     Constitutional: Well-developed, appearing stated age; cooperative.  Eyes: Normal EOM, PERRLA, vision, conjunctiva, sclera.  ENT: Normal external ears, nose, hearing, lips, teeth, gums, throat. No mucous membrane lesions, normal saliva pool.  Neck: No mass or thyroid enlargement.  Resp: Lungs clear to auscultation, nl to palpation.  CV: RRR, no murmurs, rubs or gallops, no edema.  GI: No ABD mass or tenderness, no HSM.  : Not tested.  Lymph: No cervical, supraclavicular, inguinal or epitrochlear nodes.  MS: The TMJ, neck, shoulder, elbow, spine, hip joints were examined and found normal. No dactylitis, tenosynovitis, enthesopathy. Previously noted changes at PIPs and MCPs with angulation, but there is no palpable synovial swelling at PIPs or MCPs. Good  strength. Reasonable range of motion in the wrists without any inflammatory changes. There is a flattened longitudinal arch in the left foot, but no inflammatory ankle or MTP changes. Knee is not swollen.  Skin: No nail pitting, alopecia, rash, nodules, or lesions.  Neuro: Normal cranial nerves, strength, sensation, DTRs.   Psych: Normal judgement, orientation, memory, affect.     Laboratory:   RHEUM RESULTS Latest Ref Rng & Units 4/16/2019 7/26/2019 1/28/2020   SED RATE 0 - 30 mm/h - - -   CRP, INFLAMMATION 0.0 - 8.0 mg/L - - -   CYCLIC CIT PEPT IGG <5 U/mL - - -   RHEUMATOID FACTOR 0 - 14 IU/mL - - -   YEYO SCREEN BY EIA <1.0 - - -   AST 0 - 45 U/L 14 15 13   ALT 0 - 50 U/L 23 20 18   ALBUMIN 3.4 - 5.0 g/dL 3.5 3.7 3.4   WBC 4.0 - 11.0 10e9/L 3.9(L) 6.0 7.0   RBC 3.8 - 5.2 10e12/L 4.28 4.38 4.22   HGB 11.7 - 15.7 g/dL 14.2 14.3 13.9   HCT 35.0 - 47.0 % 44.7 44.9 44.4   MCV 78 - 100 fl 104(H) 103(H) 105(H)   MCHC 31.5 - 36.5 g/dL 31.8 31.8 31.3(L)   RDW 10.0 - 15.0 % 13.6 12.9 14.2    - 450 10e9/L 160 193 205   CREATININE 0.52 - 1.04 mg/dL 0.96 1.02 1.09(H)   GFR  ESTIMATE, IF BLACK >60 mL/min/[1.73:m2] 66 61 56(L)   GFR ESTIMATE >60 mL/min/[1.73:m2] 57(L) 52(L) 48(L)     Rheumatoid Factor   Date Value Ref Range Status   03/11/2009 1990 (H) 0 - 14 IU/mL Final     Cyclic Cit Pept IgG/IgA   Date Value Ref Range Status   01/26/2012 >250  Strongly Positive (H) <20 UNITS Final     Cyclic Citrullinated Peptide IgG   Date Value Ref Range Status   03/11/2009 38 (H) <5 U/mL Final     Comment:     Interpretation:  Positive     Scleroderma Antibody Scl-70 CAROL ANN IgG   Date Value Ref Range Status   08/18/2016 0.5 0.0 - 0.9 AI Final     Comment:     Negative   Antibody index (AI) values reflect qualitative changes in antibody   concentration that cannot be directly associated with clinical condition or   disease state.       SSA (Ro) (CAROL ANN) Antibody, IgG   Date Value Ref Range Status   08/18/2016  0.0 - 0.9 AI Final    <0.2  Negative   Antibody index (AI) values reflect qualitative changes in antibody   concentration that cannot be directly associated with clinical condition or   disease state.       SSB (La) (CAROL ANN) Antibody, IgG   Date Value Ref Range Status   08/18/2016  0.0 - 0.9 AI Final    <0.2  Negative   Antibody index (AI) values reflect qualitative changes in antibody   concentration that cannot be directly associated with clinical condition or   disease state.       YEYO Screen by EIA   Date Value Ref Range Status   08/18/2016 13.0 (H) <1.0 Final     Comment:     Interpretation:  Positive     Proteinase 3 Antibody IgG   Date Value Ref Range Status   08/18/2016  0.0 - 0.9 AI Final    <0.2  Negative   Antibody index (AI) values reflect qualitative changes in antibody   concentration that cannot be directly associated with clinical condition or   disease state.       Myeloperoxidase Antibody IgG   Date Value Ref Range Status   08/18/2016  0.0 - 0.9 AI Final    <0.2  Negative   Antibody index (AI) values reflect qualitative changes in antibody   concentration that cannot be directly  associated with clinical condition or   disease state.       Smith CAROL ANN Antibody IgG   Date Value Ref Range Status   08/18/2016  0.0 - 0.9 AI Final    <0.2  Negative   Antibody index (AI) values reflect qualitative changes in antibody   concentration that cannot be directly associated with clinical condition or   disease state.       Scleroderma Antibody Scl-70 CAROL ANN IgG   Date Value Ref Range Status   08/18/2016 0.5 0.0 - 0.9 AI Final     Comment:     Negative   Antibody index (AI) values reflect qualitative changes in antibody   concentration that cannot be directly associated with clinical condition or   disease state.       Scribe Disclosure:  IBuddy, am serving as a scribe to document services personally performed by Bebeto Mccurdy MD at this visit, based upon the provider's statements to me. All documentation has been reviewed by the aforementioned provider prior to being entered into the official medical record.

## 2020-01-28 NOTE — NURSING NOTE
"Chief Complaint   Patient presents with     RECHECK     RA     Vital signs:  Temp: 97.9  F (36.6  C) Temp src: Oral BP: 112/71 Pulse: 71   Resp: 18 SpO2: 94 %     Height: 167.6 cm (5' 6\") Weight: 88.3 kg (194 lb 11.2 oz)  Estimated body mass index is 31.43 kg/m  as calculated from the following:    Height as of this encounter: 1.676 m (5' 6\").    Weight as of this encounter: 88.3 kg (194 lb 11.2 oz).          Peyton Flores, [facility]  1/28/2020 11:22 AM      "

## 2020-01-28 NOTE — PATIENT INSTRUCTIONS
Dx:  1. Rheumatoid arthritis, quiescent    Plan:  1. Continue methotrexate 0.8 ml once weekly; folic acid 1 mg daily.  2. Every 3 month laboratory evaluation

## 2020-01-28 NOTE — LETTER
"2020    RE: Abida Hahn  [address]  [facility] [address]     [facility]  Rheumatology Clinic  Bebeto Mccurdy MD  2020     Name: Abida Hahn  MRN: [id number]  Age: 79 year old  : 1940  Referring provider: Bebeto Mccurdy     Assessment and Plan:  # Rheumatoid arthritis involving multiple sites with positive rheumatoid factor (high RF, ACPA):   Patient relates stable low-grade morning stiffness, chronic low back pain, but no change in range of motion or swelling of small joint predominant arthritis. Exam shows chronic deformities associated with prior inflammatory arthritis with ulnar deviation in the right greater than left hands. Blood work from 2020 showed creatinine stable at 1.09. LFTs and CBC were normal.     Rheumatoid arthritis remains in remission with methotrexate monotherapy. Plan:  - Methotrexate 20 mg (0.8 mL) once weekly.   - Folic acid 2 mg daily.  - While on methotrexate, every 3 month LFTs, CBC, and creatinine.     Return to clinic in 6 months.     Orders:  - methotrexate 50 MG/2ML injection  Dispense: 4 mL; Refill: 5  - Needle, Disp, (BD SAFETYGLIDE NEEDLE) 27G X 5/8\" MISC  Dispense: 12 each; Refill: 1  - folic acid 800 MCG tablet  Dispense: 120 tablet; Refill: 3    Follow-up: Return in about 6 months (around 2020).    HPI:   Abida Hahn is a 79 year old female with a history including rheumatoid arthritis (+RF 1990s, +CCP >250. +YEYO) who presents for follow-up. I last saw the patient on 2019 for rheumatoid arthritis. Impression was of arthritis under excellent control. Methotrexate monotherapy was continued.     Today, the patient reports that she has constant lower back pain which is her main concern today. She states that her finger knuckles are painful, not very often though. She explains that her fingers feel cold often, so she wears gloves frequently. She adds that hand pain/coldness does not interfere with her life. She denies any other " joint issues.    She explains that her feet are not very painful now, possibly due to switching from higher-heeled shoes to flat shoes. She states that her methotrexate dose has remained unchanged; she takes liquid form 20 mg weekly by mouth mixed with water.     Review of Systems:   Pertinent items are noted in HPI or as below, remainder of complete ROS is negative.      No recent problems with hearing or vision. No swallowing problems.   No breathing difficulty, shortness of breath, coughing, or wheezing.  No chest pain or palpitations.  No heart burn, indigestion, abdominal pain, nausea, vomiting, diarrhea.  No urination problems, no bloody, cloudy urine, no dysuria.  No numbing, tingling, weakness.  No headaches or confusion.  No rashes. No easy bleeding or bruising.     Active Medications:   Current Outpatient Medications:      amLODIPine (NORVASC) 5 MG tablet, Take 1 tablet (5 mg) by mouth daily Please make appt for annual visit [phone], Disp: 90 tablet, Rfl: 0     ARTIFICIAL TEAR SOLUTION OP, Apply  to eye as needed., Disp: , Rfl:      Cholecalciferol (VITAMIN D-3) 1000 UNITS CAPS, Take by mouth 2 times daily, Disp: , Rfl:      folic acid 800 MCG TABS, Take by mouth 2 times daily, Disp: , Rfl:      GLUCOSAMINE SULFATE PO, Take 1,000 mg by mouth daily, Disp: , Rfl:      latanoprost (XALATAN) 0.005 % ophthalmic solution, Place 1 drop into both eyes At Bedtime, Disp: 3 Bottle, Rfl: 3     levothyroxine (SYNTHROID/LEVOTHROID) 88 MCG tablet, Take 1 tablet (88 mcg) by mouth daily, Disp: 90 tablet, Rfl: 0     lisinopril (PRINIVIL/ZESTRIL) 40 MG tablet, Take 1 tablet (40 mg) by mouth daily, Disp: 90 tablet, Rfl: 0     methotrexate 50 MG/2ML injection CHEMO, MIX 0.8 ML (20 mg) WITH JUICE AND DRINK EVERY 7 DAYS  *APPOINTMENT DUE. LABS EVERY 8-12 WK, Disp: 4 mL, Rfl: 5     albuterol (PROAIR HFA/PROVENTIL HFA/VENTOLIN HFA) 108 (90 BASE) MCG/ACT Inhaler, Inhale 2 puffs into the lungs every 6 hours as needed for  "shortness of breath / dyspnea or wheezing (Patient not taking: Reported on 1/28/2020), Disp: 1 Inhaler, Rfl: 1     Calcium-Vitamin D (CALCIUM + D PO), Take 1 tablet by mouth 2 times daily, Disp: , Rfl:      Multiple Vitamin (DAILY MULTIVITAMIN PO), Take 1 tablet by mouth daily And minerals per pt, Disp: , Rfl:      Needle, Disp, (BD SAFETYGLIDE NEEDLE) 27G X 5/8\" MISC, For methotrexate use EVERY 7 DAY, Disp: 12 each, Rfl: 1    Current Facility-Administered Medications:      influenza Vac Split High-Dose (FLUZONE) injection 0.5 mL, 0.5 mL, Intramuscular, Once, Bebeto Mccurdy MD    Allergies:  Ibuprofen Sodium   Penicillins   Triple Antibiotic [Neomycin-Polymyxin-Dexameth]   Aspirin   Codeine   Ibuprofen    Past Medical History:  Abscess, toe  Right great toe  Cataract  Primary open angle glaucoma of both eyes, severe stage  Pseudophakia of both eyes  Headache   Hypertension   Hypothyroidism   Interstitial lung disease   Pyoderma gangrenosum   Rheumatoid arthritis (+RF 1990s, +CCP >250, +YEYO)  Wrist fracture   Chronic pain syndrome  Osteoporosis    Past Surgical History:  Biopsy artery temporal    Extracapsular cataract extraction with intraocular lens implant 8/2011  Open reduction internal fixation wrist bilateral      Family History:    The patient's family history includes Arthritis in her brother and mother; Cancer in her father; Emphysema in her father; Glaucoma in her brother and mother.    Social History:  The patient reports that she quit smoking about 27 years ago. She smoked 0.50 packs per day. She has never used smokeless tobacco. She reports that she does not drink alcohol or use drugs.   PCP: Amarilys Iyer  Marital Status: Single    Physical Exam:   /71 (BP Location: Right arm, Patient Position: Sitting, Cuff Size: Adult Large)   Pulse 71   Temp 97.9  F (36.6  C) (Oral)   Resp 18   Ht 1.676 m (5' 6\")   Wt 88.3 kg (194 lb 11.2 oz)   SpO2 94%   BMI 31.43 kg/m      Wt Readings from " Last 4 Encounters:   01/28/20 88.3 kg (194 lb 11.2 oz)   07/26/19 87.1 kg (192 lb)   05/14/19 87.5 kg (192 lb 12.8 oz)   01/18/19 87.9 kg (193 lb 12.8 oz)     Constitutional: Well-developed, appearing stated age; cooperative.  Eyes: Normal EOM, PERRLA, vision, conjunctiva, sclera.  ENT: Normal external ears, nose, hearing, lips, teeth, gums, throat. No mucous membrane lesions, normal saliva pool.  Neck: No mass or thyroid enlargement.  Resp: Lungs clear to auscultation, nl to palpation.  CV: RRR, no murmurs, rubs or gallops, no edema.  GI: No ABD mass or tenderness, no HSM.  : Not tested.  Lymph: No cervical, supraclavicular, inguinal or epitrochlear nodes.  MS: The TMJ, neck, shoulder, elbow, spine, hip joints were examined and found normal. No dactylitis, tenosynovitis, enthesopathy. Previously noted changes at PIPs and MCPs with angulation, but there is no palpable synovial swelling at PIPs or MCPs. Good  strength. Reasonable range of motion in the wrists without any inflammatory changes. There is a flattened longitudinal arch in the left foot, but no inflammatory ankle or MTP changes. Knee is not swollen.  Skin: No nail pitting, alopecia, rash, nodules, or lesions.  Neuro: Normal cranial nerves, strength, sensation, DTRs.   Psych: Normal judgement, orientation, memory, affect.     Laboratory:   RHEUM RESULTS Latest Ref Rng & Units 4/16/2019 7/26/2019 1/28/2020   SED RATE 0 - 30 mm/h - - -   CRP, INFLAMMATION 0.0 - 8.0 mg/L - - -   CYCLIC CIT PEPT IGG <5 U/mL - - -   RHEUMATOID FACTOR 0 - 14 IU/mL - - -   YEYO SCREEN BY EIA <1.0 - - -   AST 0 - 45 U/L 14 15 13   ALT 0 - 50 U/L 23 20 18   ALBUMIN 3.4 - 5.0 g/dL 3.5 3.7 3.4   WBC 4.0 - 11.0 10e9/L 3.9(L) 6.0 7.0   RBC 3.8 - 5.2 10e12/L 4.28 4.38 4.22   HGB 11.7 - 15.7 g/dL 14.2 14.3 13.9   HCT 35.0 - 47.0 % 44.7 44.9 44.4   MCV 78 - 100 fl 104(H) 103(H) 105(H)   MCHC 31.5 - 36.5 g/dL 31.8 31.8 31.3(L)   RDW 10.0 - 15.0 % 13.6 12.9 14.2    - 450 10e9/L  160 193 205   CREATININE 0.52 - 1.04 mg/dL 0.96 1.02 1.09(H)   GFR ESTIMATE, IF BLACK >60 mL/min/[1.73:m2] 66 61 56(L)   GFR ESTIMATE >60 mL/min/[1.73:m2] 57(L) 52(L) 48(L)     Rheumatoid Factor   Date Value Ref Range Status   03/11/2009 1990 (H) 0 - 14 IU/mL Final     Cyclic Cit Pept IgG/IgA   Date Value Ref Range Status   01/26/2012 >250  Strongly Positive (H) <20 UNITS Final     Cyclic Citrullinated Peptide IgG   Date Value Ref Range Status   03/11/2009 38 (H) <5 U/mL Final     Comment:     Interpretation:  Positive     Scleroderma Antibody Scl-70 CAROL ANN IgG   Date Value Ref Range Status   08/18/2016 0.5 0.0 - 0.9 AI Final     Comment:     Negative   Antibody index (AI) values reflect qualitative changes in antibody   concentration that cannot be directly associated with clinical condition or   disease state.       SSA (Ro) (CAROL ANN) Antibody, IgG   Date Value Ref Range Status   08/18/2016  0.0 - 0.9 AI Final    <0.2  Negative   Antibody index (AI) values reflect qualitative changes in antibody   concentration that cannot be directly associated with clinical condition or   disease state.       SSB (La) (CAROL ANN) Antibody, IgG   Date Value Ref Range Status   08/18/2016  0.0 - 0.9 AI Final    <0.2  Negative   Antibody index (AI) values reflect qualitative changes in antibody   concentration that cannot be directly associated with clinical condition or   disease state.       YEYO Screen by EIA   Date Value Ref Range Status   08/18/2016 13.0 (H) <1.0 Final     Comment:     Interpretation:  Positive     Proteinase 3 Antibody IgG   Date Value Ref Range Status   08/18/2016  0.0 - 0.9 AI Final    <0.2  Negative   Antibody index (AI) values reflect qualitative changes in antibody   concentration that cannot be directly associated with clinical condition or   disease state.       Myeloperoxidase Antibody IgG   Date Value Ref Range Status   08/18/2016  0.0 - 0.9 AI Final    <0.2  Negative   Antibody index (AI) values reflect  qualitative changes in antibody   concentration that cannot be directly associated with clinical condition or   disease state.       Smith CAROL ANN Antibody IgG   Date Value Ref Range Status   08/18/2016  0.0 - 0.9 AI Final    <0.2  Negative   Antibody index (AI) values reflect qualitative changes in antibody   concentration that cannot be directly associated with clinical condition or   disease state.       Scleroderma Antibody Scl-70 CAROL ANN IgG   Date Value Ref Range Status   08/18/2016 0.5 0.0 - 0.9 AI Final     Comment:     Negative   Antibody index (AI) values reflect qualitative changes in antibody   concentration that cannot be directly associated with clinical condition or   disease state.       Scribe Disclosure:  IBuddy, am serving as a scribe to document services personally performed by Bebeto Mccurdy MD at this visit, based upon the provider's statements to me. All documentation has been reviewed by the aforementioned provider prior to being entered into the official medical record.     Bebeto Mccurdy MD

## 2020-02-10 ENCOUNTER — HEALTH MAINTENANCE LETTER (OUTPATIENT)
Age: 80
End: 2020-02-10

## 2020-02-24 DIAGNOSIS — E03.9 HYPOTHYROIDISM: ICD-10-CM

## 2020-02-25 ENCOUNTER — OFFICE VISIT (OUTPATIENT)
Dept: OPHTHALMOLOGY | Facility: CLINIC | Age: 80
End: 2020-02-25
Attending: OPHTHALMOLOGY
Payer: MEDICARE

## 2020-02-25 DIAGNOSIS — H40.1133 PRIMARY OPEN ANGLE GLAUCOMA OF BOTH EYES, SEVERE STAGE: Primary | ICD-10-CM

## 2020-02-25 DIAGNOSIS — Z96.1 PSEUDOPHAKIA OF BOTH EYES: ICD-10-CM

## 2020-02-25 PROCEDURE — 92133 CPTRZD OPH DX IMG PST SGM ON: CPT | Mod: ZF | Performed by: OPHTHALMOLOGY

## 2020-02-25 PROCEDURE — G0463 HOSPITAL OUTPT CLINIC VISIT: HCPCS | Mod: ZF

## 2020-02-25 PROCEDURE — 92083 EXTENDED VISUAL FIELD XM: CPT | Mod: ZF | Performed by: OPHTHALMOLOGY

## 2020-02-25 RX ORDER — BRIMONIDINE TARTRATE 2 MG/ML
1 SOLUTION/ DROPS OPHTHALMIC 2 TIMES DAILY
Qty: 15 ML | Refills: 11 | Status: SHIPPED | OUTPATIENT
Start: 2020-02-25

## 2020-02-25 ASSESSMENT — REFRACTION_WEARINGRX
OD_SPHERE: -1.50
OD_CYLINDER: +0.50
OD_AXIS: 148
OS_ADD: +3.00
OS_AXIS: 109
OS_SPHERE: -0.75
OS_CYLINDER: +1.50
OD_ADD: +3.00

## 2020-02-25 ASSESSMENT — PACHYMETRY
OD_CT(UM): 571
OS_CT(UM): 540

## 2020-02-25 ASSESSMENT — CONF VISUAL FIELD
OS_SUPERIOR_TEMPORAL_RESTRICTION: 3
OS_SUPERIOR_NASAL_RESTRICTION: 3
OD_SUPERIOR_TEMPORAL_RESTRICTION: 3
OD_INFERIOR_TEMPORAL_RESTRICTION: 3
OD_SUPERIOR_NASAL_RESTRICTION: 3
OD_INFERIOR_NASAL_RESTRICTION: 3
METHOD: COUNTING FINGERS

## 2020-02-25 ASSESSMENT — TONOMETRY
OD_IOP_MMHG: 15
OD_IOP_MMHG: 20
IOP_METHOD: APPLANATION
OD_IOP_MMHG: 16
OS_IOP_MMHG: 15
IOP_METHOD: APPLANATION
OS_IOP_MMHG: 17
OS_IOP_MMHG: 13
IOP_METHOD: APPLANATION

## 2020-02-25 ASSESSMENT — CUP TO DISC RATIO
OS_RATIO: 0.9
OD_RATIO: 0.9

## 2020-02-25 ASSESSMENT — VISUAL ACUITY
OD_CC: 20/40
OS_PH_CC: 20/30
METHOD: SNELLEN - LINEAR
OS_CC: 20/40
CORRECTION_TYPE: GLASSES
OS_PH_CC+: -1

## 2020-02-25 ASSESSMENT — SLIT LAMP EXAM - LIDS
COMMENTS: NORMAL
COMMENTS: NORMAL

## 2020-02-25 ASSESSMENT — EXTERNAL EXAM - LEFT EYE: OS_EXAM: NORMAL

## 2020-02-25 ASSESSMENT — EXTERNAL EXAM - RIGHT EYE: OD_EXAM: NORMAL

## 2020-02-25 NOTE — PROGRESS NOTES
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, s/p TRAB OU (OD:96 and OS:95 with revision OS in 1997), H/O Noncompliance with visits (lost to f/u from 3019-3160) with marked progression OS -- K pachy: 571/540   Tmax:     HVF:OD:[place] and OS:Super[place] (marked progresion from 2383-5996 and HVF  OD:[place] and OS:FLuct in 2009    CDR: 0.9/0.9    HRT/OCT: OD:Severe RNFl thinning and OS:Mod RNFL thinning      FHX of Glc: Mother -- gtts, Sister -- possibly     Gonio:  Open with few PAS     Intolerant to:      Asthma/COPD: Yes, on inhalers but pt is not uisng them -- follows with pulmonology  Steroid Use: No    Kidney Stones:  No   Sulfa Allergy:  No    IOP targets:L-M teens (?Lteens) -- IOP borderline  2)PCIOL OU  3)H/O HA -- s/p Negative TAB with Dr. Bellamy   4)H/O RA on MTX -- following with Rheum  5)XIN    MD:pt moving to [place], [place] on Saturday -- pt wants to use gtts OU for simplicity     Patient will continue Latanoprost which is a teal top drop at bedtime in both eyes.   A long discussion of the risks, benefits, and alternatives including potential treatment and management options were had with patient and a decision was made to start on Alphagan (Brimonidine) which is a purple top drop 2x/day (12 hours apart) in BOTH EYES and follow up in [place] with a fellowship trained glaucoma specialist.       The Latanoprost drop may cause lash growth and some darkening of the skin around the eye.  It is also possible in a patient with a freckled iris or michell eyes, that the iris could darken to brown with time, something that is not common but not reversible.    Resident note: (for edu purposes only, see above for recommendations)  VF stable to ? Slight worsening right eye  Retinal nerve fiber layer overall stable  Start Brimonidine right eye two times a day  Follow up in 4-6 weeks for repeat IOP check    Ludwig Bowen MD  PGY-4 Ophthalmology Resident  [phone]    Attending Physician  Attestation:  Complete documentation of historical and exam elements from today's encounter can be found in the full encounter summary report (not reduplicated in this progress note). I personally obtained the chief complaint(s) and history of present illness.  I confirmed and edited as necessary the review of systems, past medical/surgical history, family history, social history, and examination findings as documented by others; and I examined the patient myself. I personally reviewed the relevant tests, images, and reports as documented above. I formulated and edited as necessary the assessment and plan and discussed the findings and management plan with the patient and family.  - Corazon Addison MD

## 2020-02-25 NOTE — NURSING NOTE
Chief Complaints and History of Present Illnesses   Patient presents with     Glaucoma Follow Up     4 month follow up both eyes.     Chief Complaint(s) and History of Present Illness(es)     Glaucoma Follow Up     Comments: 4 month follow up both eyes.              Comments     Pt states vision is the same as last visit. No eye pain today.  No flashes or floaters. No redness or dryness.    MARIELENA Villalba February 25, 2020 10:31 AM

## 2020-02-25 NOTE — PATIENT INSTRUCTIONS
Patient will continue Latanoprost which is a teal top drop at bedtime in both eyes.   A long discussion of the risks, benefits, and alternatives including potential treatment and management options were had with patient and a decision was made to start on Alphagan (Brimonidine) which is a purple top drop 2x/day (12 hours apart) in BOTH EYES and follow up in [place] with a fellowship trained glaucoma specialist.       The Latanoprost drop may cause lash growth and some darkening of the skin around the eye.  It is also possible in a patient with a freckled iris or michell eyes, that the iris could darken to brown with time, something that is not common but not reversible.

## 2020-02-26 RX ORDER — LEVOTHYROXINE SODIUM 88 UG/1
88 TABLET ORAL DAILY
Qty: 30 TABLET | Refills: 0 | Status: SHIPPED | OUTPATIENT
Start: 2020-02-26 | End: 2020-03-24

## 2020-02-26 NOTE — TELEPHONE ENCOUNTER
Last Clinic Visit: 11/29/18, NV 3/26/20, 30 day rai RX provided per protocol, received 90 day rai RX with last refill, over due for TSH, routed to clinic to schedule for TSH with appointment 3/26/20

## 2020-03-10 DIAGNOSIS — I10 ESSENTIAL HYPERTENSION: ICD-10-CM

## 2020-03-18 RX ORDER — AMLODIPINE BESYLATE 5 MG/1
TABLET ORAL
Qty: 90 TABLET | Refills: 0 | Status: SHIPPED | OUTPATIENT
Start: 2020-03-18 | End: 2020-06-23

## 2020-03-18 NOTE — TELEPHONE ENCOUNTER
Last Clinic Visit:  11/9/18  NV:  3/26/20    90 DAY RF   OVER DUE APPT & ABNORM CR  CR 1.09   1/28/20  Scheduling / RN  Has NOT been notified APPT / LABS

## 2020-03-24 DIAGNOSIS — E03.9 HYPOTHYROIDISM: ICD-10-CM

## 2020-03-24 DIAGNOSIS — I10 ESSENTIAL HYPERTENSION: ICD-10-CM

## 2020-03-24 RX ORDER — LEVOTHYROXINE SODIUM 88 UG/1
88 TABLET ORAL DAILY
Qty: 90 TABLET | Refills: 0 | Status: SHIPPED | OUTPATIENT
Start: 2020-03-24 | End: 2020-06-23

## 2020-03-24 RX ORDER — LISINOPRIL 40 MG/1
40 TABLET ORAL DAILY
Qty: 90 TABLET | Refills: 0 | Status: SHIPPED | OUTPATIENT
Start: 2020-03-24 | End: 2020-06-23

## 2020-03-24 NOTE — TELEPHONE ENCOUNTER
Last Clinic Visit: 11/29/2018  [facility]  Appointment scheduled 3-26-19

## 2020-03-24 NOTE — TELEPHONE ENCOUNTER
LISINOPRIL 40MG TABLETS      Last Written Prescription Date:  12/30/2019  Last Fill Quantity: 90,   # refills: 0  Last Office Visit : 11/29/2018  Future Office visit:  3/26/2020  90 Tabs, 0 Refills sent to pharm  Over riding Protocol due to Coronavirus    Yumiko Vasquez RN  Central Triage Red Flags/Med Refills

## 2020-05-21 DIAGNOSIS — M05.79 RHEUMATOID ARTHRITIS INVOLVING MULTIPLE SITES WITH POSITIVE RHEUMATOID FACTOR (H): ICD-10-CM

## 2020-05-22 RX ORDER — METHOTREXATE 25 MG/ML
INJECTION, SOLUTION INTRA-ARTERIAL; INTRAMUSCULAR; INTRAVENOUS
Qty: 4 ML | Refills: 5 | Status: SHIPPED | OUTPATIENT
Start: 2020-05-22

## 2020-05-22 NOTE — TELEPHONE ENCOUNTER
methotrexate 50 MG/2ML injection     Last Written Prescription Date:  1/28/2020  Last Fill Quantity: 4,   # refills: 5  Last Office Visit: 1/28/2020  Future Office visit:  7/31/2020    CBC RESULTS:   Recent Labs   Lab Test 01/28/20  1036   WBC 7.0   RBC 4.22   HGB 13.9   HCT 44.4   *   MCH 32.9   MCHC 31.3*   RDW 14.2          Creatinine   Date Value Ref Range Status   01/28/2020 1.09 (H) 0.52 - 1.04 mg/dL Final   ]    Liver Function Studies -   Recent Labs   Lab Test 01/28/20  1036  08/27/18  1043   PROTTOTAL  --   --  7.5   ALBUMIN 3.4   < > 3.6   BILITOTAL  --   --  0.6   ALKPHOS  --   --  94   AST 13   < > 18   ALT 18   < > 24    < > = values in this interval not displayed.       Routing refill request to provider for review/approval because:  Labs Due:   CBC, CR, LIVER FUNCTION STUDIES  Drug not on the G, P or  Health refill protocol or controlled substance      Yumiko Vasquez RN  Central Triage Red Flags/Med Refills

## 2020-06-05 DIAGNOSIS — H40.1133 PRIMARY OPEN ANGLE GLAUCOMA OF BOTH EYES, SEVERE STAGE: ICD-10-CM

## 2020-06-05 RX ORDER — LATANOPROST 50 UG/ML
1 SOLUTION/ DROPS OPHTHALMIC AT BEDTIME
Qty: 3 BOTTLE | Refills: 2 | Status: SHIPPED | OUTPATIENT
Start: 2020-06-05

## 2020-06-05 NOTE — TELEPHONE ENCOUNTER
Last Clinic Visit: 2/25/2020  Eye Clinic  Last clinic note:  Patient will continue Latanoprost which is a teal top drop at bedtime in both eyes.

## 2020-06-22 DIAGNOSIS — I10 ESSENTIAL HYPERTENSION: ICD-10-CM

## 2020-06-23 DIAGNOSIS — E03.9 HYPOTHYROIDISM: ICD-10-CM

## 2020-06-23 DIAGNOSIS — I10 ESSENTIAL HYPERTENSION: ICD-10-CM

## 2020-06-25 RX ORDER — LEVOTHYROXINE SODIUM 88 UG/1
TABLET ORAL
Qty: 90 TABLET | OUTPATIENT
Start: 2020-06-25

## 2020-06-25 RX ORDER — AMLODIPINE BESYLATE 5 MG/1
TABLET ORAL
Qty: 90 TABLET | Refills: 0 | OUTPATIENT
Start: 2020-06-25

## 2020-06-25 RX ORDER — LISINOPRIL 40 MG/1
TABLET ORAL
Qty: 90 TABLET | OUTPATIENT
Start: 2020-06-25

## 2020-06-25 RX ORDER — AMLODIPINE BESYLATE 5 MG/1
TABLET ORAL
Qty: 90 TABLET | OUTPATIENT
Start: 2020-06-25

## 2020-06-25 NOTE — TELEPHONE ENCOUNTER
Call to [facility]'s pharmacy in [facility], Abida has been using their digital system to request refills, the numbers are linked to the [facility] pharmacy, request appears to be coming from local pharmacy, however, she has been picking meds up from [facility] in [facility] for the past 4 months.  Per Ophthalmology note from 2/25/20 she moved to [place] end of February, beginning of March 2020.  Refills declined, requesting pharmacy send to new primary care in [place]

## 2020-06-25 NOTE — TELEPHONE ENCOUNTER
AMLODIPINE BESYLATE 5MG TABLETS   amLODIPine (NORVASC) 5 MG tablet  30 tablet  0  6/23/2020   No    Sig: TAKE 1 TABLET BY MOUTH EVERY DAY    Sent to pharmacy as: amLODIPine Besylate 5 MG Oral Tablet (NORVASC)    Class: E-Prescribe    Order: [id number]    E-Prescribing Status: Receipt confirmed by pharmacy (6/23/2020  7:37 AM CDT)    Printout Tracking     External Result Report    Pharmacy     [id number] [place] [id number] AT [facility] OF [address]  Central Triage Red Flags/Med Refills

## 2020-07-24 ENCOUNTER — TRANSCRIBE ORDERS (OUTPATIENT)
Dept: ADMINISTRATIVE | Facility: HOSPITAL | Age: 80
End: 2020-07-24

## 2020-07-24 DIAGNOSIS — M05.20 RHEUMATOID ARTERITIS (HCC): ICD-10-CM

## 2020-07-24 DIAGNOSIS — R06.02 SOB (SHORTNESS OF BREATH): Primary | ICD-10-CM

## 2020-07-29 DIAGNOSIS — E03.9 HYPOTHYROIDISM: ICD-10-CM

## 2020-07-29 DIAGNOSIS — I10 ESSENTIAL HYPERTENSION: ICD-10-CM

## 2020-08-03 RX ORDER — LEVOTHYROXINE SODIUM 88 UG/1
TABLET ORAL
Qty: 30 TABLET | Refills: 0 | OUTPATIENT
Start: 2020-08-03

## 2020-08-03 RX ORDER — LISINOPRIL 40 MG/1
TABLET ORAL
Qty: 30 TABLET | Refills: 0 | OUTPATIENT
Start: 2020-08-03

## 2020-08-03 NOTE — TELEPHONE ENCOUNTER
"CHart notes reviewed and mediation denied: 7/2/20 with Dr. Iyer: [place] moved out of state per scheduling note 6/11/20 \" Cancelled via MyChart (pt moved out of state)\"  Per Ophthalmology note in February, patient has moved to [place], [place]  "

## 2020-08-14 ENCOUNTER — TRANSCRIBE ORDERS (OUTPATIENT)
Dept: SLEEP MEDICINE | Facility: HOSPITAL | Age: 80
End: 2020-08-14

## 2020-08-14 DIAGNOSIS — Z01.818 OTHER SPECIFIED PRE-OPERATIVE EXAMINATION: Primary | ICD-10-CM

## 2020-08-17 ENCOUNTER — LAB (OUTPATIENT)
Dept: LAB | Facility: HOSPITAL | Age: 80
End: 2020-08-17

## 2020-08-17 DIAGNOSIS — Z01.818 OTHER SPECIFIED PRE-OPERATIVE EXAMINATION: ICD-10-CM

## 2020-08-17 PROCEDURE — C9803 HOPD COVID-19 SPEC COLLECT: HCPCS

## 2020-08-17 PROCEDURE — U0004 COV-19 TEST NON-CDC HGH THRU: HCPCS

## 2020-08-18 DIAGNOSIS — I10 ESSENTIAL HYPERTENSION: ICD-10-CM

## 2020-08-18 LAB
REF LAB TEST METHOD: NORMAL
SARS-COV-2 RNA RESP QL NAA+PROBE: NOT DETECTED

## 2020-08-19 ENCOUNTER — HOSPITAL ENCOUNTER (OUTPATIENT)
Dept: RESPIRATORY THERAPY | Facility: HOSPITAL | Age: 80
Discharge: HOME | End: 2020-08-19
Admitting: INTERNAL MEDICINE

## 2020-08-19 ENCOUNTER — HOSPITAL ENCOUNTER (OUTPATIENT)
Dept: CT IMAGING | Facility: HOSPITAL | Age: 80
Discharge: HOME | End: 2020-08-19

## 2020-08-19 DIAGNOSIS — M05.20 RHEUMATOID ARTERITIS (HCC): ICD-10-CM

## 2020-08-19 DIAGNOSIS — R06.02 SOB (SHORTNESS OF BREATH): ICD-10-CM

## 2020-08-19 PROCEDURE — 94729 DIFFUSING CAPACITY: CPT

## 2020-08-19 PROCEDURE — 94010 BREATHING CAPACITY TEST: CPT

## 2020-08-19 PROCEDURE — 71250 CT THORAX DX C-: CPT

## 2020-08-19 PROCEDURE — 94726 PLETHYSMOGRAPHY LUNG VOLUMES: CPT

## 2020-08-22 RX ORDER — AMLODIPINE BESYLATE 5 MG/1
TABLET ORAL
Qty: 30 TABLET | Refills: 0 | OUTPATIENT
Start: 2020-08-22

## 2020-11-16 ENCOUNTER — HEALTH MAINTENANCE LETTER (OUTPATIENT)
Age: 80
End: 2020-11-16

## 2021-02-10 DIAGNOSIS — H40.1133 PRIMARY OPEN ANGLE GLAUCOMA OF BOTH EYES, SEVERE STAGE: ICD-10-CM

## 2021-02-10 RX ORDER — LATANOPROST 50 UG/ML
1 SOLUTION/ DROPS OPHTHALMIC AT BEDTIME
Qty: 7.5 ML | Status: CANCELLED | OUTPATIENT
Start: 2021-02-10

## 2021-02-10 NOTE — TELEPHONE ENCOUNTER
Spoke to pharmacy in [place] who confirmed sent request to pt's new eye provider in [facility] for latanoprost    Abhay Sánchez RN 3:30 PM 02/11/21            Left message on home/cell [phone]   Asked to call to confirm if receiving care elswhere-- if so will need to update pharmacy to send request to new pharmacy  If has not, would be able to fill Rx and schedule future appt here with [facility]    Last noted stated pt would be transferring care in the state of [place].  Last note 2- below:    MD:pt moving to [place], [place] on Saturday -- pt wants to use gtts OU for simplicity      Patient will continue Latanoprost which is a teal top drop at bedtime in both eyes.   A long discussion of the risks, benefits, and alternatives including potential treatment and management options were had with patient and a decision was made to start on Alphagan (Brimonidine) which is a purple top drop 2x/day (12 hours apart) in BOTH EYES and follow up in [place] with a fellowship trained glaucoma specialist  --  Abhay Sánchez RN 5:05 PM 02/10/21          Left message on cell/home [phone] to review refill request/future scheduling    Per last note in 2-2020 pt may be following with glaucoma MD in [place]    Abhay Sánchez RN 12:28 PM 02/10/21

## 2021-02-10 NOTE — TELEPHONE ENCOUNTER
latanoprost (XALATAN) 0.005 % ophthalmic solution    Requested directions:  Place 1 drop into both eyes At Bedtime - Both Eyes  Current directions on the medication list:  Place 1 drop into both eyes At Bedtime - Both Eyes    Last Written Prescription Date:  6/5/2020  Last Fill Quantity: 3,   # refills: 2    Last Office Visit:  2/25/2020  Future Office visit: None     Attending Provider:  Corazon Addison MD  Ophthalmology  Last Clinic Note:  2/25/2020    Patient will continue Latanoprost which is a teal top drop at bedtime in both eyes.   A long discussion of the risks, benefits, and alternatives including potential treatment and management options were had with patient and a decision was made to start on Alphagan (Brimonidine) which is a purple top drop 2x/day (12 hours apart) in BOTH EYES and follow up in [place] with a fellowship trained glaucoma specialist.       The Latanoprost drop may cause lash growth and some darkening of the skin around the eye.  It is also possible in a patient with a freckled iris or michell eyes, that the iris could darken to brown with time, something that is not common but not reversible.     Resident note: (for edu purposes only, see above for recommendations)  VF stable to ? Slight worsening right eye  Retinal nerve fiber layer overall stable  Start Brimonidine right eye two times a day  Follow up in 4-6 weeks for repeat IOP check     Ludwig Bowen MD  PGY-4 Ophthalmology Resident  [phone]    Routing refill request to provider for review/approval because:  Dr. Cyn khan,    Pt needs to establish care with new Provider in clinic.   Please advise       Yumiko Vasquez RN  Central Triage Red Flags/Med Refills

## 2021-03-28 DIAGNOSIS — H40.1133 PRIMARY OPEN ANGLE GLAUCOMA OF BOTH EYES, SEVERE STAGE: ICD-10-CM

## 2021-03-29 RX ORDER — LATANOPROST 50 UG/ML
1 SOLUTION/ DROPS OPHTHALMIC AT BEDTIME
Qty: 7.5 ML | OUTPATIENT
Start: 2021-03-29

## 2021-03-29 RX ORDER — BRIMONIDINE TARTRATE 2 MG/ML
1 SOLUTION/ DROPS OPHTHALMIC 2 TIMES DAILY
Qty: 15 ML | Refills: 11 | OUTPATIENT
Start: 2021-03-29

## 2021-04-03 ENCOUNTER — HEALTH MAINTENANCE LETTER (OUTPATIENT)
Age: 81
End: 2021-04-03

## 2021-06-02 ENCOUNTER — TRANSCRIBE ORDERS (OUTPATIENT)
Dept: ADMINISTRATIVE | Facility: HOSPITAL | Age: 81
End: 2021-06-02

## 2021-06-02 DIAGNOSIS — R91.8 PULMONARY NODULES: Primary | ICD-10-CM

## 2021-06-24 ENCOUNTER — HOSPITAL ENCOUNTER (OUTPATIENT)
Dept: CT IMAGING | Facility: HOSPITAL | Age: 81
Discharge: HOME | End: 2021-06-24
Admitting: NURSE PRACTITIONER

## 2021-06-24 DIAGNOSIS — R91.8 PULMONARY NODULES: ICD-10-CM

## 2021-06-24 PROCEDURE — 71250 CT THORAX DX C-: CPT

## 2021-09-18 ENCOUNTER — HEALTH MAINTENANCE LETTER (OUTPATIENT)
Age: 81
End: 2021-09-18

## 2022-04-30 ENCOUNTER — HEALTH MAINTENANCE LETTER (OUTPATIENT)
Age: 82
End: 2022-04-30

## 2022-11-20 ENCOUNTER — HEALTH MAINTENANCE LETTER (OUTPATIENT)
Age: 82
End: 2022-11-20

## 2023-01-18 ENCOUNTER — TRANSCRIBE ORDERS (OUTPATIENT)
Dept: ADMINISTRATIVE | Facility: HOSPITAL | Age: 83
End: 2023-01-18
Payer: COMMERCIAL

## 2023-01-18 DIAGNOSIS — M54.50 LOW BACK PAIN ASSOCIATED WITH A SPINAL DISORDER OTHER THAN RADICULOPATHY OR SPINAL STENOSIS: Primary | ICD-10-CM

## 2023-02-22 ENCOUNTER — TRANSCRIBE ORDERS (OUTPATIENT)
Dept: ADMINISTRATIVE | Facility: HOSPITAL | Age: 83
End: 2023-02-22
Payer: COMMERCIAL

## 2023-02-22 ENCOUNTER — HOSPITAL ENCOUNTER (OUTPATIENT)
Dept: MRI IMAGING | Facility: HOSPITAL | Age: 83
Discharge: HOME | End: 2023-02-22
Payer: MEDICARE

## 2023-02-22 ENCOUNTER — HOSPITAL ENCOUNTER (OUTPATIENT)
Dept: GENERAL RADIOLOGY | Facility: HOSPITAL | Age: 83
Discharge: HOME | End: 2023-02-22
Payer: MEDICARE

## 2023-02-22 DIAGNOSIS — M25.559 HIP PAIN: ICD-10-CM

## 2023-02-22 DIAGNOSIS — M25.559 HIP PAIN: Primary | ICD-10-CM

## 2023-02-22 DIAGNOSIS — M54.50 LOW BACK PAIN ASSOCIATED WITH A SPINAL DISORDER OTHER THAN RADICULOPATHY OR SPINAL STENOSIS: ICD-10-CM

## 2023-02-22 PROCEDURE — 73521 X-RAY EXAM HIPS BI 2 VIEWS: CPT

## 2023-02-22 PROCEDURE — 72148 MRI LUMBAR SPINE W/O DYE: CPT

## 2023-05-05 ENCOUNTER — TRANSCRIBE ORDERS (OUTPATIENT)
Dept: PULMONOLOGY | Facility: HOSPITAL | Age: 83
End: 2023-05-05
Payer: COMMERCIAL

## 2023-05-05 DIAGNOSIS — R06.00 DYSPNEA, UNSPECIFIED TYPE: Primary | ICD-10-CM

## 2023-06-01 ENCOUNTER — HEALTH MAINTENANCE LETTER (OUTPATIENT)
Age: 83
End: 2023-06-01

## 2023-07-28 ENCOUNTER — TRANSCRIBE ORDERS (OUTPATIENT)
Dept: ADMINISTRATIVE | Facility: HOSPITAL | Age: 83
End: 2023-07-28
Payer: COMMERCIAL

## 2023-07-28 DIAGNOSIS — R91.1 NODULE OF LEFT LUNG: ICD-10-CM

## 2023-07-28 DIAGNOSIS — R06.02 SHORTNESS OF BREATH: Primary | ICD-10-CM

## 2023-08-08 NOTE — LETTER
"11/30/2017       RE: Abida Hahn  [address]  [facility] [address]     Dear Colleague,    Thank you for referring your patient, Abida Hahn, to the [facility] DERMATOLOGY at [facility]. Please see a copy of my visit note below.    [facility] Dermatology Note    Dermatology Problem List:  1. Skin eruption to her left ankle, Seen by Dr. Johnson Meraz at [facility]   2. Hx of eczema in her youth  3. Pyoderma gangrenosum  - s/p minocycyline, mupirocin, kenalog injections.  4. Inflamed acrochordon(s) s/p cryo    CC:   Chief Complaint   Patient presents with     Derm Problem     Abida is here for a wound follow up, no concerns noted at this time.     Encounter Date: Nov 30, 2017    History of Present Illness:  Ms. Abida Hahn is a 77 year old female for a follow up for pyoderma gangrenosum. The patient was last seen on 8/30/17 when areas were curetted on the left lower extremity and 4 inflamed acrochordons were treated with cryotherapy. Today the patient reports that the wound on the top of the ankle healed well, but she notes that there is other spots on the right side of the left ankle that could be curetted at today's visit. She states that her stomach has had some issues where it is painful and she \"burps\" often. This is being followed by another provider. She notes her joints are okay. The patient reports no other lesions of concern at this time.     Otherwise, the patient reports no painful, bleeding, nonhealing, or pruritic lesions, and denies new or changing moles.    Past Medical History:   Patient Active Problem List   Diagnosis     Chronic pain syndrome     Essential hypertension     Rheumatoid arthritis (H)     Encounter for long-term current use of medication     Pyoderma gangrenosum     Hypothyroidism     Osteoporosis     Primary open angle glaucoma of both eyes, severe stage     Pseudophakia of both eyes     ILD (interstitial " lung disease) (H)     Past Medical History:   Diagnosis Date     Abscess, toe 2009    Right great toe     Cataract 8/2011    Right s/p surgery 8/2011; left s/p surgery     Glaucoma      Headache(784.0)     Relieved with gabapentin. 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 +YEYO      Wrist fracture     Right, s/p ORIF     Past Surgical History:   Procedure Laterality Date     BIOPSY ARTERY TEMPORAL       EXTRACAPSULAR CATARACT EXTRATION WITH INTRAOCULAR LENS IMPLANT  8/2011    Left 2 years ago as well     OPEN REDUCTION INTERNAL FIXATION WRIST BILATERAL       Social History:  The patient is single. The patient denies use of tanning beds. Patiently awaiting great grandchildren from her 24-year-old granddaughter, whom she loves very much.    Family History:  There is no family history of asthma, eczema or allergies.    Medications:  Current Outpatient Prescriptions   Medication Sig Dispense Refill     LOSARTAN POTASSIUM PO Take 1 tablet by mouth daily       fluticasone-vilanterol (BREO ELLIPTA) 100-25 MCG/INH oral inhaler Inhale 1 puff into the lungs daily 1 Inhaler 0     mometasone-formoterol (DULERA) 100-5 MCG/ACT oral inhaler Inhale 2 puffs into the lungs 2 times daily 13 g 1     albuterol (PROAIR HFA/PROVENTIL HFA/VENTOLIN HFA) 108 (90 BASE) MCG/ACT Inhaler Inhale 2 puffs into the lungs every 6 hours as needed for shortness of breath / dyspnea or wheezing 1 Inhaler 1     omeprazole (PRILOSEC) 40 MG capsule Take 1 capsule (40 mg) by mouth daily IN AM before breakfast 90 capsule 0     aspirin 81 MG tablet Take 81 mg by mouth daily       amLODIPine (NORVASC) 5 MG tablet Take 1 tablet (5 mg) by mouth daily 90 tablet 0     lisinopril (PRINIVIL/ZESTRIL) 40 MG tablet Take 1 tablet (40 mg) by  mouth daily 90 tablet 1     methotrexate 2.5 MG tablet CHEMO Take 8 tablets (20mg) by mouth once a week Take 8 tablets per week 32 tablet 3     latanoprost (XALATAN) 0.005 % ophthalmic solution Place 1 drop into both eyes At Bedtime 1 Bottle 11     GLUCOSAMINE SULFATE PO Take 1,000 mg by mouth daily       folic acid 800 MCG TABS Take by mouth 2 times daily       Cholecalciferol (VITAMIN D-3) 1000 UNITS CAPS Take by mouth 2 times daily       acetaminophen (TYLENOL) 500 MG tablet Take 500-1,000 mg by mouth every 6 hours as needed       levothyroxine (SYNTHROID, LEVOTHROID) 88 MCG tablet Take 88 mcg by mouth daily       ARTIFICIAL TEAR SOLUTION OP Apply  to eye as needed.       Calcium-Vitamin D (CALCIUM + D PO) Take 1 tablet by mouth 2 times daily       Multiple Vitamin (DAILY MULTIVITAMIN PO) Take 1 tablet by mouth daily And minerals per pt       Allergies   Allergen Reactions     Ibuprofen Sodium      Penicillins      Triple Antibiotic [Neomycin-Polymyxin-Dexameth]      Aspirin Rash     Codeine Rash     Review of Systems:  - Skin: per HPI    Physical exam:  Vitals: There were no vitals taken for this visit.  GEN: This is a well developed, well-nourished female in no acute distress, in a pleasant mood.      SKIN: Focused examination of the lower left leg was performed.  - Thin, non tender, pink plaques on the left lower anterior leg and medial leg  - Some retained epidermis in the plaques  - No other lesions of concern on areas examined.     Impression/Plan:  1. Hx of Pyoderma gangrenosum with minocycline hyperpigmentation- improving s/p minocycline and monthly kenalog injections. All areas are closed.   - Significant improvement with kenalog injections and minocycline in the past.  - Areas were curetted at today's visit to removed the retained epidermis.   - No indication for further kenalog injections or minocycline as there is no longer inflammation.  - Photos obtained at today's visit.  - Recommend light  scrubbing with wash cloth in the shower.   -Return as needed, as the areas will continue to improve on their own.   Follow up PRN.    Staff Involved:  Scribe Disclosure:   I, Eboni Velasco, am serving as a scribe to document services personally performed by Lillian De Luna PA-C, based on data collection and the provider's statements to me.    Provider Disclosure:   The documentation recorded by the scribe accurately reflects the services I personally performed and the decisions made by me.    All risks, benefits and alternatives were discussed with patient.  Patient is in agreement and understands the assessment and plan.  All questions were answered.  Sun Screen Education was given.   Return to Clinic as needed.   Lillian De Luna PA-C   [facility]          Detail Level: Simple Additional Notes: Discussed treatment with hemanth/aklief/winlevi vs. a round of accutane, patient will think about options and call with a decision Render Risk Assessment In Note?: no

## 2023-08-21 ENCOUNTER — HOSPITAL ENCOUNTER (OUTPATIENT)
Dept: CARDIOLOGY | Facility: HOSPITAL | Age: 83
Discharge: HOME | End: 2023-08-21
Admitting: INTERNAL MEDICINE
Payer: MEDICARE

## 2023-08-21 VITALS
BODY MASS INDEX: 30.31 KG/M2 | HEART RATE: 67 BPM | WEIGHT: 200 LBS | HEIGHT: 68 IN | DIASTOLIC BLOOD PRESSURE: 77 MMHG | SYSTOLIC BLOOD PRESSURE: 151 MMHG

## 2023-08-21 DIAGNOSIS — R06.02 SHORTNESS OF BREATH: ICD-10-CM

## 2023-08-21 LAB
BH CV ECHO MEAS - AI P1/2T: 578.4 MSEC
BH CV ECHO MEAS - AO MAX PG: 9 MMHG
BH CV ECHO MEAS - AO MEAN PG: 4.3 MMHG
BH CV ECHO MEAS - AO V2 MAX: 150.3 CM/SEC
BH CV ECHO MEAS - AO V2 VTI: 35 CM
BH CV ECHO MEAS - AVA(I,D): 3 CM2
BH CV ECHO MEAS - EDV(CUBED): 133.9 ML
BH CV ECHO MEAS - EDV(MOD-SP2): 111 ML
BH CV ECHO MEAS - EDV(MOD-SP4): 96 ML
BH CV ECHO MEAS - EF(MOD-BP): 65.2 %
BH CV ECHO MEAS - EF(MOD-SP2): 65.8 %
BH CV ECHO MEAS - EF(MOD-SP4): 65.6 %
BH CV ECHO MEAS - ESV(CUBED): 37.2 ML
BH CV ECHO MEAS - ESV(MOD-SP2): 38 ML
BH CV ECHO MEAS - ESV(MOD-SP4): 33 ML
BH CV ECHO MEAS - FS: 34.8 %
BH CV ECHO MEAS - IVS/LVPW: 0.98 CM
BH CV ECHO MEAS - IVSD: 1.11 CM
BH CV ECHO MEAS - LAT PEAK E' VEL: 9 CM/SEC
BH CV ECHO MEAS - LV DIASTOLIC VOL/BSA (35-75): 47.5 CM2
BH CV ECHO MEAS - LV MASS(C)D: 222.7 GRAMS
BH CV ECHO MEAS - LV MAX PG: 7.5 MMHG
BH CV ECHO MEAS - LV MEAN PG: 3.3 MMHG
BH CV ECHO MEAS - LV SYSTOLIC VOL/BSA (12-30): 16.3 CM2
BH CV ECHO MEAS - LV V1 MAX: 136.5 CM/SEC
BH CV ECHO MEAS - LV V1 VTI: 29.6 CM
BH CV ECHO MEAS - LVIDD: 5.1 CM
BH CV ECHO MEAS - LVIDS: 3.3 CM
BH CV ECHO MEAS - LVOT AREA: 3.6 CM2
BH CV ECHO MEAS - LVOT DIAM: 2.13 CM
BH CV ECHO MEAS - LVPWD: 1.14 CM
BH CV ECHO MEAS - MED PEAK E' VEL: 7.5 CM/SEC
BH CV ECHO MEAS - MV A MAX VEL: 77.6 CM/SEC
BH CV ECHO MEAS - MV DEC SLOPE: 252.2 CM/SEC2
BH CV ECHO MEAS - MV DEC TIME: 0.27 MSEC
BH CV ECHO MEAS - MV E MAX VEL: 57.2 CM/SEC
BH CV ECHO MEAS - MV E/A: 0.74
BH CV ECHO MEAS - MV MAX PG: 3 MMHG
BH CV ECHO MEAS - MV MEAN PG: 0.75 MMHG
BH CV ECHO MEAS - MV P1/2T: 78.8 MSEC
BH CV ECHO MEAS - MV V2 VTI: 23.5 CM
BH CV ECHO MEAS - MVA(P1/2T): 2.8 CM2
BH CV ECHO MEAS - MVA(VTI): 4.5 CM2
BH CV ECHO MEAS - PA ACC TIME: 0.1 SEC
BH CV ECHO MEAS - PA V2 MAX: 107.7 CM/SEC
BH CV ECHO MEAS - QP/QS: 1.44
BH CV ECHO MEAS - RV MAX PG: 2.9 MMHG
BH CV ECHO MEAS - RV V1 MAX: 85.9 CM/SEC
BH CV ECHO MEAS - RV V1 VTI: 19.7 CM
BH CV ECHO MEAS - RVOT DIAM: 3.1 CM
BH CV ECHO MEAS - SI(MOD-SP2): 36.1 ML/M2
BH CV ECHO MEAS - SI(MOD-SP4): 31.2 ML/M2
BH CV ECHO MEAS - SV(LVOT): 105.9 ML
BH CV ECHO MEAS - SV(MOD-SP2): 73 ML
BH CV ECHO MEAS - SV(MOD-SP4): 63 ML
BH CV ECHO MEAS - SV(RVOT): 152.2 ML
BH CV ECHO MEASUREMENTS AVERAGE E/E' RATIO: 6.93
BH CV XLRA - RV BASE: 3.4 CM
BH CV XLRA - RV LENGTH: 6.7 CM
BH CV XLRA - RV MID: 3.3 CM
BH CV XLRA - TDI S': 14 CM/SEC
LEFT ATRIUM VOLUME INDEX: 18.9 ML/M2
SINUS: 3.6 CM

## 2023-08-21 PROCEDURE — 25510000001 PERFLUTREN (DEFINITY) 8.476 MG IN SODIUM CHLORIDE (PF) 0.9 % 10 ML INJECTION: Performed by: INTERNAL MEDICINE

## 2023-08-21 PROCEDURE — 93306 TTE W/DOPPLER COMPLETE: CPT | Performed by: INTERNAL MEDICINE

## 2023-08-21 PROCEDURE — 93306 TTE W/DOPPLER COMPLETE: CPT

## 2023-08-21 RX ADMIN — SODIUM CHLORIDE 2 ML: 9 INJECTION INTRAMUSCULAR; INTRAVENOUS; SUBCUTANEOUS at 15:07

## 2024-01-15 ENCOUNTER — HOSPITAL ENCOUNTER (OUTPATIENT)
Dept: CT IMAGING | Facility: HOSPITAL | Age: 84
Discharge: HOME | End: 2024-01-15
Admitting: INTERNAL MEDICINE
Payer: MEDICARE

## 2024-01-15 DIAGNOSIS — R91.1 NODULE OF LEFT LUNG: ICD-10-CM

## 2024-01-15 PROCEDURE — 71250 CT THORAX DX C-: CPT

## 2024-03-26 NOTE — PATIENT INSTRUCTIONS
[facility] Medication Refill Request Information:  * Please contact your pharmacy regarding ANY request for medication refills.  ** [facility] Prescription Fax = [phone]  * Please allow 3 business days for routine medication refills.  * Please allow 5 business days for controlled substance medication refills.     [facility] Test Result notification information:  *You will be notified with in 7-10 days of your appointment day regarding the results of your test.  If you are on MyChart you will be notified as soon as the provider has reviewed the results and signed off on them.    [facility]: [phone]    ambulatory

## (undated) RX ORDER — ACETAMINOPHEN 500 MG
TABLET ORAL
Status: DISPENSED
Start: 2017-03-02